# Patient Record
Sex: MALE | Race: WHITE | NOT HISPANIC OR LATINO | Employment: OTHER | URBAN - METROPOLITAN AREA
[De-identification: names, ages, dates, MRNs, and addresses within clinical notes are randomized per-mention and may not be internally consistent; named-entity substitution may affect disease eponyms.]

---

## 2019-05-13 ENCOUNTER — APPOINTMENT (OUTPATIENT)
Dept: RADIOLOGY | Facility: MEDICAL CENTER | Age: 79
End: 2019-05-13
Attending: EMERGENCY MEDICINE
Payer: MEDICARE

## 2019-05-13 ENCOUNTER — HOSPITAL ENCOUNTER (EMERGENCY)
Facility: MEDICAL CENTER | Age: 79
End: 2019-05-13
Attending: EMERGENCY MEDICINE
Payer: MEDICARE

## 2019-05-13 VITALS
SYSTOLIC BLOOD PRESSURE: 113 MMHG | HEART RATE: 60 BPM | OXYGEN SATURATION: 91 % | BODY MASS INDEX: 26.31 KG/M2 | RESPIRATION RATE: 16 BRPM | HEIGHT: 74 IN | WEIGHT: 205.03 LBS | TEMPERATURE: 97.4 F | DIASTOLIC BLOOD PRESSURE: 83 MMHG

## 2019-05-13 DIAGNOSIS — R06.00 ACUTE DYSPNEA: ICD-10-CM

## 2019-05-13 LAB
ALBUMIN SERPL BCP-MCNC: 3.8 G/DL (ref 3.2–4.9)
ALBUMIN/GLOB SERPL: 1.2 G/DL
ALP SERPL-CCNC: 75 U/L (ref 30–99)
ALT SERPL-CCNC: 34 U/L (ref 2–50)
ANION GAP SERPL CALC-SCNC: 7 MMOL/L (ref 0–11.9)
AST SERPL-CCNC: 29 U/L (ref 12–45)
BASOPHILS # BLD AUTO: 0.5 % (ref 0–1.8)
BASOPHILS # BLD: 0.02 K/UL (ref 0–0.12)
BILIRUB SERPL-MCNC: 1.7 MG/DL (ref 0.1–1.5)
BNP SERPL-MCNC: 695 PG/ML (ref 0–100)
BUN SERPL-MCNC: 21 MG/DL (ref 8–22)
CALCIUM SERPL-MCNC: 8.5 MG/DL (ref 8.4–10.2)
CHLORIDE SERPL-SCNC: 107 MMOL/L (ref 96–112)
CO2 SERPL-SCNC: 22 MMOL/L (ref 20–33)
CREAT SERPL-MCNC: 1.42 MG/DL (ref 0.5–1.4)
EKG IMPRESSION: NORMAL
EOSINOPHIL # BLD AUTO: 0.15 K/UL (ref 0–0.51)
EOSINOPHIL NFR BLD: 3.4 % (ref 0–6.9)
ERYTHROCYTE [DISTWIDTH] IN BLOOD BY AUTOMATED COUNT: 48.9 FL (ref 35.9–50)
GLOBULIN SER CALC-MCNC: 3.1 G/DL (ref 1.9–3.5)
GLUCOSE SERPL-MCNC: 141 MG/DL (ref 65–99)
HCT VFR BLD AUTO: 48.2 % (ref 42–52)
HGB BLD-MCNC: 16.2 G/DL (ref 14–18)
IMM GRANULOCYTES # BLD AUTO: 0.01 K/UL (ref 0–0.11)
IMM GRANULOCYTES NFR BLD AUTO: 0.2 % (ref 0–0.9)
LYMPHOCYTES # BLD AUTO: 1.36 K/UL (ref 1–4.8)
LYMPHOCYTES NFR BLD: 31 % (ref 22–41)
MCH RBC QN AUTO: 33 PG (ref 27–33)
MCHC RBC AUTO-ENTMCNC: 33.6 G/DL (ref 33.7–35.3)
MCV RBC AUTO: 98.2 FL (ref 81.4–97.8)
MONOCYTES # BLD AUTO: 0.49 K/UL (ref 0–0.85)
MONOCYTES NFR BLD AUTO: 11.2 % (ref 0–13.4)
NEUTROPHILS # BLD AUTO: 2.36 K/UL (ref 1.82–7.42)
NEUTROPHILS NFR BLD: 53.7 % (ref 44–72)
NRBC # BLD AUTO: 0 K/UL
NRBC BLD-RTO: 0 /100 WBC
PLATELET # BLD AUTO: 167 K/UL (ref 164–446)
PMV BLD AUTO: 11.3 FL (ref 9–12.9)
POTASSIUM SERPL-SCNC: 4.3 MMOL/L (ref 3.6–5.5)
PROT SERPL-MCNC: 6.9 G/DL (ref 6–8.2)
RBC # BLD AUTO: 4.91 M/UL (ref 4.7–6.1)
SODIUM SERPL-SCNC: 136 MMOL/L (ref 135–145)
TROPONIN I SERPL-MCNC: 0.04 NG/ML (ref 0–0.04)
WBC # BLD AUTO: 4.4 K/UL (ref 4.8–10.8)

## 2019-05-13 PROCEDURE — 700111 HCHG RX REV CODE 636 W/ 250 OVERRIDE (IP): Performed by: EMERGENCY MEDICINE

## 2019-05-13 PROCEDURE — 700101 HCHG RX REV CODE 250: Performed by: EMERGENCY MEDICINE

## 2019-05-13 PROCEDURE — 96374 THER/PROPH/DIAG INJ IV PUSH: CPT

## 2019-05-13 PROCEDURE — 85025 COMPLETE CBC W/AUTO DIFF WBC: CPT

## 2019-05-13 PROCEDURE — 99285 EMERGENCY DEPT VISIT HI MDM: CPT

## 2019-05-13 PROCEDURE — 71045 X-RAY EXAM CHEST 1 VIEW: CPT

## 2019-05-13 PROCEDURE — 93005 ELECTROCARDIOGRAM TRACING: CPT | Performed by: EMERGENCY MEDICINE

## 2019-05-13 PROCEDURE — 83880 ASSAY OF NATRIURETIC PEPTIDE: CPT

## 2019-05-13 PROCEDURE — 94640 AIRWAY INHALATION TREATMENT: CPT

## 2019-05-13 PROCEDURE — 80053 COMPREHEN METABOLIC PANEL: CPT

## 2019-05-13 PROCEDURE — 94760 N-INVAS EAR/PLS OXIMETRY 1: CPT

## 2019-05-13 PROCEDURE — 84484 ASSAY OF TROPONIN QUANT: CPT

## 2019-05-13 PROCEDURE — 36415 COLL VENOUS BLD VENIPUNCTURE: CPT

## 2019-05-13 RX ORDER — FUROSEMIDE 20 MG/1
20 TABLET ORAL SEE ADMIN INSTRUCTIONS
Status: SHIPPED | COMMUNITY
End: 2019-12-11

## 2019-05-13 RX ORDER — ALBUTEROL SULFATE 90 UG/1
2 AEROSOL, METERED RESPIRATORY (INHALATION) EVERY 6 HOURS PRN
Qty: 8.5 G | Refills: 0 | Status: SHIPPED | OUTPATIENT
Start: 2019-05-13 | End: 2024-01-11

## 2019-05-13 RX ORDER — FUROSEMIDE 10 MG/ML
40 INJECTION INTRAMUSCULAR; INTRAVENOUS ONCE
Status: COMPLETED | OUTPATIENT
Start: 2019-05-13 | End: 2019-05-13

## 2019-05-13 RX ORDER — CHLORAL HYDRATE 500 MG
1000 CAPSULE ORAL DAILY
COMMUNITY

## 2019-05-13 RX ORDER — CARVEDILOL 25 MG/1
12.5-25 TABLET ORAL 2 TIMES DAILY
Status: SHIPPED | COMMUNITY
End: 2022-12-09 | Stop reason: SDUPTHER

## 2019-05-13 RX ORDER — AMIODARONE HYDROCHLORIDE 200 MG/1
200 TABLET ORAL DAILY
Status: SHIPPED | COMMUNITY
End: 2021-03-15 | Stop reason: SDUPTHER

## 2019-05-13 RX ORDER — CINACALCET 30 MG/1
30 TABLET, FILM COATED ORAL DAILY
COMMUNITY

## 2019-05-13 RX ORDER — METHYLPREDNISOLONE 4 MG/1
TABLET ORAL
Qty: 1 KIT | Refills: 0 | Status: SHIPPED | OUTPATIENT
Start: 2019-05-13 | End: 2019-07-11

## 2019-05-13 RX ORDER — ATORVASTATIN CALCIUM 20 MG/1
20 TABLET, FILM COATED ORAL EVERY EVENING
Status: SHIPPED | COMMUNITY
End: 2022-12-09

## 2019-05-13 RX ORDER — AMLODIPINE BESYLATE 5 MG/1
5 TABLET ORAL DAILY
Status: SHIPPED | COMMUNITY
End: 2019-07-11 | Stop reason: SDUPTHER

## 2019-05-13 RX ORDER — IPRATROPIUM BROMIDE AND ALBUTEROL SULFATE 2.5; .5 MG/3ML; MG/3ML
3 SOLUTION RESPIRATORY (INHALATION) ONCE
Status: COMPLETED | OUTPATIENT
Start: 2019-05-13 | End: 2019-05-13

## 2019-05-13 RX ORDER — TAMSULOSIN HYDROCHLORIDE 0.4 MG/1
0.8 CAPSULE ORAL
Status: SHIPPED | COMMUNITY
End: 2020-06-01

## 2019-05-13 RX ADMIN — IPRATROPIUM BROMIDE AND ALBUTEROL SULFATE 3 ML: .5; 3 SOLUTION RESPIRATORY (INHALATION) at 12:34

## 2019-05-13 RX ADMIN — FUROSEMIDE 40 MG: 10 INJECTION, SOLUTION INTRAMUSCULAR; INTRAVENOUS at 13:33

## 2019-05-13 NOTE — ED NOTES
Med rec updated and complete  Allergies reviewed  Interviewed pt with wife at bedside with permission from pt.  Pt had a list of medications on his phone, went over all medications.  Pt reports no antibiotics in the last 30 days.

## 2019-05-13 NOTE — ED PROVIDER NOTES
"CHIEF COMPLAINT  Chief Complaint   Patient presents with   • Shortness of Breath     x \" a few days off and on\" , today was the worst. PMH: cardiomyopathy, defibrillator . Keagan ERWIN. Pt recent located from [place].    • Dizziness   • Blurred Vision       HPI  Alejandra Parker is a 78 y.o. male who presents complaints of a shortness of breath.  The shortness of breath is with any exertion.  Has no chest pain sweating or nausea when this occurs.  He does have a history of COPD and has moved here recently from [facility] which is a much lower elevation with higher oxygen pressures.  He also has history of a cardiomyopathy has a pacemaker.  He has not been sick with cough congestion or fevers.  Currently has no shortness of breath.    He is also complaining of intermittent episodes of lightheadedness, the seem to be worse when he is standing and happens almost every time he does stand up.  Again with this he has no chest pain or shortness of breath with it.  He has not had a syncopal episode  REVIEW OF SYSTEMS  See HPI for further details. All other systems are negative.     PAST MEDICAL HISTORY   has a past medical history of Cardiomyopathy (HCC); Chronic obstructive pulmonary disease (HCC); Renal disorder; and Stroke (HCC) (2012).    SOCIAL HISTORY  Social History     Social History Main Topics   • Smoking status: Never Smoker   • Smokeless tobacco: Never Used   • Alcohol use Yes      Comment: wine occasionally   • Drug use: No   • Sexual activity: Not on file       SURGICAL HISTORY  patient denies any surgical history    CURRENT MEDICATIONS  Home Medications     Reviewed by Alfonso Singh (Pharmacy Tech) on 05/13/19 at 1125  Med List Status: Complete   Medication Last Dose Status   amiodarone (CORDARONE) 200 MG Tab 5/13/2019 Active   amLODIPine (NORVASC) 5 MG Tab 5/13/2019 Active   aspirin EC (ECOTRIN) 81 MG Tablet Delayed Response 5/13/2019 Active   atorvastatin (LIPITOR) 20 MG Tab 5/13/2019 Active   carvedilol " "(COREG) 25 MG Tab 5/13/2019 Active   cinacalcet (SENSIPAR) 30 MG Tab 5/13/2019 Active   furosemide (LASIX) 20 MG Tab 5/13/2019 Active   multivitamin (THERAGRAN) Tab 5/13/2019 Active   Omega-3 Fatty Acids (FISH OIL) 1000 MG Cap capsule 5/13/2019 Active   tamsulosin (FLOMAX) 0.4 MG capsule 5/13/2019 Active                ALLERGIES  No Known Allergies    PHYSICAL EXAM  VITAL SIGNS: /83   Pulse 65   Temp 36.3 °C (97.4 °F) (Temporal)   Resp 18   Ht 1.88 m (6' 2\")   Wt 93 kg (205 lb 0.4 oz)   SpO2 92%   BMI 26.32 kg/m²   Pulse ox interpretation: pulse ox is normal.  Constitutional: Alert in no apparent distress.  HENT: No signs of trauma, Bilateral external ears normal, Nose normal.   Eyes: Pupils are equal and reactive, Conjunctiva normal, Non-icteric.   Neck: Normal range of motion, No tenderness, Supple, No stridor.   Lymphatic: No lymphadenopathy noted.   Cardiovascular: Regular rate and rhythm, no murmurs.   Thorax & Lungs: Normal breath sounds, No respiratory distress, No wheezing, No chest tenderness.  Left chest mass consistent with defibrillator  Abdomen: Bowel sounds normal, Soft, No tenderness, No masses, No pulsatile masses. No peritoneal signs.  Skin: Warm, Dry, No erythema, No rash.   Back: No bony tenderness, No CVA tenderness.   Extremities: Intact distal pulses, No edema, No tenderness, No cyanosis,  Negative Vamshi's sign.   Musculoskeletal: Good range of motion in all major joints. No tenderness to palpation or major deformities noted.   Neurologic: Alert , Normal motor function, Normal sensory function, No focal deficits noted.   Psychiatric: Affect normal, Judgment normal, Mood normal.       MEDICAL DECISION MAKING  Has a history of COPD, is having shortness of breath, this may be COPD exacerbation, pneumonia, bronchitis, also concerns of changes in oxygen pressures.  He has a history of cardiomyopathy congestive heart failure and cardiac injury are also considered.  Lab tests are being " initiated to evaluate this.  Currently the patient has no respiratory distress lungs are clear to auscultation he is not hypoxic.  Is also complaining of orthostatic lightheadedness.  IV fluids will be held at this time pending evaluation for possible congestive heart failure as cause of his shortness of breath.      DIAGNOSTIC STUDIES / PROCEDURES    EKG  Results for orders placed or performed during the hospital encounter of 05/13/19   CBC WITH DIFFERENTIAL   Result Value Ref Range    WBC 4.4 (L) 4.8 - 10.8 K/uL    RBC 4.91 4.70 - 6.10 M/uL    Hemoglobin 16.2 14.0 - 18.0 g/dL    Hematocrit 48.2 42.0 - 52.0 %    MCV 98.2 (H) 81.4 - 97.8 fL    MCH 33.0 27.0 - 33.0 pg    MCHC 33.6 (L) 33.7 - 35.3 g/dL    RDW 48.9 35.9 - 50.0 fL    Platelet Count 167 164 - 446 K/uL    MPV 11.3 9.0 - 12.9 fL    Neutrophils-Polys 53.70 44.00 - 72.00 %    Lymphocytes 31.00 22.00 - 41.00 %    Monocytes 11.20 0.00 - 13.40 %    Eosinophils 3.40 0.00 - 6.90 %    Basophils 0.50 0.00 - 1.80 %    Immature Granulocytes 0.20 0.00 - 0.90 %    Nucleated RBC 0.00 /100 WBC    Neutrophils (Absolute) 2.36 1.82 - 7.42 K/uL    Lymphs (Absolute) 1.36 1.00 - 4.80 K/uL    Monos (Absolute) 0.49 0.00 - 0.85 K/uL    Eos (Absolute) 0.15 0.00 - 0.51 K/uL    Baso (Absolute) 0.02 0.00 - 0.12 K/uL    Immature Granulocytes (abs) 0.01 0.00 - 0.11 K/uL    NRBC (Absolute) 0.00 K/uL   COMP METABOLIC PANEL   Result Value Ref Range    Sodium 136 135 - 145 mmol/L    Potassium 4.3 3.6 - 5.5 mmol/L    Chloride 107 96 - 112 mmol/L    Co2 22 20 - 33 mmol/L    Anion Gap 7.0 0.0 - 11.9    Glucose 141 (H) 65 - 99 mg/dL    Bun 21 8 - 22 mg/dL    Creatinine 1.42 (H) 0.50 - 1.40 mg/dL    Calcium 8.5 8.4 - 10.2 mg/dL    AST(SGOT) 29 12 - 45 U/L    ALT(SGPT) 34 2 - 50 U/L    Alkaline Phosphatase 75 30 - 99 U/L    Total Bilirubin 1.7 (H) 0.1 - 1.5 mg/dL    Albumin 3.8 3.2 - 4.9 g/dL    Total Protein 6.9 6.0 - 8.2 g/dL    Globulin 3.1 1.9 - 3.5 g/dL    A-G Ratio 1.2 g/dL   TROPONIN    Result Value Ref Range    Troponin I 0.04 0.00 - 0.04 ng/mL   BTYPE NATRIURETIC PEPTIDE   Result Value Ref Range    B Natriuretic Peptide 695 (H) 0 - 100 pg/mL   ESTIMATED GFR   Result Value Ref Range    GFR If  59 (A) >60 mL/min/1.73 m 2    GFR If Non  48 (A) >60 mL/min/1.73 m 2   EKG (NOW)   Result Value Ref Range    Report       [facility] Emergency Dept.    Test Date:  2019  Pt Name:    TIMI LEONARD               Department: [facility]  MRN:        [id number]                      [facility]  Gender:     Male                         Technician: MANNY  :        1940                   Requested By:JOANN WALLACE  Order #:    [id number]                    Reading MD: JOANN WALLACE D.O.    Measurements  Intervals                                Axis  Rate:       61                           P:  PA:                                      QRS:        23  QRSD:       132                          T:          142  QT:         492  QTc:        496    Interpretive Statements  FAILURE TO SENSE AND/OR CAPTURE (?MAGNET)  ACCELERATED JUNCTIONAL ESCAPE RHYTHM  NONSPECIFIC INTRAVENTRICULAR CONDUCTION DELAY  No previous ECG available for comparison    Electronically Signed On 2019 12:19:57 PDT by JOANN WALLACE D.O.       DX-CHEST-PORTABLE (1 VIEW)   Final Result         1. No pulmonary infiltrates or consolidations are noted.      2. Cardiomegaly.                COURSE  Pertinent Labs & Imaging studies reviewed. (See chart for details)  Patient has a history of cardiomyopathy, COPD, and is just moved here from [place] which is a significant change in elevation he is developed some shortness of breath since he has been here.  This is with exertion there is no chest pain with this.  His troponin is negative, his EKG has pacemaker changes, I do not suspect cardiac injury as etiology of her shortness of breath.  His BNP is mildly elevated but his  chest x-ray shows no pulmonary edema, mild congestive heart failure decompensated is possible so he will receive an extra dose of Lasix.  Also COPD is a concern.  Be placed on steroids and albuterol.  I believe some of this may have to do with acclimatization, and this will take some time several weeks.  He is not hypoxic and no respiratory distress his lungs are clear to auscultation.  There is no indication at this time for admission and I densely made for outpatient management with maximizing his breathing potential.  He is to return if his symptoms change or worsen.  The patient will not drink alcohol nor drive with prescribed medications. The patient will return for worsening symptoms and is stable at the time of discharge. The patient verbalizes understanding and will comply.    FINAL IMPRESSION  1. Acute dyspnea               Electronically signed by: Angel Saunders, 5/13/2019 11:53 AM    ED Provider Note

## 2019-05-13 NOTE — ED TRIAGE NOTES
"Chief Complaint   Patient presents with   • Shortness of Breath     x \" a few days off and on\" , today was the worst. PMH: cardiomyopathy, defibrillator . Keagan ERWIN. Pt recent located from [place].    • Dizziness   • Blurred Vision       "

## 2019-05-13 NOTE — DISCHARGE INSTRUCTIONS
The things contributing to your shortness of breath is a change in out COPD, and perhaps some mild congestive heart failure.  You are being given an additional dose of Lasix now.  Please continue your previous doses.  You are also being treated with a low-dose steroid along with albuterol.  This can help with shortness of breath.  Typically getting acclimatized to this elevation may take a couple weeks.  Return if you develop chest pain or worsening symptoms.

## 2019-05-13 NOTE — FLOWSHEET NOTE
05/13/19 1234   Events/Summary/Plan   Events/Summary/Plan ER TX   Interdisciplinary Plan of Care-Goals (Indications)   Bronchodilator Indications History / Diagnosis   Interdisciplinary Plan of Care-Outcomes    Bronchodilator Outcome Patient at Stable Baseline   Education   Education Yes - Pt. / Family has been Instructed in use of Respiratory Equipment;Yes - Pt. / Family has been Instructed in use of Respiratory Medications and Adverse Reactions   RT Assessment of Delivered Medications   Evaluation of Medication Delivery Daily Yes-- Pt /Family has been Instructed in use of Respiratory Medications and Adverse Reactions   SVN Group   #SVN Performed Yes   Given By: Mouthpiece   Date SVN Last Changed 05/13/19   Respiratory WDL   Respiratory (WDL) X   Chest Exam   Respiration 18   Pulse 65   Heart Rate (Monitored) 64   Breath Sounds   Pre/Post Intervention Pre Intervention Assessment   RUL Breath Sounds Clear;Diminished   RML Breath Sounds Clear;Diminished   RLL Breath Sounds Diminished   CECELIA Breath Sounds Clear   LLL Breath Sounds Clear   Oximetry   #Pulse Oximetry (Single Determination) Yes   Oxygen   Home O2 Use Prior To Admission? No   Pulse Oximetry 92 %   O2 Daily Delivery Respiratory  Room Air with O2 Available

## 2019-07-11 ENCOUNTER — TELEPHONE (OUTPATIENT)
Dept: CARDIOLOGY | Facility: MEDICAL CENTER | Age: 79
End: 2019-07-11

## 2019-07-11 ENCOUNTER — NON-PROVIDER VISIT (OUTPATIENT)
Dept: CARDIOLOGY | Facility: MEDICAL CENTER | Age: 79
End: 2019-07-11
Payer: MEDICARE

## 2019-07-11 ENCOUNTER — OFFICE VISIT (OUTPATIENT)
Dept: CARDIOLOGY | Facility: MEDICAL CENTER | Age: 79
End: 2019-07-11
Payer: MEDICARE

## 2019-07-11 VITALS
BODY MASS INDEX: 25.93 KG/M2 | OXYGEN SATURATION: 93 % | HEART RATE: 88 BPM | SYSTOLIC BLOOD PRESSURE: 100 MMHG | WEIGHT: 202 LBS | DIASTOLIC BLOOD PRESSURE: 70 MMHG | HEIGHT: 74 IN

## 2019-07-11 DIAGNOSIS — E78.49 OTHER HYPERLIPIDEMIA: ICD-10-CM

## 2019-07-11 DIAGNOSIS — Z79.899 ENCOUNTER FOR LONG-TERM (CURRENT) USE OF HIGH-RISK MEDICATION: ICD-10-CM

## 2019-07-11 DIAGNOSIS — Z95.810 AUTOMATIC IMPLANTABLE CARDIOVERTER-DEFIBRILLATOR IN SITU: ICD-10-CM

## 2019-07-11 DIAGNOSIS — R06.02 SHORTNESS OF BREATH: ICD-10-CM

## 2019-07-11 DIAGNOSIS — I42.8 OTHER CARDIOMYOPATHY (HCC): ICD-10-CM

## 2019-07-11 DIAGNOSIS — I47.20 VT (VENTRICULAR TACHYCARDIA) (HCC): ICD-10-CM

## 2019-07-11 DIAGNOSIS — I49.9 CARDIAC ARRHYTHMIA, UNSPECIFIED CARDIAC ARRHYTHMIA TYPE: ICD-10-CM

## 2019-07-11 LAB — EKG IMPRESSION: NORMAL

## 2019-07-11 PROCEDURE — 93283 PRGRMG EVAL IMPLANTABLE DFB: CPT | Performed by: INTERNAL MEDICINE

## 2019-07-11 PROCEDURE — 93000 ELECTROCARDIOGRAM COMPLETE: CPT | Mod: 59 | Performed by: INTERNAL MEDICINE

## 2019-07-11 PROCEDURE — 99205 OFFICE O/P NEW HI 60 MIN: CPT | Mod: 25 | Performed by: INTERNAL MEDICINE

## 2019-07-11 RX ORDER — AMLODIPINE BESYLATE 2.5 MG/1
2.5 TABLET ORAL DAILY
Qty: 30 TAB | Refills: 11 | Status: SHIPPED | OUTPATIENT
Start: 2019-07-11 | End: 2019-10-28

## 2019-07-11 ASSESSMENT — ENCOUNTER SYMPTOMS
PND: 0
ABDOMINAL PAIN: 0
PALPITATIONS: 0
DEPRESSION: 0
ORTHOPNEA: 0
SHORTNESS OF BREATH: 1
DIZZINESS: 1
FALLS: 0
LOSS OF CONSCIOUSNESS: 0

## 2019-07-11 NOTE — TELEPHONE ENCOUNTER
----- Message from Becka Martines M.D. sent at 7/11/2019 11:12 AM PDT -----  Graciela Pond,     This patient needs an urgent ICD interrogation. Per recent report from his prior cardiologist, his ICD went off recently. We don't have any records.   Can you get him in for an interrogation in the week or less either at  or [facility]?    Thanks  AA

## 2019-07-11 NOTE — PROGRESS NOTES
Chief Complaint   Patient presents with   • Cardiomyopathy (Non-ischemic)   • Shortness of Breath       Subjective:   Lencho Parker is a 78 y.o. male referred to cardiology clinic for evaluation of cardiomyopathy.      Patient reports being diagnosed with cardiomyopathy in the late 1990s at which time he was also told that his heart rhythm is abnormal.  He initially received a pacemaker in about a year later he was upgraded to an ICD (approximately 1999).  He reports having a generator change about 5 years ago.  He has been on amiodarone for almost 20 years.    He moved from [place] 3 months ago.  He recently received a call from his previous cardiologist office stating that he had an event on his ICD recently with possible shock from the defibrillator.  Patient does not have any further details about this.  His previous cardiologist was Dr. Limon in [place].    He exercises regularly.  He does labs in a 50 m pool at least 3 times a week without any dyspnea or chest discomfort.  He denies any heart failure symptoms.  He denies any palpitations.  His blood pressure ranges between 80s to low 100 systolic.  When his blood pressures in the 80s he often gets dizziness.  He denies any associated palpitations.  No history of syncope.    He is currently on Lasix 20 mg Monday Wednesday and Friday.    Other pertinent history:  History of AAA status post stent approximately 10 years ago  Solitary kidney.  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  Patient denies having any neurologic deficits.    Past Medical History:   Diagnosis Date   • Cardiomyopathy (HCC)    • Chronic obstructive pulmonary disease (HCC)    • Renal disorder     Pt  donated 1 kidney to son.    • Stroke (HCC) 2012    tia     History reviewed. No pertinent surgical history.  History reviewed. No pertinent family history.  Social History     Social History   • Marital status: Single     Spouse  name: N/A   • Number of children: N/A   • Years of education: N/A     Occupational History   • Not on file.     Social History Main Topics   • Smoking status: Former Smoker     Packs/day: 0.50     Types: Cigarettes     Quit date: 7/11/2004   • Smokeless tobacco: Never Used   • Alcohol use Yes      Comment: wine occasionally   • Drug use: No   • Sexual activity: Not on file     Other Topics Concern   • Not on file     Social History Narrative   • No narrative on file     No Known Allergies  Outpatient Encounter Prescriptions as of 7/11/2019   Medication Sig Dispense Refill   • amLODIPine (NORVASC) 2.5 MG Tab Take 1 Tab by mouth every day. 30 Tab 11   • aspirin EC (ECOTRIN) 81 MG Tablet Delayed Response Take 81 mg by mouth every day.     • amiodarone (CORDARONE) 200 MG Tab Take 200 mg by mouth every day.     • tamsulosin (FLOMAX) 0.4 MG capsule Take 0.8 mg by mouth ONE-HALF HOUR AFTER BREAKFAST.     • cinacalcet (SENSIPAR) 30 MG Tab Take 30 mg by mouth every day.     • furosemide (LASIX) 20 MG Tab Take 20 mg by mouth See Admin Instructions. Every Mon-Wed-Fri     • atorvastatin (LIPITOR) 20 MG Tab Take 20 mg by mouth every day.     • multivitamin (THERAGRAN) Tab Take 1 Tab by mouth every day.     • albuterol 108 (90 Base) MCG/ACT Aero Soln inhalation aerosol Inhale 2 Puffs by mouth every 6 hours as needed for Shortness of Breath. 8.5 g 0   • carvedilol (COREG) 25 MG Tab Take 25 mg by mouth every day. Pt takes 25MG in AM and 12.5MG HS      • Omega-3 Fatty Acids (FISH OIL) 1000 MG Cap capsule Take 1,000 mg by mouth every day.     • [DISCONTINUED] amLODIPine (NORVASC) 5 MG Tab Take 5 mg by mouth every day.     • [DISCONTINUED] MethylPREDNISolone (MEDROL DOSEPAK) 4 MG Tablet Therapy Pack Use as directed 1 Kit 0     No facility-administered encounter medications on file as of 7/11/2019.      Review of Systems   Constitutional: Negative for malaise/fatigue.   Respiratory: Positive for shortness of breath.    Cardiovascular:  "Negative for chest pain, palpitations, orthopnea, leg swelling and PND.   Gastrointestinal: Negative for abdominal pain.   Musculoskeletal: Negative for falls.   Neurological: Positive for dizziness. Negative for loss of consciousness.   Psychiatric/Behavioral: Negative for depression.   All other systems reviewed and are negative.       Objective:   /70 (BP Location: Right arm, Patient Position: Sitting)   Pulse 88   Ht 1.88 m (6' 2\")   Wt 91.6 kg (202 lb)   SpO2 93%   BMI 25.94 kg/m²     Physical Exam   Constitutional: He is oriented to person, place, and time. He appears well-developed and well-nourished. No distress.   HENT:   Head: Normocephalic and atraumatic.   Eyes: Conjunctivae are normal. No scleral icterus.   Neck: Normal range of motion. Neck supple.   Cardiovascular: Normal rate, regular rhythm and normal heart sounds.  Exam reveals no gallop and no friction rub.    No murmur heard.  Pulmonary/Chest: Effort normal and breath sounds normal. No respiratory distress. He has no wheezes. He has no rales.   Abdominal: Soft. He exhibits no distension. There is no tenderness.   Musculoskeletal: He exhibits no edema.   Neurological: He is alert and oriented to person, place, and time.   Skin: Skin is warm and dry. He is not diaphoretic.   Psychiatric: He has a normal mood and affect. His behavior is normal.   Nursing note and vitals reviewed.    EKG performed today was personally reviewed and per my interpretation shows atrial paced rhythm with a long MS interval.  Nonspecific internal jugular conduction delay.  Nonspecific T wave changes in the anterolateral leads.    Assessment:     1. Other cardiomyopathy (HCC)  EKG    Lipid Profile    EC-ECHOCARDIOGRAM COMPLETE W/O CONT   2. Cardiac arrhythmia, unspecified cardiac arrhythmia type  TSH    FREE THYROXINE    HEPATIC FUNCTION PANEL   3. Shortness of breath  PULMONARY FUNCTION TESTS -Test requested: Complete Pulmonary Function Test    EC-ECHOCARDIOGRAM " COMPLETE W/O CONT   4. Other hyperlipidemia  Lipid Profile   5. Encounter for long-term (current) use of high-risk medication         Medical Decision Making:  Today's Assessment / Status / Plan:     History of arrhythmia:  Status post ICD:  Patient reports that he was contacted by his previous cardiologist about an event on his ICD recently.  We will request an urgent interrogation in our clinic in the next week for further evaluation.  He is currently on amiodarone for this reason.  Thyroid and liver function testing was ordered today.    Dyspnea: Since moving to [place], possibly related to the elevation.  Patient does not have symptoms with exercise.  He has been on amiodarone for almost 20 years.  I will refer him for pulmonary function testing for further evaluation.  If the patient starts having exertional symptoms, he should let us know.    Cardiomyopathy:  Hypertension:  Dizziness:  Patient is euvolemic on exam today.  Echocardiogram has been ordered to evaluate LV function.  His blood pressure is at goal however at home he has been significantly lower with associated symptoms.  Will reduce amlodipine to 2.5 mg once a day.  If patient continues to have persistently lower blood pressures, he will let us know.    Return to clinic in 4 months or earlier if needed.    Thank you for allowing me to participate in the care of this patient. Please do not hesitate to contact me with any questions.    Becka Martines MD  Cardiologist  [facility]      PLEASE NOTE: This dictation was created using voice recognition software.

## 2019-07-17 ENCOUNTER — HOSPITAL ENCOUNTER (OUTPATIENT)
Dept: LAB | Facility: MEDICAL CENTER | Age: 79
End: 2019-07-17
Attending: INTERNAL MEDICINE
Payer: MEDICARE

## 2019-07-17 DIAGNOSIS — I49.9 CARDIAC ARRHYTHMIA, UNSPECIFIED CARDIAC ARRHYTHMIA TYPE: ICD-10-CM

## 2019-07-17 DIAGNOSIS — E78.49 OTHER HYPERLIPIDEMIA: ICD-10-CM

## 2019-07-17 DIAGNOSIS — I42.8 OTHER CARDIOMYOPATHY (HCC): ICD-10-CM

## 2019-07-17 LAB
ALBUMIN SERPL BCP-MCNC: 4 G/DL (ref 3.2–4.9)
ALBUMIN SERPL BCP-MCNC: 4.1 G/DL (ref 3.2–4.9)
ALBUMIN/GLOB SERPL: 1.4 G/DL
ALP SERPL-CCNC: 71 U/L (ref 30–99)
ALP SERPL-CCNC: 78 U/L (ref 30–99)
ALT SERPL-CCNC: 33 U/L (ref 2–50)
ALT SERPL-CCNC: 35 U/L (ref 2–50)
ANION GAP SERPL CALC-SCNC: 9 MMOL/L (ref 0–11.9)
APPEARANCE UR: CLEAR
AST SERPL-CCNC: 22 U/L (ref 12–45)
AST SERPL-CCNC: 22 U/L (ref 12–45)
BASOPHILS # BLD AUTO: 0.2 % (ref 0–1.8)
BASOPHILS # BLD: 0.01 K/UL (ref 0–0.12)
BILIRUB CONJ SERPL-MCNC: 0.2 MG/DL (ref 0.1–0.5)
BILIRUB INDIRECT SERPL-MCNC: 1.1 MG/DL (ref 0–1)
BILIRUB SERPL-MCNC: 1.3 MG/DL (ref 0.1–1.5)
BILIRUB SERPL-MCNC: 1.3 MG/DL (ref 0.1–1.5)
BILIRUB UR QL STRIP.AUTO: NEGATIVE
BUN SERPL-MCNC: 24 MG/DL (ref 8–22)
CALCIUM SERPL-MCNC: 9 MG/DL (ref 8.5–10.5)
CHLORIDE SERPL-SCNC: 107 MMOL/L (ref 96–112)
CHOLEST SERPL-MCNC: 192 MG/DL (ref 100–199)
CO2 SERPL-SCNC: 22 MMOL/L (ref 20–33)
COLOR UR: NORMAL
CREAT SERPL-MCNC: 1.67 MG/DL (ref 0.5–1.4)
CREAT UR-MCNC: 180.6 MG/DL
EOSINOPHIL # BLD AUTO: 0.24 K/UL (ref 0–0.51)
EOSINOPHIL NFR BLD: 5.3 % (ref 0–6.9)
ERYTHROCYTE [DISTWIDTH] IN BLOOD BY AUTOMATED COUNT: 51.1 FL (ref 35.9–50)
FASTING STATUS PATIENT QL REPORTED: NORMAL
FASTING STATUS PATIENT QL REPORTED: NORMAL
GLOBULIN SER CALC-MCNC: 2.8 G/DL (ref 1.9–3.5)
GLUCOSE SERPL-MCNC: 130 MG/DL (ref 65–99)
GLUCOSE UR STRIP.AUTO-MCNC: NEGATIVE MG/DL
HCT VFR BLD AUTO: 49.9 % (ref 42–52)
HDLC SERPL-MCNC: 61 MG/DL
HGB BLD-MCNC: 17 G/DL (ref 14–18)
IMM GRANULOCYTES # BLD AUTO: 0.01 K/UL (ref 0–0.11)
IMM GRANULOCYTES NFR BLD AUTO: 0.2 % (ref 0–0.9)
KETONES UR STRIP.AUTO-MCNC: NEGATIVE MG/DL
LDLC SERPL CALC-MCNC: 113 MG/DL
LEUKOCYTE ESTERASE UR QL STRIP.AUTO: NEGATIVE
LYMPHOCYTES # BLD AUTO: 1.26 K/UL (ref 1–4.8)
LYMPHOCYTES NFR BLD: 27.8 % (ref 22–41)
MCH RBC QN AUTO: 33.9 PG (ref 27–33)
MCHC RBC AUTO-ENTMCNC: 34.1 G/DL (ref 33.7–35.3)
MCV RBC AUTO: 99.6 FL (ref 81.4–97.8)
MICRO URNS: NORMAL
MONOCYTES # BLD AUTO: 0.47 K/UL (ref 0–0.85)
MONOCYTES NFR BLD AUTO: 10.4 % (ref 0–13.4)
NEUTROPHILS # BLD AUTO: 2.54 K/UL (ref 1.82–7.42)
NEUTROPHILS NFR BLD: 56.1 % (ref 44–72)
NITRITE UR QL STRIP.AUTO: NEGATIVE
NRBC # BLD AUTO: 0 K/UL
NRBC BLD-RTO: 0 /100 WBC
PH UR STRIP.AUTO: 5.5 [PH]
PHOSPHATE SERPL-MCNC: 3.7 MG/DL (ref 2.5–4.5)
PLATELET # BLD AUTO: 157 K/UL (ref 164–446)
PMV BLD AUTO: 12.4 FL (ref 9–12.9)
POTASSIUM SERPL-SCNC: 4.4 MMOL/L (ref 3.6–5.5)
PROT SERPL-MCNC: 6.8 G/DL (ref 6–8.2)
PROT SERPL-MCNC: 6.9 G/DL (ref 6–8.2)
PROT UR QL STRIP: NEGATIVE MG/DL
PROT UR-MCNC: 25.8 MG/DL (ref 0–15)
PROT/CREAT UR: 143 MG/G (ref 15–68)
RBC # BLD AUTO: 5.01 M/UL (ref 4.7–6.1)
RBC UR QL AUTO: NEGATIVE
SODIUM SERPL-SCNC: 138 MMOL/L (ref 135–145)
SP GR UR STRIP.AUTO: 1.02
T4 FREE SERPL-MCNC: 1.13 NG/DL (ref 0.53–1.43)
TRIGL SERPL-MCNC: 89 MG/DL (ref 0–149)
TSH SERPL DL<=0.005 MIU/L-ACNC: 4.63 UIU/ML (ref 0.38–5.33)
UROBILINOGEN UR STRIP.AUTO-MCNC: 1 MG/DL
WBC # BLD AUTO: 4.5 K/UL (ref 4.8–10.8)

## 2019-07-17 PROCEDURE — 84443 ASSAY THYROID STIM HORMONE: CPT

## 2019-07-17 PROCEDURE — 80061 LIPID PANEL: CPT

## 2019-07-17 PROCEDURE — 84100 ASSAY OF PHOSPHORUS: CPT

## 2019-07-17 PROCEDURE — 84439 ASSAY OF FREE THYROXINE: CPT

## 2019-07-17 PROCEDURE — 85025 COMPLETE CBC W/AUTO DIFF WBC: CPT

## 2019-07-17 PROCEDURE — 80076 HEPATIC FUNCTION PANEL: CPT

## 2019-07-17 PROCEDURE — 80053 COMPREHEN METABOLIC PANEL: CPT

## 2019-07-17 PROCEDURE — 36415 COLL VENOUS BLD VENIPUNCTURE: CPT

## 2019-07-17 PROCEDURE — 81003 URINALYSIS AUTO W/O SCOPE: CPT

## 2019-08-29 ENCOUNTER — HOSPITAL ENCOUNTER (OUTPATIENT)
Dept: CARDIOLOGY | Facility: MEDICAL CENTER | Age: 79
End: 2019-08-29
Attending: INTERNAL MEDICINE
Payer: MEDICARE

## 2019-08-29 DIAGNOSIS — I42.8 OTHER CARDIOMYOPATHY (HCC): ICD-10-CM

## 2019-08-29 DIAGNOSIS — R06.02 SHORTNESS OF BREATH: ICD-10-CM

## 2019-08-29 LAB
LV EJECT FRACT  99904: 15
LV EJECT FRACT MOD 4C 99902: 22

## 2019-08-29 PROCEDURE — 700117 HCHG RX CONTRAST REV CODE 255: Performed by: INTERNAL MEDICINE

## 2019-08-29 PROCEDURE — 93306 TTE W/DOPPLER COMPLETE: CPT

## 2019-08-29 PROCEDURE — 93306 TTE W/DOPPLER COMPLETE: CPT | Mod: 26 | Performed by: INTERNAL MEDICINE

## 2019-08-29 RX ADMIN — HUMAN ALBUMIN MICROSPHERES AND PERFLUTREN 3 ML: 10; .22 INJECTION, SOLUTION INTRAVENOUS at 14:45

## 2019-08-30 ENCOUNTER — TELEPHONE (OUTPATIENT)
Dept: CARDIOLOGY | Facility: MEDICAL CENTER | Age: 79
End: 2019-08-30

## 2019-08-30 NOTE — TELEPHONE ENCOUNTER
----- Message from Becka Martines M.D. sent at 8/29/2019  4:59 PM PDT -----  Reviewed echocardiogram.  EF is severely reduced.  If the patient is still having shortness of breath please increase Lasix to 40 mg once daily for 1 week following which he should reduce back to 20 mg once daily.  Thank you   AA

## 2019-10-02 ENCOUNTER — HOSPITAL ENCOUNTER (OUTPATIENT)
Dept: LAB | Facility: MEDICAL CENTER | Age: 79
End: 2019-10-02
Attending: INTERNAL MEDICINE
Payer: MEDICARE

## 2019-10-02 LAB
25(OH)D3 SERPL-MCNC: 26 NG/ML (ref 30–100)
ALBUMIN SERPL BCP-MCNC: 4.1 G/DL (ref 3.2–4.9)
APPEARANCE UR: CLEAR
BASOPHILS # BLD AUTO: 0.2 % (ref 0–1.8)
BASOPHILS # BLD: 0.01 K/UL (ref 0–0.12)
BILIRUB UR QL STRIP.AUTO: NEGATIVE
BUN SERPL-MCNC: 22 MG/DL (ref 8–22)
CALCIUM SERPL-MCNC: 8.6 MG/DL (ref 8.5–10.5)
CHLORIDE SERPL-SCNC: 108 MMOL/L (ref 96–112)
CO2 SERPL-SCNC: 21 MMOL/L (ref 20–33)
COLOR UR: YELLOW
CREAT SERPL-MCNC: 1.36 MG/DL (ref 0.5–1.4)
CREAT UR-MCNC: 96 MG/DL
CREAT UR-MCNC: 96.7 MG/DL
EOSINOPHIL # BLD AUTO: 0.21 K/UL (ref 0–0.51)
EOSINOPHIL NFR BLD: 3.9 % (ref 0–6.9)
ERYTHROCYTE [DISTWIDTH] IN BLOOD BY AUTOMATED COUNT: 51.3 FL (ref 35.9–50)
EST. AVERAGE GLUCOSE BLD GHB EST-MCNC: 120 MG/DL
FASTING STATUS PATIENT QL REPORTED: NORMAL
GLUCOSE SERPL-MCNC: 102 MG/DL (ref 65–99)
GLUCOSE UR STRIP.AUTO-MCNC: NEGATIVE MG/DL
HBA1C MFR BLD: 5.8 % (ref 0–5.6)
HCT VFR BLD AUTO: 49.3 % (ref 42–52)
HGB BLD-MCNC: 16.2 G/DL (ref 14–18)
IMM GRANULOCYTES # BLD AUTO: 0.01 K/UL (ref 0–0.11)
IMM GRANULOCYTES NFR BLD AUTO: 0.2 % (ref 0–0.9)
KETONES UR STRIP.AUTO-MCNC: NEGATIVE MG/DL
LEUKOCYTE ESTERASE UR QL STRIP.AUTO: NEGATIVE
LYMPHOCYTES # BLD AUTO: 1.59 K/UL (ref 1–4.8)
LYMPHOCYTES NFR BLD: 29.7 % (ref 22–41)
MAGNESIUM SERPL-MCNC: 1.9 MG/DL (ref 1.5–2.5)
MCH RBC QN AUTO: 33.6 PG (ref 27–33)
MCHC RBC AUTO-ENTMCNC: 32.9 G/DL (ref 33.7–35.3)
MCV RBC AUTO: 102.3 FL (ref 81.4–97.8)
MICRO URNS: NORMAL
MICROALBUMIN UR-MCNC: 5.2 MG/DL
MICROALBUMIN/CREAT UR: 54 MG/G (ref 0–30)
MONOCYTES # BLD AUTO: 0.57 K/UL (ref 0–0.85)
MONOCYTES NFR BLD AUTO: 10.7 % (ref 0–13.4)
NEUTROPHILS # BLD AUTO: 2.96 K/UL (ref 1.82–7.42)
NEUTROPHILS NFR BLD: 55.3 % (ref 44–72)
NITRITE UR QL STRIP.AUTO: NEGATIVE
NRBC # BLD AUTO: 0 K/UL
NRBC BLD-RTO: 0 /100 WBC
PH UR STRIP.AUTO: 6.5 [PH] (ref 5–8)
PHOSPHATE SERPL-MCNC: 3.1 MG/DL (ref 2.5–4.5)
PLATELET # BLD AUTO: 167 K/UL (ref 164–446)
PMV BLD AUTO: 11.9 FL (ref 9–12.9)
POTASSIUM SERPL-SCNC: 4.3 MMOL/L (ref 3.6–5.5)
PROT UR QL STRIP: NEGATIVE MG/DL
PROT UR-MCNC: 20.3 MG/DL (ref 0–15)
PROT/CREAT UR: 211 MG/G (ref 15–68)
PTH-INTACT SERPL-MCNC: 163.5 PG/ML (ref 14–72)
RBC # BLD AUTO: 4.82 M/UL (ref 4.7–6.1)
RBC UR QL AUTO: NEGATIVE
SODIUM SERPL-SCNC: 138 MMOL/L (ref 135–145)
SP GR UR STRIP.AUTO: 1.02
URATE SERPL-MCNC: 6.8 MG/DL (ref 2.5–8.3)
UROBILINOGEN UR STRIP.AUTO-MCNC: 1 MG/DL
WBC # BLD AUTO: 5.4 K/UL (ref 4.8–10.8)

## 2019-10-02 PROCEDURE — 85025 COMPLETE CBC W/AUTO DIFF WBC: CPT

## 2019-10-02 PROCEDURE — 82570 ASSAY OF URINE CREATININE: CPT | Mod: 91

## 2019-10-02 PROCEDURE — 84550 ASSAY OF BLOOD/URIC ACID: CPT

## 2019-10-02 PROCEDURE — 84156 ASSAY OF PROTEIN URINE: CPT

## 2019-10-02 PROCEDURE — 80069 RENAL FUNCTION PANEL: CPT

## 2019-10-02 PROCEDURE — 83036 HEMOGLOBIN GLYCOSYLATED A1C: CPT | Mod: GA

## 2019-10-02 PROCEDURE — 36415 COLL VENOUS BLD VENIPUNCTURE: CPT

## 2019-10-02 PROCEDURE — 82306 VITAMIN D 25 HYDROXY: CPT

## 2019-10-02 PROCEDURE — 83970 ASSAY OF PARATHORMONE: CPT

## 2019-10-02 PROCEDURE — 82043 UR ALBUMIN QUANTITATIVE: CPT

## 2019-10-02 PROCEDURE — 83735 ASSAY OF MAGNESIUM: CPT

## 2019-10-02 PROCEDURE — 81003 URINALYSIS AUTO W/O SCOPE: CPT

## 2019-10-21 ENCOUNTER — HOSPITAL ENCOUNTER (OUTPATIENT)
Dept: LAB | Facility: MEDICAL CENTER | Age: 79
End: 2019-10-21
Attending: INTERNAL MEDICINE
Payer: MEDICARE

## 2019-10-21 LAB
ALBUMIN SERPL BCP-MCNC: 4.1 G/DL (ref 3.2–4.9)
APPEARANCE UR: CLEAR
BASOPHILS # BLD AUTO: 0.4 % (ref 0–1.8)
BASOPHILS # BLD: 0.02 K/UL (ref 0–0.12)
BILIRUB UR QL STRIP.AUTO: NEGATIVE
BUN SERPL-MCNC: 18 MG/DL (ref 8–22)
CALCIUM SERPL-MCNC: 9.4 MG/DL (ref 8.5–10.5)
CHLORIDE SERPL-SCNC: 111 MMOL/L (ref 96–112)
CO2 SERPL-SCNC: 20 MMOL/L (ref 20–33)
COLOR UR: YELLOW
CREAT SERPL-MCNC: 1.42 MG/DL (ref 0.5–1.4)
CREAT UR-MCNC: 81.8 MG/DL
EOSINOPHIL # BLD AUTO: 0.23 K/UL (ref 0–0.51)
EOSINOPHIL NFR BLD: 5 % (ref 0–6.9)
ERYTHROCYTE [DISTWIDTH] IN BLOOD BY AUTOMATED COUNT: 51 FL (ref 35.9–50)
GLUCOSE SERPL-MCNC: 94 MG/DL (ref 65–99)
GLUCOSE UR STRIP.AUTO-MCNC: NEGATIVE MG/DL
HCT VFR BLD AUTO: 49.8 % (ref 42–52)
HGB BLD-MCNC: 16.6 G/DL (ref 14–18)
IMM GRANULOCYTES # BLD AUTO: 0.01 K/UL (ref 0–0.11)
IMM GRANULOCYTES NFR BLD AUTO: 0.2 % (ref 0–0.9)
KETONES UR STRIP.AUTO-MCNC: NEGATIVE MG/DL
LEUKOCYTE ESTERASE UR QL STRIP.AUTO: NEGATIVE
LYMPHOCYTES # BLD AUTO: 1.46 K/UL (ref 1–4.8)
LYMPHOCYTES NFR BLD: 32 % (ref 22–41)
MAGNESIUM SERPL-MCNC: 2.1 MG/DL (ref 1.5–2.5)
MCH RBC QN AUTO: 33.9 PG (ref 27–33)
MCHC RBC AUTO-ENTMCNC: 33.3 G/DL (ref 33.7–35.3)
MCV RBC AUTO: 101.8 FL (ref 81.4–97.8)
MICRO URNS: NORMAL
MONOCYTES # BLD AUTO: 0.48 K/UL (ref 0–0.85)
MONOCYTES NFR BLD AUTO: 10.5 % (ref 0–13.4)
NEUTROPHILS # BLD AUTO: 2.36 K/UL (ref 1.82–7.42)
NEUTROPHILS NFR BLD: 51.9 % (ref 44–72)
NITRITE UR QL STRIP.AUTO: NEGATIVE
NRBC # BLD AUTO: 0 K/UL
NRBC BLD-RTO: 0 /100 WBC
PH UR STRIP.AUTO: 6.5 [PH] (ref 5–8)
PHOSPHATE SERPL-MCNC: 3.1 MG/DL (ref 2.5–4.5)
PLATELET # BLD AUTO: 172 K/UL (ref 164–446)
PMV BLD AUTO: 11.6 FL (ref 9–12.9)
POTASSIUM SERPL-SCNC: 4.4 MMOL/L (ref 3.6–5.5)
PROT UR QL STRIP: NEGATIVE MG/DL
PROT UR-MCNC: 19.9 MG/DL (ref 0–15)
PROT/CREAT UR: 243 MG/G (ref 15–68)
RBC # BLD AUTO: 4.89 M/UL (ref 4.7–6.1)
RBC UR QL AUTO: NEGATIVE
SODIUM SERPL-SCNC: 141 MMOL/L (ref 135–145)
SP GR UR STRIP.AUTO: 1.01
URATE SERPL-MCNC: 6.5 MG/DL (ref 2.5–8.3)
UROBILINOGEN UR STRIP.AUTO-MCNC: 1 MG/DL
WBC # BLD AUTO: 4.6 K/UL (ref 4.8–10.8)

## 2019-10-21 PROCEDURE — 84156 ASSAY OF PROTEIN URINE: CPT

## 2019-10-21 PROCEDURE — 83735 ASSAY OF MAGNESIUM: CPT

## 2019-10-21 PROCEDURE — 82570 ASSAY OF URINE CREATININE: CPT

## 2019-10-21 PROCEDURE — 36415 COLL VENOUS BLD VENIPUNCTURE: CPT

## 2019-10-21 PROCEDURE — 81003 URINALYSIS AUTO W/O SCOPE: CPT

## 2019-10-21 PROCEDURE — 85025 COMPLETE CBC W/AUTO DIFF WBC: CPT

## 2019-10-21 PROCEDURE — 84550 ASSAY OF BLOOD/URIC ACID: CPT

## 2019-10-21 PROCEDURE — 80069 RENAL FUNCTION PANEL: CPT

## 2019-10-28 ENCOUNTER — NON-PROVIDER VISIT (OUTPATIENT)
Dept: CARDIOLOGY | Facility: MEDICAL CENTER | Age: 79
End: 2019-10-28
Payer: MEDICARE

## 2019-10-28 ENCOUNTER — OFFICE VISIT (OUTPATIENT)
Dept: CARDIOLOGY | Facility: MEDICAL CENTER | Age: 79
End: 2019-10-28

## 2019-10-28 VITALS
BODY MASS INDEX: 26.31 KG/M2 | DIASTOLIC BLOOD PRESSURE: 76 MMHG | HEIGHT: 74 IN | WEIGHT: 205 LBS | OXYGEN SATURATION: 93 % | SYSTOLIC BLOOD PRESSURE: 120 MMHG | HEART RATE: 70 BPM

## 2019-10-28 DIAGNOSIS — E78.2 MIXED HYPERLIPIDEMIA: ICD-10-CM

## 2019-10-28 DIAGNOSIS — Z79.899 HIGH RISK MEDICATION USE: ICD-10-CM

## 2019-10-28 DIAGNOSIS — I10 ESSENTIAL HYPERTENSION, BENIGN: ICD-10-CM

## 2019-10-28 DIAGNOSIS — I42.0 DILATED CARDIOMYOPATHY (HCC): ICD-10-CM

## 2019-10-28 DIAGNOSIS — I47.20 VENTRICULAR TACHYCARDIA (HCC): ICD-10-CM

## 2019-10-28 DIAGNOSIS — Z95.810 AICD (AUTOMATIC CARDIOVERTER/DEFIBRILLATOR) PRESENT: ICD-10-CM

## 2019-10-28 DIAGNOSIS — I48.0 PAROXYSMAL ATRIAL FIBRILLATION (HCC): ICD-10-CM

## 2019-10-28 DIAGNOSIS — J44.9 CHRONIC OBSTRUCTIVE PULMONARY DISEASE, UNSPECIFIED COPD TYPE (HCC): ICD-10-CM

## 2019-10-28 PROBLEM — Z86.79 HISTORY OF ABDOMINAL AORTIC ANEURYSM (AAA): Status: ACTIVE | Noted: 2019-10-28

## 2019-10-28 PROBLEM — I48.91 ATRIAL FIBRILLATION (HCC): Status: ACTIVE | Noted: 2019-10-28

## 2019-10-28 PROBLEM — N28.9 RENAL DISORDER: Status: ACTIVE | Noted: 2019-10-28

## 2019-10-28 PROBLEM — I42.9 CARDIOMYOPATHY (HCC): Status: ACTIVE | Noted: 2019-10-28

## 2019-10-28 PROBLEM — Z86.73 HISTORY OF TIA (TRANSIENT ISCHEMIC ATTACK): Status: ACTIVE | Noted: 2019-10-28

## 2019-10-28 PROCEDURE — 99214 OFFICE O/P EST MOD 30 MIN: CPT | Mod: 25 | Performed by: NURSE PRACTITIONER

## 2019-10-28 PROCEDURE — 93283 PRGRMG EVAL IMPLANTABLE DFB: CPT | Performed by: NURSE PRACTITIONER

## 2019-10-28 RX ORDER — LOSARTAN POTASSIUM 25 MG/1
25 TABLET ORAL DAILY
Qty: 90 TAB | Refills: 3 | Status: SHIPPED
Start: 2019-10-28 | End: 2019-12-11

## 2019-10-28 RX ORDER — ERGOCALCIFEROL 1.25 MG/1
50000 CAPSULE ORAL
COMMUNITY
End: 2022-08-30

## 2019-10-28 ASSESSMENT — ENCOUNTER SYMPTOMS
SHORTNESS OF BREATH: 1
HEADACHES: 0
MYALGIAS: 0
CHILLS: 0
ABDOMINAL PAIN: 0
COUGH: 0
ORTHOPNEA: 0
NAUSEA: 0
FEVER: 0
INSOMNIA: 0
PALPITATIONS: 0
DIZZINESS: 0
BRUISES/BLEEDS EASILY: 0
PND: 0
LOSS OF CONSCIOUSNESS: 0

## 2019-10-28 NOTE — PATIENT INSTRUCTIONS
STOP Amlodipine 2.5mg once daily    START Losartan 25mg once daily (printed RX given)    Need pulmonary function tests (PFTs)    Referral to [facility] HF clinic

## 2019-10-28 NOTE — PROGRESS NOTES
Chief Complaint   Patient presents with   • Follow-Up   • AICD Check/Dysfunction   • Cardiomyopathy (Non-ischemic)   • Ventricular Tachycardia   • Atrial Fibrillation   • HTN (Controlled)   • Hyperlipidemia       Subjective:   Lencho Parker is a 79 y.o. male who presents today for three month follow-up for AICD check, cardiomyopathy, VT, AFib, HTN and hyperlipidemia.    Lencho is a 79 year old male with history of (presumed dilated) cardiomyopathy with AICD (with generator change in January 2017), VT, AFib status post Watchman procedure in 2017, hypertension, hyperlipidemia, history of solitary kidney and COPD, first seen by Dr. Martines in July 2019, after relocating here from [place]. Echocardiogram was done in August 2019, which showed LVEF 15%.    He is here today for follow-up. Generally, he is doing stable: he does tire easily and has some shortness of breath, but overt LE edema or orthopnea. No chest pain, pressure or discomfort; no palpitations; some lightheadedness, but no dizziness or syncope; BPs run  systolic at home. He does try to exercise somewhat regularly. He does have a solitary kidney, as he donated one to his son.    Past Medical History:   Diagnosis Date   • Cardiomyopathy (HCC) 08/2019    Echocardiogram with mildly dilated LV, mild concentric, LVEF 15%. Severely dilated LA. Trace MR, trace AI, mild TR. RVSP 26mmHg.   • Chronic obstructive pulmonary disease (HCC)    • History of abdominal aortic aneurysm (AAA) 2009    Status post stent   • Hyperlipidemia    • Hypertension    • Paroxysmal atrial fibrillation (HCC)     Status post Watchman procedure in [place].   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    • Stroke (HCC) 2012    TIA   • Ventricular tachycardia (HCC)      Past Surgical History:   Procedure Laterality Date   • AICD BATTERY CHANGE Left 01/03/2017    Generator replacement with Premise Evera MRI XT  [id number] implanted in [place].   • AAA WITH STENT GRAFT  2009     History  reviewed. No pertinent family history.  Social History     Socioeconomic History   • Marital status: Single     Spouse name: Not on file   • Number of children: Not on file   • Years of education: Not on file   • Highest education level: Not on file   Occupational History   • Not on file   Social Needs   • Financial resource strain: Not on file   • Food insecurity:     Worry: Not on file     Inability: Not on file   • Transportation needs:     Medical: Not on file     Non-medical: Not on file   Tobacco Use   • Smoking status: Former Smoker     Packs/day: 0.50     Types: Cigarettes     Last attempt to quit: 7/11/2004     Years since quitting: 15.3   • Smokeless tobacco: Never Used   Substance and Sexual Activity   • Alcohol use: Yes     Comment: wine occasionally   • Drug use: No   • Sexual activity: Not on file   Lifestyle   • Physical activity:     Days per week: Not on file     Minutes per session: Not on file   • Stress: Not on file   Relationships   • Social connections:     Talks on phone: Not on file     Gets together: Not on file     Attends Hindu service: Not on file     Active member of club or organization: Not on file     Attends meetings of clubs or organizations: Not on file     Relationship status: Not on file   • Intimate partner violence:     Fear of current or ex partner: Not on file     Emotionally abused: Not on file     Physically abused: Not on file     Forced sexual activity: Not on file   Other Topics Concern   • Not on file   Social History Narrative   • Not on file     No Known Allergies  Outpatient Encounter Medications as of 10/28/2019   Medication Sig Dispense Refill   • vitamin D, Ergocalciferol, (DRISDOL) 36151 units Cap capsule Take  by mouth every 7 days.     • losartan (COZAAR) 25 MG Tab Take 1 Tab by mouth every day. 90 Tab 3   • aspirin EC (ECOTRIN) 81 MG Tablet Delayed Response Take 81 mg by mouth every day.     • amiodarone (CORDARONE) 200 MG Tab Take 200 mg by mouth every  "day.     • tamsulosin (FLOMAX) 0.4 MG capsule Take 0.8 mg by mouth ONE-HALF HOUR AFTER BREAKFAST.     • cinacalcet (SENSIPAR) 30 MG Tab Take 30 mg by mouth every day.     • carvedilol (COREG) 25 MG Tab Take 25 mg by mouth every day. Pt takes 25MG in AM and 12.5MG HS      • furosemide (LASIX) 20 MG Tab Take 20 mg by mouth See Admin Instructions. Every Mon-Wed-Fri     • atorvastatin (LIPITOR) 20 MG Tab Take 20 mg by mouth every day.     • Omega-3 Fatty Acids (FISH OIL) 1000 MG Cap capsule Take 1,000 mg by mouth every day.     • multivitamin (THERAGRAN) Tab Take 1 Tab by mouth every day.     • albuterol 108 (90 Base) MCG/ACT Aero Soln inhalation aerosol Inhale 2 Puffs by mouth every 6 hours as needed for Shortness of Breath. 8.5 g 0   • [DISCONTINUED] amLODIPine (NORVASC) 2.5 MG Tab Take 1 Tab by mouth every day. 30 Tab 11     No facility-administered encounter medications on file as of 10/28/2019.      Review of Systems   Constitutional: Positive for malaise/fatigue. Negative for chills and fever.   HENT: Negative for congestion.    Respiratory: Positive for shortness of breath. Negative for cough.    Cardiovascular: Negative for chest pain, palpitations, orthopnea, leg swelling and PND.   Gastrointestinal: Negative for abdominal pain and nausea.   Musculoskeletal: Negative for myalgias.   Skin: Negative for rash.   Neurological: Negative for dizziness, loss of consciousness and headaches.   Endo/Heme/Allergies: Does not bruise/bleed easily.   Psychiatric/Behavioral: The patient does not have insomnia.         Objective:   /76 (BP Location: Left arm, Patient Position: Sitting, BP Cuff Size: Adult)   Pulse 70   Ht 1.88 m (6' 2\")   Wt 93 kg (205 lb)   SpO2 93%   BMI 26.32 kg/m²     Physical Exam   Constitutional: He is oriented to person, place, and time. He appears well-developed and well-nourished.   HENT:   Head: Normocephalic.   Eyes: EOM are normal.   Neck: Normal range of motion. Neck supple. JVD " present.   Cardiovascular: Normal rate, regular rhythm and normal heart sounds.   Pulmonary/Chest: Effort normal and breath sounds normal. No respiratory distress. He has no wheezes. He has no rales.   AICD in left chest wall.   Abdominal: Soft. Bowel sounds are normal. He exhibits no distension. There is no tenderness.   Musculoskeletal: Normal range of motion. He exhibits no edema.   Neurological: He is alert and oriented to person, place, and time.   Skin: Skin is warm and dry. No rash noted.   Psychiatric: He has a normal mood and affect.     Device is working normally today, no device therapy and no mode switching episodes. Battery is good.    CONCLUSIONS OF ECHOCARDIOGRAM OF 8/29/2019:  No prior study is available for comparison.   Severely reduced left ventricular systolic function. Left ventricular   ejection fraction is visually estimated to be 15%.  Indeterminate diastolic function.  Estimated right ventricular systolic pressure  is 26 mmHg + JVP.  Ascending aorta diameter is 4 cm.    RESULTS OF CHEST XRAY OF 5/13/2019:  1. No pulmonary infiltrates or consolidations are noted.  2. Cardiomegaly.    Component      Latest Ref Rng & Units 7/17/2019           8:37 AM   TSH      0.380 - 5.330 uIU/mL 4.630       Lab Results   Component Value Date/Time    CHOLSTRLTOT 192 07/17/2019 08:37 AM     (H) 07/17/2019 08:37 AM    HDL 61 07/17/2019 08:37 AM    TRIGLYCERIDE 89 07/17/2019 08:37 AM       Lab Results   Component Value Date/Time    SODIUM 141 10/21/2019 10:07 AM    POTASSIUM 4.4 10/21/2019 10:07 AM    CHLORIDE 111 10/21/2019 10:07 AM    CO2 20 10/21/2019 10:07 AM    GLUCOSE 94 10/21/2019 10:07 AM    BUN 18 10/21/2019 10:07 AM    CREATININE 1.42 (H) 10/21/2019 10:07 AM     Lab Results   Component Value Date/Time    ALKPHOSPHAT 78 07/17/2019 08:38 AM    ASTSGOT 22 07/17/2019 08:38 AM    ALTSGPT 35 07/17/2019 08:38 AM    TBILIRUBIN 1.3 07/17/2019 08:38 AM        Assessment:     1. AICD (automatic  cardioverter/defibrillator) present     2. Dilated cardiomyopathy (HCC)  losartan (COZAAR) 25 MG Tab    REFERRAL TO CARDIOLOGY   3. Ventricular tachycardia (HCC)     4. High risk medication use     5. Paroxysmal atrial fibrillation (HCC)     6. Essential hypertension, benign     7. Mixed hyperlipidemia     8. Chronic obstructive pulmonary disease, unspecified COPD type (HCC)         Medical Decision Making:  Today's Assessment / Status / Plan:     1. (Presumed dilated) cardiomyopathy, with LVEF 15% and AICD. The device is working normally. He is on BB, but will stop Amlodipine and start Losartan 25mg once daily. To watch BP at home. Is also on Lasix and ASA. Will refer to HF clinic, for medication titration (possibly Entresto?).    2. History of VT, on Amiodarone. No recent device therapy. Recent TSH, LFTs and CXR were all normal.    3. History of paroxysmal atrial fibrillation, with Watchman device (2017?), now of off anticoagulation.    4. Hypertension, treated and stable. As above, to stop Amlodipine and start Losartan.    5. Hyperlipidemia, on Lipitor.    6. COPD, on inhalers. Needs to complete PFTs, which were ordered in July 2019; may need referral to pulmonology for possible sleep apnea evluation.    Plan as above. Refer to HF clinic. Next AICD interrogation in 4 months, along with FU with Dr. Martines.    Collaborating MD: Monica

## 2019-10-29 ENCOUNTER — TELEPHONE (OUTPATIENT)
Dept: CARDIOLOGY | Facility: MEDICAL CENTER | Age: 79
End: 2019-10-29

## 2019-10-29 NOTE — TELEPHONE ENCOUNTER
Spoke with pt. To explain that Radha switched him from Amlodipine to Losartan because Losartan may help treat dilated cardiomyopathy. He has FV 11-4-19 with Sole and will discuss further then.

## 2019-10-29 NOTE — TELEPHONE ENCOUNTER
AB/mary    Pt calling about AB's med change from amlodipine to losartan. Pharmacy told pt losartan reduces b/p.  Please call Lencho  to discuss and advise.

## 2019-10-31 ENCOUNTER — HOSPITAL ENCOUNTER (OUTPATIENT)
Dept: PULMONOLOGY | Facility: MEDICAL CENTER | Age: 79
End: 2019-10-31
Attending: INTERNAL MEDICINE
Payer: MEDICARE

## 2019-10-31 DIAGNOSIS — R06.02 SHORTNESS OF BREATH: ICD-10-CM

## 2019-10-31 PROCEDURE — 94060 EVALUATION OF WHEEZING: CPT

## 2019-10-31 PROCEDURE — 94729 DIFFUSING CAPACITY: CPT

## 2019-10-31 PROCEDURE — 94726 PLETHYSMOGRAPHY LUNG VOLUMES: CPT

## 2019-10-31 PROCEDURE — 94060 EVALUATION OF WHEEZING: CPT | Mod: 26 | Performed by: INTERNAL MEDICINE

## 2019-10-31 PROCEDURE — 94729 DIFFUSING CAPACITY: CPT | Mod: 26 | Performed by: INTERNAL MEDICINE

## 2019-10-31 PROCEDURE — 94726 PLETHYSMOGRAPHY LUNG VOLUMES: CPT | Mod: 26 | Performed by: INTERNAL MEDICINE

## 2019-10-31 ASSESSMENT — PULMONARY FUNCTION TESTS
FEV1/FVC_PERCENT_PREDICTED: 101
FEV1: 2.97
FEV1/FVC_PERCENT_PREDICTED: 74
FEV1/FVC: 77
FEV1_LLN: 2.81
FVC_PREDICTED: 4.56
FEV1/FVC_PERCENT_LLN: 62
FEV1_PERCENT_CHANGE: 3
FVC: 4.01
FEV1/FVC_PERCENT_PREDICTED: 104
FVC_LLN: 3.81
FEV1/FVC: 75.31
FVC_PERCENT_PREDICTED: 84
FVC_LLN: 3.81
FEV1_PERCENT_CHANGE: 1
FEV1/FVC: 75
FEV1/FVC: 77
FEV1: 3.02
FEV1_PREDICTED: 3.36
FVC: 3.86
FEV1/FVC_PREDICTED: 74
FEV1/FVC_PERCENT_PREDICTED: 102
FEV1_PERCENT_PREDICTED: 88
FEV1/FVC_PERCENT_PREDICTED: 105
FEV1_PERCENT_PREDICTED: 89
FEV1_LLN: 2.81
FVC_PERCENT_PREDICTED: 87
FEV1/FVC_PERCENT_LLN: 62
FEV1/FVC_PERCENT_CHANGE: -2
FEV1/FVC_PERCENT_CHANGE: 33

## 2019-11-03 NOTE — PROCEDURES
PULMONARY FUNCTION TEST INTERPRETATION    REQUESTING PROVIDER:  Becka Martines MD    REASON FOR REQUEST:  Shortness of breath.    FINDINGS:  1.  Acceptable and reproducible.  2.  FEV1 2.97 liters (88%), FVC 3.86 liters (84%), ratio 77%.  3.  Flow volume loops suggestive of restriction.  4.  Total lung capacity 6.62 liters (84%).  5.  DLCO corrected for hemoglobin 19.62 mL/min/mmHg (73%).    IMPRESSION:  Normal spirometry with no response to bronchodilator.  Reduced   inspiratory capacity suggestive of underlying restrictive lung disease with a   mild reduction in gas transfer.  Absolute total lung capacity is normal.    Clinically correlate if there is any evidence of amiodarone toxicity as there   is reduced inspiratory capacity as well as mild reduction in DLCO.       ____________________________________     MD KIM Garcia / MILAD    DD:  11/03/2019 15:23:44  DT:  11/03/2019 15:37:33    D#:  [id number]  Job#:  [id number]    cc: Becka Martines MD

## 2019-11-04 ENCOUNTER — OFFICE VISIT (OUTPATIENT)
Dept: CARDIOLOGY | Facility: MEDICAL CENTER | Age: 79
End: 2019-11-04
Payer: MEDICARE

## 2019-11-04 VITALS
BODY MASS INDEX: 26.51 KG/M2 | HEART RATE: 86 BPM | WEIGHT: 206.6 LBS | OXYGEN SATURATION: 95 % | HEIGHT: 74 IN | SYSTOLIC BLOOD PRESSURE: 112 MMHG | DIASTOLIC BLOOD PRESSURE: 60 MMHG

## 2019-11-04 DIAGNOSIS — Z79.899 HIGH RISK MEDICATION USE: ICD-10-CM

## 2019-11-04 DIAGNOSIS — E78.2 MIXED HYPERLIPIDEMIA: ICD-10-CM

## 2019-11-04 DIAGNOSIS — R06.02 SOB (SHORTNESS OF BREATH): ICD-10-CM

## 2019-11-04 DIAGNOSIS — Z95.810 AICD (AUTOMATIC CARDIOVERTER/DEFIBRILLATOR) PRESENT: ICD-10-CM

## 2019-11-04 DIAGNOSIS — I47.20 VENTRICULAR TACHYCARDIA (HCC): ICD-10-CM

## 2019-11-04 DIAGNOSIS — I48.0 PAROXYSMAL ATRIAL FIBRILLATION (HCC): ICD-10-CM

## 2019-11-04 DIAGNOSIS — R06.81 APNEA: ICD-10-CM

## 2019-11-04 DIAGNOSIS — J44.9 CHRONIC OBSTRUCTIVE PULMONARY DISEASE, UNSPECIFIED COPD TYPE (HCC): ICD-10-CM

## 2019-11-04 DIAGNOSIS — I42.0 DILATED CARDIOMYOPATHY (HCC): ICD-10-CM

## 2019-11-04 PROCEDURE — 94618 PULMONARY STRESS TESTING: CPT | Performed by: NURSE PRACTITIONER

## 2019-11-04 PROCEDURE — 99214 OFFICE O/P EST MOD 30 MIN: CPT | Mod: 25 | Performed by: NURSE PRACTITIONER

## 2019-11-04 ASSESSMENT — MINNESOTA LIVING WITH HEART FAILURE QUESTIONNAIRE (MLHF)
DIFFICULTY GOING AWAY FROM HOME: 1
TIRED, FATIGUED OR LOW ON ENERGY: 1
DIFFICULTY SOCIALIZING WITH FAMILY OR FRIENDS: 1
MAKING YOU FEEL DEPRESSED: 0
DIFFICULTY WITH SEXUAL ACTIVITIES: 0
FEELING LIKE A BURDEN TO FAMILY AND FRIENDS: 0
DIFFICULTY WITH RECREATIONAL PASTIMES, SPORTS, HOBBIES: 1
DIFFICULTY TO CONCENTRATE OR REMEMBERING THINGS: 1
WALKING ABOUT OR CLIMBING STAIRS DIFFICULT: 2
LOSS OF SELF CONTROL IN YOUR LIFE: 0
TOTAL_SCORE: 14
MAKING YOU WORRY: 0
MAKING YOU STAY IN A HOSPITAL: 0
SWELLING IN ANKLES OR LEGS: 0
MAKING YOU SHORT OF BREATH: 2
DIFFICULTY WORKING TO EARN A LIVING: 0
WORKING AROUND THE HOUSE OR YARD DIFFICULT: 2
GIVING YOU SIDE EFFECTS FROM TREATMENTS: 0
COSTING YOU MONEY FOR MEDICAL CARE: 0
DIFFICULTY SLEEPING WELL AT NIGHT: 2
EATING LESS FOODS YOU LIKE: 0
HAVING TO SIT OR LIE DOWN DURING THE DAY: 1

## 2019-11-04 ASSESSMENT — ENCOUNTER SYMPTOMS
DIZZINESS: 0
ABDOMINAL PAIN: 0
ORTHOPNEA: 0
FEVER: 0
COUGH: 0
SHORTNESS OF BREATH: 1
MYALGIAS: 0
PALPITATIONS: 0
PND: 0
CLAUDICATION: 0

## 2019-11-04 ASSESSMENT — 6 MINUTE WALK TEST (6MWT): TOTAL DISTANCE WALKED (METERS): 410

## 2019-11-04 NOTE — PROGRESS NOTES
Chief Complaint   Patient presents with   • Congestive Heart Failure       Subjective:   Lencho Parker is a 79 y.o. male who presents today for follow-up on his cardiomyopathy.    Patient of Dr. Martines.  He was last seen in clinic on 10/28/2019 with NORMA Ordonez.  During that visit, his amlodipine was discontinued and he was started on losartan 25 mg daily.  Patient was also sent for PFT.  He was referred over to the heart failure clinic to optimize his medical therapy.    For his symptoms, he reports dyspnea with exertion.  He recently moved up to the [facility] from [place], [place] and attributes the elevation change to the cause of his shortness of breath.    He denies chest pain, palpitations, orthopnea, PND, dizziness/lightheadedness or fatigue.    His home weights are stable at 200 pounds    He does exercise regularly by swimming 3 times a week for about 45 minutes.    He was diagnosed with cardiomyopathy in the 90s.    Patient was a mild social smoker for about 5 years    He drinks 2 to 3 glasses of wine per night.    His girlfriend reports he stops breathing in his sleep but reports rare snoring.    Initial 6 minute walk test, patient was able to complete 410 m during his 6 minute walk test.  His O2 saturation at baseline was 95 % and at the end of the test, the O2 saturation was 96 %.  He reported level 2 of dyspnea on Christian scale.    MLWHF score 14    Additonally, patient has the following medical problems:    -Non-ischemic cardiomyopathy last EF in [place] was 20%    -Paroxysmal A. fib, has a watchman device placed approximately 10/2015    -Hx sustained slow VT, had RFA (9/2012) has an ICD    -Nonobstructive CAD per angiogram 4/09 and 05/2016 (per medical records)    -History of a AAA with repair about 10 years ago    -Has 1 solitary left kidney.  Gave 1 to son in the 90s, followed by nephrology    -History of TIA/CVA    -History Colon Cancer    Past Medical History:   Diagnosis Date   •  Cardiomyopathy (HCC) 08/2019    Echocardiogram with mildly dilated LV, mild concentric, LVEF 15%. Severely dilated LA. Trace MR, trace AI, mild TR. RVSP 26mmHg.   • Chronic obstructive pulmonary disease (HCC)    • History of abdominal aortic aneurysm (AAA) 2009    Status post stent   • Hyperlipidemia    • Hypertension    • Paroxysmal atrial fibrillation (HCC)     Status post Watchman procedure in [place].   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    • Stroke (HCC) 2012    TIA   • Ventricular tachycardia (HCC)      Past Surgical History:   Procedure Laterality Date   • AICD BATTERY CHANGE Left 01/03/2017    Generator replacement with wishkicker Evera MRI XT  [id number] implanted in [place].   • AAA WITH STENT GRAFT  2009     History reviewed. No pertinent family history.  Social History     Socioeconomic History   • Marital status: Single     Spouse name: Not on file   • Number of children: Not on file   • Years of education: Not on file   • Highest education level: Not on file   Occupational History   • Not on file   Social Needs   • Financial resource strain: Not on file   • Food insecurity:     Worry: Not on file     Inability: Not on file   • Transportation needs:     Medical: Not on file     Non-medical: Not on file   Tobacco Use   • Smoking status: Former Smoker     Packs/day: 0.50     Types: Cigarettes     Last attempt to quit: 7/11/2004     Years since quitting: 15.3   • Smokeless tobacco: Never Used   Substance and Sexual Activity   • Alcohol use: Yes     Comment: wine occasionally   • Drug use: No   • Sexual activity: Not on file   Lifestyle   • Physical activity:     Days per week: Not on file     Minutes per session: Not on file   • Stress: Not on file   Relationships   • Social connections:     Talks on phone: Not on file     Gets together: Not on file     Attends Nondenominational service: Not on file     Active member of club or organization: Not on file     Attends meetings of clubs or organizations: Not on  file     Relationship status: Not on file   • Intimate partner violence:     Fear of current or ex partner: Not on file     Emotionally abused: Not on file     Physically abused: Not on file     Forced sexual activity: Not on file   Other Topics Concern   • Not on file   Social History Narrative   • Not on file     No Known Allergies  Outpatient Encounter Medications as of 11/4/2019   Medication Sig Dispense Refill   • vitamin D, Ergocalciferol, (DRISDOL) 48054 units Cap capsule Take  by mouth every 7 days.     • losartan (COZAAR) 25 MG Tab Take 1 Tab by mouth every day. 90 Tab 3   • aspirin EC (ECOTRIN) 81 MG Tablet Delayed Response Take 81 mg by mouth every day.     • amiodarone (CORDARONE) 200 MG Tab Take 200 mg by mouth every day.     • tamsulosin (FLOMAX) 0.4 MG capsule Take 0.8 mg by mouth ONE-HALF HOUR AFTER BREAKFAST.     • cinacalcet (SENSIPAR) 30 MG Tab Take 30 mg by mouth every day.     • carvedilol (COREG) 25 MG Tab Take 25 mg by mouth every day. Pt takes 25MG in AM and 12.5MG HS      • furosemide (LASIX) 20 MG Tab Take 20 mg by mouth See Admin Instructions. Every Mon-Wed-Fri     • atorvastatin (LIPITOR) 20 MG Tab Take 20 mg by mouth every day.     • Omega-3 Fatty Acids (FISH OIL) 1000 MG Cap capsule Take 1,000 mg by mouth every day.     • multivitamin (THERAGRAN) Tab Take 1 Tab by mouth every day.     • albuterol 108 (90 Base) MCG/ACT Aero Soln inhalation aerosol Inhale 2 Puffs by mouth every 6 hours as needed for Shortness of Breath. 8.5 g 0     No facility-administered encounter medications on file as of 11/4/2019.      Review of Systems   Constitutional: Negative for fever and malaise/fatigue.   Respiratory: Positive for shortness of breath. Negative for cough.    Cardiovascular: Negative for chest pain, palpitations, orthopnea, claudication, leg swelling and PND.   Gastrointestinal: Negative for abdominal pain.   Musculoskeletal: Negative for myalgias.   Neurological: Negative for dizziness.   All  "other systems reviewed and are negative.       Objective:   /60 (BP Location: Left arm, Patient Position: Sitting, BP Cuff Size: Adult)   Pulse 86   Ht 1.88 m (6' 2\")   Wt 93.7 kg (206 lb 9.6 oz)   SpO2 95%   BMI 26.53 kg/m²     Physical Exam   Constitutional: He is oriented to person, place, and time. He appears well-developed and well-nourished.   HENT:   Head: Normocephalic and atraumatic.   Eyes: Pupils are equal, round, and reactive to light. EOM are normal.   Neck: Normal range of motion. Neck supple. No JVD present.   Cardiovascular: Normal rate, regular rhythm and normal heart sounds.   Pulmonary/Chest: Effort normal and breath sounds normal. No respiratory distress. He has no wheezes. He has no rales.   Abdominal: Soft. Bowel sounds are normal.   Musculoskeletal:         General: No edema.   Neurological: He is alert and oriented to person, place, and time.   Skin: Skin is warm and dry.   Psychiatric: He has a normal mood and affect. His behavior is normal.   Vitals reviewed.    Lab Results   Component Value Date/Time    CHOLSTRLTOT 192 07/17/2019 08:37 AM     (H) 07/17/2019 08:37 AM    HDL 61 07/17/2019 08:37 AM    TRIGLYCERIDE 89 07/17/2019 08:37 AM       Lab Results   Component Value Date/Time    SODIUM 141 10/21/2019 10:07 AM    POTASSIUM 4.4 10/21/2019 10:07 AM    CHLORIDE 111 10/21/2019 10:07 AM    CO2 20 10/21/2019 10:07 AM    GLUCOSE 94 10/21/2019 10:07 AM    BUN 18 10/21/2019 10:07 AM    CREATININE 1.42 (H) 10/21/2019 10:07 AM     Lab Results   Component Value Date/Time    ALKPHOSPHAT 78 07/17/2019 08:38 AM    ASTSGOT 22 07/17/2019 08:38 AM    ALTSGPT 35 07/17/2019 08:38 AM    TBILIRUBIN 1.3 07/17/2019 08:38 AM      Transthoracic Echo Report 8/29/2019  No prior study is available for comparison.   Severely reduced left ventricular systolic function. Left ventricular   ejection fraction is visually estimated to be 15%.  Indeterminate diastolic function.  Estimated right ventricular " systolic pressure  is 26 mmHg + JVP.  Ascending aorta diameter is 4 cm.     Assessment:     1. Dilated cardiomyopathy (HCC)  REFERRAL TO SLEEP STUDIES   2. Apnea  REFERRAL TO SLEEP STUDIES   3. AICD (automatic cardioverter/defibrillator) present     4. Ventricular tachycardia (HCC)     5. Paroxysmal atrial fibrillation (HCC)     6. High risk medication use     7. Mixed hyperlipidemia     8. Chronic obstructive pulmonary disease, unspecified COPD type (HCC)     9. SOB (shortness of breath)         Medical Decision Making:  Today's Assessment / Status / Plan:   1.  Cardiomyopathy, LVEF 15%: Patient does have mild dyspnea, could be related to elevation change.  PFT did show a restrictive disease.  -Patient to take carvedilol 25 mg in the a.m. and start 12.5 mg in the p.m.  (Patient states he has been taking only 25 mg daily)  -Continue losartan 25 mg daily as (can consider Entresto in the future, however, he has solitary kidney)  -Consider spironolactone  -Has ICD for history of VT, but denies any current shocks  -Reinforced s/sx of worsening heart failure with patient and weight monitoring. Pt verbalizes understanding. Pt to call office or RTC if present.     2.  Paroxysmal A. Fib, has watchman:   -No episodes on device check  -Continue aspirin 81 mg daily  -Continue amiodarone 200 mg daily    3.  Hyperlipidemia/nonobstructive CAD: Last  on 7/17/2019  -Continue aspirin 81 mg daily  -Continue atorvastatin 20 mg daily    4.  Hypertension: Stable   -Continue recommendations per above    5.  Apnea in sleep:  -Referred patient for sleep study, discussed with patient he is agreeable.    FU in clinic in 1 month with HF MD to see if meds can further be opitmized. Sooner if needed.    Patient verbalizes understanding and agrees with the plan of care.     Collaborating MD: Francesco Velázquez MD

## 2019-11-12 ENCOUNTER — TELEPHONE (OUTPATIENT)
Dept: CARDIOLOGY | Facility: MEDICAL CENTER | Age: 79
End: 2019-11-12

## 2019-11-12 DIAGNOSIS — R06.02 SOB (SHORTNESS OF BREATH): ICD-10-CM

## 2019-11-12 DIAGNOSIS — R06.02 SHORTNESS OF BREATH: ICD-10-CM

## 2019-11-12 DIAGNOSIS — J44.9 CHRONIC OBSTRUCTIVE PULMONARY DISEASE, UNSPECIFIED COPD TYPE (HCC): ICD-10-CM

## 2019-11-12 DIAGNOSIS — R06.81 APNEA: ICD-10-CM

## 2019-11-12 NOTE — TELEPHONE ENCOUNTER
Result Notes for PFT DICTATED RESULTS     Notes recorded by Becka Martines M.D. on 11/10/2019 at 8:44 PM PST  Abnormal pulmonary function testing.  Please refer the patient to pulmonary  Thank you   AA       Referral to pulmonology order placed.  Attempted to call pt, no answer, LM for him to call back.

## 2019-11-13 NOTE — TELEPHONE ENCOUNTER
Called pt back, discussed referral to pulmonology, verbalized understanding. Advised if he does not get a phone call by the end of the week, he should give us a call. Pt appreciative of call.

## 2019-11-19 ENCOUNTER — HOSPITAL ENCOUNTER (EMERGENCY)
Facility: MEDICAL CENTER | Age: 79
End: 2019-11-19
Attending: EMERGENCY MEDICINE
Payer: MEDICARE

## 2019-11-19 VITALS
TEMPERATURE: 97.6 F | WEIGHT: 204.81 LBS | BODY MASS INDEX: 40.21 KG/M2 | SYSTOLIC BLOOD PRESSURE: 119 MMHG | HEART RATE: 70 BPM | OXYGEN SATURATION: 96 % | RESPIRATION RATE: 19 BRPM | DIASTOLIC BLOOD PRESSURE: 88 MMHG | HEIGHT: 60 IN

## 2019-11-19 DIAGNOSIS — S61.209A AVULSION OF FINGERTIP, INITIAL ENCOUNTER: ICD-10-CM

## 2019-11-19 PROCEDURE — 303353 HCHG DERMABOND SKIN ADHESIVE

## 2019-11-19 PROCEDURE — 304999 HCHG REPAIR-SIMPLE/INTERMED LEVEL 1

## 2019-11-19 PROCEDURE — 700101 HCHG RX REV CODE 250: Performed by: EMERGENCY MEDICINE

## 2019-11-19 PROCEDURE — 90471 IMMUNIZATION ADMIN: CPT

## 2019-11-19 PROCEDURE — 700111 HCHG RX REV CODE 636 W/ 250 OVERRIDE (IP)

## 2019-11-19 PROCEDURE — 90715 TDAP VACCINE 7 YRS/> IM: CPT

## 2019-11-19 PROCEDURE — 99283 EMERGENCY DEPT VISIT LOW MDM: CPT

## 2019-11-19 PROCEDURE — 303485 HCHG DRESSING MEDIUM

## 2019-11-19 RX ORDER — LIDOCAINE HYDROCHLORIDE 20 MG/ML
20 INJECTION, SOLUTION INFILTRATION; PERINEURAL ONCE
Status: COMPLETED | OUTPATIENT
Start: 2019-11-19 | End: 2019-11-19

## 2019-11-19 RX ORDER — CEPHALEXIN 500 MG/1
500 CAPSULE ORAL 4 TIMES DAILY
Qty: 28 CAP | Refills: 0 | Status: SHIPPED | OUTPATIENT
Start: 2019-11-19 | End: 2019-11-26

## 2019-11-19 RX ADMIN — LIDOCAINE HYDROCHLORIDE 20 ML: 20 INJECTION, SOLUTION INFILTRATION; PERINEURAL at 19:30

## 2019-11-19 RX ADMIN — CLOSTRIDIUM TETANI TOXOID ANTIGEN (FORMALDEHYDE INACTIVATED), CORYNEBACTERIUM DIPHTHERIAE TOXOID ANTIGEN (FORMALDEHYDE INACTIVATED), BORDETELLA PERTUSSIS TOXOID ANTIGEN (GLUTARALDEHYDE INACTIVATED), BORDETELLA PERTUSSIS FILAMENTOUS HEMAGGLUTININ ANTIGEN (FORMALDEHYDE INACTIVATED), BORDETELLA PERTUSSIS PERTACTIN ANTIGEN, AND BORDETELLA PERTUSSIS FIMBRIAE 2/3 ANTIGEN 0.5 ML: 5; 2; 2.5; 5; 3; 5 INJECTION, SUSPENSION INTRAMUSCULAR at 19:38

## 2019-11-20 NOTE — ED NOTES
Pt given d/c paperwork and prescription, pt verbalized understanding all information given. Pt ambulated out of the ER w/ spouse w/o difficulty.

## 2019-11-20 NOTE — ED PROVIDER NOTES
ED Provider Note    CHIEF COMPLAINT  Chief Complaint   Patient presents with   • Laceration     right index distal avulsion laceration. Bleeding controlled with pressure, mailbox snaped on him       HPI  Lencho Parker is a 79 y.o. male who presents for evaluation of acute injury to the right distal index finger.  The patient accidentally lacerated the tip of his right index finger when mailbox naptime.  He has an avulsion tissue loss to the tip right index finger.  No exposed tendon or bone.  He has no other symptoms.  Injury occurred an hour ago.  Tetanus is not up-to-date.    REVIEW OF SYSTEMS  See HPI for further details.  No numbness tingling weakness fevers chills all other systems are negative.     PAST MEDICAL HISTORY  Past Medical History:   Diagnosis Date   • Cardiomyopathy (HCC) 08/2019    Echocardiogram with mildly dilated LV, mild concentric, LVEF 15%. Severely dilated LA. Trace MR, trace AI, mild TR. RVSP 26mmHg.   • Stroke (HCC) 2012    TIA   • History of abdominal aortic aneurysm (AAA) 2009    Status post stent   • Chronic obstructive pulmonary disease (HCC)    • Hyperlipidemia    • Hypertension    • Paroxysmal atrial fibrillation (HCC)     Status post Watchman procedure in [place].   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    • Ventricular tachycardia (HCC)        FAMILY HISTORY  Noncontributory    SOCIAL HISTORY  Social History     Socioeconomic History   • Marital status: Single     Spouse name: Not on file   • Number of children: Not on file   • Years of education: Not on file   • Highest education level: Not on file   Occupational History   • Not on file   Social Needs   • Financial resource strain: Not on file   • Food insecurity:     Worry: Not on file     Inability: Not on file   • Transportation needs:     Medical: Not on file     Non-medical: Not on file   Tobacco Use   • Smoking status: Former Smoker     Packs/day: 0.50     Types: Cigarettes     Last attempt to quit: 7/11/2004      Years since quitting: 15.3   • Smokeless tobacco: Never Used   Substance and Sexual Activity   • Alcohol use: Yes     Comment: wine occasionally   • Drug use: No   • Sexual activity: Not on file   Lifestyle   • Physical activity:     Days per week: Not on file     Minutes per session: Not on file   • Stress: Not on file   Relationships   • Social connections:     Talks on phone: Not on file     Gets together: Not on file     Attends Adventism service: Not on file     Active member of club or organization: Not on file     Attends meetings of clubs or organizations: Not on file     Relationship status: Not on file   • Intimate partner violence:     Fear of current or ex partner: Not on file     Emotionally abused: Not on file     Physically abused: Not on file     Forced sexual activity: Not on file   Other Topics Concern   • Not on file   Social History Narrative   • Not on file       SURGICAL HISTORY  Past Surgical History:   Procedure Laterality Date   • AICD BATTERY CHANGE Left 01/03/2017    Generator replacement with [employer] Evera MRI XT  [id number] implanted in [place].   • AAA WITH STENT GRAFT  2009       CURRENT MEDICATIONS  No current facility-administered medications for this encounter.     Current Outpatient Medications:   •  vitamin D, Ergocalciferol, (DRISDOL) 69281 units Cap capsule, Take  by mouth every 7 days., Disp: , Rfl:   •  losartan (COZAAR) 25 MG Tab, Take 1 Tab by mouth every day., Disp: 90 Tab, Rfl: 3  •  aspirin EC (ECOTRIN) 81 MG Tablet Delayed Response, Take 81 mg by mouth every day., Disp: , Rfl:   •  amiodarone (CORDARONE) 200 MG Tab, Take 200 mg by mouth every day., Disp: , Rfl:   •  tamsulosin (FLOMAX) 0.4 MG capsule, Take 0.8 mg by mouth ONE-HALF HOUR AFTER BREAKFAST., Disp: , Rfl:   •  cinacalcet (SENSIPAR) 30 MG Tab, Take 30 mg by mouth every day., Disp: , Rfl:   •  carvedilol (COREG) 25 MG Tab, Take 25 mg by mouth every day. Pt takes 25MG in AM and 12.5MG HS , Disp: , Rfl:   •   furosemide (LASIX) 20 MG Tab, Take 20 mg by mouth See Admin Instructions. Every Mon-Wed-Fri, Disp: , Rfl:   •  atorvastatin (LIPITOR) 20 MG Tab, Take 20 mg by mouth every day., Disp: , Rfl:   •  Omega-3 Fatty Acids (FISH OIL) 1000 MG Cap capsule, Take 1,000 mg by mouth every day., Disp: , Rfl:   •  multivitamin (THERAGRAN) Tab, Take 1 Tab by mouth every day., Disp: , Rfl:   •  albuterol 108 (90 Base) MCG/ACT Aero Soln inhalation aerosol, Inhale 2 Puffs by mouth every 6 hours as needed for Shortness of Breath., Disp: 8.5 g, Rfl: 0      ALLERGIES  No Known Allergies    PHYSICAL EXAM  VITAL SIGNS: /80   Pulse 79   Temp 36.1 °C (97 °F) (Temporal)   Resp 20   Ht 0.61 m (2')   Wt 92.9 kg (204 lb 12.9 oz)   SpO2 95%   .99 kg/m²       Constitutional: Well developed, Well nourished, No acute distress, Non-toxic appearance.   HENT: Normocephalic, Atraumatic, Bilateral external ears normal, Oropharynx moist, No oral exudates, Nose normal.   Eyes: PERRLA, EOMI, Conjunctiva normal, No discharge.   Cardiovascular: Normal heart rate, Normal rhythm, No murmurs, No rubs, No gallops.   Thorax & Lungs: Normal breath sounds, No respiratory distress, No wheezing, No chest tenderness.   Abdomen: Bowel sounds normal, Soft, No tenderness, No masses, No pulsatile masses.   Skin: Warm, Dry, No erythema, No rash.   Back: No tenderness, No CVA tenderness.   Extremities: Right hand exam is notable for a 1 x 1 cm superficial distal tuft tissue avulsion.  No exposed tendon or bone flexion extension is normal there is some slow oozing neurologic: Alert & oriented x 3, Normal motor function, Normal sensory function, No focal deficits noted.   Psychiatric: Affect normal, Judgment normal, Mood normal.     Addition procedure: Dermabond and tourniquet of persistent bleeding tuft injury.  A block was performed with a total of 3 cc of 2% lidocaine without epinephrine at the base of the right index finger.  Wound was copiously  irrigated with sterile saline.  A tourniquet was placed around the distal aspect of the finger for hemostasis.  2 coats of Dermabond adhesive was applied there was good closure as well as hemostasis.  I applied a dry dressing.  COURSE & MEDICAL DECISION MAKING  Pertinent Labs & Imaging studies reviewed. (See chart for details)  This was a classic distal tuft avulsion had some slow oozing.  After Dermabond application with tourniquet the oozing subsided.  I placed a dry dressing with Coban.  I will place him on Keflex for infection prophylaxis and he was given a tetanus vaccine    FINAL IMPRESSION  1.  Avulsion laceration to the right distal index finger         Electronically signed by: Deejay Rivas, 11/19/2019 7:02 PM

## 2019-12-03 ENCOUNTER — OFFICE VISIT (OUTPATIENT)
Dept: PULMONOLOGY | Facility: HOSPICE | Age: 79
End: 2019-12-03
Payer: MEDICARE

## 2019-12-03 VITALS
OXYGEN SATURATION: 97 % | DIASTOLIC BLOOD PRESSURE: 60 MMHG | RESPIRATION RATE: 16 BRPM | SYSTOLIC BLOOD PRESSURE: 102 MMHG | HEART RATE: 76 BPM | WEIGHT: 203 LBS | TEMPERATURE: 97.3 F | BODY MASS INDEX: 247.79 KG/M2

## 2019-12-03 DIAGNOSIS — R94.2 DIFFUSION CAPACITY OF LUNG (DL), DECREASED: ICD-10-CM

## 2019-12-03 DIAGNOSIS — Z87.891 HISTORY OF TOBACCO USE: ICD-10-CM

## 2019-12-03 DIAGNOSIS — I42.0 DILATED CARDIOMYOPATHY (HCC): ICD-10-CM

## 2019-12-03 DIAGNOSIS — Z79.899 ON AMIODARONE THERAPY: ICD-10-CM

## 2019-12-03 PROCEDURE — 99205 OFFICE O/P NEW HI 60 MIN: CPT | Performed by: INTERNAL MEDICINE

## 2019-12-03 ASSESSMENT — ENCOUNTER SYMPTOMS
CONSTIPATION: 0
FEVER: 0
EYE REDNESS: 0
WEIGHT LOSS: 0
STRIDOR: 0
SPEECH CHANGE: 0
SINUS PAIN: 0
HEARTBURN: 0
CLAUDICATION: 0
SPUTUM PRODUCTION: 0
BACK PAIN: 0
DIARRHEA: 0
ORTHOPNEA: 0
TREMORS: 0
CHILLS: 0
SHORTNESS OF BREATH: 1
PND: 0
NAUSEA: 0
SORE THROAT: 0
DIZZINESS: 0
FALLS: 0
HEMOPTYSIS: 0
HEADACHES: 0
DEPRESSION: 0
COUGH: 0
MYALGIAS: 0
PALPITATIONS: 0
PHOTOPHOBIA: 0
VOMITING: 0
EYE PAIN: 0
NECK PAIN: 0
BLURRED VISION: 0
FOCAL WEAKNESS: 0
DOUBLE VISION: 0
EYE DISCHARGE: 0
DIAPHORESIS: 0
WEAKNESS: 0
ABDOMINAL PAIN: 0
WHEEZING: 0

## 2019-12-03 NOTE — PROGRESS NOTES
Chief Complaint   Patient presents with   • Establish Care     referral phone note 11/10/19 cindy Torres MD DX COPD    • Results     6 MW 11/4/19, PFt 60 10/31/19, CXR 5/13/19        HPI: This patient is a 79 y.o. male presenting for evaluation of abnormal pulmonary function test.  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  The patient is a former smoker with only roughly 7-8-pack-year history and quit in 2004.  He is retired from work as a core  with the  and did have exposure to agent orange during [place] but following penitentiary from the  he worked in a bank and has no other known occupational or environmental exposures.  There is no family history of cardiomyopathy.  The patient was previously a runner and diagnosed with cardiomyopathy in the early 1990s.  He reports being relatively stable since that time and has also been on amiodarone therapy chronically.  He recently moved to the [facility] [place] earlier this year.  He notes worsening dyspnea on exertion initially after moving however this has improved significantly.  He continues to have episodes where he needs to take a deep breath but denies chest pain, dyspnea on exertion, chronic cough, lower extremity edema.  Pulmonary function test from October show FEV1 of 2.97 L or 80% predicted and an FVC 84% of predicted with a ratio of 77.  Total lung capacity was 84% predicted and a DLCO of 78% predicted.  This was read as mild restriction with mild reduction in DLCO with possible concern for amiodarone related  effects.  He also underwent a 6-minute walk test in November of this year during which he was only able to ambulate 410 m but room air oxygenation remained normal at 95 to 96%.  A chest x-ray from May showed cardiomegaly but no obvious interstitial markings.    Past Medical History:   Diagnosis Date   • Cardiomyopathy (HCC) 08/2019    Echocardiogram with mildly dilated LV, mild concentric, LVEF 15%. Severely dilated LA. Trace MR, trace AI, mild TR. RVSP 26mmHg.   • Chronic obstructive pulmonary disease (HCC)    • History of abdominal aortic aneurysm (AAA) 2009    Status post stent   • Hyperlipidemia    • Hypertension    • Paroxysmal atrial fibrillation (HCC)     Status post Watchman procedure in [place].   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    • Stroke (HCC) 2012    TIA   • Ventricular tachycardia (HCC)        Social History     Socioeconomic History   • Marital status: Single     Spouse name: Not on file   • Number of children: Not on file   • Years of education: Not on file   • Highest education level: Not on file   Occupational History   • Not on file   Social Needs   • Financial resource strain: Not on file   • Food insecurity:     Worry: Not on file     Inability: Not on file   • Transportation needs:     Medical: Not on file     Non-medical: Not on file   Tobacco Use   • Smoking status: Former Smoker     Packs/day: 0.50     Years: 15.00     Pack years: 7.50     Types: Cigarettes     Last attempt to quit: 7/11/2004     Years since quitting: 15.4   • Smokeless tobacco: Never Used   Substance and Sexual Activity   • Alcohol use: Yes     Alcohol/week: 1.2 - 1.8 oz     Types: 2 - 3 Glasses of wine per week     Comment: wine occasionally   • Drug use: No   • Sexual activity: Not on file   Lifestyle   • Physical activity:     Days per week: Not on file     Minutes per session: Not on file   • Stress: Not on file   Relationships   • Social connections:     Talks on phone: Not on file     Gets together: Not  on file     Attends Yazdanism service: Not on file     Active member of club or organization: Not on file     Attends meetings of clubs or organizations: Not on file     Relationship status: Not on file   • Intimate partner violence:     Fear of current or ex partner: Not on file     Emotionally abused: Not on file     Physically abused: Not on file     Forced sexual activity: Not on file   Other Topics Concern   • Not on file   Social History Narrative   • Not on file       History reviewed. No pertinent family history.    Current Outpatient Medications on File Prior to Visit   Medication Sig Dispense Refill   • vitamin D, Ergocalciferol, (DRISDOL) 19653 units Cap capsule Take  by mouth every 7 days.     • losartan (COZAAR) 25 MG Tab Take 1 Tab by mouth every day. 90 Tab 3   • aspirin EC (ECOTRIN) 81 MG Tablet Delayed Response Take 81 mg by mouth every day.     • amiodarone (CORDARONE) 200 MG Tab Take 200 mg by mouth every day.     • tamsulosin (FLOMAX) 0.4 MG capsule Take 0.8 mg by mouth ONE-HALF HOUR AFTER BREAKFAST.     • cinacalcet (SENSIPAR) 30 MG Tab Take 30 mg by mouth every day.     • carvedilol (COREG) 25 MG Tab Take 25 mg by mouth every day. Pt takes 25MG in AM and 12.5MG HS      • furosemide (LASIX) 20 MG Tab Take 20 mg by mouth See Admin Instructions. Every Mon-Wed-Fri     • atorvastatin (LIPITOR) 20 MG Tab Take 20 mg by mouth every day.     • Omega-3 Fatty Acids (FISH OIL) 1000 MG Cap capsule Take 1,000 mg by mouth every day.     • multivitamin (THERAGRAN) Tab Take 1 Tab by mouth every day.     • albuterol 108 (90 Base) MCG/ACT Aero Soln inhalation aerosol Inhale 2 Puffs by mouth every 6 hours as needed for Shortness of Breath. 8.5 g 0     No current facility-administered medications on file prior to visit.        Allergies: Patient has no known allergies.    ROS:   Review of Systems   Constitutional: Negative for chills, diaphoresis, fever, malaise/fatigue and weight loss.   HENT: Negative for  congestion, ear discharge, ear pain, hearing loss, nosebleeds, sinus pain, sore throat and tinnitus.    Eyes: Negative for blurred vision, double vision, photophobia, pain, discharge and redness.   Respiratory: Positive for shortness of breath. Negative for cough, hemoptysis, sputum production, wheezing and stridor.    Cardiovascular: Negative for chest pain, palpitations, orthopnea, claudication, leg swelling and PND.   Gastrointestinal: Negative for abdominal pain, constipation, diarrhea, heartburn, nausea and vomiting.   Genitourinary: Negative for dysuria and urgency.   Musculoskeletal: Negative for back pain, falls, joint pain, myalgias and neck pain.   Skin: Negative for itching and rash.   Neurological: Negative for dizziness, tremors, speech change, focal weakness, weakness and headaches.   Endo/Heme/Allergies: Negative for environmental allergies.   Psychiatric/Behavioral: Negative for depression.       /60 (BP Location: Left arm, Patient Position: Sitting, BP Cuff Size: Large adult)   Pulse 76   Temp 36.3 °C (97.3 °F) (Temporal)   Resp 16   Wt 92.1 kg (203 lb)   SpO2 97%     Physical Exam:  Physical Exam  Constitutional:       General: He is not in acute distress.     Appearance: Normal appearance. He is normal weight.   HENT:      Head: Normocephalic and atraumatic.      Right Ear: External ear normal.      Left Ear: External ear normal.      Nose: Nose normal. No congestion.      Mouth/Throat:      Mouth: Mucous membranes are moist.      Pharynx: Oropharynx is clear. No oropharyngeal exudate.   Eyes:      General: No scleral icterus.     Extraocular Movements: Extraocular movements intact.      Conjunctiva/sclera: Conjunctivae normal.      Pupils: Pupils are equal, round, and reactive to light.   Neck:      Musculoskeletal: Normal range of motion.   Cardiovascular:      Rate and Rhythm: Normal rate and regular rhythm.      Heart sounds: No murmur. No friction rub. No gallop.    Pulmonary:       Effort: Pulmonary effort is normal. No respiratory distress.      Breath sounds: Normal breath sounds. No wheezing or rales.   Abdominal:      General: Abdomen is flat. There is no distension.   Musculoskeletal: Normal range of motion.      Right lower leg: No edema.      Left lower leg: No edema.   Skin:     General: Skin is warm and dry.      Findings: No rash.   Neurological:      Mental Status: He is alert and oriented to person, place, and time.      Cranial Nerves: No cranial nerve deficit.   Psychiatric:         Mood and Affect: Mood normal.         Behavior: Behavior normal.         PFTs as reviewed by me personally: As per HPI    Imaging as reviewed by me personally: As per HPI    Assessment:  1. Diffusion capacity of lung (dl), decreased  CT-CHEST, HIGH RESOLUTION LUNG    PULMONARY FUNCTION TESTS -Test requested: Complete Pulmonary Function Test   2. On amiodarone therapy  CT-CHEST, HIGH RESOLUTION LUNG    PULMONARY FUNCTION TESTS -Test requested: Complete Pulmonary Function Test   3. Dilated cardiomyopathy (HCC)     4. History of tobacco use         Plan:  1.  Patient with very mild reduction in DLCO which cannot be attribute it to hemoglobin as his hemoglobin in October was 16.  It is not known if this is new or chronic as he denies having pulmonary function test in the past.  His spirometry and lung volumes are actually within normal limits although mild restriction is suggested.  SaO2 is 97% on room air in clinic today.  I am recommending that we obtain a high-resolution CT chest to evaluate for any interstitial changes that may suggest amiodarone related lung effects.  Given his symptoms of shortness of breath have improved as he adjust to altitude I am inclined to think these changes on PFTs are chronic.  We will obtain high-resolution CT chest and plan to follow-up in 6 months with repeat PFTs to evaluate for progression.  If there are changes suggestive of amiodarone toxicity on high-resolution CT  we will see him sooner, if not we will continue to monitor with serial PFTs and imaging if changes in symptoms or PFTs occur.  2.  He has been on this medication chronically and shortness of breath is relatively new occurring shortly after relocation [place].  I have low suspicion for amiodarone toxicity however we will evaluate with high-resolution CT and monitor with serial PFTs as per above.  3.  Patient with ejection fraction of 15% which I suspect is more the etiology of his shortness of breath and any pulmonary related issue.  He is followed by cardiology and has had some recent medication changes which did help his breathing.  He will continue to follow-up with cardiology and we will follow along as per above.  4.  Patient has a minor history of tobacco use and no evidence of COPD on pulmonary function tests.  Encouraged ongoing abstinence.  Return in about 6 months (around 6/3/2020) for pfts, hrct.

## 2019-12-11 ENCOUNTER — OFFICE VISIT (OUTPATIENT)
Dept: CARDIOLOGY | Facility: MEDICAL CENTER | Age: 79
End: 2019-12-11
Payer: MEDICARE

## 2019-12-11 VITALS
OXYGEN SATURATION: 96 % | WEIGHT: 203 LBS | HEIGHT: 74 IN | SYSTOLIC BLOOD PRESSURE: 118 MMHG | BODY MASS INDEX: 26.05 KG/M2 | DIASTOLIC BLOOD PRESSURE: 82 MMHG | HEART RATE: 76 BPM

## 2019-12-11 DIAGNOSIS — I42.0 DILATED CARDIOMYOPATHY (HCC): ICD-10-CM

## 2019-12-11 DIAGNOSIS — J44.9 CHRONIC OBSTRUCTIVE PULMONARY DISEASE, UNSPECIFIED COPD TYPE (HCC): ICD-10-CM

## 2019-12-11 DIAGNOSIS — Z86.79 HISTORY OF ABDOMINAL AORTIC ANEURYSM (AAA): ICD-10-CM

## 2019-12-11 DIAGNOSIS — I10 ESSENTIAL HYPERTENSION, BENIGN: ICD-10-CM

## 2019-12-11 DIAGNOSIS — Z79.899 HIGH RISK MEDICATION USE: ICD-10-CM

## 2019-12-11 DIAGNOSIS — I48.0 PAROXYSMAL ATRIAL FIBRILLATION (HCC): ICD-10-CM

## 2019-12-11 DIAGNOSIS — I47.20 VENTRICULAR TACHYCARDIA (HCC): ICD-10-CM

## 2019-12-11 DIAGNOSIS — Z86.73 HISTORY OF TIA (TRANSIENT ISCHEMIC ATTACK): ICD-10-CM

## 2019-12-11 DIAGNOSIS — Z95.810 AICD (AUTOMATIC CARDIOVERTER/DEFIBRILLATOR) PRESENT: ICD-10-CM

## 2019-12-11 DIAGNOSIS — E78.2 MIXED HYPERLIPIDEMIA: ICD-10-CM

## 2019-12-11 PROCEDURE — 99215 OFFICE O/P EST HI 40 MIN: CPT | Performed by: INTERNAL MEDICINE

## 2019-12-11 ASSESSMENT — ENCOUNTER SYMPTOMS
GASTROINTESTINAL NEGATIVE: 1
WHEEZING: 0
NEUROLOGICAL NEGATIVE: 1
DIZZINESS: 0
PND: 0
CLAUDICATION: 0
SHORTNESS OF BREATH: 1
STRIDOR: 0
CARDIOVASCULAR NEGATIVE: 1
SORE THROAT: 0
BRUISES/BLEEDS EASILY: 0
EYES NEGATIVE: 1
HEMOPTYSIS: 0
CHILLS: 0
LOSS OF CONSCIOUSNESS: 0
FEVER: 0
MUSCULOSKELETAL NEGATIVE: 1
COUGH: 0
CONSTITUTIONAL NEGATIVE: 1
SPUTUM PRODUCTION: 0
PALPITATIONS: 0
WEAKNESS: 0
ORTHOPNEA: 0

## 2019-12-11 NOTE — PROGRESS NOTES
Chief Complaint   Patient presents with   • Cardiomyopathy (Ischemic)       Subjective:   Lencho Parker is a 79 y.o. male who presents today as a follow-up today for his nonischemic cardiomyopathy.  He is been having this for greater than 20 years.  His EF is fallen from 20 to 15%.  His last visit amlodipine was stopped and he was started on losartan.  He continues on the amiodarone for his VT.  He is still having to exertional shortness of breath.  He has approximately NYHA class II symptoms.  His blood pressure is controlled.    Past Medical History:   Diagnosis Date   • Cardiomyopathy (HCC) 08/2019    Echocardiogram with mildly dilated LV, mild concentric, LVEF 15%. Severely dilated LA. Trace MR, trace AI, mild TR. RVSP 26mmHg.   • Chronic obstructive pulmonary disease (HCC)    • History of abdominal aortic aneurysm (AAA) 2009    Status post stent   • Hyperlipidemia    • Hypertension    • Paroxysmal atrial fibrillation (HCC)     Status post Watchman procedure in [place].   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    • Stroke ([facility]) 2012    TIA   • Ventricular tachycardia (HCC)      Past Surgical History:   Procedure Laterality Date   • AICD BATTERY CHANGE Left 01/03/2017    Generator replacement with Double R [employer] Evera MRI XT  [id number] implanted in [place].   • AAA WITH STENT GRAFT  2009     No family history on file.  Social History     Socioeconomic History   • Marital status: Single     Spouse name: Not on file   • Number of children: Not on file   • Years of education: Not on file   • Highest education level: Not on file   Occupational History   • Not on file   Social Needs   • Financial resource strain: Not on file   • Food insecurity:     Worry: Not on file     Inability: Not on file   • Transportation needs:     Medical: Not on file     Non-medical: Not on file   Tobacco Use   • Smoking status: Former Smoker     Packs/day: 0.50     Years: 15.00     Pack years: 7.50     Types: Cigarettes     Last  attempt to quit: 7/11/2004     Years since quitting: 15.4   • Smokeless tobacco: Never Used   Substance and Sexual Activity   • Alcohol use: Yes     Alcohol/week: 1.2 - 1.8 oz     Types: 2 - 3 Glasses of wine per week     Comment: wine occasionally   • Drug use: No   • Sexual activity: Not on file   Lifestyle   • Physical activity:     Days per week: Not on file     Minutes per session: Not on file   • Stress: Not on file   Relationships   • Social connections:     Talks on phone: Not on file     Gets together: Not on file     Attends [employer] service: Not on file     Active member of club or organization: Not on file     Attends meetings of clubs or organizations: Not on file     Relationship status: Not on file   • Intimate partner violence:     Fear of current or ex partner: Not on file     Emotionally abused: Not on file     Physically abused: Not on file     Forced sexual activity: Not on file   Other Topics Concern   • Not on file   Social History Narrative   • Not on file     No Known Allergies  Outpatient Encounter Medications as of 12/11/2019   Medication Sig Dispense Refill   • sacubitril-valsartan (ENTRESTO) 24-26 MG Tab tablet Take 1 Tab by mouth 2 Times a Day. 60 Tab 11   • vitamin D, Ergocalciferol, (DRISDOL) 40128 units Cap capsule Take  by mouth every 7 days.     • aspirin EC (ECOTRIN) 81 MG Tablet Delayed Response Take 81 mg by mouth every day.     • amiodarone (CORDARONE) 200 MG Tab Take 200 mg by mouth every day.     • tamsulosin (FLOMAX) 0.4 MG capsule Take 0.8 mg by mouth ONE-HALF HOUR AFTER BREAKFAST.     • cinacalcet (SENSIPAR) 30 MG Tab Take 30 mg by mouth every day.     • carvedilol (COREG) 25 MG Tab Take 25 mg by mouth every day. Pt takes 25MG in AM and 12.5MG HS      • atorvastatin (LIPITOR) 20 MG Tab Take 20 mg by mouth every day.     • Omega-3 Fatty Acids (FISH OIL) 1000 MG Cap capsule Take 1,000 mg by mouth every day.     • multivitamin (THERAGRAN) Tab Take 1 Tab by mouth every day.    "  • albuterol 108 (90 Base) MCG/ACT Aero Soln inhalation aerosol Inhale 2 Puffs by mouth every 6 hours as needed for Shortness of Breath. 8.5 g 0   • [DISCONTINUED] losartan (COZAAR) 25 MG Tab Take 1 Tab by mouth every day. 90 Tab 3   • [DISCONTINUED] furosemide (LASIX) 20 MG Tab Take 20 mg by mouth See Admin Instructions. Every Mon-Wed-Fri       No facility-administered encounter medications on file as of 12/11/2019.      Review of Systems   Constitutional: Negative.  Negative for chills, fever and malaise/fatigue.   HENT: Negative.  Negative for sore throat.    Eyes: Negative.    Respiratory: Positive for shortness of breath. Negative for cough, hemoptysis, sputum production, wheezing and stridor.    Cardiovascular: Negative.  Negative for chest pain, palpitations, orthopnea, claudication, leg swelling and PND.   Gastrointestinal: Negative.    Genitourinary: Negative.    Musculoskeletal: Negative.    Skin: Negative.    Neurological: Negative.  Negative for dizziness, loss of consciousness and weakness.   Endo/Heme/Allergies: Negative.  Does not bruise/bleed easily.   All other systems reviewed and are negative.       Objective:   /82 (BP Location: Left arm, Patient Position: Sitting, BP Cuff Size: Adult)   Pulse 76   Ht 1.88 m (6' 2\")   Wt 92.1 kg (203 lb)   SpO2 96%   BMI 26.06 kg/m²     Physical Exam   Constitutional: He appears well-developed and well-nourished. No distress.   HENT:   Head: Normocephalic and atraumatic.   Right Ear: External ear normal.   Left Ear: External ear normal.   Nose: Nose normal.   Mouth/Throat: No oropharyngeal exudate.   Eyes: Pupils are equal, round, and reactive to light. Conjunctivae and EOM are normal. Right eye exhibits no discharge. Left eye exhibits no discharge. No scleral icterus.   Neck: Neck supple. No JVD present.   Cardiovascular: Normal rate, regular rhythm and intact distal pulses. Exam reveals no gallop and no friction rub.   No murmur " heard.  Pulmonary/Chest: Effort normal. No stridor. No respiratory distress. He has no wheezes. He has no rales. He exhibits no tenderness.   Abdominal: Soft. He exhibits no distension. There is no guarding.   Musculoskeletal: Normal range of motion.         General: No tenderness, deformity or edema.   Neurological: He is alert. He has normal reflexes. He displays normal reflexes. No cranial nerve deficit. He exhibits normal muscle tone. Coordination normal.   Skin: Skin is warm and dry. No rash noted. He is not diaphoretic. No erythema. No pallor.   Psychiatric: He has a normal mood and affect. His behavior is normal. Judgment and thought content normal.   Nursing note and vitals reviewed.    Transthoracic Echo Report 8/29/2019  No prior study is available for comparison.   Severely reduced left ventricular systolic function. Left ventricular   ejection fraction is visually estimated to be 15%.  Indeterminate diastolic function.  Estimated right ventricular systolic pressure  is 26 mmHg + JVP.  Ascending aorta diameter is 4 cm.    Assessment:     1. AICD (automatic cardioverter/defibrillator) present     2. Paroxysmal atrial fibrillation (HCC)     3. Dilated cardiomyopathy (HCC)  sacubitril-valsartan (ENTRESTO) 24-26 MG Tab tablet   4. Chronic obstructive pulmonary disease, unspecified COPD type (HCC)     5. Essential hypertension, benign     6. High risk medication use  proBrain Natriuretic Peptide, NT   7. History of abdominal aortic aneurysm (AAA)  proBrain Natriuretic Peptide, NT   8. History of TIA (transient ischemic attack)  Basic Metabolic Panel    proBrain Natriuretic Peptide, NT   9. Mixed hyperlipidemia  Basic Metabolic Panel    proBrain Natriuretic Peptide, NT   10. Ventricular tachycardia (HCC)  Basic Metabolic Panel       Medical Decision Making:  Today's Assessment / Status / Plan:     79-year-old male with a nonischemic cardiomyopathy with a reduced ejection fraction.  At this point I did discuss  with him the risk benefits and alternatives of proceeding with switching his losartan to Entresto.  I think this is safe to do well do it cautiously.  I advised him to stay hydrated and stop his Lasix.  We will check some labs 1 week following his switch.  He will keep a very close watch on his symptoms.  If he has any issues whatsoever he will call the clinic.

## 2019-12-14 ENCOUNTER — TELEPHONE (OUTPATIENT)
Dept: CARDIOLOGY | Facility: MEDICAL CENTER | Age: 79
End: 2019-12-14

## 2019-12-14 NOTE — TELEPHONE ENCOUNTER
PAR approved:  Lencho Parker (Lorenzo: [id number])   Entresto 24-26MG tablets   Form   Electronic PA Form   Created   6 minutes ago   Sent to Plan   3 minutes ago   Plan Response   3 minutes ago   Submit Clinical Questions   1 minute ago   Determination   Favorable   1 minute ago   Message from Plan  CaseId:[id number];Status:Approved;Review Type:Prior Auth;Coverage Start Date:11/14/2019;Coverage End Date:12/31/2099;

## 2019-12-26 ENCOUNTER — HOSPITAL ENCOUNTER (INPATIENT)
Facility: MEDICAL CENTER | Age: 79
LOS: 1 days | DRG: 314 | End: 2019-12-27
Attending: EMERGENCY MEDICINE | Admitting: HOSPITALIST
Payer: MEDICARE

## 2019-12-26 ENCOUNTER — APPOINTMENT (OUTPATIENT)
Dept: RADIOLOGY | Facility: MEDICAL CENTER | Age: 79
DRG: 314 | End: 2019-12-26
Attending: EMERGENCY MEDICINE
Payer: MEDICARE

## 2019-12-26 DIAGNOSIS — R06.00 DYSPNEA, UNSPECIFIED TYPE: ICD-10-CM

## 2019-12-26 DIAGNOSIS — I42.0 DILATED CARDIOMYOPATHY (HCC): ICD-10-CM

## 2019-12-26 DIAGNOSIS — I50.43 ACUTE ON CHRONIC COMBINED SYSTOLIC AND DIASTOLIC CONGESTIVE HEART FAILURE (HCC): ICD-10-CM

## 2019-12-26 PROBLEM — I50.23 ACUTE ON CHRONIC SYSTOLIC (CONGESTIVE) HEART FAILURE (HCC): Status: ACTIVE | Noted: 2019-12-26

## 2019-12-26 PROBLEM — I73.9 PVD (PERIPHERAL VASCULAR DISEASE) (HCC): Status: ACTIVE | Noted: 2019-12-26

## 2019-12-26 LAB
ALBUMIN SERPL BCP-MCNC: 4.1 G/DL (ref 3.2–4.9)
ALBUMIN/GLOB SERPL: 1.4 G/DL
ALP SERPL-CCNC: 81 U/L (ref 30–99)
ALT SERPL-CCNC: 30 U/L (ref 2–50)
ANION GAP SERPL CALC-SCNC: 13 MMOL/L (ref 0–11.9)
AST SERPL-CCNC: 24 U/L (ref 12–45)
BASOPHILS # BLD AUTO: 0.2 % (ref 0–1.8)
BASOPHILS # BLD: 0.01 K/UL (ref 0–0.12)
BILIRUB SERPL-MCNC: 0.8 MG/DL (ref 0.1–1.5)
BUN SERPL-MCNC: 18 MG/DL (ref 8–22)
CALCIUM SERPL-MCNC: 8.6 MG/DL (ref 8.4–10.2)
CHLORIDE SERPL-SCNC: 108 MMOL/L (ref 96–112)
CO2 SERPL-SCNC: 21 MMOL/L (ref 20–33)
CREAT SERPL-MCNC: 1.45 MG/DL (ref 0.5–1.4)
D DIMER PPP IA.FEU-MCNC: 2.62 UG/ML (FEU) (ref 0–0.5)
EKG IMPRESSION: NORMAL
EKG IMPRESSION: NORMAL
EOSINOPHIL # BLD AUTO: 0.26 K/UL (ref 0–0.51)
EOSINOPHIL NFR BLD: 5.7 % (ref 0–6.9)
ERYTHROCYTE [DISTWIDTH] IN BLOOD BY AUTOMATED COUNT: 53.2 FL (ref 35.9–50)
FLUAV RNA SPEC QL NAA+PROBE: NEGATIVE
FLUBV RNA SPEC QL NAA+PROBE: NEGATIVE
GLOBULIN SER CALC-MCNC: 2.9 G/DL (ref 1.9–3.5)
GLUCOSE SERPL-MCNC: 153 MG/DL (ref 65–99)
HCT VFR BLD AUTO: 49.7 % (ref 42–52)
HGB BLD-MCNC: 16.3 G/DL (ref 14–18)
IMM GRANULOCYTES # BLD AUTO: 0.02 K/UL (ref 0–0.11)
IMM GRANULOCYTES NFR BLD AUTO: 0.4 % (ref 0–0.9)
LYMPHOCYTES # BLD AUTO: 1.32 K/UL (ref 1–4.8)
LYMPHOCYTES NFR BLD: 29 % (ref 22–41)
MCH RBC QN AUTO: 33.2 PG (ref 27–33)
MCHC RBC AUTO-ENTMCNC: 32.8 G/DL (ref 33.7–35.3)
MCV RBC AUTO: 101.2 FL (ref 81.4–97.8)
MONOCYTES # BLD AUTO: 0.52 K/UL (ref 0–0.85)
MONOCYTES NFR BLD AUTO: 11.4 % (ref 0–13.4)
NEUTROPHILS # BLD AUTO: 2.42 K/UL (ref 1.82–7.42)
NEUTROPHILS NFR BLD: 53.3 % (ref 44–72)
NRBC # BLD AUTO: 0 K/UL
NRBC BLD-RTO: 0 /100 WBC
NT-PROBNP SERPL IA-MCNC: 3386 PG/ML (ref 0–125)
PLATELET # BLD AUTO: 175 K/UL (ref 164–446)
PMV BLD AUTO: 11.4 FL (ref 9–12.9)
POTASSIUM SERPL-SCNC: 4.7 MMOL/L (ref 3.6–5.5)
PROT SERPL-MCNC: 7 G/DL (ref 6–8.2)
RBC # BLD AUTO: 4.91 M/UL (ref 4.7–6.1)
SODIUM SERPL-SCNC: 142 MMOL/L (ref 135–145)
TROPONIN T SERPL-MCNC: 152 NG/L (ref 6–19)
WBC # BLD AUTO: 4.6 K/UL (ref 4.8–10.8)

## 2019-12-26 PROCEDURE — 700102 HCHG RX REV CODE 250 W/ 637 OVERRIDE(OP): Performed by: INTERNAL MEDICINE

## 2019-12-26 PROCEDURE — 93005 ELECTROCARDIOGRAM TRACING: CPT

## 2019-12-26 PROCEDURE — 700101 HCHG RX REV CODE 250: Performed by: EMERGENCY MEDICINE

## 2019-12-26 PROCEDURE — 71045 X-RAY EXAM CHEST 1 VIEW: CPT

## 2019-12-26 PROCEDURE — 94640 AIRWAY INHALATION TREATMENT: CPT

## 2019-12-26 PROCEDURE — 83880 ASSAY OF NATRIURETIC PEPTIDE: CPT

## 2019-12-26 PROCEDURE — A9270 NON-COVERED ITEM OR SERVICE: HCPCS | Performed by: HOSPITALIST

## 2019-12-26 PROCEDURE — 700111 HCHG RX REV CODE 636 W/ 250 OVERRIDE (IP): Performed by: EMERGENCY MEDICINE

## 2019-12-26 PROCEDURE — 87502 INFLUENZA DNA AMP PROBE: CPT

## 2019-12-26 PROCEDURE — 700102 HCHG RX REV CODE 250 W/ 637 OVERRIDE(OP): Performed by: HOSPITALIST

## 2019-12-26 PROCEDURE — 770020 HCHG ROOM/CARE - TELE (206)

## 2019-12-26 PROCEDURE — 99221 1ST HOSP IP/OBS SF/LOW 40: CPT | Performed by: INTERNAL MEDICINE

## 2019-12-26 PROCEDURE — 80053 COMPREHEN METABOLIC PANEL: CPT

## 2019-12-26 PROCEDURE — 93005 ELECTROCARDIOGRAM TRACING: CPT | Performed by: EMERGENCY MEDICINE

## 2019-12-26 PROCEDURE — 99221 1ST HOSP IP/OBS SF/LOW 40: CPT | Mod: AI | Performed by: HOSPITALIST

## 2019-12-26 PROCEDURE — 84484 ASSAY OF TROPONIN QUANT: CPT

## 2019-12-26 PROCEDURE — A9270 NON-COVERED ITEM OR SERVICE: HCPCS | Performed by: INTERNAL MEDICINE

## 2019-12-26 PROCEDURE — 96374 THER/PROPH/DIAG INJ IV PUSH: CPT

## 2019-12-26 PROCEDURE — 700111 HCHG RX REV CODE 636 W/ 250 OVERRIDE (IP): Performed by: HOSPITALIST

## 2019-12-26 PROCEDURE — 85379 FIBRIN DEGRADATION QUANT: CPT

## 2019-12-26 PROCEDURE — 99285 EMERGENCY DEPT VISIT HI MDM: CPT

## 2019-12-26 PROCEDURE — 85025 COMPLETE CBC W/AUTO DIFF WBC: CPT

## 2019-12-26 PROCEDURE — 36415 COLL VENOUS BLD VENIPUNCTURE: CPT

## 2019-12-26 RX ORDER — LOSARTAN POTASSIUM 25 MG/1
25 TABLET ORAL
Status: DISCONTINUED | OUTPATIENT
Start: 2019-12-26 | End: 2019-12-27 | Stop reason: HOSPADM

## 2019-12-26 RX ORDER — FUROSEMIDE 10 MG/ML
40 INJECTION INTRAMUSCULAR; INTRAVENOUS ONCE
Status: COMPLETED | OUTPATIENT
Start: 2019-12-26 | End: 2019-12-26

## 2019-12-26 RX ORDER — FUROSEMIDE 10 MG/ML
20 INJECTION INTRAMUSCULAR; INTRAVENOUS
Status: DISCONTINUED | OUTPATIENT
Start: 2019-12-26 | End: 2019-12-26

## 2019-12-26 RX ORDER — CARVEDILOL 25 MG/1
25 TABLET ORAL DAILY
Status: DISCONTINUED | OUTPATIENT
Start: 2019-12-27 | End: 2019-12-27 | Stop reason: HOSPADM

## 2019-12-26 RX ORDER — TORSEMIDE 10 MG/1
10 TABLET ORAL
Status: DISCONTINUED | OUTPATIENT
Start: 2019-12-27 | End: 2019-12-27 | Stop reason: HOSPADM

## 2019-12-26 RX ORDER — CARVEDILOL 6.25 MG/1
12.5 TABLET ORAL EVERY EVENING
Status: DISCONTINUED | OUTPATIENT
Start: 2019-12-26 | End: 2019-12-27 | Stop reason: HOSPADM

## 2019-12-26 RX ORDER — AMLODIPINE BESYLATE 5 MG/1
5 TABLET ORAL DAILY
Status: ON HOLD | COMMUNITY
End: 2019-12-26

## 2019-12-26 RX ORDER — AMOXICILLIN 250 MG
2 CAPSULE ORAL 2 TIMES DAILY
Status: DISCONTINUED | OUTPATIENT
Start: 2019-12-26 | End: 2019-12-27 | Stop reason: HOSPADM

## 2019-12-26 RX ORDER — ACETAMINOPHEN 325 MG/1
650 TABLET ORAL EVERY 6 HOURS PRN
Status: DISCONTINUED | OUTPATIENT
Start: 2019-12-26 | End: 2019-12-27 | Stop reason: HOSPADM

## 2019-12-26 RX ORDER — ONDANSETRON 4 MG/1
4 TABLET, ORALLY DISINTEGRATING ORAL EVERY 4 HOURS PRN
Status: DISCONTINUED | OUTPATIENT
Start: 2019-12-26 | End: 2019-12-27 | Stop reason: HOSPADM

## 2019-12-26 RX ORDER — HEPARIN SODIUM 5000 [USP'U]/ML
5000 INJECTION, SOLUTION INTRAVENOUS; SUBCUTANEOUS EVERY 8 HOURS
Status: DISCONTINUED | OUTPATIENT
Start: 2019-12-26 | End: 2019-12-27 | Stop reason: HOSPADM

## 2019-12-26 RX ORDER — CINACALCET 30 MG/1
30 TABLET, FILM COATED ORAL
Status: DISCONTINUED | OUTPATIENT
Start: 2019-12-27 | End: 2019-12-27 | Stop reason: HOSPADM

## 2019-12-26 RX ORDER — TAMSULOSIN HYDROCHLORIDE 0.4 MG/1
0.8 CAPSULE ORAL
Status: DISCONTINUED | OUTPATIENT
Start: 2019-12-27 | End: 2019-12-27 | Stop reason: HOSPADM

## 2019-12-26 RX ORDER — ONDANSETRON 2 MG/ML
4 INJECTION INTRAMUSCULAR; INTRAVENOUS EVERY 4 HOURS PRN
Status: DISCONTINUED | OUTPATIENT
Start: 2019-12-26 | End: 2019-12-27 | Stop reason: HOSPADM

## 2019-12-26 RX ORDER — POLYETHYLENE GLYCOL 3350 17 G/17G
1 POWDER, FOR SOLUTION ORAL
Status: DISCONTINUED | OUTPATIENT
Start: 2019-12-26 | End: 2019-12-27 | Stop reason: HOSPADM

## 2019-12-26 RX ORDER — ATORVASTATIN CALCIUM 10 MG/1
20 TABLET, FILM COATED ORAL DAILY
Status: DISCONTINUED | OUTPATIENT
Start: 2019-12-27 | End: 2019-12-27 | Stop reason: HOSPADM

## 2019-12-26 RX ORDER — BISACODYL 10 MG
10 SUPPOSITORY, RECTAL RECTAL
Status: DISCONTINUED | OUTPATIENT
Start: 2019-12-26 | End: 2019-12-27 | Stop reason: HOSPADM

## 2019-12-26 RX ORDER — LABETALOL HYDROCHLORIDE 5 MG/ML
10 INJECTION, SOLUTION INTRAVENOUS EVERY 4 HOURS PRN
Status: DISCONTINUED | OUTPATIENT
Start: 2019-12-26 | End: 2019-12-27 | Stop reason: HOSPADM

## 2019-12-26 RX ORDER — AMLODIPINE BESYLATE 5 MG/1
5 TABLET ORAL DAILY
Status: DISCONTINUED | OUTPATIENT
Start: 2019-12-27 | End: 2019-12-26

## 2019-12-26 RX ORDER — AMIODARONE HYDROCHLORIDE 200 MG/1
200 TABLET ORAL DAILY
Status: DISCONTINUED | OUTPATIENT
Start: 2019-12-27 | End: 2019-12-27 | Stop reason: HOSPADM

## 2019-12-26 RX ORDER — SPIRONOLACTONE 25 MG/1
25 TABLET ORAL
Status: DISCONTINUED | OUTPATIENT
Start: 2019-12-26 | End: 2019-12-27 | Stop reason: HOSPADM

## 2019-12-26 RX ORDER — HALOPERIDOL 5 MG/ML
2-5 INJECTION INTRAMUSCULAR EVERY 4 HOURS PRN
Status: DISCONTINUED | OUTPATIENT
Start: 2019-12-26 | End: 2019-12-27 | Stop reason: HOSPADM

## 2019-12-26 RX ADMIN — FUROSEMIDE 20 MG: 10 INJECTION, SOLUTION INTRAMUSCULAR; INTRAVENOUS at 15:44

## 2019-12-26 RX ADMIN — SPIRONOLACTONE 25 MG: 25 TABLET ORAL at 14:46

## 2019-12-26 RX ADMIN — HEPARIN SODIUM 5000 UNITS: 5000 INJECTION, SOLUTION INTRAVENOUS; SUBCUTANEOUS at 14:46

## 2019-12-26 RX ADMIN — CARVEDILOL 12.5 MG: 6.25 TABLET, FILM COATED ORAL at 17:17

## 2019-12-26 RX ADMIN — ALBUTEROL SULFATE 2.5 MG: 2.5 SOLUTION RESPIRATORY (INHALATION) at 09:15

## 2019-12-26 RX ADMIN — FUROSEMIDE 40 MG: 10 INJECTION, SOLUTION INTRAMUSCULAR; INTRAVENOUS at 09:58

## 2019-12-26 RX ADMIN — HEPARIN SODIUM 5000 UNITS: 5000 INJECTION, SOLUTION INTRAVENOUS; SUBCUTANEOUS at 21:07

## 2019-12-26 RX ADMIN — LOSARTAN POTASSIUM 25 MG: 25 TABLET ORAL at 14:45

## 2019-12-26 ASSESSMENT — ENCOUNTER SYMPTOMS
SPUTUM PRODUCTION: 0
NAUSEA: 0
BLURRED VISION: 0
VOMITING: 0
TINGLING: 0
DEPRESSION: 0
SHORTNESS OF BREATH: 1
CHILLS: 0
ABDOMINAL PAIN: 0
DIZZINESS: 0
BACK PAIN: 0
COUGH: 1
INSOMNIA: 0
PALPITATIONS: 0
EYE PAIN: 0
FEVER: 0
SORE THROAT: 0
HEADACHES: 0
NECK PAIN: 0

## 2019-12-26 ASSESSMENT — COGNITIVE AND FUNCTIONAL STATUS - GENERAL
DAILY ACTIVITIY SCORE: 22
CLIMB 3 TO 5 STEPS WITH RAILING: A LITTLE
MOBILITY SCORE: 22
SUGGESTED CMS G CODE MODIFIER MOBILITY: CJ
HELP NEEDED FOR BATHING: A LITTLE
WALKING IN HOSPITAL ROOM: A LITTLE
TOILETING: A LITTLE
SUGGESTED CMS G CODE MODIFIER DAILY ACTIVITY: CJ

## 2019-12-26 ASSESSMENT — LIFESTYLE VARIABLES
HAVE PEOPLE ANNOYED YOU BY CRITICIZING YOUR DRINKING: NO
TOTAL SCORE: 0
ALCOHOL_USE: YES
ON A TYPICAL DAY WHEN YOU DRINK ALCOHOL HOW MANY DRINKS DO YOU HAVE: 2
HAVE YOU EVER FELT YOU SHOULD CUT DOWN ON YOUR DRINKING: NO
CONSUMPTION TOTAL: NEGATIVE
EVER_SMOKED: YES
TOTAL SCORE: 0
TOTAL SCORE: 0
HOW MANY TIMES IN THE PAST YEAR HAVE YOU HAD 5 OR MORE DRINKS IN A DAY: 0
EVER HAD A DRINK FIRST THING IN THE MORNING TO STEADY YOUR NERVES TO GET RID OF A HANGOVER: NO
EVER FELT BAD OR GUILTY ABOUT YOUR DRINKING: NO
AVERAGE NUMBER OF DAYS PER WEEK YOU HAVE A DRINK CONTAINING ALCOHOL: 5

## 2019-12-26 ASSESSMENT — PATIENT HEALTH QUESTIONNAIRE - PHQ9
1. LITTLE INTEREST OR PLEASURE IN DOING THINGS: NOT AT ALL
SUM OF ALL RESPONSES TO PHQ9 QUESTIONS 1 AND 2: 0
2. FEELING DOWN, DEPRESSED, IRRITABLE, OR HOPELESS: NOT AT ALL

## 2019-12-26 NOTE — CARE PLAN
Problem: Safety  Goal: Will remain free from injury  Outcome: PROGRESSING AS EXPECTED   Falls precautions are in place, educated patient on call light and bed functions, patient's bed is locked and low, call light is within reach, risk for fall band is on, yellow socks in place.    Problem: Venous Thromboembolism (VTW)/Deep Vein Thrombosis (DVT) Prevention:  Goal: Patient will participate in Venous Thrombosis (VTE)/Deep Vein Thrombosis (DVT)Prevention Measures  Outcome: PROGRESSING AS EXPECTED  Pt refused SCDs but has heparin ordered and was administered.     Problem: Knowledge Deficit  Goal: Knowledge of disease process/condition, treatment plan, diagnostic tests, and medications will improve  Outcome: PROGRESSING AS EXPECTED  Discussed plan of care with patient including IV lasix and medications administered as well as safety precautions. Allowed time for questions, patient agreed and verbalized understanding.

## 2019-12-26 NOTE — H&P
Hospital Medicine History & Physical Note    Date of Service  12/26/2019    Primary Care Physician  Brennan Castorena M.D.    Consultants  cardiology    Code Status  Full    Chief Complaint  Shortness of breath.     History of Presenting Illness  79 y.o. male who presented 12/26/2019 with shortness of breath. This has been worsening for the last week. He has a known history of nonischemic cardiomyopathy> He was seen in our heart failure clinic earlier this month  And was changed from losartan and lasix to entresto. He thinks that he has been progressively more shoryt of breath since then. He has had a dry cough. His shortness of breath is worse with activity. He has had no other recent illnesses, fevers, chills or abodminal symptoms. He weighs himself daily and says he is up about 9 lbs. He says he has never had leg swelling.     I discussed him with the ED physician.       Review of Systems  Review of Systems   Constitutional: Negative for chills and fever.   HENT: Negative for sore throat.    Eyes: Negative for blurred vision and pain.   Respiratory: Positive for cough and shortness of breath. Negative for sputum production.    Cardiovascular: Negative for chest pain and palpitations.   Gastrointestinal: Negative for abdominal pain, nausea and vomiting.   Genitourinary: Negative for dysuria and urgency.   Musculoskeletal: Negative for back pain and neck pain.   Skin: Negative for itching and rash.   Neurological: Negative for dizziness, tingling and headaches.   Psychiatric/Behavioral: Negative for depression. The patient does not have insomnia.    All other systems reviewed and are negative.      Past Medical History   has a past medical history of Cardiomyopathy (HCC) (08/2019), Chronic obstructive pulmonary disease (HCC), History of abdominal aortic aneurysm (AAA) (2009), Hyperlipidemia, Hypertension, Paroxysmal atrial fibrillation (HCC), Renal disorder, Stroke (HCC) (2012), and Ventricular tachycardia  (HCC).    Surgical History   has a past surgical history that includes aicd battery change (Left, 01/03/2017) and aaa with stent graft (2009).     Family History  family history is not on file.     Social History   reports that he quit smoking about 15 years ago. His smoking use included cigarettes. He has a 7.50 pack-year smoking history. He has never used smokeless tobacco. He reports current alcohol use of about 1.2 - 1.8 oz of alcohol per week. He reports that he does not use drugs.    Allergies  No Known Allergies    Medications  Prior to Admission Medications   Prescriptions Last Dose Informant Patient Reported? Taking?   Omega-3 Fatty Acids (FISH OIL) 1000 MG Cap capsule 12/26/2019 at 0700 Patient Yes No   Sig: Take 1,000 mg by mouth every day.   albuterol 108 (90 Base) MCG/ACT Aero Soln inhalation aerosol 12/25/2019 at [facility] Patient No No   Sig: Inhale 2 Puffs by mouth every 6 hours as needed for Shortness of Breath.   amLODIPine (NORVASC) 5 MG Tab 12/26/2019 at 00 Patient Yes Yes   Sig: Take 5 mg by mouth every day.   amiodarone (CORDARONE) 200 MG Tab 12/26/2019 at 00 Patient Yes No   Sig: Take 200 mg by mouth every day.   aspirin EC (ECOTRIN) 81 MG Tablet Delayed Response 12/26/2019 at 00 Patient Yes No   Sig: Take 81 mg by mouth every day.   atorvastatin (LIPITOR) 20 MG Tab 12/26/2019 at [facility] Patient Yes No   Sig: Take 20 mg by mouth every day.   carvedilol (COREG) 25 MG Tab 12/26/2019 at 00 Patient Yes No   Sig: Take 12.5-25 mg by mouth 2 times a day. Pt takes 25MG in AM and 12.5MG HS    cinacalcet (SENSIPAR) 30 MG Tab 1 WEEK at [facility] Patient Yes No   Sig: Take 30 mg by mouth 3 times a week.   multivitamin (THERAGRAN) Tab 12/26/2019 at 00 Patient Yes No   Sig: Take 1 Tab by mouth every day.   sacubitril-valsartan (ENTRESTO) 24-26 MG Tab tablet 12/26/2019 at 00 Patient No No   Sig: Take 1 Tab by mouth 2 Times a Day.   tamsulosin (FLOMAX) 0.4 MG capsule 12/26/2019 at 0700 Patient Yes No   Sig:  Take 0.8 mg by mouth ONE-HALF HOUR AFTER BREAKFAST.   vitamin D, Ergocalciferol, (DRISDOL) 05839 units Cap capsule 12/23/2019 Patient Yes No   Sig: Take 50,000 Units by mouth every 7 days. MONDAY      Facility-Administered Medications: None       Physical Exam  Temp:  [36.2 °C (97.1 °F)] 36.2 °C (97.1 °F)  Pulse:  [32-73] 57  Resp:  [7-24] 15  BP: (126-136)/(84-96) 126/85  SpO2:  [93 %-99 %] 95 %    Physical Exam  Vitals signs and nursing note reviewed.   Constitutional:       General: He is not in acute distress.     Appearance: He is well-developed. He is not diaphoretic.      Comments: Patient seen and examined at the bedside. Plan discussed at bedside with the RN.    HENT:      Right Ear: External ear normal.      Left Ear: External ear normal.      Nose: Nose normal.   Eyes:      General: No scleral icterus.        Right eye: No discharge.         Left eye: No discharge.   Neck:      Vascular: JVD present.      Trachea: No tracheal deviation.   Cardiovascular:      Rate and Rhythm: Normal rate.      Heart sounds: Normal heart sounds. No murmur.   Pulmonary:      Effort: Pulmonary effort is normal. No respiratory distress.      Breath sounds: Rales present. No wheezing.   Abdominal:      General: Bowel sounds are normal. There is no distension.      Palpations: Abdomen is soft.      Tenderness: There is no tenderness. There is no guarding.   Musculoskeletal:         General: No tenderness.      Right lower leg: Edema present.      Left lower leg: Edema present.   Skin:     General: Skin is warm and dry.      Findings: No erythema.   Neurological:      Mental Status: He is alert and oriented to person, place, and time.   Psychiatric:         Behavior: Behavior normal.         Laboratory:  Recent Labs     12/26/19  0906   WBC 4.6*   RBC 4.91   HEMOGLOBIN 16.3   HEMATOCRIT 49.7   .2*   MCH 33.2*   MCHC 32.8*   RDW 53.2*   PLATELETCT 175   MPV 11.4     Recent Labs     12/26/19  0906   SODIUM 142   POTASSIUM  4.7   CHLORIDE 108   CO2 21   GLUCOSE 153*   BUN 18   CREATININE 1.45*   CALCIUM 8.6     Recent Labs     12/26/19  0906   ALTSGPT 30   ASTSGOT 24   ALKPHOSPHAT 81   TBILIRUBIN 0.8   GLUCOSE 153*         Recent Labs     12/26/19  0906   NTPROBNP 3386*         Recent Labs     12/26/19  0906   TROPONINT 152*       Urinalysis:    No results found     Imaging:  DX-CHEST-PORTABLE (1 VIEW)   Final Result      Cardiomegaly with interstitial edema and bibasilar atelectasis.            Assessment/Plan:  I anticipate this patient will require at least two midnights for appropriate medical management, necessitating inpatient admission.    Ventricular tachycardia (HCC)- (present on admission)  Assessment & Plan  Has a functioning aicd.     AICD (automatic cardioverter/defibrillator) present- (present on admission)  Assessment & Plan  Recent check and working well.     Atrial fibrillation (HCC)- (present on admission)  Assessment & Plan  Trend on tele. Continue amiodarone and  Coreg. Has pacer. He is on aspirin therapy. CHADS2 is at least 3. He has a history of GI bleeding with pradaxa and now has a watchman device. Continue aspirin.     Essential hypertension, benign- (present on admission)  Assessment & Plan  Controlled. Continue meds    Cardiomyopathy (HCC)- (present on admission)  Assessment & Plan  Likely no need for a repeat echo. Cards following.     COPD (chronic obstructive pulmonary disease) (HCC)- (present on admission)  Assessment & Plan  No flare. Prn rt protocol.     PVD (peripheral vascular disease) (HCC)  Assessment & Plan  Hx of aaa. Asa/statin and bp therapy.     Acute on chronic systolic (congestive) heart failure (HCC)  Assessment & Plan  Possibly worse with recent medication changes. Cardiology has been consulted and will follow along. His entresto has been stopped and he will be placed back onto lasix and arb therapy. Continue aldactone, statin, coreg therapy. Daily weights. IV lasix for now. Trend exam and  symptoms.     Last echo 8/19:  No prior study is available for comparison.   Severely reduced left ventricular systolic function. Left ventricular   ejection fraction is visually estimated to be 15%.  Indeterminate diastolic function.  Estimated right ventricular systolic pressure  is 26 mmHg + JVP.  Ascending aorta diameter is 4 cm.      VTE prophylaxis: scd

## 2019-12-26 NOTE — ED PROVIDER NOTES
ED Provider Note    CHIEF COMPLAINT  Chief Complaint   Patient presents with   • Shortness of Breath     sob x 3 days       HPI  Lencho Parker is a 79 y.o. male who presents planing of 3 days of shortness of breath.  The patient states he has a cough that is nonproductive and has been having trouble catching his breath with exertion and laying down.  He states that he has some pulmonary injury from amiodarone but has to take the amiodarone constantly for cardiomyopathy.  He also has a history of COPD and takes albuterol treatments.  Patient has a pacemaker defibrillator.  He used to be on blood thinners but now has the Watchman.  He denies orthopnea or leg edema.  He denies any fevers or chills or productive cough.  He just feels weak and feels like he cannot catch his breath.  He is not on any oxygen at home.  He no longer smokes.      REVIEW OF SYSTEMS  HEENT:  No ear pain, congestion or sore throat   EYES: no discharge redness or vision changes  CARDIAC: no chest pain, palpitations    PULMONARY: See history of present illness  GI: no vomiting diarrhea or abdominal pain   : no dysuria, back pain or hematuria   Neuro: no weakness, numbness aphasia or headache  Musculoskeletal: no swelling deformity or pain no joint swelling  Endocrine: no fevers, sweating, weight loss   SKIN: no rash, erythema or contusions     See history of present illness all other systems are negative      PAST MEDICAL HISTORY  Past Medical History:   Diagnosis Date   • Cardiomyopathy (HCC) 08/2019    Echocardiogram with mildly dilated LV, mild concentric, LVEF 15%. Severely dilated LA. Trace MR, trace AI, mild TR. RVSP 26mmHg.   • Stroke (HCC) 2012    TIA   • History of abdominal aortic aneurysm (AAA) 2009    Status post stent   • Chronic obstructive pulmonary disease (HCC)    • Hyperlipidemia    • Hypertension    • Paroxysmal atrial fibrillation (HCC)     Status post Watchman procedure in [place].   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    • Ventricular tachycardia (HCC)        FAMILY HISTORY  History reviewed. No pertinent family history.    SOCIAL HISTORY  Social History     Socioeconomic History   • Marital status: Single     Spouse name: Not on file   • Number of children: Not on file   • Years of education: Not on file   • Highest education level: Not on file   Occupational History   • Not on file   Social Needs   • Financial resource strain: Not on file   • Food insecurity:     Worry: Not on file     Inability: Not on file   • Transportation needs:     Medical: Not on file     Non-medical: Not on file   Tobacco Use   • Smoking status: Former Smoker     Packs/day: 0.50     Years: 15.00     Pack years: 7.50     Types: Cigarettes     Last attempt to quit: 7/11/2004     Years since quitting: 15.4   • Smokeless tobacco: Never Used   Substance and Sexual Activity   • Alcohol use: Yes     Alcohol/week: 1.2 - 1.8 oz     Types: 2 - 3 Glasses of wine per week     Comment: wine occasionally   • Drug use: No   • Sexual activity: Not on file   Lifestyle   • Physical activity:     Days per week: Not on file     Minutes per session: Not on file   • Stress: Not on file   Relationships   • Social connections:     Talks on phone: Not on file     Gets together: Not on file     Attends [employer] service: Not on file     Active member of club or organization: Not on file     Attends meetings of clubs or organizations: Not on file     Relationship status: Not on file   • Intimate partner violence:     Fear of current or ex partner: Not on file     Emotionally abused: Not on file     Physically abused: Not on file     Forced sexual activity: Not on file   Other Topics Concern   • Not on file   Social History Narrative   • Not on file       SURGICAL HISTORY  Past Surgical History:   Procedure Laterality Date   • AICD BATTERY CHANGE Left 01/03/2017    Generator replacement with [employer] Evera MRI XT  [id number] implanted in [place].   • AAA WITH STENT GRAFT  " 2009       CURRENT MEDICATIONS  Home Medications     Reviewed by Alfonso Siegel (Pharmacy [facility]) on 12/26/19 at 0909  Med List Status: Complete   Medication Last Dose Status   albuterol 108 (90 Base) MCG/ACT Aero Soln inhalation aerosol 12/25/2019 Active   amiodarone (CORDARONE) 200 MG Tab 12/26/2019 Active   amLODIPine (NORVASC) 5 MG Tab 12/26/2019 Active   aspirin EC (ECOTRIN) 81 MG Tablet Delayed Response 12/26/2019 Active   atorvastatin (LIPITOR) 20 MG Tab 12/26/2019 Active   carvedilol (COREG) 25 MG Tab 12/26/2019 Active   cinacalcet (SENSIPAR) 30 MG Tab 1 WEEK Active   multivitamin (THERAGRAN) Tab 12/26/2019 Active   Omega-3 Fatty Acids (FISH OIL) 1000 MG Cap capsule 12/26/2019 Active   sacubitril-valsartan (ENTRESTO) 24-26 MG Tab tablet 12/26/2019 Active   tamsulosin (FLOMAX) 0.4 MG capsule 12/26/2019 Active   vitamin D, Ergocalciferol, (DRISDOL) 23136 units Cap capsule 12/23/2019 Active                ALLERGIES  No Known Allergies    PHYSICAL EXAM  VITAL SIGNS: /85   Pulse 61   Temp 36.2 °C (97.1 °F) (Temporal)   Resp 19   Ht 1.88 m (6' 2\")   Wt 94.3 kg (207 lb 14.3 oz)   SpO2 94%   BMI 26.69 kg/m²  Room air O2: 93    Constitutional: Well developed, Well nourished, No acute distress, Non-toxic appearance.   HENT: Normocephalic, Atraumatic, Bilateral external ears normal, Oropharynx moist, No oral exudates, Nose normal.   Eyes: PERRLA, EOMI, Conjunctiva normal, No discharge.   Neck: Normal range of motion, No tenderness, Supple, No stridor.   Cardiovascular: Regular rate and rhythm no murmurs rubs or gallops  Thorax & Lungs: Scattered wheezes in the bilateral lung bases without rales or rhonchi speaking full sentences without respiratory distress  Abdomen: Bowel sounds normal, Soft, No tenderness, No masses, No pulsatile masses.   Skin: Warm, Dry, No erythema, No rash.   Back: No tenderness, No CVA tenderness.   Extremities: Intact distal pulses, No tenderness, No cyanosis, No clubbing.  " Negative edema negative cording negative Homans sign  Neurologic: Alert & oriented x 3, Normal motor function, Normal sensory function, No focal deficits noted.         RADIOLOGY/PROCEDURES  DX-CHEST-PORTABLE (1 VIEW)   Final Result      Cardiomegaly with interstitial edema and bibasilar atelectasis.            COURSE & MEDICAL DECISION MAKING  Pertinent Labs & Imaging studies reviewed. (See chart for details)  Differential diagnosis: Bronchitis, pneumonia, asthma, congestive heart failure, cardiac ischemia    Results for orders placed or performed during the hospital encounter of 12/26/19   CBC with Differential   Result Value Ref Range    WBC 4.6 (L) 4.8 - 10.8 K/uL    RBC 4.91 4.70 - 6.10 M/uL    Hemoglobin 16.3 14.0 - 18.0 g/dL    Hematocrit 49.7 42.0 - 52.0 %    .2 (H) 81.4 - 97.8 fL    MCH 33.2 (H) 27.0 - 33.0 pg    MCHC 32.8 (L) 33.7 - 35.3 g/dL    RDW 53.2 (H) 35.9 - 50.0 fL    Platelet Count 175 164 - 446 K/uL    MPV 11.4 9.0 - 12.9 fL    Neutrophils-Polys 53.30 44.00 - 72.00 %    Lymphocytes 29.00 22.00 - 41.00 %    Monocytes 11.40 0.00 - 13.40 %    Eosinophils 5.70 0.00 - 6.90 %    Basophils 0.20 0.00 - 1.80 %    Immature Granulocytes 0.40 0.00 - 0.90 %    Nucleated RBC 0.00 /100 WBC    Neutrophils (Absolute) 2.42 1.82 - 7.42 K/uL    Lymphs (Absolute) 1.32 1.00 - 4.80 K/uL    Monos (Absolute) 0.52 0.00 - 0.85 K/uL    Eos (Absolute) 0.26 0.00 - 0.51 K/uL    Baso (Absolute) 0.01 0.00 - 0.12 K/uL    Immature Granulocytes (abs) 0.02 0.00 - 0.11 K/uL    NRBC (Absolute) 0.00 K/uL   Complete Metabolic Panel (CMP)   Result Value Ref Range    Sodium 142 135 - 145 mmol/L    Potassium 4.7 3.6 - 5.5 mmol/L    Chloride 108 96 - 112 mmol/L    Co2 21 20 - 33 mmol/L    Anion Gap 13.0 (H) 0.0 - 11.9    Glucose 153 (H) 65 - 99 mg/dL    Bun 18 8 - 22 mg/dL    Creatinine 1.45 (H) 0.50 - 1.40 mg/dL    Calcium 8.6 8.4 - 10.2 mg/dL    AST(SGOT) 24 12 - 45 U/L    ALT(SGPT) 30 2 - 50 U/L    Alkaline Phosphatase 81 30 - 99  U/L    Total Bilirubin 0.8 0.1 - 1.5 mg/dL    Albumin 4.1 3.2 - 4.9 g/dL    Total Protein 7.0 6.0 - 8.2 g/dL    Globulin 2.9 1.9 - 3.5 g/dL    A-G Ratio 1.4 g/dL   Troponin STAT   Result Value Ref Range    Troponin T 152 (H) 6 - 19 ng/L   D-Dimer   Result Value Ref Range    D-Dimer Screen 2.62 (H) 0.00 - 0.50 ug/mL (FEU)   Influenza A/B By PCR (Adult - Flu Only)   Result Value Ref Range    Influenza virus A RNA Negative Negative    Influenza virus B, PCR Negative Negative   ESTIMATED GFR   Result Value Ref Range    GFR If  57 (A) >60 mL/min/1.73 m 2    GFR If Non  47 (A) >60 mL/min/1.73 m 2   proBrain Natriuretic Peptide, NT   Result Value Ref Range    NT-proBNP 3386 (H) 0 - 125 pg/mL   EKG   Result Value Ref Range    Report       [facility] Emergency Dept.    Test Date:  2019  Pt Name:    KYUNG LEONARD               Department: [facility]  MRN:        [id number]                      Room:       [place] [address]  Gender:     Male                         Technician: [id number]  :        1940                   Requested By:ER TRIAGE PROTOCOL  Order #:    [id number]                    Reading MD: YIN WILLINGHAM MD    Measurements  Intervals                                Axis  Rate:       63                           P:  ID:         168                          QRS:        69  QRSD:       123                          T:          176  QT:         630  QTc:        646    Interpretive Statements  Atrial-paced rhythm  Nonspecific intraventricular conduction delay  Abnormal T, consider ischemia, diffuse leads  Compared to ECG 2019 10:17:17  T-wave abnormality now present  Possible ischemia now present  First degree AV block no longer present  Electronically Signed On 2019 8:56:22 PST by SHEMAR WILLINGHAM MD     EKG   Result Value Ref Range    Report       [facility] Emergency Dept.    Test Date:  2019  Pt Name:    KYUNG HORACIO                Department: [facility]  MRN:        [id number]                      [facility]  Gender:     Male                         Technician: [id number]  :        1940                   Requested By:YIN WILLINGHAM  Order #:    [id number]                    Reading MD: YIN WILLINGHAM MD    Measurements  Intervals                                Axis  Rate:       60                           P:          63  SD:         396                          QRS:        -4  QRSD:       135                          T:          172  QT:         537  QTc:        537    Interpretive Statements  Failure to sense and/or capture (?magnet)  Sinus rhythm  Prolonged SD interval  Nonspecific intraventricular conduction delay  Nonspecific T abnormalities, diffuse leads  Compared to ECG 2019 08:37:47  Failure to Sense now present  First degree AV block now present  Atrial-paced complex(es) or r hythm no longer present  Ventricular-paced complex(es) or rhythm no longer present  Possible ischemia no longer present  T-wave abnormality still present  Electronically Signed On 2019 12:14:38 PST by YIN WILLINGHAM MD        9:00 AM An IV was started and labs were drawn.  The patient was placed on oxygen for comfort and given an albuterol treatment for his wheezing.    9:58 AM I spoke with Dr. Anderson who is on-call for cardiology and he wants the patient to have 40 mg of IV Lasix.  I went into discussed this with the patient he states that he was taken off of Lasix over a week ago because he only has 1 kidney.  He obviously needs some sort of diuretic to get the fluid off of his lungs and improve his heart function.  His kidney function is okay today so we will give him the IV 40 Lasix Dr. Anderson will check on him around noon to make sure that he is diuresing well and can be discharged with medication adjustment.  If he is not improved he will be admitted.  The patient is not having any pain or distress at this time.      12:15 PM after  Monica saw the patient in the emergency department and he still had rales and shortness of breath.  He will be admitted to the hospitalist service for diuresis and medication adjustment.      12:30pm I spoke with Dr. Bailey who accepts the patient for admission            FINAL IMPRESSION  1. Dyspnea, unspecified type     2. Acute on chronic combined systolic and diastolic congestive heart failure (HCC)             Electronically signed by: Melissa Acosta, 12/26/2019 8:50 AM

## 2019-12-26 NOTE — CONSULTS
DATE OF SERVICE:  12/26/2019    CHIEF COMPLAINT:  Dyspnea.    HISTORY OF PRESENT ILLNESS:  The patient is a very pleasant 79-year-old   gentleman with known nonischemic cardiomyopathy, admitted to the ER with   dyspnea and seen in consultation at the request of Dr. Acosta for evaluation   of above problems.  He has a long history of known nonischemic cardiomyopathy   and was initially diagnosed in [place], [place] and moved here last July, was   seen initially in our office at that time.  He has a history of a previous   pacemaker that was upgraded to an AICD in approximately 1999.  He had a   generator change about 5 years ago.  The patient was doing well on his medical   therapy.  He was seen in the heart failure clinic in early December.  At that   time, he was changed to Entresto from his losartan, although he recalls he   does not have any problems at that time.  For the past 3 days, he has had   exertional dyspnea and during the last night had recurrent episodes of PND   requiring him to get up and walk around, leave the home.  On his last visit,   his thrice weekly furosemide was also discontinued.    The patient has history of a solitary kidney and chronic kidney disease.    His LV function is severely depressed with EF of 15% by echo in 08/2019.  His   RV systolic pressure was approximately 35 at that time.  There was no   significant mitral insufficiency.    The patient denies any dependent edema, chest pain or pressure or   defibrillator discharges.    MEDICATIONS ON ADMISSION:  Albuterol inhaler, amiodarone 200 mg daily,   amlodipine 5 mg daily, aspirin 81 mg a day, atorvastatin 20 mg a day,   carvedilol 25 mg twice daily, Sensipar 3 times a week, multivitamin, omega-3   fatty acids, Entresto 24/26 daily, tamsulosin 0.8 mg daily, vitamin D   supplementation.    ILLNESS:  PAF.  History of AICD.  Nonischemic cardiomyopathy.  Aortic   aneurysm.  History of TIA.  High risk medication use, history of  hypertension,   and history of hyperlipidemia.    SOCIAL HISTORY:  The patient is not .  He moved here from [place] in   approximately 05/2019.    ALLERGIES:  None known.    REVIEW OF SYSTEMS:  NEUROLOGIC:  History of TIA.  No recent numbness or difficulty speaking.  PULMONARY:  Exertional dyspnea and orthopnea as described above.  No wheezing,   no hemoptysis.  GASTROINTESTINAL:  No history of abdominal pain, nausea or vomiting.  CARDIAC:  As above.  MUSCULOSKELETAL:  No recent trauma.  No edema.  ENDOCRINE:  He has had no weight loss.  No heat or cold intolerance.  All   others reviewed and negative.    PHYSICAL EXAMINATION:  GENERAL:  The patient is resting comfortably.  VITAL SIGNS:  On the monitor, he is in sinus rhythm, heart rate is 68, blood   pressure is 126/85.  HEENT:  Pupils equal, gaze conjugate, sclerae nonicteric.  NECK:  JVD is present.  Carotid upstroke intact without bruit.  LUNGS:  Reveal rales at the bases bilaterally.  No wheezes.  BACK:  Without deformity.  CHEST:  AICD in place, left upper chest.  Previous scars over the incision   from generator replacements.  No erythema.  No erosion.  HEART:  Regular rate and rhythm with II/VI systolic murmur.  ABDOMEN:  Soft and nontender.  No hepatomegaly, no pain to palpation.  EXTREMITIES:  No edema.  Posterior tibial pulses are 2+.  SKIN:  Warm and dry.    IMAGING:  Chest x-ray images personally reviewed.  There is marked   cardiomegaly, evidence of an AICD and placed his dual chamber device, evidence   of pulmonary vascular redistribution with possible early pulmonary edema.    EKG again personally reviewed.  This demonstrates atrial paced rhythm with   poor R-wave progression and nonspecific T-wave abnormalities.    PERTINENT LABORATORY:  Potassium is 4.7, creatinine is 1.45.  GFR is 47.    Troponin T is elevated at 152.    IMPRESSION:  1.  Dyspnea.  The patient presents with 2-3 days of dyspnea and paroxysmal   nocturnal dyspnea.  He has  acute-on-chronic systolic heart failure, prompted   probably from discussing of his diuretic therapy.  He was seen in the office   on 12/11, changed from his losartan to Entresto and his diuretic was   discontinued.  He does recall that he had been quite stable prior to his   medication changes and had no dyspnea or other problems.  He has been treated   with intravenous Lasix.  We would recommend getting him back on oral diuretic   regimen.  He does not want to take Entresto any longer, so we will start him   back on his losartan.  Unfortunately, he does not recall his dose, so we will   start him on low dose.  We would also recommend stopping his amlodipine as   this is not adding anything to his regimen.  He should be started on   spironolactone per ACC guidelines and this will be started.  2.  Status post automatic implantable cardioverter defibrillator.  3.  Chronic kidney disease stage III.  BUN and creatinine are stable compared   to prior studies.  4.  History of atrial fibrillation.  5.  High risk medication use with amiodarone.    It is not entirely clear why the patient is not on anticoagulants.  We will   review his defibrillator interrogation to determine if he had any episodes of   PAF.  Also, he is at high risk for embolic event because of his poor LV   function.  Cardiology will follow.       ____________________________________     ROBERT COLEMAN MD    RPS / NTS    DD:  12/26/2019 12:55:39  DT:  12/26/2019 13:59:04    D#:  [id number]  Job#:  [id number]

## 2019-12-26 NOTE — PROGRESS NOTES
Pt arrived to unit via gurney. Ambulated from gurney to bed, standby assist. Tele monitor applied, vitals taken. Pt assessed. A&O x 4. Admit profile and med rec complete. Discussed POC with pt, including IV lasix and other medications administered, and safety precautions. Welcome folder provided and discussed. Communication board filled out. Questions and concerns addressed, verbalized understanding. Fall precautions in place. Pt demonstrates ability to use call light appropriately. Pt left in lowest position. Bed locked and low.

## 2019-12-26 NOTE — ASSESSMENT & PLAN NOTE
Trend on tele. Continue amiodarone and  Coreg. Has pacer. He is on aspirin therapy. CHADS2 is at least 3. He has a history of GI bleeding with pradaxa and now has a watchman device. Continue aspirin.

## 2019-12-26 NOTE — ASSESSMENT & PLAN NOTE
Possibly worse with recent medication changes. Cardiology has been consulted and will follow along. His entresto has been stopped and he will be placed back onto lasix and arb therapy. Continue aldactone, statin, coreg therapy. Daily weights. IV lasix for now. Trend exam and symptoms.     Last echo 8/19:  No prior study is available for comparison.   Severely reduced left ventricular systolic function. Left ventricular   ejection fraction is visually estimated to be 15%.  Indeterminate diastolic function.  Estimated right ventricular systolic pressure  is 26 mmHg + JVP.  Ascending aorta diameter is 4 cm.

## 2019-12-26 NOTE — ED NOTES
Jurgen from Lab called with critical result of troponin 152 at 0945. Critical lab result read back to Jurgen.   Dr. Acosta notified of critical lab result at 0947.  Critical lab result read back by Dr. Acosta.

## 2019-12-26 NOTE — DISCHARGE INSTRUCTIONS
Discharge Instructions per Oma Bailey M.D.    Follow up [facility] cardiology in the next 2 weeks.    DIET: cardiac    ACTIVITY: as tolerated    DIAGNOSIS: heart failure    Return to ER for swelling, shortness of breath.    Discharge Instructions    Discharged to home by car with relative. Discharged via wheelchair, hospital escort: Yes.  Special equipment needed: Not Applicable    Be sure to schedule a follow-up appointment with your primary care doctor or any specialists as instructed.     Discharge Plan:   Diet Plan: Discussed  Activity Level: Discussed  Confirmed Follow up Appointment: Appointment Scheduled  Confirmed Symptoms Management: Discussed  Medication Reconciliation Updated: Yes  Influenza Vaccine Indication: Not indicated: Previously immunized this influenza season and > 8 years of age    I understand that a diet low in cholesterol, fat, and sodium is recommended for good health. Unless I have been given specific instructions below for another diet, I accept this instruction as my diet prescription.   Other diet: CARDIAC    Special Instructions:     HF Patient Discharge Instructions  · Monitor your weight daily, and maintain a weight chart, to track your weight changes.   · Activity as tolerated, unless your Doctor has ordered otherwise. Other activity order: NONE.  · Follow a low fat, low cholesterol, low salt diet unless instructed otherwise by your Doctor. Read the labels on the back of food products and track your intake of fat, cholesterol and salt.   · Fluid Restriction No. If a Fluid Restriction has been ordered by your Doctor, measure fluids with a measuring cup to ensure that you are not exceeding the restriction.   · No smoking.  · Oxygen No. If your Doctor has ordered that you wear Oxygen at home, it is important to wear it as ordered.  · Did you receive an explanation from staff on the importance of taking each of your medications and why it is necessary to stay on the medications the  physician/care provider has ordered? Yes  · Do you have any questions concerning how to manage your heart failure and what to do should you have any increased signs and symptoms after you go home? No  · Do you feel like your heart failure care team involved you in the care treatment plan and allowed you to make decisions regarding your care while in the hospital and addressed any discharge needs you might have? Yes    See the educational handout provided at discharge for more information on monitoring your daily weight, activity and diet. This also explains more about Heart Failure, symptoms of a flare-up and some of the tests that you have undergone.     Warning Signs of a Flare-Up include:  · Swelling in the ankles or lower legs.  · Shortness of breath, while at rest, or while doing normal activities.   · Shortness of breath at night when in bed, or coughing in bed.   · Requiring more pillows to sleep at night, or needing to sit up at night to sleep.  · Feeling weak, dizzy or fatigued.     When to call your Doctor:  · Call [facility] about questions regarding the discharge instructions you were given [phone].  (Discharge Unit MED/TELE)  · Call your Primary Care Physician or Cardiologist if:   1. You experience any pain radiating to your jaw or neck.  2. You have any difficulty breathing.  3. You experience weight gain of 2 lbs in a day or 5 lbs in a week.   4. You feel any palpitations or irregular heartbeats.  5. You become dizzy or lose consciousness.   If you have had an angiogram or had a pacemaker or AICD placed, and experience:  1. Bleeding, drainage or swelling at the surgical / puncture site.  2. Fever greater than 100.0 F  3. Shock from internal defibrillator.  4. Cool and / or numb extremities.      · Is patient discharged on Warfarin / Coumadin?   No     Depression / Suicide Risk    As you are discharged from this [facility], it is important to learn how to  keep safe from harming yourself.    Recognize the warning signs:  · Abrupt changes in personality, positive or negative- including increase in energy   · Giving away possessions  · Change in eating patterns- significant weight changes-  positive or negative  · Change in sleeping patterns- unable to sleep or sleeping all the time   · Unwillingness or inability to communicate  · Depression  · Unusual sadness, discouragement and loneliness  · Talk of wanting to die  · Neglect of personal appearance   · Rebelliousness- reckless behavior  · Withdrawal from people/activities they love  · Confusion- inability to concentrate     If you or a loved one observes any of these behaviors or has concerns about self-harm, here's what you can do:  · Talk about it- your feelings and reasons for harming yourself  · Remove any means that you might use to hurt yourself (examples: pills, rope, extension cords, firearm)  · Get professional help from the community (Mental Health, Substance Abuse, psychological counseling)  · Do not be alone:Call your Safe Contact- someone whom you trust who will be there for you.  · Call your local CRISIS HOTLINE 119-6098 or 249-378-4695  · Call your local Children's Mobile Crisis Response Team [place] (451) 149-3319 or www.[employer]  · Call the toll free National Suicide Prevention Hotlines   · National Suicide Prevention Lifeline 123-620-OEEK (8560)  · National Hope Line Network 800-SUICIDE (035-4910)        Heart Failure  Heart failure means your heart has trouble pumping blood. This makes it hard for your body to work well. Heart failure is usually a long-term (chronic) condition. You must take good care of yourself and follow your doctor's treatment plan.  HOME CARE  · Take your heart medicine as told by your doctor.  ¨ Do not stop taking medicine unless your doctor tells you to.  ¨ Do not skip any dose of medicine.  ¨ Refill your medicines before they run out.  ¨ Take other medicines  only as told by your doctor or pharmacist.  · Stay active if told by your doctor. The elderly and people with severe heart failure should talk with a doctor about physical activity.  · Eat heart-healthy foods. Choose foods that are without trans fat and are low in saturated fat, cholesterol, and salt (sodium). This includes fresh or frozen fruits and vegetables, fish, lean meats, fat-free or low-fat dairy foods, whole grains, and high-fiber foods. Lentils and dried peas and beans (legumes) are also good choices.  · Limit salt if told by your doctor.  · Cook in a healthy way. Roast, grill, broil, bake, poach, steam, or stir-shea foods.  · Limit fluids as told by your doctor.  · Weigh yourself every morning. Do this after you pee (urinate) and before you eat breakfast. Write down your weight to give to your doctor.  · Take your blood pressure and write it down if your doctor tells you to.  · Ask your doctor how to check your pulse. Check your pulse as told.  · Lose weight if told by your doctor.  · Stop smoking or chewing tobacco. Do not use gum or patches that help you quit without your doctor's approval.  · Schedule and go to doctor visits as told.  · Nonpregnant women should have no more than 1 drink a day. Men should have no more than 2 drinks a day. Talk to your doctor about drinking alcohol.  · Stop illegal drug use.  · Stay current with shots (immunizations).  · Manage your health conditions as told by your doctor.  · Learn to manage your stress.  · Rest when you are tired.  · If it is really hot outside:  ¨ Avoid intense activities.  ¨ Use air conditioning or fans, or get in a cooler place.  ¨ Avoid caffeine and alcohol.  ¨ Wear loose-fitting, lightweight, and light-colored clothing.  · If it is really cold outside:  ¨ Avoid intense activities.  ¨ Layer your clothing.  ¨ Wear mittens or gloves, a hat, and a scarf when going outside.  ¨ Avoid alcohol.  · Learn about heart failure and get support as  needed.  · Get help to maintain or improve your quality of life and your ability to care for yourself as needed.  GET HELP IF:   · You gain weight quickly.  · You are more short of breath than usual.  · You cannot do your normal activities.  · You tire easily.  · You cough more than normal, especially with activity.  · You have any or more puffiness (swelling) in areas such as your hands, feet, ankles, or belly (abdomen).  · You cannot sleep because it is hard to breathe.  · You feel like your heart is beating fast (palpitations).  · You get dizzy or light-headed when you stand up.  GET HELP RIGHT AWAY IF:   · You have trouble breathing.  · There is a change in mental status, such as becoming less alert or not being able to focus.  · You have chest pain or discomfort.  · You faint.  MAKE SURE YOU:   · Understand these instructions.  · Will watch your condition.  · Will get help right away if you are not doing well or get worse.  This information is not intended to replace advice given to you by your health care provider. Make sure you discuss any questions you have with your health care provider.  Document Released: 09/26/2009 Document Revised: 01/08/2016 Document Reviewed: 02/03/2014  [employer] Interactive Patient Education © 2017 [employer]

## 2019-12-26 NOTE — PROGRESS NOTES
2 RN skin check complete.   Devices in place tele box and hearing aids.  Skin assessed under devices yes.  Confirmed pressure ulcers found on n/a.  New potential pressure ulcers noted on n/a. Wound consult placed n/a.  The following interventions in place pillows provided for support, encouraged patient to turn in bed.

## 2019-12-27 ENCOUNTER — PATIENT OUTREACH (OUTPATIENT)
Dept: HEALTH INFORMATION MANAGEMENT | Facility: OTHER | Age: 79
End: 2019-12-27

## 2019-12-27 VITALS
DIASTOLIC BLOOD PRESSURE: 75 MMHG | HEART RATE: 59 BPM | RESPIRATION RATE: 18 BRPM | BODY MASS INDEX: 25.89 KG/M2 | HEIGHT: 74 IN | TEMPERATURE: 97.5 F | OXYGEN SATURATION: 97 % | SYSTOLIC BLOOD PRESSURE: 120 MMHG | WEIGHT: 201.72 LBS

## 2019-12-27 PROBLEM — I50.23 ACUTE ON CHRONIC SYSTOLIC (CONGESTIVE) HEART FAILURE (HCC): Status: RESOLVED | Noted: 2019-12-26 | Resolved: 2019-12-27

## 2019-12-27 LAB
ALBUMIN SERPL BCP-MCNC: 3.4 G/DL (ref 3.2–4.9)
ALBUMIN/GLOB SERPL: 1.3 G/DL
ALP SERPL-CCNC: 69 U/L (ref 30–99)
ALT SERPL-CCNC: 27 U/L (ref 2–50)
ANION GAP SERPL CALC-SCNC: 15 MMOL/L (ref 0–11.9)
AST SERPL-CCNC: 24 U/L (ref 12–45)
BASOPHILS # BLD AUTO: 0.5 % (ref 0–1.8)
BASOPHILS # BLD: 0.02 K/UL (ref 0–0.12)
BILIRUB SERPL-MCNC: 1.1 MG/DL (ref 0.1–1.5)
BUN SERPL-MCNC: 20 MG/DL (ref 8–22)
CALCIUM SERPL-MCNC: 8.2 MG/DL (ref 8.4–10.2)
CHLORIDE SERPL-SCNC: 107 MMOL/L (ref 96–112)
CO2 SERPL-SCNC: 21 MMOL/L (ref 20–33)
CREAT SERPL-MCNC: 1.45 MG/DL (ref 0.5–1.4)
EOSINOPHIL # BLD AUTO: 0.28 K/UL (ref 0–0.51)
EOSINOPHIL NFR BLD: 6.6 % (ref 0–6.9)
ERYTHROCYTE [DISTWIDTH] IN BLOOD BY AUTOMATED COUNT: 52.1 FL (ref 35.9–50)
GLOBULIN SER CALC-MCNC: 2.7 G/DL (ref 1.9–3.5)
GLUCOSE SERPL-MCNC: 99 MG/DL (ref 65–99)
HCT VFR BLD AUTO: 46.2 % (ref 42–52)
HGB BLD-MCNC: 15.5 G/DL (ref 14–18)
IMM GRANULOCYTES # BLD AUTO: 0.01 K/UL (ref 0–0.11)
IMM GRANULOCYTES NFR BLD AUTO: 0.2 % (ref 0–0.9)
LYMPHOCYTES # BLD AUTO: 1.65 K/UL (ref 1–4.8)
LYMPHOCYTES NFR BLD: 38.7 % (ref 22–41)
MCH RBC QN AUTO: 33.4 PG (ref 27–33)
MCHC RBC AUTO-ENTMCNC: 33.5 G/DL (ref 33.7–35.3)
MCV RBC AUTO: 99.6 FL (ref 81.4–97.8)
MONOCYTES # BLD AUTO: 0.51 K/UL (ref 0–0.85)
MONOCYTES NFR BLD AUTO: 12 % (ref 0–13.4)
NEUTROPHILS # BLD AUTO: 1.79 K/UL (ref 1.82–7.42)
NEUTROPHILS NFR BLD: 42 % (ref 44–72)
NRBC # BLD AUTO: 0 K/UL
NRBC BLD-RTO: 0 /100 WBC
PLATELET # BLD AUTO: 163 K/UL (ref 164–446)
PMV BLD AUTO: 11.8 FL (ref 9–12.9)
POTASSIUM SERPL-SCNC: 3.7 MMOL/L (ref 3.6–5.5)
PROT SERPL-MCNC: 6.1 G/DL (ref 6–8.2)
RBC # BLD AUTO: 4.64 M/UL (ref 4.7–6.1)
SODIUM SERPL-SCNC: 143 MMOL/L (ref 135–145)
WBC # BLD AUTO: 4.3 K/UL (ref 4.8–10.8)

## 2019-12-27 PROCEDURE — A9270 NON-COVERED ITEM OR SERVICE: HCPCS | Performed by: INTERNAL MEDICINE

## 2019-12-27 PROCEDURE — 99239 HOSP IP/OBS DSCHRG MGMT >30: CPT | Performed by: HOSPITALIST

## 2019-12-27 PROCEDURE — A9270 NON-COVERED ITEM OR SERVICE: HCPCS | Performed by: HOSPITALIST

## 2019-12-27 PROCEDURE — 700102 HCHG RX REV CODE 250 W/ 637 OVERRIDE(OP): Performed by: INTERNAL MEDICINE

## 2019-12-27 PROCEDURE — 99232 SBSQ HOSP IP/OBS MODERATE 35: CPT | Performed by: INTERNAL MEDICINE

## 2019-12-27 PROCEDURE — 80053 COMPREHEN METABOLIC PANEL: CPT

## 2019-12-27 PROCEDURE — 700102 HCHG RX REV CODE 250 W/ 637 OVERRIDE(OP): Performed by: HOSPITALIST

## 2019-12-27 PROCEDURE — 85025 COMPLETE CBC W/AUTO DIFF WBC: CPT

## 2019-12-27 RX ORDER — SPIRONOLACTONE 25 MG/1
25 TABLET ORAL DAILY
Qty: 30 TAB | Refills: 3 | Status: SHIPPED | OUTPATIENT
Start: 2019-12-28 | End: 2020-01-10

## 2019-12-27 RX ORDER — SPIRONOLACTONE 25 MG/1
25 TABLET ORAL
Status: DISCONTINUED | OUTPATIENT
Start: 2019-12-27 | End: 2019-12-27

## 2019-12-27 RX ORDER — LOSARTAN POTASSIUM 25 MG/1
25 TABLET ORAL DAILY
Qty: 30 TAB | Refills: 2 | Status: SHIPPED | OUTPATIENT
Start: 2019-12-28 | End: 2020-09-09

## 2019-12-27 RX ADMIN — AMIODARONE HYDROCHLORIDE 200 MG: 200 TABLET ORAL at 05:52

## 2019-12-27 RX ADMIN — CINACALCET HYDROCHLORIDE 30 MG: 30 TABLET, COATED ORAL at 05:52

## 2019-12-27 RX ADMIN — ASPIRIN 81 MG: 81 TABLET, COATED ORAL at 05:52

## 2019-12-27 RX ADMIN — TORSEMIDE 10 MG: 10 TABLET ORAL at 05:52

## 2019-12-27 RX ADMIN — CARVEDILOL 25 MG: 25 TABLET, FILM COATED ORAL at 05:51

## 2019-12-27 RX ADMIN — LOSARTAN POTASSIUM 25 MG: 25 TABLET ORAL at 05:52

## 2019-12-27 RX ADMIN — TAMSULOSIN HYDROCHLORIDE 0.8 MG: 0.4 CAPSULE ORAL at 08:54

## 2019-12-27 RX ADMIN — SPIRONOLACTONE 25 MG: 25 TABLET ORAL at 05:52

## 2019-12-27 RX ADMIN — ATORVASTATIN CALCIUM 20 MG: 10 TABLET, FILM COATED ORAL at 05:51

## 2019-12-27 ASSESSMENT — ENCOUNTER SYMPTOMS
NEUROLOGICAL NEGATIVE: 1
AGITATION: 0
BRUISES/BLEEDS EASILY: 0
CARDIOVASCULAR NEGATIVE: 1
CONSTITUTIONAL NEGATIVE: 1
RESPIRATORY NEGATIVE: 1

## 2019-12-27 NOTE — PROGRESS NOTES
Telemetry Shift Summary    Rhythm SR, Paced  HR Range 59-67  Ectopy RPVC          Normal Values  Rhythm SR  HR Range    Measurements 0.12-0.20 / 0.06-0.10  / 0.30-0.52

## 2019-12-27 NOTE — PROGRESS NOTES
Telemetry Shift Summary    Rhythm A Paced w/ BBB  HR Range 62  Ectopy R PVCs  Measurements -/.12/.42        Normal Values  Rhythm SR  HR Range    Measurements 0.12-0.20 / 0.06-0.10  / 0.30-0.52

## 2019-12-27 NOTE — PROGRESS NOTES
Patient given discharge information verbalized understanding. IV and tele discontinued. Patient taken to front lobby via dejuan accompanied by RN.

## 2019-12-27 NOTE — PROGRESS NOTES
Report received from Alexandre MERCHANT. Pt comfortably resting. Bed in locked and lowest position, call bell within reach.

## 2019-12-27 NOTE — CARE PLAN
Problem: Communication  Goal: The ability to communicate needs accurately and effectively will improve  Outcome: PROGRESSING AS EXPECTED  Note:   Pt effectively communicates needs. Call bell within reach.     Problem: Safety  Goal: Will remain free from injury  Outcome: PROGRESSING AS EXPECTED  Note:   Bed in locked and lowest position, call bell within reach.      Problem: Venous Thromboembolism (VTW)/Deep Vein Thrombosis (DVT) Prevention:  Goal: Patient will participate in Venous Thrombosis (VTE)/Deep Vein Thrombosis (DVT)Prevention Measures  Outcome: PROGRESSING AS EXPECTED  Flowsheets (Taken 12/27/2019 0900)  Pharmacologic Prophylaxis Used: Unfractionated Heparin  Note:   Unfractionated Heparin injection refused. Ambulation encouraged with verbal acceptance.

## 2019-12-27 NOTE — PROGRESS NOTES
Telemetry Strip     Strip printed: 0641  Measurements from am strip were as follows:  Rhythm:paced  HR:63  Measurements:-/.14/.40          Telemetry Shift Summary:    Rhythm:paced  HR: 50-70s  Ectopy:r-o PVC        Normal Values  Rhythm SR  HR Range    Measurements 0.12-0.20 / 0.06-0.10  / 0.30-0.52

## 2019-12-27 NOTE — PROGRESS NOTES
Cardiology Follow Up Progress Note    Date of Service  12/27/2019    Attending Physician  Oma Bailey M.D.    Chief Complaint   Dyspnea and PND    HPI  Lencho Parker is a 79 y.o. male admitted 12/26/2019 with the above complaints.  Chest x-ray demonstrated pulmonary infiltrates compatible with early pulmonary edema.  He has known severe LV dysfunction.  Patient was recently taken off losartan and started on Entresto in the office and his thrice weekly Lasix was stopped.  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  He had a good diuresis overnight with IV Lasix and is feeling back to normal.\    He had a Watchman device placed because of anemia when he was on anticoagulation.  Therefore he is not a candidate for anticoagulation for embolic prophylaxis from his LV dysfunction.    Review of Systems  Review of Systems   Constitutional: Negative.    HENT: Negative.    Respiratory: Negative.    Cardiovascular: Negative.    Neurological: Negative.    Hematological: Does not bruise/bleed easily.   Psychiatric/Behavioral: Negative for agitation.       Vital signs in last 24 hours  Temp:  [36.2 °C (97.1 °F)-36.8 °C (98.3 °F)] 36.4 °C (97.5 °F)  Pulse:  [32-73] 61  Resp:  [7-24] 18  BP: ()/(63-96) 121/82  SpO2:  [89 %-99 %] 91 %    Physical Exam  Physical Exam  Constitutional:       General: He is not in acute distress.     Appearance: Normal appearance. He is not ill-appearing.   HENT:      Head: Normocephalic and atraumatic.   Eyes:      Conjunctiva/sclera: Conjunctivae normal.      Pupils: Pupils are equal, round, and reactive to light.   Cardiovascular:      Rate and Rhythm: Normal rate and regular rhythm.      Heart sounds: Murmur present.   Pulmonary:      Effort: Pulmonary effort is normal. No respiratory distress.      Breath sounds: No wheezing or rales.   Musculoskeletal:         General: No swelling.   Skin:     General: Skin is dry.   Neurological:      General: No focal deficit present.       Mental Status: He is alert.   Psychiatric:         Mood and Affect: Mood normal.         Lab Review  Lab Results   Component Value Date/Time    WBC 4.3 (L) 12/27/2019 03:54 AM    RBC 4.64 (L) 12/27/2019 03:54 AM    HEMOGLOBIN 15.5 12/27/2019 03:54 AM    HEMATOCRIT 46.2 12/27/2019 03:54 AM    MCV 99.6 (H) 12/27/2019 03:54 AM    MCH 33.4 (H) 12/27/2019 03:54 AM    MCHC 33.5 (L) 12/27/2019 03:54 AM    MPV 11.8 12/27/2019 03:54 AM      Lab Results   Component Value Date/Time    SODIUM 143 12/27/2019 03:54 AM    POTASSIUM 3.7 12/27/2019 03:54 AM    CHLORIDE 107 12/27/2019 03:54 AM    CO2 21 12/27/2019 03:54 AM    GLUCOSE 99 12/27/2019 03:54 AM    BUN 20 12/27/2019 03:54 AM    CREATININE 1.45 (H) 12/27/2019 03:54 AM      Lab Results   Component Value Date/Time    ASTSGOT 24 12/27/2019 03:54 AM    ALTSGPT 27 12/27/2019 03:54 AM     Lab Results   Component Value Date/Time    CHOLSTRLTOT 192 07/17/2019 08:37 AM     (H) 07/17/2019 08:37 AM    HDL 61 07/17/2019 08:37 AM    TRIGLYCERIDE 89 07/17/2019 08:37 AM    TROPONINT 152 (H) 12/26/2019 09:06 AM       Recent Labs     12/26/19  0906   NTPROBNP 3386*       Dilated cardiomyopathy:  Patient has known severe LV dysfunction that has been present for many years.  He was admitted with acute on chronic systolic heart failure secondary to medication change.  Overnight, has had a good diuresis.  No VT on the monitor.  The patient does not want to go back on Entresto.  Recommend he be placed back on his previous outpatient regimen which was losartan (dose currently unknown) plus thrice weekly furosemide.  The only addition I would make would be to add low-dose spironolactone per ACC/AHA guidelines.  Because of his unitnephric  status would purposely start him on a very low dose.  We will make arrangements for follow-up in the cardiology office.   Recommend he have a BMP and proBNP midweek next week.  Cardiology will sign off.  Renan Anderson M.D.   Cardiologist, [place]  [phone]

## 2019-12-27 NOTE — PROGRESS NOTES
Report given to Alexandre MERCHANT. Plan of care discussed. Patient resting comfortably in bed. Safety precautions in place.

## 2019-12-27 NOTE — DISCHARGE SUMMARY
Discharge Summary    CHIEF COMPLAINT ON ADMISSION  Chief Complaint   Patient presents with   • Shortness of Breath     sob x 3 days       Reason for Admission  Shortness of Breath     Admission Date  12/26/2019    CODE STATUS  Full Code    HPI & HOSPITAL COURSE  History of Presenting Illness  79 y.o. male who presented 12/26/2019 with shortness of breath. This has been worsening for the last week. He has a known history of nonischemic cardiomyopathy> He was seen in our heart failure clinic earlier this month  And was changed from losartan and lasix to entresto. He thinks that he has been progressively more shoryt of breath since then. He has had a dry cough. His shortness of breath is worse with activity. He has had no other recent illnesses, fevers, chills or abodminal symptoms. He weighs himself daily and says he is up about 9 lbs. He says he has never had leg swelling.      HOSPITAL COURSE: Patient was recently taken off losartan and started on Entresto in the office and his 3 times weekly Lasix was stopped, he was diuresed with IV Lasix overnight and was feeling back to his normal baseline with a very rapid recovery.  He has a watchman device and does not need anticoagulation.  I discussed the case with Dr. Anderson he is recommending patient start on low-dose spironolactone and go back on losartan in the outpatient setting and stop the Entresto.  Will need to be seen in cardiology clinic in the next 2 weeks.    Therefore, he is discharged in good and stable condition to home with close outpatient follow-up.    The patient recovered much more quickly than anticipated on admission.    Discharge Date  12/27/19    FOLLOW UP ITEMS POST DISCHARGE  Cardiology follow up 1/10/20    DISCHARGE DIAGNOSES  Active Problems:    Cardiomyopathy (HCC) POA: Yes      Overview: August 2019: Echocardiogram with mildly dilated LV, mild concentric, LVEF       15%. Severely dilated LA. Trace MR, trace AI, mild TR. RVSP 26mmHg.     Essential hypertension, benign POA: Yes    Atrial fibrillation (HCC) POA: Yes      Overview: Status post Watchman procedure in [place]. Now off of anticoagulation.    AICD (automatic cardioverter/defibrillator) present POA: Yes      Overview: January 2017: Generator replacement with [employer] MRI XT  [id number]       implanted in [place].    Ventricular tachycardia (HCC) POA: Yes    COPD (chronic obstructive pulmonary disease) (HCC) POA: Yes    PVD (peripheral vascular disease) (HCC) POA: Unknown  Resolved Problems:    Acute on chronic systolic (congestive) heart failure (HCC) POA: Unknown      FOLLOW UP  Future Appointments   Date Time Provider Department Center   1/10/2020 11:00 AM EUGENE Munoz [facility] None   2/13/2020  8:20 AM Varghese Peñaloza M.D. [facility] None   2/28/2020  9:45 AM Becka Martines M.D. [facility] None   3/2/2020  8:00 AM RERE Ordonez [facility] None   3/2/2020  8:20 AM RERE Ordonez [facility] None   6/3/2020  8:15 AM [facility] None   6/3/2020  9:15 AM Morelia Schroeder M.D. [facility] None     No follow-up provider specified.    MEDICATIONS ON DISCHARGE     Medication List      START taking these medications      Instructions   losartan 25 MG Tabs  Start taking on:  December 28, 2019  Commonly known as:  COZAAR   Take 1 Tab by mouth every day.  Dose:  25 mg     spironolactone 25 MG Tabs  Start taking on:  December 28, 2019  Commonly known as:  ALDACTONE   Take 1 Tab by mouth every day.  Dose:  25 mg        CONTINUE taking these medications      Instructions   albuterol 108 (90 Base) MCG/ACT Aers inhalation aerosol   Inhale 2 Puffs by mouth every 6 hours as needed for Shortness of Breath.  Dose:  2 Puff     amiodarone 200 MG Tabs  Commonly known as:  CORDARONE   Take 200 mg by mouth every day.  Dose:  200 mg     aspirin EC 81 MG Tbec  Commonly known as:  ECOTRIN   Take 81 mg by mouth every day.  Dose:  81 mg     atorvastatin 20 MG Tabs  Commonly known as:  LIPITOR   Take 20 mg by mouth every day.  Dose:  20 mg      carvedilol 25 MG Tabs  Commonly known as:  COREG   Take 12.5-25 mg by mouth 2 times a day. Pt takes 25MG in AM and 12.5MG HS  Dose:  12.5-25 mg     cinacalcet 30 MG Tabs  Commonly known as:  SENSIPAR   Take 30 mg by mouth 3 times a week.  Dose:  30 mg     fish oil 1000 MG Caps capsule   Take 1,000 mg by mouth every day.  Dose:  1,000 mg     multivitamin Tabs   Take 1 Tab by mouth every day.  Dose:  1 Tab     tamsulosin 0.4 MG capsule  Commonly known as:  FLOMAX   Take 0.8 mg by mouth ONE-HALF HOUR AFTER BREAKFAST.  Dose:  0.8 mg     vitamin D (Ergocalciferol) 25281 units Caps capsule  Commonly known as:  DRISDOL   Take 50,000 Units by mouth every 7 days. MONDAY  Dose:  50,000 Units        STOP taking these medications    sacubitril-valsartan 24-26 MG Tabs tablet  Commonly known as:  ENTRESTO            Allergies  No Known Allergies    DIET  Orders Placed This Encounter   Procedures   • Diet Order Regular, 2 Gram Sodium     Standing Status:   Standing     Number of Occurrences:   1     Order Specific Question:   Diet:     Answer:   Regular [1]     Order Specific Question:   Diet:     Answer:   2 Gram Sodium [7]       ACTIVITY  As tolerated.  Weight bearing as tolerated    CONSULTATIONS  Cardiology    PROCEDURES  None    LABORATORY  Lab Results   Component Value Date    SODIUM 143 12/27/2019    POTASSIUM 3.7 12/27/2019    CHLORIDE 107 12/27/2019    CO2 21 12/27/2019    GLUCOSE 99 12/27/2019    BUN 20 12/27/2019    CREATININE 1.45 (H) 12/27/2019        Lab Results   Component Value Date    WBC 4.3 (L) 12/27/2019    HEMOGLOBIN 15.5 12/27/2019    HEMATOCRIT 46.2 12/27/2019    PLATELETCT 163 (L) 12/27/2019        Total time of the discharge process exceeds 35 minutes.

## 2019-12-27 NOTE — PROGRESS NOTES
Pt discharged to home with wife and escorted via wheelchair. IV's and tele discontinued. Medications available for pickup at pt's pharmacy. Follow up appt placed with cardiology, lab, and PCP. Discharge information given and verbalized understanding.

## 2020-01-02 ENCOUNTER — OFFICE VISIT (OUTPATIENT)
Dept: CARDIOLOGY | Facility: MEDICAL CENTER | Age: 80
End: 2020-01-02
Payer: MEDICARE

## 2020-01-02 VITALS
DIASTOLIC BLOOD PRESSURE: 80 MMHG | HEIGHT: 74 IN | HEART RATE: 74 BPM | BODY MASS INDEX: 25.8 KG/M2 | OXYGEN SATURATION: 94 % | SYSTOLIC BLOOD PRESSURE: 112 MMHG | WEIGHT: 201 LBS

## 2020-01-02 DIAGNOSIS — E78.2 MIXED HYPERLIPIDEMIA: ICD-10-CM

## 2020-01-02 DIAGNOSIS — I50.20 ACC/AHA STAGE C SYSTOLIC HEART FAILURE (HCC): ICD-10-CM

## 2020-01-02 DIAGNOSIS — Z79.899 HIGH RISK MEDICATION USE: ICD-10-CM

## 2020-01-02 DIAGNOSIS — I48.0 PAROXYSMAL ATRIAL FIBRILLATION (HCC): ICD-10-CM

## 2020-01-02 DIAGNOSIS — I50.9 HEART FAILURE, NYHA CLASS 3 (HCC): ICD-10-CM

## 2020-01-02 DIAGNOSIS — I42.0 DILATED CARDIOMYOPATHY (HCC): ICD-10-CM

## 2020-01-02 DIAGNOSIS — I47.20 VENTRICULAR TACHYCARDIA (HCC): ICD-10-CM

## 2020-01-02 DIAGNOSIS — I25.10 CORONARY ARTERY DISEASE INVOLVING NATIVE CORONARY ARTERY OF NATIVE HEART WITHOUT ANGINA PECTORIS: ICD-10-CM

## 2020-01-02 DIAGNOSIS — I10 ESSENTIAL HYPERTENSION, BENIGN: ICD-10-CM

## 2020-01-02 DIAGNOSIS — Z95.810 AICD (AUTOMATIC CARDIOVERTER/DEFIBRILLATOR) PRESENT: ICD-10-CM

## 2020-01-02 PROCEDURE — 99214 OFFICE O/P EST MOD 30 MIN: CPT | Performed by: NURSE PRACTITIONER

## 2020-01-02 RX ORDER — FUROSEMIDE 20 MG/1
20 TABLET ORAL
Status: ON HOLD | COMMUNITY
End: 2021-07-10 | Stop reason: SDUPTHER

## 2020-01-02 ASSESSMENT — ENCOUNTER SYMPTOMS
PND: 0
FEVER: 0
SHORTNESS OF BREATH: 1
MYALGIAS: 0
ABDOMINAL PAIN: 0
CLAUDICATION: 0
COUGH: 0
PALPITATIONS: 0
ORTHOPNEA: 0
DIZZINESS: 1

## 2020-01-02 NOTE — PROGRESS NOTES
Chief Complaint   Patient presents with   • Cardiomyopathy (Non-ischemic)     HF Est.        Subjective:   Lencho Parker is a 79 y.o. male who presents today for follow-up on his cardiomyopathy.    Patient of Dr. Martines.  He was last seen in clinic on 12/11/2019 with Dr. Sanders.  During that visit, Losartan and furosemide were discontinued. He was started on Entresto.    Pt then went to the ER on 12/26/19 after having increased SOB over the previous week.  He had some increasing weight gain after his medication changes.  Patient was diuresed in the hospital, restarted on losartan, started on spironolactone.    He currently is taking furosemide on Mondays, Tuesdays, Thursdays, Fridays.    His weights have been at 196 pounds.    For his symptoms, he reports dyspnea with exertion, and at other times such as rest.  He also reports some dizziness episodes.  He denies chest pain, palpitations, orthopnea, PND, edema.    He moved to the [facility] from [place] this past year.    Prior to moving from [place], he was recommended for a device to help monitor his heart failure.  He is unsure the name of it.    Prior to his hospitalization, he was exercising regularly by swimming 3 times a week for about 45 minutes.    He was diagnosed with cardiomyopathy in the 90s.    Patient was a mild social smoker for about 5 years    He drinks 2 to 3 glasses of wine per night.    His girlfriend reports he stops breathing in his sleep but reports rare snoring.    Additonally, patient has the following medical problems:    -Non-ischemic cardiomyopathy last EF in [place] was 20%    -Paroxysmal A. fib, has a watchman device placed approximately 10/2015    -Hx sustained slow VT, had RFA (9/2012) has an ICD    -Nonobstructive CAD per angiogram 4/09 and 05/2016 (per medical records)    -History of a AAA with repair about 10 years ago    -Has 1 solitary left kidney.  Gave 1 to son in the 90s, followed by nephrology    -History of  TIA/CVA    -History Colon Cancer    Past Medical History:   Diagnosis Date   • Cardiomyopathy (HCC) 08/2019    Echocardiogram with mildly dilated LV, mild concentric, LVEF 15%. Severely dilated LA. Trace MR, trace AI, mild TR. RVSP 26mmHg.   • Chronic obstructive pulmonary disease (HCC)    • History of abdominal aortic aneurysm (AAA) 2009    Status post stent   • Hyperlipidemia    • Hypertension    • Paroxysmal atrial fibrillation (HCC)     Status post Watchman procedure in [place].   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    • Stroke (HCC) 2012    TIA   • Ventricular tachycardia (HCC)      Past Surgical History:   Procedure Laterality Date   • AICD BATTERY CHANGE Left 01/03/2017    Generator replacement with [employer] Evera MRI XT  [id number] implanted in [place].   • AAA WITH STENT GRAFT  2009     History reviewed. No pertinent family history.  Social History     Socioeconomic History   • Marital status: Single     Spouse name: Not on file   • Number of children: Not on file   • Years of education: Not on file   • Highest education level: Not on file   Occupational History   • Not on file   Social Needs   • Financial resource strain: Not on file   • Food insecurity:     Worry: Not on file     Inability: Not on file   • Transportation needs:     Medical: Not on file     Non-medical: Not on file   Tobacco Use   • Smoking status: Former Smoker     Packs/day: 0.50     Years: 15.00     Pack years: 7.50     Types: Cigarettes     Last attempt to quit: 7/11/2004     Years since quitting: 15.4   • Smokeless tobacco: Never Used   Substance and Sexual Activity   • Alcohol use: Yes     Alcohol/week: 1.2 - 1.8 oz     Types: 2 - 3 Glasses of wine per week     Comment: wine occasionally   • Drug use: No   • Sexual activity: Not on file   Lifestyle   • Physical activity:     Days per week: Not on file     Minutes per session: Not on file   • Stress: Not on file   Relationships   • Social connections:     Talks on phone: Not on  file     Gets together: Not on file     Attends Voodoo service: Not on file     Active member of club or organization: Not on file     Attends meetings of clubs or organizations: Not on file     Relationship status: Not on file   • Intimate partner violence:     Fear of current or ex partner: Not on file     Emotionally abused: Not on file     Physically abused: Not on file     Forced sexual activity: Not on file   Other Topics Concern   • Not on file   Social History Narrative   • Not on file     No Known Allergies  Outpatient Encounter Medications as of 1/2/2020   Medication Sig Dispense Refill   • losartan (COZAAR) 25 MG Tab Take 1 Tab by mouth every day. 30 Tab 2   • spironolactone (ALDACTONE) 25 MG Tab Take 1 Tab by mouth every day. 30 Tab 3   • vitamin D, Ergocalciferol, (DRISDOL) 98651 units Cap capsule Take 50,000 Units by mouth every 7 days. MONDAY     • aspirin EC (ECOTRIN) 81 MG Tablet Delayed Response Take 81 mg by mouth every day.     • amiodarone (CORDARONE) 200 MG Tab Take 200 mg by mouth every day.     • tamsulosin (FLOMAX) 0.4 MG capsule Take 0.8 mg by mouth ONE-HALF HOUR AFTER BREAKFAST.     • cinacalcet (SENSIPAR) 30 MG Tab Take 30 mg by mouth 3 times a week.     • carvedilol (COREG) 25 MG Tab Take 12.5-25 mg by mouth 2 times a day. Pt takes 25MG in AM and 12.5MG HS      • atorvastatin (LIPITOR) 20 MG Tab Take 20 mg by mouth every day.     • Omega-3 Fatty Acids (FISH OIL) 1000 MG Cap capsule Take 1,000 mg by mouth every day.     • multivitamin (THERAGRAN) Tab Take 1 Tab by mouth every day.     • albuterol 108 (90 Base) MCG/ACT Aero Soln inhalation aerosol Inhale 2 Puffs by mouth every 6 hours as needed for Shortness of Breath. 8.5 g 0     No facility-administered encounter medications on file as of 1/2/2020.      Review of Systems   Constitutional: Negative for fever and malaise/fatigue.   Respiratory: Positive for shortness of breath. Negative for cough.    Cardiovascular: Negative for chest  "pain, palpitations, orthopnea, claudication, leg swelling and PND.   Gastrointestinal: Negative for abdominal pain.   Musculoskeletal: Negative for myalgias.   Neurological: Positive for dizziness.   All other systems reviewed and are negative.       Objective:   /80 (BP Location: Left arm, Patient Position: Sitting, BP Cuff Size: Adult)   Pulse 74   Ht 1.88 m (6' 2\")   Wt 91.2 kg (201 lb)   SpO2 94%   BMI 25.81 kg/m²     Physical Exam   Constitutional: He is oriented to person, place, and time. He appears well-developed and well-nourished.   HENT:   Head: Normocephalic and atraumatic.   Eyes: Pupils are equal, round, and reactive to light. EOM are normal.   Neck: Normal range of motion. Neck supple. No JVD present.   Cardiovascular: Normal rate, regular rhythm and normal heart sounds.   Pulmonary/Chest: Effort normal and breath sounds normal. No respiratory distress. He has no wheezes. He has no rales.   Left chest ICD-no erosion, drainage or Erythema   Abdominal: Soft. Bowel sounds are normal.   Musculoskeletal:         General: No edema.   Neurological: He is alert and oriented to person, place, and time.   Skin: Skin is warm and dry.   Psychiatric: He has a normal mood and affect. His behavior is normal.   Vitals reviewed.    Lab Results   Component Value Date/Time    CHOLSTRLTOT 192 07/17/2019 08:37 AM     (H) 07/17/2019 08:37 AM    HDL 61 07/17/2019 08:37 AM    TRIGLYCERIDE 89 07/17/2019 08:37 AM       Lab Results   Component Value Date/Time    SODIUM 143 12/27/2019 03:54 AM    POTASSIUM 3.7 12/27/2019 03:54 AM    CHLORIDE 107 12/27/2019 03:54 AM    CO2 21 12/27/2019 03:54 AM    GLUCOSE 99 12/27/2019 03:54 AM    BUN 20 12/27/2019 03:54 AM    CREATININE 1.45 (H) 12/27/2019 03:54 AM     Lab Results   Component Value Date/Time    ALKPHOSPHAT 69 12/27/2019 03:54 AM    ASTSGOT 24 12/27/2019 03:54 AM    ALTSGPT 27 12/27/2019 03:54 AM    TBILIRUBIN 1.1 12/27/2019 03:54 AM      Transthoracic Echo " Report 8/29/2019  No prior study is available for comparison.   Severely reduced left ventricular systolic function. Left ventricular   ejection fraction is visually estimated to be 15%.  Indeterminate diastolic function.  Estimated right ventricular systolic pressure  is 26 mmHg + JVP.  Ascending aorta diameter is 4 cm.     Assessment:     1. ACC/AHA stage C systolic heart failure (HCC)  Basic Metabolic Panel   2. Heart failure, NYHA class 3 (HCC)  Basic Metabolic Panel   3. High risk medication use  Basic Metabolic Panel   4. Paroxysmal atrial fibrillation (HCC)     5. Dilated cardiomyopathy (HCC)     6. AICD (automatic cardioverter/defibrillator) present     7. Mixed hyperlipidemia     8. Ventricular tachycardia (HCC)     9. Coronary artery disease involving native coronary artery of native heart without angina pectoris     10. Essential hypertension, benign         Medical Decision Making:  Today's Assessment / Status / Plan:   1.  HFrEF, Stage C, Class 2-3, LVEF 15%: Based on physical examination findings, patient is euvolemic. No JVD, lungs are clear to auscultation, no pitting edema in bilateral lower extremities, no ascites.  -Reinforced with patient importance of monitoring weights at home and alerting the clinic if weight gain is present.  -Patient to obtain a BMP this week  -Continue losartan 25 mg daily  -Continue spironolactone 25 mg daily  -Continue carvedilol 25 mg in a.m. and 12.5 mg in p.m.  -Continue furosemide on Mondays, Tuesdays, Thursdays, Fridays  -Patient has a solitary kidney  -Has ICD for history of VT, but denies any current shocks  -Reinforced s/sx of worsening heart failure with patient and weight monitoring. Pt verbalizes understanding. Pt to call office or RTC if present.   -Did discuss with patient possibility of needing advanced heart failure therapies in the future such as LVAD.  Also discussed the intracardiac pressure monitoring system, CardioMEMS, information given to patient.   Patient will look it over and let us know if he has any questions.  -Patient may have been worked up in the past for heart logic?    2.  Paroxysmal A. Fib, has watchman:   -No episodes on last device check  -Continue aspirin 81 mg daily  -Continue amiodarone 200 mg daily    3.  Hyperlipidemia/nonobstructive CAD: Last  on 7/17/2019  -Continue aspirin 81 mg daily  -Continue atorvastatin 20 mg daily  -will recheck lipid in the future    4.  Hypertension: Stable   -Continue recommendations per above    5.  Apnea in sleep:  -Referred patient for sleep study, appt on 2/13/2019    FU in clinic in 3 weeks to see if meds can further be opitmized and labs this week. Sooner if needed.    Patient verbalizes understanding and agrees with the plan of care.     Collaborating MD: AYSE Martines MD

## 2020-01-08 ENCOUNTER — HOSPITAL ENCOUNTER (OUTPATIENT)
Dept: LAB | Facility: MEDICAL CENTER | Age: 80
End: 2020-01-08
Attending: INTERNAL MEDICINE
Payer: MEDICARE

## 2020-01-08 ENCOUNTER — HOSPITAL ENCOUNTER (OUTPATIENT)
Dept: LAB | Facility: MEDICAL CENTER | Age: 80
End: 2020-01-08
Attending: NURSE PRACTITIONER
Payer: MEDICARE

## 2020-01-08 DIAGNOSIS — E78.2 MIXED HYPERLIPIDEMIA: ICD-10-CM

## 2020-01-08 DIAGNOSIS — I47.20 VENTRICULAR TACHYCARDIA (HCC): ICD-10-CM

## 2020-01-08 DIAGNOSIS — Z86.73 HISTORY OF TIA (TRANSIENT ISCHEMIC ATTACK): ICD-10-CM

## 2020-01-08 DIAGNOSIS — Z86.79 HISTORY OF ABDOMINAL AORTIC ANEURYSM (AAA): ICD-10-CM

## 2020-01-08 DIAGNOSIS — Z79.899 HIGH RISK MEDICATION USE: ICD-10-CM

## 2020-01-08 LAB
ANION GAP SERPL CALC-SCNC: 8 MMOL/L (ref 0–11.9)
BUN SERPL-MCNC: 29 MG/DL (ref 8–22)
CALCIUM SERPL-MCNC: 8.8 MG/DL (ref 8.5–10.5)
CHLORIDE SERPL-SCNC: 109 MMOL/L (ref 96–112)
CO2 SERPL-SCNC: 22 MMOL/L (ref 20–33)
CREAT SERPL-MCNC: 1.89 MG/DL (ref 0.5–1.4)
FASTING STATUS PATIENT QL REPORTED: NORMAL
GLUCOSE SERPL-MCNC: 132 MG/DL (ref 65–99)
NT-PROBNP SERPL IA-MCNC: 2601 PG/ML (ref 0–125)
POTASSIUM SERPL-SCNC: 4.8 MMOL/L (ref 3.6–5.5)
SODIUM SERPL-SCNC: 139 MMOL/L (ref 135–145)

## 2020-01-08 PROCEDURE — 83880 ASSAY OF NATRIURETIC PEPTIDE: CPT | Mod: GA

## 2020-01-08 PROCEDURE — 80048 BASIC METABOLIC PNL TOTAL CA: CPT

## 2020-01-08 PROCEDURE — 36415 COLL VENOUS BLD VENIPUNCTURE: CPT | Mod: GA

## 2020-01-10 ENCOUNTER — TELEPHONE (OUTPATIENT)
Dept: CARDIOLOGY | Facility: MEDICAL CENTER | Age: 80
End: 2020-01-10

## 2020-01-10 DIAGNOSIS — I50.20 ACC/AHA STAGE C SYSTOLIC HEART FAILURE (HCC): ICD-10-CM

## 2020-01-10 DIAGNOSIS — Z79.899 HIGH RISK MEDICATION USE: ICD-10-CM

## 2020-01-10 RX ORDER — SPIRONOLACTONE 25 MG/1
12.5 TABLET ORAL DAILY
Qty: 45 TAB | Refills: 3 | Status: SHIPPED | OUTPATIENT
Start: 2020-01-10 | End: 2022-12-09 | Stop reason: SDUPTHER

## 2020-01-10 NOTE — PROGRESS NOTES
Slight worsening of kidney function, will have patient reduce spironolactone to 12.5 mg daily and repeat a BMP before his next visit in 2 weeks.  Patient also to stay adequately hydrated.  Will follow kidney function of solitary kidney.

## 2020-01-10 NOTE — TELEPHONE ENCOUNTER
Result Notes for Basic Metabolic Panel     Notes recorded by EUGENE Munoz on 1/10/2020 at 10:15 AM PST  Can you please let the patient know that his kidney function has worsened slightly, please have him decrease his spironolactone to 12.5 mg daily and make sure he is adequately hydrating.  If possible, have him repeat another blood test in 2 weeks before his next visit with me.  ------    Notes recorded by Aleah Metzger R.N. on 1/8/2020 at 2:25 PM PST  Pt scheduled to see CHERRIE 1/27     Pt called. No answer, voicemail left with detailed instructions as above and contact information provided for questions.    Rx changed and labs ordered by NORMA. Med change faxed to Josh BARONE with receipt confirmation received.

## 2020-01-16 ENCOUNTER — HOSPITAL ENCOUNTER (OUTPATIENT)
Dept: LAB | Facility: MEDICAL CENTER | Age: 80
End: 2020-01-16
Attending: NURSE PRACTITIONER
Payer: MEDICARE

## 2020-01-16 DIAGNOSIS — I50.20 ACC/AHA STAGE C SYSTOLIC HEART FAILURE (HCC): ICD-10-CM

## 2020-01-16 DIAGNOSIS — Z79.899 HIGH RISK MEDICATION USE: ICD-10-CM

## 2020-01-16 LAB
ANION GAP SERPL CALC-SCNC: 10 MMOL/L (ref 0–11.9)
BUN SERPL-MCNC: 28 MG/DL (ref 8–22)
CALCIUM SERPL-MCNC: 9 MG/DL (ref 8.5–10.5)
CHLORIDE SERPL-SCNC: 107 MMOL/L (ref 96–112)
CO2 SERPL-SCNC: 19 MMOL/L (ref 20–33)
CREAT SERPL-MCNC: 1.51 MG/DL (ref 0.5–1.4)
FASTING STATUS PATIENT QL REPORTED: NORMAL
GLUCOSE SERPL-MCNC: 124 MG/DL (ref 65–99)
POTASSIUM SERPL-SCNC: 4.5 MMOL/L (ref 3.6–5.5)
SODIUM SERPL-SCNC: 136 MMOL/L (ref 135–145)

## 2020-01-16 PROCEDURE — 80048 BASIC METABOLIC PNL TOTAL CA: CPT

## 2020-01-16 PROCEDURE — 36415 COLL VENOUS BLD VENIPUNCTURE: CPT

## 2020-01-27 ENCOUNTER — TELEPHONE (OUTPATIENT)
Dept: CARDIOLOGY | Facility: MEDICAL CENTER | Age: 80
End: 2020-01-27

## 2020-01-27 ENCOUNTER — OFFICE VISIT (OUTPATIENT)
Dept: CARDIOLOGY | Facility: MEDICAL CENTER | Age: 80
End: 2020-01-27
Payer: MEDICARE

## 2020-01-27 ENCOUNTER — DOCUMENTATION (OUTPATIENT)
Dept: CARDIOLOGY | Facility: MEDICAL CENTER | Age: 80
End: 2020-01-27

## 2020-01-27 VITALS
HEART RATE: 97 BPM | BODY MASS INDEX: 25.15 KG/M2 | WEIGHT: 196 LBS | SYSTOLIC BLOOD PRESSURE: 102 MMHG | OXYGEN SATURATION: 97 % | DIASTOLIC BLOOD PRESSURE: 62 MMHG | HEIGHT: 74 IN

## 2020-01-27 DIAGNOSIS — I25.10 CORONARY ARTERY DISEASE INVOLVING NATIVE CORONARY ARTERY OF NATIVE HEART WITHOUT ANGINA PECTORIS: ICD-10-CM

## 2020-01-27 DIAGNOSIS — I50.20 ACC/AHA STAGE C SYSTOLIC HEART FAILURE (HCC): ICD-10-CM

## 2020-01-27 DIAGNOSIS — I10 ESSENTIAL HYPERTENSION, BENIGN: ICD-10-CM

## 2020-01-27 DIAGNOSIS — I47.20 VENTRICULAR TACHYCARDIA (HCC): ICD-10-CM

## 2020-01-27 DIAGNOSIS — I42.0 DILATED CARDIOMYOPATHY (HCC): ICD-10-CM

## 2020-01-27 DIAGNOSIS — E78.2 MIXED HYPERLIPIDEMIA: ICD-10-CM

## 2020-01-27 DIAGNOSIS — R06.81 APNEA: ICD-10-CM

## 2020-01-27 DIAGNOSIS — Z95.810 AICD (AUTOMATIC CARDIOVERTER/DEFIBRILLATOR) PRESENT: ICD-10-CM

## 2020-01-27 DIAGNOSIS — I48.0 PAROXYSMAL ATRIAL FIBRILLATION (HCC): ICD-10-CM

## 2020-01-27 DIAGNOSIS — I50.9 HEART FAILURE, NYHA CLASS 3 (HCC): ICD-10-CM

## 2020-01-27 DIAGNOSIS — Z79.899 HIGH RISK MEDICATION USE: ICD-10-CM

## 2020-01-27 PROBLEM — Z00.6 RESEARCH STUDY PATIENT: Status: ACTIVE | Noted: 2020-01-27

## 2020-01-27 PROCEDURE — 99214 OFFICE O/P EST MOD 30 MIN: CPT | Performed by: NURSE PRACTITIONER

## 2020-01-27 RX ORDER — CLOPIDOGREL BISULFATE 75 MG/1
75 TABLET ORAL DAILY
Qty: 30 TAB | Refills: 0 | Status: SHIPPED | OUTPATIENT
Start: 2020-01-27 | End: 2020-01-31

## 2020-01-27 RX ORDER — CLOPIDOGREL BISULFATE 75 MG/1
75 TABLET ORAL DAILY
Qty: 30 TAB | Refills: 0 | Status: SHIPPED | OUTPATIENT
Start: 2020-01-27 | End: 2020-01-27 | Stop reason: SDUPTHER

## 2020-01-27 ASSESSMENT — ENCOUNTER SYMPTOMS
MYALGIAS: 0
COUGH: 0
SHORTNESS OF BREATH: 1
CLAUDICATION: 0
PALPITATIONS: 0
ABDOMINAL PAIN: 0
ORTHOPNEA: 0
FEVER: 0
DIZZINESS: 1
PND: 0

## 2020-01-27 NOTE — PROGRESS NOTES
Study patient: GUIDE-HF CardioMEMS implant    Met with patient and research team in the HF office for CardioMEMS pre-implant teaching session. Patient watched [employer] CardioMEMS videos and reviewed home equipment. Discussion about the implant, purpose of the tool and partnership with patient and HF team. All questions answered.

## 2020-01-27 NOTE — PROGRESS NOTES
No chief complaint on file.      Subjective:   Lencho Parker is a 79 y.o. male who presents today for follow-up on his cardiomyopathy.    Patient of Dr. Martines.  He was last seen in clinic on 1/2/2020.  During that visit, no changes were made to his medical regimen, he was sent for repeat lab testing.    His kidney function had worsened slightly, his spironolactone was decreased to 12.5 mg daily.    For his symptoms, he continues to report dyspnea with exertion, occasional dizziness.  He denies chest pain, palpitations, orthopnea, PND or edema.    His home weights have been stable at 196 pounds.    He has agreed to pursue intracardiac pressure monitoring device. He is scheduled on 2/4/2020 for implantation.     He was diagnosed with cardiomyopathy in the 90s.    Patient was a mild social smoker for about 5 years    He drinks 2 to 3 glasses of wine per night.    He scheduled his sleep study appointment on 12/13/2020.    Additonally, patient has the following medical problems:    -Non-ischemic cardiomyopathy last EF in [place] was 20%    -Paroxysmal A. fib, has a watchman device placed approximately 10/2015    -Hx sustained slow VT, had RFA (9/2012) has an ICD    -Nonobstructive CAD per angiogram 4/09 and 05/2016 (per medical records)    -History of a AAA with repair about 10 years ago    -Has 1 solitary left kidney.  Gave 1 to son in the 90s, followed by nephrology    -History of TIA/CVA    -History Colon Cancer    Past Medical History:   Diagnosis Date   • Cardiomyopathy (HCC) 08/2019    Echocardiogram with mildly dilated LV, mild concentric, LVEF 15%. Severely dilated LA. Trace MR, trace AI, mild TR. RVSP 26mmHg.   • Chronic obstructive pulmonary disease (HCC)    • History of abdominal aortic aneurysm (AAA) 2009    Status post stent   • Hyperlipidemia    • Hypertension    • Paroxysmal atrial fibrillation (HCC)     Status post Watchman procedure in [place].   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     • Stroke (HCC) 2012    TIA   • Ventricular tachycardia (HCC)      Past Surgical History:   Procedure Laterality Date   • AICD BATTERY CHANGE Left 01/03/2017    Generator replacement with Hydrocapsule Evera MRI XT  [id number] implanted in [place].   • AAA WITH STENT GRAFT  2009     No family history on file.  Social History     Socioeconomic History   • Marital status: Single     Spouse name: Not on file   • Number of children: Not on file   • Years of education: Not on file   • Highest education level: Not on file   Occupational History   • Not on file   Social Needs   • Financial resource strain: Not on file   • Food insecurity:     Worry: Not on file     Inability: Not on file   • Transportation needs:     Medical: Not on file     Non-medical: Not on file   Tobacco Use   • Smoking status: Former Smoker     Packs/day: 0.50     Years: 15.00     Pack years: 7.50     Types: Cigarettes     Last attempt to quit: 7/11/2004     Years since quitting: 15.5   • Smokeless tobacco: Never Used   Substance and Sexual Activity   • Alcohol use: Yes     Alcohol/week: 1.2 - 1.8 oz     Types: 2 - 3 Glasses of wine per week     Comment: wine occasionally   • Drug use: No   • Sexual activity: Not on file   Lifestyle   • Physical activity:     Days per week: Not on file     Minutes per session: Not on file   • Stress: Not on file   Relationships   • Social connections:     Talks on phone: Not on file     Gets together: Not on file     Attends Hoahaoism service: Not on file     Active member of club or organization: Not on file     Attends meetings of clubs or organizations: Not on file     Relationship status: Not on file   • Intimate partner violence:     Fear of current or ex partner: Not on file     Emotionally abused: Not on file     Physically abused: Not on file     Forced sexual activity: Not on file   Other Topics Concern   • Not on file   Social History Narrative   • Not on file     No Known Allergies  Outpatient Encounter Medications  "as of 1/27/2020   Medication Sig Dispense Refill   • spironolactone (ALDACTONE) 25 MG Tab Take 0.5 Tabs by mouth every day. 45 Tab 3   • furosemide (LASIX) 20 MG Tab Take 20 mg by mouth as needed. Take Mon, Tues, Th, Fri     • losartan (COZAAR) 25 MG Tab Take 1 Tab by mouth every day. 30 Tab 2   • vitamin D, Ergocalciferol, (DRISDOL) 00466 units Cap capsule Take 50,000 Units by mouth every 7 days. MONDAY     • aspirin EC (ECOTRIN) 81 MG Tablet Delayed Response Take 81 mg by mouth every day.     • amiodarone (CORDARONE) 200 MG Tab Take 200 mg by mouth every day.     • tamsulosin (FLOMAX) 0.4 MG capsule Take 0.8 mg by mouth ONE-HALF HOUR AFTER BREAKFAST.     • cinacalcet (SENSIPAR) 30 MG Tab Take 30 mg by mouth 3 times a week.     • carvedilol (COREG) 25 MG Tab Take 12.5-25 mg by mouth 2 times a day. Pt takes 25MG in AM and 12.5MG HS      • atorvastatin (LIPITOR) 20 MG Tab Take 20 mg by mouth every day.     • Omega-3 Fatty Acids (FISH OIL) 1000 MG Cap capsule Take 1,000 mg by mouth every day.     • multivitamin (THERAGRAN) Tab Take 1 Tab by mouth every day.     • albuterol 108 (90 Base) MCG/ACT Aero Soln inhalation aerosol Inhale 2 Puffs by mouth every 6 hours as needed for Shortness of Breath. 8.5 g 0     No facility-administered encounter medications on file as of 1/27/2020.      Review of Systems   Constitutional: Negative for fever and malaise/fatigue.   Respiratory: Positive for shortness of breath. Negative for cough.    Cardiovascular: Negative for chest pain, palpitations, orthopnea, claudication, leg swelling and PND.   Gastrointestinal: Negative for abdominal pain.   Musculoskeletal: Negative for myalgias.   Neurological: Positive for dizziness.   All other systems reviewed and are negative.       Objective:   /62 (BP Location: Right arm, Patient Position: Sitting, BP Cuff Size: Adult)   Pulse 97   Ht 1.88 m (6' 2\")   Wt 88.9 kg (196 lb)   SpO2 97%   BMI 25.16 kg/m²     Physical Exam "   Constitutional: He is oriented to person, place, and time. He appears well-developed and well-nourished.   HENT:   Head: Normocephalic and atraumatic.   Eyes: Pupils are equal, round, and reactive to light. EOM are normal.   Neck: Normal range of motion. Neck supple. No JVD present.   Cardiovascular: Normal rate, regular rhythm and normal heart sounds.   Pulmonary/Chest: Effort normal and breath sounds normal. No respiratory distress. He has no wheezes. He has no rales.   Left chest ICD-no erosion, drainage or Erythema   Abdominal: Soft. Bowel sounds are normal.   Musculoskeletal:         General: Edema present.   Neurological: He is alert and oriented to person, place, and time.   Skin: Skin is warm and dry.   Psychiatric: He has a normal mood and affect. His behavior is normal.   Vitals reviewed.    Lab Results   Component Value Date/Time    CHOLSTRLTOT 192 07/17/2019 08:37 AM     (H) 07/17/2019 08:37 AM    HDL 61 07/17/2019 08:37 AM    TRIGLYCERIDE 89 07/17/2019 08:37 AM       Lab Results   Component Value Date/Time    SODIUM 136 01/16/2020 09:08 AM    POTASSIUM 4.5 01/16/2020 09:08 AM    CHLORIDE 107 01/16/2020 09:08 AM    CO2 19 (L) 01/16/2020 09:08 AM    GLUCOSE 124 (H) 01/16/2020 09:08 AM    BUN 28 (H) 01/16/2020 09:08 AM    CREATININE 1.51 (H) 01/16/2020 09:08 AM     Lab Results   Component Value Date/Time    ALKPHOSPHAT 69 12/27/2019 03:54 AM    ASTSGOT 24 12/27/2019 03:54 AM    ALTSGPT 27 12/27/2019 03:54 AM    TBILIRUBIN 1.1 12/27/2019 03:54 AM      Transthoracic Echo Report 8/29/2019  No prior study is available for comparison.   Severely reduced left ventricular systolic function. Left ventricular   ejection fraction is visually estimated to be 15%.  Indeterminate diastolic function.  Estimated right ventricular systolic pressure  is 26 mmHg + JVP.  Ascending aorta diameter is 4 cm.     Assessment:     1. ACC/AHA stage C systolic heart failure (HCC)  clopidogrel (PLAVIX) 75 MG Tab     DISCONTINUED: clopidogrel (PLAVIX) 75 MG Tab   2. Heart failure, NYHA class 3 (HCC)  clopidogrel (PLAVIX) 75 MG Tab    DISCONTINUED: clopidogrel (PLAVIX) 75 MG Tab   3. Dilated cardiomyopathy (HCC)     4. Paroxysmal atrial fibrillation (HCC)     5. AICD (automatic cardioverter/defibrillator) present     6. High risk medication use     7. Mixed hyperlipidemia     8. Coronary artery disease involving native coronary artery of native heart without angina pectoris     9. Ventricular tachycardia (HCC)     10. Essential hypertension, benign     11. Apnea         Medical Decision Making:  Today's Assessment / Status / Plan:   1.  HFrEF, Stage C, Class 2-3, LVEF 15%: Based on physical examination findings, patient is euvolemic. No JVD, lungs are clear to auscultation, no pitting edema in bilateral lower extremities, no ascites.  -Patient is getting CardioMEMS implantation on 2/4/2020  -He will start clopidogrel 75 mg daily after implantation for 30 days  -Continue losartan 25 mg daily  -Continue spironolactone 12.5 mg daily  -Continue carvedilol 25 mg in a.m. and 12.5 mg in p.m.  -Continue furosemide on Mondays, Wednesday, Fridays  -Patient has a solitary kidney  -Has ICD for history of VT, but denies any current shocks  -Reinforced s/sx of worsening heart failure with patient and weight monitoring. Pt verbalizes understanding. Pt to call office or RTC if present.   -Consider further discussion of advanced heart failure therapies in the future such as LVAD.    -Patient may have been worked up in the past for heart logic?    2.  Paroxysmal A. Fib, has watchman:   -No episodes on last device check  -Continue aspirin 81 mg daily  -Continue amiodarone 200 mg daily (patient worried about side effects from this medication, he does have some dizziness is not sure if this medication is causing it.  May look at discontinuing in the future)    3.  Hyperlipidemia/nonobstructive CAD: Last  on 7/17/2019  -Continue aspirin 81 mg  daily  -Continue atorvastatin 20 mg daily  -will recheck lipid in the future    4.  Hypertension: Stable   -Continue recommendations per above    5.  Apnea in sleep:  -Referred patient for sleep study, appt on 2/13/2019    FU in clinic in 2 weeks after CardioMEMS implantation. Sooner if needed.    Patient verbalizes understanding and agrees with the plan of care.     Collaborating MD: Francesco Velázquez MD

## 2020-01-27 NOTE — TELEPHONE ENCOUNTER
----- Message from Jyoti Aguilar sent at 1/24/2020  2:16 PM PST -----  Regarding: GUIDE HF/Cardiomems  Usha Chang is coming in Monday to sign consent and get scheduled for the February 4th implant date.

## 2020-01-27 NOTE — TELEPHONE ENCOUNTER
Patient is scheduled on 2-4-20 for a RHC w/cardiomems with Dr. Velázquez. Patient was told to hold lasix AM day of procedure and to check in at 7:30 for a 9:30 procedure. H&P was done on 1-27-20 by Sole Larose. Pre admit is scheduled on 2-3-20 at 9:15.

## 2020-01-28 NOTE — PROGRESS NOTES
GUIDE HF         NCT [id number]  Protocol Title: Hemodynamic-GUIDEd Management of Heart Failure (GUIDE-HF)  Single Arm     Screening/Baseline visit:  Clinical trial participation was discussed with Lencho damian. He was given the study consent with ample time to review. I informed him that the clinical trial is voluntary and he may withdraw consent at any time by notifying the study team. All aspects of the study purpose and procedures were discussed per consent form. Dr. Velázquez present to answer all questions to his satisfaction. Subject signed consent without coercion and undue influence and was given a copy of the signed consent. No research specific procedures took place prior to consenting and all assessments were conducted per protocol. Subject completed the EQ-5D-5L & KCCQ-12 questionnaires prior to completing the 6MWT and obtaining vitals.       BP 102/62 mmHg  Weight 196 lbs  BMI 25 kg/m2  HR 97 bpm     6MWT- Total distance walked: 390 Meters     Medications and medical history were reviewed. Study team contact information was given for him to call with any more questions.     CardioMEMS pre-implant teaching completed by Hetal Kendall RN.     Implant procedure scheduled for February 4, 2020.

## 2020-01-29 DIAGNOSIS — I50.9 HEART FAILURE, NYHA CLASS 3 (HCC): ICD-10-CM

## 2020-01-29 DIAGNOSIS — Z00.6 RESEARCH STUDY PATIENT: ICD-10-CM

## 2020-01-31 ENCOUNTER — TELEPHONE (OUTPATIENT)
Dept: CARDIOLOGY | Facility: MEDICAL CENTER | Age: 80
End: 2020-01-31

## 2020-01-31 DIAGNOSIS — Z01.810 PRE-OPERATIVE CARDIOVASCULAR EXAMINATION: ICD-10-CM

## 2020-01-31 DIAGNOSIS — Z01.812 PRE-OPERATIVE LABORATORY EXAMINATION: ICD-10-CM

## 2020-01-31 LAB
ALBUMIN SERPL BCP-MCNC: 4 G/DL (ref 3.2–4.9)
ALBUMIN/GLOB SERPL: 1.3 G/DL
ALP SERPL-CCNC: 68 U/L (ref 30–99)
ALT SERPL-CCNC: 41 U/L (ref 2–50)
ANION GAP SERPL CALC-SCNC: 12 MMOL/L (ref 0–11.9)
APTT PPP: 29.3 SEC (ref 24.7–36)
AST SERPL-CCNC: 32 U/L (ref 12–45)
BILIRUB SERPL-MCNC: 1 MG/DL (ref 0.1–1.5)
BUN SERPL-MCNC: 34 MG/DL (ref 8–22)
CALCIUM SERPL-MCNC: 9.4 MG/DL (ref 8.5–10.5)
CHLORIDE SERPL-SCNC: 107 MMOL/L (ref 96–112)
CO2 SERPL-SCNC: 20 MMOL/L (ref 20–33)
CREAT SERPL-MCNC: 2.01 MG/DL (ref 0.5–1.4)
EKG IMPRESSION: NORMAL
ERYTHROCYTE [DISTWIDTH] IN BLOOD BY AUTOMATED COUNT: 51 FL (ref 35.9–50)
GLOBULIN SER CALC-MCNC: 3.1 G/DL (ref 1.9–3.5)
GLUCOSE SERPL-MCNC: 123 MG/DL (ref 65–99)
HCT VFR BLD AUTO: 52.7 % (ref 42–52)
HGB BLD-MCNC: 17.6 G/DL (ref 14–18)
INR PPP: 1.02 (ref 0.87–1.13)
MAGNESIUM SERPL-MCNC: 2.1 MG/DL (ref 1.5–2.5)
MCH RBC QN AUTO: 33.9 PG (ref 27–33)
MCHC RBC AUTO-ENTMCNC: 33.4 G/DL (ref 33.7–35.3)
MCV RBC AUTO: 101.5 FL (ref 81.4–97.8)
NT-PROBNP SERPL IA-MCNC: 2567 PG/ML (ref 0–125)
PLATELET # BLD AUTO: 152 K/UL (ref 164–446)
PMV BLD AUTO: 11.9 FL (ref 9–12.9)
POTASSIUM SERPL-SCNC: 4.9 MMOL/L (ref 3.6–5.5)
PROT SERPL-MCNC: 7.1 G/DL (ref 6–8.2)
PROTHROMBIN TIME: 13.6 SEC (ref 12–14.6)
RBC # BLD AUTO: 5.19 M/UL (ref 4.7–6.1)
SODIUM SERPL-SCNC: 139 MMOL/L (ref 135–145)
WBC # BLD AUTO: 4.7 K/UL (ref 4.8–10.8)

## 2020-01-31 PROCEDURE — 85610 PROTHROMBIN TIME: CPT

## 2020-01-31 PROCEDURE — 36415 COLL VENOUS BLD VENIPUNCTURE: CPT

## 2020-01-31 PROCEDURE — 93005 ELECTROCARDIOGRAM TRACING: CPT

## 2020-01-31 PROCEDURE — 85730 THROMBOPLASTIN TIME PARTIAL: CPT

## 2020-01-31 PROCEDURE — 83735 ASSAY OF MAGNESIUM: CPT

## 2020-01-31 PROCEDURE — 80053 COMPREHEN METABOLIC PANEL: CPT

## 2020-01-31 PROCEDURE — 85027 COMPLETE CBC AUTOMATED: CPT

## 2020-01-31 PROCEDURE — 83880 ASSAY OF NATRIURETIC PEPTIDE: CPT

## 2020-01-31 PROCEDURE — 93010 ELECTROCARDIOGRAM REPORT: CPT | Performed by: INTERNAL MEDICINE

## 2020-01-31 RX ORDER — CLOPIDOGREL BISULFATE 75 MG/1
75 TABLET ORAL DAILY
Qty: 30 TAB | Refills: 2 | Status: ON HOLD | OUTPATIENT
Start: 2020-01-31 | End: 2020-02-04

## 2020-01-31 RX ORDER — CLOPIDOGREL BISULFATE 75 MG/1
75 TABLET ORAL DAILY
Qty: 30 TAB | Refills: 2 | Status: SHIPPED | OUTPATIENT
Start: 2020-01-31 | End: 2020-09-03

## 2020-02-01 NOTE — TELEPHONE ENCOUNTER
Explained to Lencho his labs today showed slight worsening of his kidney function and that Dr. Velázquez would like him to increase his fluid intake.  We will recheck labs Tuesday prior to CardioMEMS procedure.  Also notified that Plavix prescription sent to his pharmacy.

## 2020-02-04 ENCOUNTER — HOSPITAL ENCOUNTER (OUTPATIENT)
Facility: MEDICAL CENTER | Age: 80
End: 2020-02-04
Attending: INTERNAL MEDICINE | Admitting: INTERNAL MEDICINE
Payer: MEDICARE

## 2020-02-04 ENCOUNTER — APPOINTMENT (OUTPATIENT)
Dept: CARDIOLOGY | Facility: MEDICAL CENTER | Age: 80
End: 2020-02-04
Attending: INTERNAL MEDICINE
Payer: MEDICARE

## 2020-02-04 VITALS
TEMPERATURE: 97.4 F | HEIGHT: 74 IN | WEIGHT: 196.43 LBS | DIASTOLIC BLOOD PRESSURE: 67 MMHG | BODY MASS INDEX: 25.21 KG/M2 | SYSTOLIC BLOOD PRESSURE: 93 MMHG | HEART RATE: 62 BPM | OXYGEN SATURATION: 97 % | RESPIRATION RATE: 16 BRPM

## 2020-02-04 DIAGNOSIS — I50.9 HEART FAILURE, NYHA CLASS 3 (HCC): ICD-10-CM

## 2020-02-04 PROBLEM — I51.89 LEFT VENTRICULAR SYSTOLIC DYSFUNCTION, NYHA CLASS 3: Status: ACTIVE | Noted: 2020-02-04

## 2020-02-04 PROBLEM — I50.20 ACC/AHA STAGE C SYSTOLIC HEART FAILURE (HCC): Status: ACTIVE | Noted: 2020-02-04

## 2020-02-04 LAB
ACTION RANGE TRIGGERED IACRT: YES
BODY TEMPERATURE: NORMAL DEGREES
INST. QUALIFIED PATIENT IIQPT: YES
PCO2 BLDV: 42.7 MMHG (ref 41–51)
PO2 BLDV: 34 MMHG (ref 25–40)
SAO2 % BLDV: 60 %
SPECIMEN DRAWN FROM PATIENT: NORMAL

## 2020-02-04 PROCEDURE — 700117 HCHG RX CONTRAST REV CODE 255: Performed by: INTERNAL MEDICINE

## 2020-02-04 PROCEDURE — 33289 TCAT IMPL WRLS P-ART PRS SNR: CPT | Mod: Q0 | Performed by: INTERNAL MEDICINE

## 2020-02-04 PROCEDURE — C1894 INTRO/SHEATH, NON-LASER: HCPCS

## 2020-02-04 PROCEDURE — 700111 HCHG RX REV CODE 636 W/ 250 OVERRIDE (IP)

## 2020-02-04 PROCEDURE — 700105 HCHG RX REV CODE 258: Performed by: INTERNAL MEDICINE

## 2020-02-04 PROCEDURE — 160002 HCHG RECOVERY MINUTES (STAT)

## 2020-02-04 PROCEDURE — 700101 HCHG RX REV CODE 250

## 2020-02-04 PROCEDURE — 99152 MOD SED SAME PHYS/QHP 5/>YRS: CPT | Mod: Q1 | Performed by: INTERNAL MEDICINE

## 2020-02-04 PROCEDURE — 82803 BLOOD GASES ANY COMBINATION: CPT

## 2020-02-04 RX ORDER — HEPARIN SODIUM 200 [USP'U]/100ML
INJECTION, SOLUTION INTRAVENOUS
Status: COMPLETED
Start: 2020-02-04 | End: 2020-02-04

## 2020-02-04 RX ORDER — SODIUM CHLORIDE 9 MG/ML
INJECTION, SOLUTION INTRAVENOUS CONTINUOUS
Status: DISCONTINUED | OUTPATIENT
Start: 2020-02-04 | End: 2020-02-04 | Stop reason: HOSPADM

## 2020-02-04 RX ORDER — LIDOCAINE HYDROCHLORIDE 20 MG/ML
INJECTION, SOLUTION INFILTRATION; PERINEURAL
Status: COMPLETED
Start: 2020-02-04 | End: 2020-02-04

## 2020-02-04 RX ORDER — MIDAZOLAM HYDROCHLORIDE 1 MG/ML
INJECTION INTRAMUSCULAR; INTRAVENOUS
Status: COMPLETED
Start: 2020-02-04 | End: 2020-02-04

## 2020-02-04 RX ORDER — HEPARIN SODIUM,PORCINE 1000/ML
VIAL (ML) INJECTION
Status: COMPLETED
Start: 2020-02-04 | End: 2020-02-04

## 2020-02-04 RX ADMIN — FENTANYL CITRATE 50 MCG: 0.05 INJECTION, SOLUTION INTRAMUSCULAR; INTRAVENOUS at 10:49

## 2020-02-04 RX ADMIN — IOHEXOL 10 ML: 350 INJECTION, SOLUTION INTRAVENOUS at 10:48

## 2020-02-04 RX ADMIN — SODIUM CHLORIDE: 9 INJECTION, SOLUTION INTRAVENOUS at 08:16

## 2020-02-04 RX ADMIN — MIDAZOLAM HYDROCHLORIDE 2 MG: 1 INJECTION, SOLUTION INTRAMUSCULAR; INTRAVENOUS at 10:17

## 2020-02-04 RX ADMIN — HEPARIN SODIUM: 1000 INJECTION, SOLUTION INTRAVENOUS; SUBCUTANEOUS at 10:17

## 2020-02-04 RX ADMIN — LIDOCAINE HYDROCHLORIDE: 20 INJECTION, SOLUTION INFILTRATION; PERINEURAL at 10:17

## 2020-02-04 RX ADMIN — HEPARIN SODIUM 2000 UNITS: 200 INJECTION, SOLUTION INTRAVENOUS at 10:17

## 2020-02-04 NOTE — PROCEDURES
Cardiomems implantation documentation    Indication for procedure:    This is a 79 years old patient with prior history of NYHA class 3 along with recent hospitalization for heart failure exacerbation and worsening symptom of exertional dyspnea. Patient is indicated to undergo Cardiomems implantation to reduce repeated heart failure hospitalization and also to improve overall quality of life. Patient meets criteria to undergo such procedure. Patient has been managed in our heart failure clinic.    Procedures performed:  1) Ultrasound guided femoral venous access.  2) Right heart catheterization.  3) Pulmonary angiogram.  4) Wiring of the pulmonary arterial system.  5) Implantation of Cardiomems device.  6) Calibration between intracardiac pressures via pulmonary arterial catheter along with Cardiomems readings.      Procedures:  Patient's right femoral venous area was prepped per protocol. It was sterilized per protocol. Based on patient's body habitus, venous assess through conventional means was difficult to perform safely without significant risk of retroperitoneal bleeding. Ultrasound was used to successfully obtain right femoral venous access after 2% lidocaine was given for local anesthesia. The right femoral vein was then dilated with sequential dilator to target goal of 12 Malaysian. Through that, a 12 Malaysian sheath was then inserted and locked in place. The pulmonary arterial Arnold-Afua catheter was then advanced into the 12 Malaysian sheath, inferior vena cava, right atrium, right ventricle and main pulmonary artery. The pulmonary arterial pressure was then obtained. Cardiac index and output was obtained via thermodilution and Saurav's method. Pulmonary arterial wedge pressure was also successfully obtained. After that, the Arnold-Afua catheter was then placed within the left pulmonary arterial system. Through that, a pulmonary angiogram was successfully obtained. This was done to delineate the appropriate palmar  arterial branch for successful deployment of Cardiomems device. Through the [place]-Afua catheter, a steel core wire was advanced into the appropriate branch of the left pulmonary arterial tree. The [place]-Afua catheter was then removed over the wire fashion. After which, the Cardiomems system was advanced over the wire into the appropriate branch of the pulmonary arterial tree. Measurement was made and the device was placed successfully without complications. After which, the catheter was removed and the steel core wire was left in place within the main pulmonary arterial system. The [place]-Afua catheter was then reintroduced back into the main pulmonary artery. The wire was then removed. Calibration between the main pulmonary artery readings and the device readings were made to ensure consistency. After that was done successfully, the [place]-Afua catheter was then pulled back into the right ventricle and then the right atrium for which appropriate measures of pressures were obtained. The [place]-Afua catheter was then completely removed from the body and the sheath was removed with hemostasis obtained through direct pressure.    Hemodynamics:  1) Pulmonary arterial pressure: Systolic of 35 mm Hg, diastolic of 17 mm Hg, mean of 24 mm Hg.  2) Pulmonary arterial wedge pressure with mean of 15 mm Hg.  3) Cardiac output of 2.9 and Cardiac index of 1.3 through thermodilution method.  4) Right ventricular pressure: Systolic of 30 mm Hg, end diastolic pressure of 5 mm Hg.  5) Right atrial pressure: A wave of 3 mm Hg, V wave of 5 mm Hg, mean of 3 mm Hg.    Conclusions:  1) Successful implantation of Cardiomems device for remote monitor of intracardiac pressures.  2) Patient will be monitored as part of our protocol in our heart failure program to further reduce repeated heart failure hospitalization and also to improve overall quality of life.    Ksenia Velázquez M.D.

## 2020-02-04 NOTE — DISCHARGE INSTRUCTIONS
ACTIVITY: Rest and take it easy for the first 24 hours.  A responsible adult is recommended to remain with you during that time.  It is normal to feel sleepy.  We encourage you to not do anything that requires balance, judgment or coordination.    MILD FLU-LIKE SYMPTOMS ARE NORMAL. YOU MAY EXPERIENCE GENERALIZED MUSCLE ACHES, THROAT IRRITATION, HEADACHE AND/OR SOME NAUSEA.    FOR 24 HOURS DO NOT:  Drive, operate machinery or run household appliances.  Drink beer or alcoholic beverages.   Make important decisions or sign legal documents.    SPECIAL INSTRUCTIONS: Follow up with your Cardiologist.  Resume home medications.    For Questions Regarding Cardiomems System Please Contact:  Jyoti Aguilar  Lead Clinical Research Coordinator  [phone]    DIET: To avoid nausea, slowly advance diet as tolerated, avoiding spicy or greasy foods for the first day.  Add more substantial food to your diet according to your physician's instructions.  Babies can be fed formula or breast milk as soon as they are hungry.  INCREASE FLUIDS AND FIBER TO AVOID CONSTIPATION.    SURGICAL DRESSING/BATHING: Keep dressing dry for 24 hours. May remove dressing and shower after 11:00 AM on 2/5, do not need to replace dressing. Do not submerge in water or bathe for 7 days.    FOLLOW-UP APPOINTMENT:  A follow-up appointment should be arranged with your doctor; call to schedule.    You should CALL YOUR PHYSICIAN if you develop:  Fever greater than 101 degrees F.  Pain not relieved by medication, or persistent nausea or vomiting.  Excessive bleeding (blood soaking through dressing) or unexpected drainage from the wound.  Extreme redness or swelling around the incision site, drainage of pus or foul smelling drainage.  Inability to urinate or empty your bladder within 8 hours.  Problems with breathing or chest pain.    You should call 911 if you develop problems with breathing or chest pain.  If you are unable to contact your doctor or surgical  center, you should go to the nearest emergency room or urgent care center.  Physician's telephone #: [phone]    If any questions arise, call your doctor.  If your doctor is not available, please feel free to call the Surgical Center at [phone].  The Center is open Monday through Friday from 7AM to 7PM.  You can also call the HEALTH HOTLINE open 24 hours/day, 7 days/week and speak to a nurse at [phone], or toll free at [phone].    A registered nurse may call you a few days after your surgery to see how you are doing after your procedure.    MEDICATIONS: Resume taking daily medication.  Take prescribed pain medication with food.  If no medication is prescribed, you may take non-aspirin pain medication if needed.  PAIN MEDICATION CAN BE VERY CONSTIPATING.  Take a stool softener or laxative such as senokot, pericolace, or milk of magnesia if needed.    If your physician has prescribed pain medication that includes Acetaminophen (Tylenol), do not take additional Acetaminophen (Tylenol) while taking the prescribed medication.    Depression / Suicide Risk    As you are discharged from this [facility] facility, it is important to learn how to keep safe from harming yourself.    Recognize the warning signs:  · Abrupt changes in personality, positive or negative- including increase in energy   · Giving away possessions  · Change in eating patterns- significant weight changes-  positive or negative  · Change in sleeping patterns- unable to sleep or sleeping all the time   · Unwillingness or inability to communicate  · Depression  · Unusual sadness, discouragement and loneliness  · Talk of wanting to die  · Neglect of personal appearance   · Rebelliousness- reckless behavior  · Withdrawal from people/activities they love  · Confusion- inability to concentrate     If you or a loved one observes any of these behaviors or has concerns about self-harm, here's what you can do:  · Talk about it- your  "feelings and reasons for harming yourself  · Remove any means that you might use to hurt yourself (examples: pills, rope, extension cords, firearm)  · Get professional help from the community (Mental Health, Substance Abuse, psychological counseling)  · Do not be alone:Call your Safe Contact- someone whom you trust who will be there for you.  · Call your local CRISIS HOTLINE 547-0648 or 299-640-2224  · Call your local Children's Mobile Crisis Response Team [place] (521) 305-1820 or wwwTrooval  · Call the toll free National Suicide Prevention Hotlines   · National Suicide Prevention Lifeline 089-927-BDJS (8617)  National Hope Line Network 800-SUICIDE (905-9729)    Post Angiogram Groin Care Instructions     INSTRUCTIONS  Examine (look and feel) the site of your incision site TODAY so you can recognize changes that should be called to your doctor (see below).  Avoid straining either by lifting or pulling objects for 4-5 days. Avoid lifting over 5 pounds.   For at least 72 hours, if you should sneeze or cough, please hold pressure over your groin area.  If you should begin to have oozing from the catheterization site, please hold firm pressure and call your doctor's office immediately.  If profuse bleeding occurs from the catheterization site, hold firm pressure and call \"[phone]\" immediately for assistance.  Remove bandage after 24 hours.     ACTIVITY  Limit activity as instructed by your doctor.  No driving or very limited driving with frequent stops for one week.   If you must take a long car ride, stop every hour and walk around the car.   Warm showers or baths are permitted after the bandage is removed. Avoid hot showers, baths, hot tubs, and swimming for one week.    PLEASE CALL YOUR DOCTOR IF:  Temperature elevation occurs.  Catheterization site becomes reddened or begins to drain.   Bruising appears to be new or not resolving. The bruise may move down your leg. This is normal.  The small round lump " in the groin increases in size.  Any leg numbness, aching, or discomfort (immediately).  Increasing discomfort in the leg at the insertion site.  Chest pains, even if relieved by Nitroglycerin.    MISCELLANEOUS INSTRUCTIONS  Bruising may occur as a result of heart catheterization. Some of the discoloration may travel down the leg, going from blue to green in color.  A small round lump under the catheterization site will remain for up to six weeks.  If any questions arise call your physician's office. You can also call the [employer] HOTLINE open 24 hours/day, 7 days/week and speak to a nurse at [phone], or toll free at [phone].   You should call [phone] if you develop problems with breathing or chest pain.    FOR PROBLEMS CALL YOUR DOCTOR AT: [phone]    I acknowledge receipt and understanding of these Home Care instructions.

## 2020-02-04 NOTE — RESEARCH
Met with patient after procedure. CardioMEMS home monitor reviewed. Education on equipment completed, daily reading procedure reviewed, and all questions answered.  Temporary implant card and contact card for HF clinic given. All equipment left with patient. Follow up scheduled.        For questions or concerns please contact:    Jyoti Arceo Clinical Research Coordinator  [phone]    HF CardioMEMS team    Hetal Kendall RN  HF   [phone]    Kenyetta Caro, RN  HF Clinic RN  [phone]    Caitlyn Mayes RN  HF Nurse Navigator  [phone]

## 2020-02-04 NOTE — PROCEDURES
Moderate sedation was used by me (Ksenia Velázquez MD).    Agents: Fentanyl and Versed via intravenous injection (please see media tab for details of dosage).    Start time: 09:44 AM.    Stop time: 10:47 AM.    Complications: none.    Ksenia Velázquez M.D.

## 2020-02-04 NOTE — OR NURSING
1105: Patient arrived from cath lab s/p right heart cath and cardiomems implatation with RN. Right femoral access site; clean, dry, and soft. Patient is alert and awake. Updated on POC.    1115: Patient tolerating PO intake.    1145: Spouse updated on POC via telephone.    1445: Patient up to ambulate with a steady gait upon completion of bedrest. Right femoral site remains clean, dry, and soft. Criteria met to discharge patient.    1500: Discharge paperwork reviewed with patient and spouse. Discussed activity, site care, worsening symptoms, and follow-up. Verbalized understanding. No further questions. PIV removed, tip intact.    1515: Patient escorted out in wheelchair with RN. Discharge instructions and personal belongings in possession of the patient. Copy of discharge instructions signed and placed in the chart. Patient discharged to home with spouse.

## 2020-02-06 ENCOUNTER — TELEPHONE (OUTPATIENT)
Dept: CARDIOLOGY | Facility: MEDICAL CENTER | Age: 80
End: 2020-02-06

## 2020-02-06 NOTE — TELEPHONE ENCOUNTER
Returned patient call. Pt took bandage off. Little bleeding. Not bleeding now. Little bit of bruise. No ache or pain. Little swelling. Not hard. Area is soft. Started Plavix yesterday. Pt encouraged to use gentle pressure for continued light bleeding, intermittent ice for 10 min intervals. ER/911 for heavy bleeding advised. Pt states understanding and appreciates call back.     Discussed with NORMA

## 2020-02-06 NOTE — TELEPHONE ENCOUNTER
CHERRIE    Pt's incision is bleeding a little bit from taking the gauze off, doesn't know if it's bc he pulled it off too hard. He'd like a call back for advice at [phone]

## 2020-02-07 ENCOUNTER — TELEPHONE (OUTPATIENT)
Dept: CARDIOLOGY | Facility: MEDICAL CENTER | Age: 80
End: 2020-02-07

## 2020-02-10 ENCOUNTER — OFFICE VISIT (OUTPATIENT)
Dept: CARDIOLOGY | Facility: MEDICAL CENTER | Age: 80
End: 2020-02-10
Payer: MEDICARE

## 2020-02-10 VITALS
BODY MASS INDEX: 25.49 KG/M2 | RESPIRATION RATE: 16 BRPM | SYSTOLIC BLOOD PRESSURE: 116 MMHG | DIASTOLIC BLOOD PRESSURE: 70 MMHG | HEART RATE: 74 BPM | OXYGEN SATURATION: 96 % | HEIGHT: 74 IN | WEIGHT: 198.6 LBS

## 2020-02-10 DIAGNOSIS — I25.10 CORONARY ARTERY DISEASE INVOLVING NATIVE CORONARY ARTERY OF NATIVE HEART WITHOUT ANGINA PECTORIS: ICD-10-CM

## 2020-02-10 DIAGNOSIS — I10 ESSENTIAL HYPERTENSION, BENIGN: ICD-10-CM

## 2020-02-10 DIAGNOSIS — Z00.6 RESEARCH STUDY PATIENT: ICD-10-CM

## 2020-02-10 DIAGNOSIS — R06.81 APNEA: ICD-10-CM

## 2020-02-10 DIAGNOSIS — I47.20 VENTRICULAR TACHYCARDIA (HCC): ICD-10-CM

## 2020-02-10 DIAGNOSIS — E78.2 MIXED HYPERLIPIDEMIA: ICD-10-CM

## 2020-02-10 DIAGNOSIS — I50.20 ACC/AHA STAGE C SYSTOLIC HEART FAILURE (HCC): ICD-10-CM

## 2020-02-10 DIAGNOSIS — I50.9 HEART FAILURE, NYHA CLASS 2 (HCC): ICD-10-CM

## 2020-02-10 DIAGNOSIS — Z95.810 AICD (AUTOMATIC CARDIOVERTER/DEFIBRILLATOR) PRESENT: ICD-10-CM

## 2020-02-10 DIAGNOSIS — Z79.899 HIGH RISK MEDICATION USE: ICD-10-CM

## 2020-02-10 DIAGNOSIS — I48.0 PAROXYSMAL ATRIAL FIBRILLATION (HCC): ICD-10-CM

## 2020-02-10 DIAGNOSIS — I42.0 DILATED CARDIOMYOPATHY (HCC): ICD-10-CM

## 2020-02-10 PROCEDURE — 99215 OFFICE O/P EST HI 40 MIN: CPT | Performed by: NURSE PRACTITIONER

## 2020-02-10 ASSESSMENT — ENCOUNTER SYMPTOMS
CLAUDICATION: 0
ABDOMINAL PAIN: 0
PND: 0
PALPITATIONS: 0
SHORTNESS OF BREATH: 1
MYALGIAS: 0
FEVER: 0
COUGH: 0
DIZZINESS: 1
ORTHOPNEA: 0

## 2020-02-10 NOTE — PROGRESS NOTES
"CC: \"Test\"    HPI:  Mr. Lencho Parker is a 79-year-old kindly referred by NORMA Ordonez for evaluation of obstructive sleep apnea syndrome.    Patient briefly describes his sleep problem as \"stop breathing.\"  He rates the severity of his problem as 5 out of 10.  He is never previously been evaluated for sleep.    He has a regular bed partner.  He goes to bed at 10 PM and gets up at 5 AM.  He estimates his sleep latency to be about 10 to 15 minutes.  He reports 2-3 episodes of nocturia and wakes up 2 or 3 times each night    Symptoms include tiredness during the day.  He also experiences shortness of breath when sleeping.  He is not known to snore.  He has no restless legs and denies parasomnia and narcolepsy symptoms..    He may fall asleep accidentally when reading a book.  His total [place] score is 1 out of 24.  His wife is noticed light snoring, pauses in breathing, and sleep talking.  His wife is noted the pauses in his breathing for 16 years.    Review of his sleep diary shows that he napped for of 6 days.  He awakened feeling \"okay\" each and every morning.    Significant comorbidities and modifying factors include systolic heart failure, AICD, atrial fibrillation status post watchman device,, cardiomyopathy, hypertension, abdominal aortic aneurysm status post stent, TIA, mixed hyperlipidemia, chronic kidney disease, solitary kidney, peripheral vascular disease, need for influenza vaccination, CardioMEMS, polycythemia, and former smoker.      Patient Active Problem List    Diagnosis Date Noted   • Cardiomyopathy (HCC) 10/28/2019     Priority: High   • Essential hypertension, benign 10/28/2019     Priority: High   • Mixed hyperlipidemia 10/28/2019     Priority: High   • Atrial fibrillation (HCC) 10/28/2019     Priority: High   • AICD (automatic cardioverter/defibrillator) present 10/28/2019     Priority: High   • Ventricular tachycardia (HCC) 10/28/2019     Priority: High   • COPD (chronic obstructive " pulmonary disease) ([facility]) 10/28/2019     Priority: Medium   • Renal disorder 10/28/2019     Priority: Medium   • High risk medication use 10/28/2019     Priority: Medium   • History of TIA (transient ischemic attack) 10/28/2019     Priority: Medium   • History of abdominal aortic aneurysm (AAA) 10/28/2019     Priority: Medium   • HARPAL (obstructive sleep apnea) 02/13/2020   • Former smoker 02/13/2020   • Need for influenza vaccination 02/13/2020   • ACC/AHA stage C systolic heart failure ([facility]) 02/04/2020   • Left ventricular systolic dysfunction, NYHA class 3 02/04/2020   • Research study patient 01/27/2020   • [facility] Billing 01/27/2020   • PVD (peripheral vascular disease) ([facility]) 12/26/2019       Past Medical History:   Diagnosis Date   • Breath shortness     on exertion; no c/o today; improving slightly   • Cardiomyopathy ([facility]) 08/2019    Echocardiogram with mildly dilated LV, mild concentric, LVEF 15%. Severely dilated LA. Trace MR, trace AI, mild TR. RVSP 26mmHg.   • Chronic obstructive pulmonary disease (HCC)    • Welsh measles    • Heart attack ([facility])    • History of abdominal aortic aneurysm (AAA) 2009    Status post stent   • Hyperlipidemia    • Hypertension    • Mumps    • Pacemaker    • Paroxysmal atrial fibrillation (HCC)     Status post Watchman procedure in [place].   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    • Stroke (HCC) 2012    TIA   • Tonsillitis    • Ventricular tachycardia (HCC)         Past Surgical History:   Procedure Laterality Date   • AICD BATTERY CHANGE Left 01/03/2017    Generator replacement with Medtronic Evera MRI XT  [id number] implanted in [place].   • OTHER CARDIAC SURGERY  2014    watchman device   • AAA WITH STENT GRAFT  2009   • OTHER ORTHOPEDIC SURGERY  2005    hip surgery   • OTHER  2000    kidney removed   • OTHER CARDIAC SURGERY  1991    AICD implanted       Family History   Problem Relation Age of Onset   • Cancer Mother    • Cancer Sister        Social  History     Socioeconomic History   • Marital status: Single     Spouse name: Not on file   • Number of children: Not on file   • Years of education: Not on file   • Highest education level: Not on file   Occupational History   • Not on file   Social Needs   • Financial resource strain: Not on file   • Food insecurity     Worry: Not on file     Inability: Not on file   • Transportation needs     Medical: Not on file     Non-medical: Not on file   Tobacco Use   • Smoking status: Former Smoker     Packs/day: 0.50     Years: 15.00     Pack years: 7.50     Types: Cigarettes     Last attempt to quit: 7/11/2004     Years since quitting: 15.6   • Smokeless tobacco: Never Used   Substance and Sexual Activity   • Alcohol use: Yes     Alcohol/week: 1.2 - 1.8 oz     Types: 2 - 3 Glasses of wine per week     Comment: 2 per day   • Drug use: No   • Sexual activity: Not on file   Lifestyle   • Physical activity     Days per week: Not on file     Minutes per session: Not on file   • Stress: Not on file   Relationships   • Social connections     Talks on phone: Not on file     Gets together: Not on file     Attends Bahai service: Not on file     Active member of club or organization: Not on file     Attends meetings of clubs or organizations: Not on file     Relationship status: Not on file   • Intimate partner violence     Fear of current or ex partner: Not on file     Emotionally abused: Not on file     Physically abused: Not on file     Forced sexual activity: Not on file   Other Topics Concern   • Not on file   Social History Narrative   • Not on file       Current Outpatient Medications   Medication Sig Dispense Refill   • zolpidem (AMBIEN) 5 MG Tab Take 1 Tab by mouth at bedtime as needed for Sleep (1 to 2 po qhs prn insomnia/sleep study. Bring to sleep study.) for up to 2 doses. 2 Tab 0   • clopidogrel (PLAVIX) 75 MG Tab Take 1 Tab by mouth every day. 30 Tab 2   • spironolactone (ALDACTONE) 25 MG Tab Take 0.5 Tabs by  "mouth every day. 45 Tab 3   • furosemide (LASIX) 20 MG Tab Take 20 mg by mouth as needed. Take Mon, Wed, Fri     • losartan (COZAAR) 25 MG Tab Take 1 Tab by mouth every day. 30 Tab 2   • vitamin D, Ergocalciferol, (DRISDOL) 55500 units Cap capsule Take 50,000 Units by mouth every 7 days. MONDAY     • aspirin EC (ECOTRIN) 81 MG Tablet Delayed Response Take 81 mg by mouth every day.     • amiodarone (CORDARONE) 200 MG Tab Take 200 mg by mouth every day.     • tamsulosin (FLOMAX) 0.4 MG capsule Take 0.8 mg by mouth ONE-HALF HOUR AFTER BREAKFAST.     • cinacalcet (SENSIPAR) 30 MG Tab Take 30 mg by mouth 3 times a week.     • carvedilol (COREG) 25 MG Tab Take 12.5-25 mg by mouth 2 times a day. Pt takes 25MG in AM and 12.5MG HS      • atorvastatin (LIPITOR) 20 MG Tab Take 20 mg by mouth every day.     • Omega-3 Fatty Acids (FISH OIL) 1000 MG Cap capsule Take 1,000 mg by mouth every day.     • multivitamin (THERAGRAN) Tab Take 1 Tab by mouth every day.     • albuterol 108 (90 Base) MCG/ACT Aero Soln inhalation aerosol Inhale 2 Puffs by mouth every 6 hours as needed for Shortness of Breath. 8.5 g 0     No current facility-administered medications for this visit.     \"CURRENT RX\"    ALLERGIES: Patient has no known allergies.    ROS    Constitutional: Denies fever, chills, sweats,  weight loss, fatigue.  Eyes: Denies vision loss, pain, drainage, double vision, glasses.  Ears/Nose/Mouth/Throat: Denies earache, difficulty hearing, rhinitis/nasal congestion, injury, recurrent sore throat, persistent hoarseness, decayed teeth/toothaches, ringing or buzzing in the ears.  Cardiovascular: Denies chest pain, tightness, palpitations, swelling in legs/feet, fainting, difficulty breathing when lying down but gets better when sitting up.   Respiratory: Positive for cough and shortness of breath  Sleep: per HPI  Gastrointestinal: Denies  difficulty swallowing, nausea, abdominal pain, diarrhea, constipation, heartburn.  Genitourinary: " "Denies  blood in urine, discharge, frequent urination.   Musculoskeletal: Denies painful joints, sore muscles, back pain.   Integumentary: Denies rashes, lumps, color changes.   Neurological: Denies frequent headaches,weakness, dizziness.    PHYSICAL EXAM  Normal weight    /74 (BP Location: Right arm, Patient Position: Sitting, BP Cuff Size: Adult)   Pulse 90   Resp 16   Ht 1.88 m (6' 2\")   Wt 88.9 kg (196 lb)   SpO2 96%   BMI 25.16 kg/m²   Appearance: Well-nourished, well-developed, no acute distress  Eyes:  PERRLA, EOMI  ENMT: without lesions, deformities;hearing grossly intact; tongue normal, posterior pharynx without erythema or exudate;   Modified Mallampati (AKA Hernandez) Score: 1  Neck: Supple, trachea midline, no masses  Respiratory effort:  No intercostal retractions or use of accessory muscles  Lung auscultation:  No wheezes rhonchi rubs or rales  Cardiac: No murmurs, rubs, or gallops; regular rhythm, normal rate; no edema  Abdomen:  No tenderness, no organomegaly.  Normal weight  Musculoskeletal:  Grossly normal; gait and station normal; digits and nails normal  Skin:  No rashes, petechiae, cyanosis  Neurologic: without focal signs; oriented to person, time, place, and purpose; judgement intact  Psychiatric:  No depression, anxiety, agitation        PROBLEMS:    1. HARPAL (obstructive sleep apnea)    - Polysomnography, 4 or More; Future  - zolpidem (AMBIEN) 5 MG Tab; Take 1 Tab by mouth at bedtime as needed for Sleep (1 to 2 po qhs prn insomnia/sleep study. Bring to sleep study.) for up to 2 doses.  Dispense: 2 Tab; Refill: 0    2. ACC/AHA stage C systolic heart failure (HCC)      3. AICD (automatic cardioverter/defibrillator) present      4. Paroxysmal atrial fibrillation (HCC)      5. Dilated cardiomyopathy (HCC)      6. Essential hypertension, benign      7. History of abdominal aortic aneurysm (AAA)      8. History of TIA (transient ischemic attack)      9. Former smoker      10. Need for " influenza vaccination        PLAN:   The patient has signs and symptoms consistent with obstructive sleep apnea hypopnea syndrome. Will schedule to have a nocturnal polysomnogram using zolpidem to assist with sleep onset and maintenance should the need arise. Will return after the results are available to determine further diagnostic needs and/or treatment options.    The risks of untreated sleep apnea were discussed with the patient at length. Patients with HARPAL are at increased risk of cardiovascular disease including coronary artery disease, systemic arterial hypertension, pulmonary arterial hypertension, cardiac arrythmias, and stroke. HARPAL patients have an increased risk of motor vehicle accidents, type 2 diabetes, chronic kidney disease, and non-alcoholic liver disease. The patient was advised to avoid driving a motor vehicle when drowsy.    Positive airway pressure, such as CPAP, is considered first-line and preferred therapy for sleep apnea and may reverse both symptoms and risks.    His 8/29/2019 echocardiogram showed left ventricular ejection fraction of only 15%.    Have advised the patient to follow up with the appropriate healthcare practitioners for all other medical problems and issues.      Return for after sleep study.

## 2020-02-10 NOTE — PROGRESS NOTES
Chief Complaint   Patient presents with   • Congestive Heart Failure       Subjective:   Lencho Parker is a 79 y.o. male who presents today for follow-up on his cardiomyopathy.    Patient of Dr. Martines and of the Heart Failure clinic.  He was last seen in clinic on 1/27/2020.  During that visit, no changes were made to his medical regimen, he was planning on getting a CardioMEMS implantation on 2/4/2020.    Patient reports that the procedure went fairly well.  He did have a spot of bleeding after he took off the bandage from his angiogram.  He has no other problems since.    For his symptoms, he continues to have some dyspnea with exertion and occasional dizziness, but denies chest pain, palpitations, orthopnea, PND or edema.    His home weights remain stable.    He is planning on seeing the kidney doctor in March.    He was diagnosed with cardiomyopathy in the 90s.    Patient was a mild social smoker for about 5 years    He drinks 2 to 3 glasses of wine per night.    He scheduled his sleep study appointment on 2/13/2020.    Additonally, patient has the following medical problems:    -Non-ischemic cardiomyopathy last EF in [place] was 20%    -Paroxysmal A. fib, has a watchman device placed approximately 10/2015    -Hx sustained slow VT, had RFA (9/2012) has an ICD    -Nonobstructive CAD per angiogram 4/09 and 05/2016 (per medical records)    -History of a AAA with repair about 10 years ago    -Has 1 solitary left kidney.  Gave 1 to son in the 90s, followed by nephrology    -History of TIA/CVA    -History Colon Cancer    Past Medical History:   Diagnosis Date   • Breath shortness     on exertion; no c/o today; improving slightly   • Cardiomyopathy (HCC) 08/2019    Echocardiogram with mildly dilated LV, mild concentric, LVEF 15%. Severely dilated LA. Trace MR, trace AI, mild TR. RVSP 26mmHg.   • Chronic obstructive pulmonary disease (HCC)    • History of abdominal aortic aneurysm (AAA) 2009    Status post  stent   • Hyperlipidemia    • Hypertension    • Pacemaker    • Paroxysmal atrial fibrillation (HCC)     Status post Watchman procedure in [place].   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    • Stroke (HCC) 2012    TIA   • Ventricular tachycardia (HCC)      Past Surgical History:   Procedure Laterality Date   • AICD BATTERY CHANGE Left 01/03/2017    Generator replacement with Medtronic Evera MRI XT  [id number] implanted in [place].   • OTHER CARDIAC SURGERY  2014    watchman device   • AAA WITH STENT GRAFT  2009   • OTHER ORTHOPEDIC SURGERY  2005    hip surgery   • OTHER  2000    kidney removed   • OTHER CARDIAC SURGERY  1991    AICD implanted     History reviewed. No pertinent family history.  Social History     Socioeconomic History   • Marital status: Single     Spouse name: Not on file   • Number of children: Not on file   • Years of education: Not on file   • Highest education level: Not on file   Occupational History   • Not on file   Social Needs   • Financial resource strain: Not on file   • Food insecurity:     Worry: Not on file     Inability: Not on file   • Transportation needs:     Medical: Not on file     Non-medical: Not on file   Tobacco Use   • Smoking status: Former Smoker     Packs/day: 0.50     Years: 15.00     Pack years: 7.50     Types: Cigarettes     Last attempt to quit: 7/11/2004     Years since quitting: 15.5   • Smokeless tobacco: Never Used   Substance and Sexual Activity   • Alcohol use: Yes     Alcohol/week: 1.2 - 1.8 oz     Types: 2 - 3 Glasses of wine per week     Comment: 2 per day   • Drug use: No   • Sexual activity: Not on file   Lifestyle   • Physical activity:     Days per week: Not on file     Minutes per session: Not on file   • Stress: Not on file   Relationships   • Social connections:     Talks on phone: Not on file     Gets together: Not on file     Attends Quaker service: Not on file     Active member of club or organization: Not on file     Attends meetings of clubs or  organizations: Not on file     Relationship status: Not on file   • Intimate partner violence:     Fear of current or ex partner: Not on file     Emotionally abused: Not on file     Physically abused: Not on file     Forced sexual activity: Not on file   Other Topics Concern   • Not on file   Social History Narrative   • Not on file     No Known Allergies  Outpatient Encounter Medications as of 2/10/2020   Medication Sig Dispense Refill   • clopidogrel (PLAVIX) 75 MG Tab Take 1 Tab by mouth every day. 30 Tab 2   • spironolactone (ALDACTONE) 25 MG Tab Take 0.5 Tabs by mouth every day. 45 Tab 3   • furosemide (LASIX) 20 MG Tab Take 20 mg by mouth as needed. Take Mon, Wed, Fri     • losartan (COZAAR) 25 MG Tab Take 1 Tab by mouth every day. 30 Tab 2   • vitamin D, Ergocalciferol, (DRISDOL) 12898 units Cap capsule Take 50,000 Units by mouth every 7 days. MONDAY     • aspirin EC (ECOTRIN) 81 MG Tablet Delayed Response Take 81 mg by mouth every day.     • amiodarone (CORDARONE) 200 MG Tab Take 200 mg by mouth every day.     • tamsulosin (FLOMAX) 0.4 MG capsule Take 0.8 mg by mouth ONE-HALF HOUR AFTER BREAKFAST.     • cinacalcet (SENSIPAR) 30 MG Tab Take 30 mg by mouth 3 times a week.     • carvedilol (COREG) 25 MG Tab Take 12.5-25 mg by mouth 2 times a day. Pt takes 25MG in AM and 12.5MG HS      • atorvastatin (LIPITOR) 20 MG Tab Take 20 mg by mouth every day.     • Omega-3 Fatty Acids (FISH OIL) 1000 MG Cap capsule Take 1,000 mg by mouth every day.     • multivitamin (THERAGRAN) Tab Take 1 Tab by mouth every day.     • albuterol 108 (90 Base) MCG/ACT Aero Soln inhalation aerosol Inhale 2 Puffs by mouth every 6 hours as needed for Shortness of Breath. 8.5 g 0     No facility-administered encounter medications on file as of 2/10/2020.      Review of Systems   Constitutional: Negative for fever and malaise/fatigue.   Respiratory: Positive for shortness of breath. Negative for cough.    Cardiovascular: Negative for chest  "pain, palpitations, orthopnea, claudication, leg swelling and PND.   Gastrointestinal: Negative for abdominal pain.   Musculoskeletal: Negative for myalgias.   Neurological: Positive for dizziness.   All other systems reviewed and are negative.       Objective:   /70 (BP Location: Right arm, Patient Position: Sitting, BP Cuff Size: Adult)   Pulse 74   Resp 16   Ht 1.88 m (6' 2\")   Wt 90.1 kg (198 lb 9.6 oz)   SpO2 96%   BMI 25.50 kg/m²     Physical Exam   Constitutional: He is oriented to person, place, and time. He appears well-developed and well-nourished.   HENT:   Head: Normocephalic and atraumatic.   Eyes: Pupils are equal, round, and reactive to light. EOM are normal.   Neck: Normal range of motion. Neck supple. No JVD present.   Cardiovascular: Normal rate, regular rhythm and normal heart sounds.   Pulmonary/Chest: Effort normal and breath sounds normal. No respiratory distress. He has no wheezes. He has no rales.   Left chest ICD-no erosion, drainage or Erythema   Abdominal: Soft. Bowel sounds are normal.   Musculoskeletal:         General: No edema.   Neurological: He is alert and oriented to person, place, and time.   Skin: Skin is warm and dry.   Right groin site, soft, no hematoma, erosion or drainage.  Patient does have tenderness with palpation.   Psychiatric: He has a normal mood and affect. His behavior is normal.   Vitals reviewed.    Lab Results   Component Value Date/Time    CHOLSTRLTOT 192 07/17/2019 08:37 AM     (H) 07/17/2019 08:37 AM    HDL 61 07/17/2019 08:37 AM    TRIGLYCERIDE 89 07/17/2019 08:37 AM       Lab Results   Component Value Date/Time    SODIUM 139 01/31/2020 10:41 AM    POTASSIUM 4.9 01/31/2020 10:41 AM    CHLORIDE 107 01/31/2020 10:41 AM    CO2 20 01/31/2020 10:41 AM    GLUCOSE 123 (H) 01/31/2020 10:41 AM    BUN 34 (H) 01/31/2020 10:41 AM    CREATININE 2.01 (H) 01/31/2020 10:41 AM     Lab Results   Component Value Date/Time    ALKPHOSPHAT 68 01/31/2020 10:41 AM "    ASTSGOT 32 01/31/2020 10:41 AM    ALTSGPT 41 01/31/2020 10:41 AM    TBILIRUBIN 1.0 01/31/2020 10:41 AM      Transthoracic Echo Report 8/29/2019  No prior study is available for comparison.   Severely reduced left ventricular systolic function. Left ventricular   ejection fraction is visually estimated to be 15%.  Indeterminate diastolic function.  Estimated right ventricular systolic pressure  is 26 mmHg + JVP.  Ascending aorta diameter is 4 cm.    CardioMEMS implantation 2/4/2020  Hemodynamics:  1) Pulmonary arterial pressure: Systolic of 35 mm Hg, diastolic of 17 mm Hg, mean of 24 mm Hg.  2) Pulmonary arterial wedge pressure with mean of 15 mm Hg.  3) Cardiac output of 2.9 and Cardiac index of 1.3 through thermodilution method.  4) Right ventricular pressure: Systolic of 30 mm Hg, end diastolic pressure of 5 mm Hg.  5) Right atrial pressure: A wave of 3 mm Hg, V wave of 5 mm Hg, mean of 3 mm Hg.     Conclusions:  1) Successful implantation of Cardiomems device for remote monitor of intracardiac pressures.  2) Patient will be monitored as part of our protocol in our heart failure program to further reduce repeated heart failure hospitalization and also to improve overall quality of life.        Assessment:     1. ACC/AHA stage C systolic heart failure (HCC)  Basic Metabolic Panel   2. High risk medication use  Basic Metabolic Panel   3. Heart failure, NYHA class 2 (HCC)     4. Research study patient     5. AICD (automatic cardioverter/defibrillator) present     6. Paroxysmal atrial fibrillation (HCC)     7. Dilated cardiomyopathy (HCC)     8. Mixed hyperlipidemia     9. Coronary artery disease involving native coronary artery of native heart without angina pectoris     10. Ventricular tachycardia (HCC)     11. Essential hypertension, benign     12. Apnea         Medical Decision Making:  Today's Assessment / Status / Plan:   1.  HFrEF, Stage C, Class 2-3, LVEF 15%: Based on physical examination findings, patient  is euvolemic. No JVD, lungs are clear to auscultation, no pitting edema in bilateral lower extremities, no ascites.  -Discussed results of his right heart cath, cardiac index, cardiac output  -Had CardioMEMS implantation on 2/4/2020, he is a part of the single arm of the Guide HF study.  Continue regular readings daily  -Continue clopidogrel 75 mg daily for 30 days after implantation  -Continue losartan 25 mg daily (reintroduced patient is switching back to Entresto, patient is not interested at this time, and states does not like the medications and will not try it again)  -Continue spironolactone 12.5 mg daily (may need to discontinue if kidney function not better with next lab test)  -Continue carvedilol 25 mg in a.m. and 12.5 mg in p.m.  -Continue furosemide on Mondays, Wednesday, Fridays  -Patient has a solitary kidney  -Obtain repeat BMP in the next couple of weeks  -Has ICD for history of VT, but denies any current shocks  -Reinforced s/sx of worsening heart failure with patient and weight monitoring. Pt verbalizes understanding. Pt to call office or RTC if present.  Has upcoming schedule  -Discussed prognosis of his heart failure, currently euvolemic, may need to consider advanced heart failure therapies in the future such as LVAD.      2.  Paroxysmal A. Fib, has watchman:   -No episodes on last device check  -Continue aspirin 81 mg daily  -Continue amiodarone 200 mg daily     3.  Hyperlipidemia/nonobstructive CAD: Last  on 7/17/2019  -Continue aspirin 81 mg daily  -Continue atorvastatin 20 mg daily  -will recheck lipid in the future    4.  Hypertension: Stable   -Continue recommendations per above    5.  Apnea in sleep:  -Referred patient for sleep study, appt on 2/13/2019    FU in clinic in 3 weeks with Dr. Martines, back in the Heart failure clinic in 3 months with Dr. Velázquez. Sooner if needed.    Patient verbalizes understanding and agrees with the plan of care.     Collaborating MD: Axel  MD Hebert

## 2020-02-13 ENCOUNTER — SLEEP CENTER VISIT (OUTPATIENT)
Dept: SLEEP MEDICINE | Facility: MEDICAL CENTER | Age: 80
End: 2020-02-13
Payer: MEDICARE

## 2020-02-13 VITALS
BODY MASS INDEX: 25.15 KG/M2 | HEART RATE: 90 BPM | WEIGHT: 196 LBS | HEIGHT: 74 IN | DIASTOLIC BLOOD PRESSURE: 74 MMHG | SYSTOLIC BLOOD PRESSURE: 102 MMHG | RESPIRATION RATE: 16 BRPM | OXYGEN SATURATION: 96 %

## 2020-02-13 DIAGNOSIS — Z86.79 HISTORY OF ABDOMINAL AORTIC ANEURYSM (AAA): ICD-10-CM

## 2020-02-13 DIAGNOSIS — I48.0 PAROXYSMAL ATRIAL FIBRILLATION (HCC): ICD-10-CM

## 2020-02-13 DIAGNOSIS — I42.0 DILATED CARDIOMYOPATHY (HCC): ICD-10-CM

## 2020-02-13 DIAGNOSIS — Z23 NEED FOR INFLUENZA VACCINATION: ICD-10-CM

## 2020-02-13 DIAGNOSIS — I10 ESSENTIAL HYPERTENSION, BENIGN: ICD-10-CM

## 2020-02-13 DIAGNOSIS — Z95.810 AICD (AUTOMATIC CARDIOVERTER/DEFIBRILLATOR) PRESENT: ICD-10-CM

## 2020-02-13 DIAGNOSIS — I50.20 ACC/AHA STAGE C SYSTOLIC HEART FAILURE (HCC): ICD-10-CM

## 2020-02-13 DIAGNOSIS — G47.33 OSA (OBSTRUCTIVE SLEEP APNEA): ICD-10-CM

## 2020-02-13 DIAGNOSIS — Z86.73 HISTORY OF TIA (TRANSIENT ISCHEMIC ATTACK): ICD-10-CM

## 2020-02-13 DIAGNOSIS — Z87.891 FORMER SMOKER: ICD-10-CM

## 2020-02-13 PROCEDURE — 99204 OFFICE O/P NEW MOD 45 MIN: CPT | Performed by: INTERNAL MEDICINE

## 2020-02-13 RX ORDER — ZOLPIDEM TARTRATE 5 MG/1
5 TABLET ORAL NIGHTLY PRN
Qty: 2 TAB | Refills: 0 | Status: SHIPPED | OUTPATIENT
Start: 2020-02-13 | End: 2020-03-13

## 2020-02-19 ENCOUNTER — HOSPITAL ENCOUNTER (OUTPATIENT)
Dept: LAB | Facility: MEDICAL CENTER | Age: 80
End: 2020-02-19
Attending: NURSE PRACTITIONER
Payer: MEDICARE

## 2020-02-19 LAB
25(OH)D3 SERPL-MCNC: 67 NG/ML (ref 30–100)
ALBUMIN SERPL BCP-MCNC: 4.2 G/DL (ref 3.2–4.9)
APPEARANCE UR: CLEAR
BASOPHILS # BLD AUTO: 0.5 % (ref 0–1.8)
BASOPHILS # BLD: 0.02 K/UL (ref 0–0.12)
BILIRUB UR QL STRIP.AUTO: NEGATIVE
BUN SERPL-MCNC: 26 MG/DL (ref 8–22)
CALCIUM SERPL-MCNC: 9.3 MG/DL (ref 8.5–10.5)
CHLORIDE SERPL-SCNC: 107 MMOL/L (ref 96–112)
CO2 SERPL-SCNC: 20 MMOL/L (ref 20–33)
COLOR UR: YELLOW
CREAT SERPL-MCNC: 1.81 MG/DL (ref 0.5–1.4)
CREAT UR-MCNC: 114.2 MG/DL
EOSINOPHIL # BLD AUTO: 0.15 K/UL (ref 0–0.51)
EOSINOPHIL NFR BLD: 3.7 % (ref 0–6.9)
ERYTHROCYTE [DISTWIDTH] IN BLOOD BY AUTOMATED COUNT: 50.9 FL (ref 35.9–50)
GLUCOSE SERPL-MCNC: 131 MG/DL (ref 65–99)
GLUCOSE UR STRIP.AUTO-MCNC: NEGATIVE MG/DL
HCT VFR BLD AUTO: 49.8 % (ref 42–52)
HGB BLD-MCNC: 16.5 G/DL (ref 14–18)
IMM GRANULOCYTES # BLD AUTO: 0.01 K/UL (ref 0–0.11)
IMM GRANULOCYTES NFR BLD AUTO: 0.2 % (ref 0–0.9)
KETONES UR STRIP.AUTO-MCNC: NEGATIVE MG/DL
LEUKOCYTE ESTERASE UR QL STRIP.AUTO: NEGATIVE
LYMPHOCYTES # BLD AUTO: 1.49 K/UL (ref 1–4.8)
LYMPHOCYTES NFR BLD: 37.1 % (ref 22–41)
MAGNESIUM SERPL-MCNC: 2.2 MG/DL (ref 1.5–2.5)
MCH RBC QN AUTO: 33.2 PG (ref 27–33)
MCHC RBC AUTO-ENTMCNC: 33.1 G/DL (ref 33.7–35.3)
MCV RBC AUTO: 100.2 FL (ref 81.4–97.8)
MICRO URNS: NORMAL
MONOCYTES # BLD AUTO: 0.42 K/UL (ref 0–0.85)
MONOCYTES NFR BLD AUTO: 10.4 % (ref 0–13.4)
NEUTROPHILS # BLD AUTO: 1.93 K/UL (ref 1.82–7.42)
NEUTROPHILS NFR BLD: 48.1 % (ref 44–72)
NITRITE UR QL STRIP.AUTO: NEGATIVE
NRBC # BLD AUTO: 0 K/UL
NRBC BLD-RTO: 0 /100 WBC
PH UR STRIP.AUTO: 6 [PH] (ref 5–8)
PHOSPHATE SERPL-MCNC: 3.7 MG/DL (ref 2.5–4.5)
PLATELET # BLD AUTO: 168 K/UL (ref 164–446)
PMV BLD AUTO: 11.9 FL (ref 9–12.9)
POTASSIUM SERPL-SCNC: 4.5 MMOL/L (ref 3.6–5.5)
PROT UR QL STRIP: NEGATIVE MG/DL
PROT UR-MCNC: 14.1 MG/DL (ref 0–15)
PROT/CREAT UR: 123 MG/G (ref 15–68)
PTH-INTACT SERPL-MCNC: 158.8 PG/ML (ref 14–72)
RBC # BLD AUTO: 4.97 M/UL (ref 4.7–6.1)
RBC UR QL AUTO: NEGATIVE
SODIUM SERPL-SCNC: 136 MMOL/L (ref 135–145)
SP GR UR STRIP.AUTO: 1.02
URATE SERPL-MCNC: 8.1 MG/DL (ref 2.5–8.3)
UROBILINOGEN UR STRIP.AUTO-MCNC: 1 MG/DL
WBC # BLD AUTO: 4 K/UL (ref 4.8–10.8)

## 2020-02-19 PROCEDURE — 82306 VITAMIN D 25 HYDROXY: CPT

## 2020-02-19 PROCEDURE — 81003 URINALYSIS AUTO W/O SCOPE: CPT

## 2020-02-19 PROCEDURE — 85025 COMPLETE CBC W/AUTO DIFF WBC: CPT

## 2020-02-19 PROCEDURE — 83970 ASSAY OF PARATHORMONE: CPT

## 2020-02-19 PROCEDURE — 80069 RENAL FUNCTION PANEL: CPT

## 2020-02-19 PROCEDURE — 83735 ASSAY OF MAGNESIUM: CPT

## 2020-02-19 PROCEDURE — 84550 ASSAY OF BLOOD/URIC ACID: CPT

## 2020-02-19 PROCEDURE — 36415 COLL VENOUS BLD VENIPUNCTURE: CPT

## 2020-02-28 ENCOUNTER — OFFICE VISIT (OUTPATIENT)
Dept: CARDIOLOGY | Facility: MEDICAL CENTER | Age: 80
End: 2020-02-28
Payer: MEDICARE

## 2020-02-28 VITALS
WEIGHT: 194.6 LBS | SYSTOLIC BLOOD PRESSURE: 112 MMHG | HEIGHT: 74 IN | BODY MASS INDEX: 24.97 KG/M2 | HEART RATE: 74 BPM | OXYGEN SATURATION: 96 % | DIASTOLIC BLOOD PRESSURE: 60 MMHG

## 2020-02-28 DIAGNOSIS — I50.20 ACC/AHA STAGE C SYSTOLIC HEART FAILURE (HCC): ICD-10-CM

## 2020-02-28 DIAGNOSIS — E78.2 MIXED HYPERLIPIDEMIA: ICD-10-CM

## 2020-02-28 DIAGNOSIS — Z79.899 ENCOUNTER FOR LONG-TERM (CURRENT) USE OF HIGH-RISK MEDICATION: ICD-10-CM

## 2020-02-28 DIAGNOSIS — I10 ESSENTIAL HYPERTENSION, BENIGN: ICD-10-CM

## 2020-02-28 DIAGNOSIS — I48.0 PAROXYSMAL ATRIAL FIBRILLATION (HCC): ICD-10-CM

## 2020-02-28 DIAGNOSIS — I47.20 VENTRICULAR TACHYCARDIA (HCC): ICD-10-CM

## 2020-02-28 PROCEDURE — 99215 OFFICE O/P EST HI 40 MIN: CPT | Mod: 25 | Performed by: INTERNAL MEDICINE

## 2020-02-28 PROCEDURE — 99358 PROLONG SERVICE W/O CONTACT: CPT | Performed by: INTERNAL MEDICINE

## 2020-02-28 ASSESSMENT — ENCOUNTER SYMPTOMS
ORTHOPNEA: 0
DEPRESSION: 0
LOSS OF CONSCIOUSNESS: 0
PALPITATIONS: 0
DIZZINESS: 1
FALLS: 0
ABDOMINAL PAIN: 0
PND: 0
SHORTNESS OF BREATH: 0

## 2020-02-28 NOTE — LETTER
[facility]   [address]  [place] [address]  Phone: [phone]  Fax: [phone]              Lencho Parker  1940    Encounter Date: 2/28/2020    Becka Martines M.D.          PROGRESS NOTE:  Chief Complaint   Patient presents with   • Cardiomyopathy (Non-ischemic)   • Atrial Fibrillation   • HTN (Controlled)   • CHF (Systolic)       Subjective:   Lencho Parker is a 79-year-old male presenting for follow-up on cardiomyopathy.    Pertinent history:  - Nonischemic cardiomyopathy, coronary angiogram in 2009 showed nonobstructive coronary artery disease.  Last coronary angiogram in 2016 showing nonobstructive disease.  - Status post ICD for primary prevention in 2010.  Patient previously had a pacemaker  - Paroxysmal atrial fibrillation status post watchman device in October 2015  - History of ventricular tachycardia status post ablation in 2012  - PFO  - Hypertension  - Hyperlipidemia  - History of severe anemia of unclear etiology, 2015  - History of AAA status post stent approximately 10 years ago  - Chronic kidney disease, solitary kidney (patient donated a kidney to his son)   - History of TIA/CVA, diagnosed on brain imaging.  Patient denies having any neurologic deficits.  - History of right MCA bifurcation aneurysm, patient previously evaluated by neurology and thought to be low risk for bleeding with anticoagulation (per previous cardiology records)        After his last visit, he was referred to the heart failure clinic by NORMA Miranda.  He is now status post CardioMEMS on 2/4/2020.  Patient tolerated the procedure well.  He denies having any heart failure symptoms.  He has been compliant with his medications and salt restriction.    He has been doing well overall since his last visit.  He reports his blood pressures have been at goal.  He occasionally gets dizzy if he stands up too fast but denies any syncopal episodes.    For his  hyperlipidemia continues to be on Lipitor which he is tolerating well.    In regard to his atrial fibrillation, he denies any palpitations.  Continues to be on amiodarone.    Past Medical History:   Diagnosis Date   • Breath shortness     on exertion; no c/o today; improving slightly   • Cardiomyopathy (HCC) 08/2019    Echocardiogram with mildly dilated LV, mild concentric, LVEF 15%. Severely dilated LA. Trace MR, trace AI, mild TR. RVSP 26mmHg.   • Chronic obstructive pulmonary disease (HCC)    • [place] measles    • Heart attack (HCC)    • History of abdominal aortic aneurysm (AAA) 2009    Status post stent   • Hyperlipidemia    • Hypertension    • Mumps    • Pacemaker    • Paroxysmal atrial fibrillation (HCC)     Status post Watchman procedure in [place].   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    • Stroke (HCC) 2012    TIA   • Tonsillitis    • Ventricular tachycardia (HCC)      Past Surgical History:   Procedure Laterality Date   • AICD BATTERY CHANGE Left 01/03/2017    Generator replacement with Medtronic Evera MRI XT  [id number] implanted in [place].   • OTHER CARDIAC SURGERY  2014    watchman device   • AAA WITH STENT GRAFT  2009   • OTHER ORTHOPEDIC SURGERY  2005    hip surgery   • OTHER  2000    kidney removed   • OTHER CARDIAC SURGERY  1991    AICD implanted     Family History   Problem Relation Age of Onset   • Cancer Mother    • Cancer Sister      Social History     Socioeconomic History   • Marital status: Single     Spouse name: Not on file   • Number of children: Not on file   • Years of education: Not on file   • Highest education level: Not on file   Occupational History   • Not on file   Social Needs   • Financial resource strain: Not on file   • Food insecurity     Worry: Not on file     Inability: Not on file   • Transportation needs     Medical: Not on file     Non-medical: Not on file   Tobacco Use   • Smoking status: Former Smoker     Packs/day: 0.50     Years: 15.00     Pack years: 7.50      Types: Cigarettes     Last attempt to quit: 7/11/2004     Years since quitting: 15.6   • Smokeless tobacco: Never Used   Substance and Sexual Activity   • Alcohol use: Yes     Alcohol/week: 1.2 - 1.8 oz     Types: 2 - 3 Glasses of wine per week     Comment: 2 per day   • Drug use: No   • Sexual activity: Not on file   Lifestyle   • Physical activity     Days per week: Not on file     Minutes per session: Not on file   • Stress: Not on file   Relationships   • Social connections     Talks on phone: Not on file     Gets together: Not on file     Attends [employer] service: Not on file     Active member of club or organization: Not on file     Attends meetings of clubs or organizations: Not on file     Relationship status: Not on file   • Intimate partner violence     Fear of current or ex partner: Not on file     Emotionally abused: Not on file     Physically abused: Not on file     Forced sexual activity: Not on file   Other Topics Concern   • Not on file   Social History Narrative   • Not on file     No Known Allergies  Outpatient Encounter Medications as of 2/28/2020   Medication Sig Dispense Refill   • zolpidem (AMBIEN) 5 MG Tab Take 1 Tab by mouth at bedtime as needed for Sleep (1 to 2 po qhs prn insomnia/sleep study. Bring to sleep study.) for up to 2 doses. 2 Tab 0   • clopidogrel (PLAVIX) 75 MG Tab Take 1 Tab by mouth every day. 30 Tab 2   • spironolactone (ALDACTONE) 25 MG Tab Take 0.5 Tabs by mouth every day. 45 Tab 3   • furosemide (LASIX) 20 MG Tab Take 20 mg by mouth as needed. Take Mon, Wed, Fri     • losartan (COZAAR) 25 MG Tab Take 1 Tab by mouth every day. 30 Tab 2   • vitamin D, Ergocalciferol, (DRISDOL) 32564 units Cap capsule Take 50,000 Units by mouth every 7 days. MONDAY     • aspirin EC (ECOTRIN) 81 MG Tablet Delayed Response Take 81 mg by mouth every day.     • amiodarone (CORDARONE) 200 MG Tab Take 200 mg by mouth every day.     • tamsulosin (FLOMAX) 0.4 MG capsule Take 0.8 mg by mouth ONE-HALF  "HOUR AFTER BREAKFAST.     • cinacalcet (SENSIPAR) 30 MG Tab Take 30 mg by mouth 3 times a week.     • carvedilol (COREG) 25 MG Tab Take 12.5-25 mg by mouth 2 times a day. Pt takes 25MG in AM and 12.5MG HS      • atorvastatin (LIPITOR) 20 MG Tab Take 20 mg by mouth every day.     • Omega-3 Fatty Acids (FISH OIL) 1000 MG Cap capsule Take 1,000 mg by mouth every day.     • multivitamin (THERAGRAN) Tab Take 1 Tab by mouth every day.     • albuterol 108 (90 Base) MCG/ACT Aero Soln inhalation aerosol Inhale 2 Puffs by mouth every 6 hours as needed for Shortness of Breath. 8.5 g 0     No facility-administered encounter medications on file as of 2/28/2020.      Review of Systems   Constitutional: Negative for malaise/fatigue.   Respiratory: Negative for shortness of breath.    Cardiovascular: Negative for chest pain, palpitations, orthopnea, leg swelling and PND.   Gastrointestinal: Negative for abdominal pain.   Musculoskeletal: Negative for falls.   Neurological: Positive for dizziness. Negative for loss of consciousness.   Psychiatric/Behavioral: Negative for depression.   All other systems reviewed and are negative.       Objective:   /60 (BP Location: Left arm, Patient Position: Sitting, BP Cuff Size: Adult)   Pulse 74   Ht 1.88 m (6' 2\")   Wt 88.3 kg (194 lb 9.6 oz)   SpO2 96%   BMI 24.99 kg/m²      Physical Exam   Constitutional: He is oriented to person, place, and time. He appears well-developed and well-nourished. No distress.   HENT:   Head: Normocephalic and atraumatic.   Eyes: Conjunctivae are normal. No scleral icterus.   Neck: Normal range of motion. Neck supple.   Cardiovascular: Normal rate, regular rhythm and normal heart sounds. Exam reveals no gallop and no friction rub.   No murmur heard.  Pulmonary/Chest: Effort normal and breath sounds normal. No respiratory distress. He has no wheezes. He has no rales.   Abdominal: Soft. He exhibits no distension. There is no abdominal tenderness.   "   Musculoskeletal:         General: No edema.   Neurological: He is alert and oriented to person, place, and time.   Skin: Skin is warm and dry. He is not diaphoretic.   Psychiatric: He has a normal mood and affect. His behavior is normal.   Nursing note and vitals reviewed.    Echocardiogram performed August 2019 was personally reviewed and per my interpretation showed severely reduced LV systolic function with EF of 15%.  RVSP 26 mmHg plus JVP.  Indeterminate diastolic function.    Labs performed February 2020 were reviewed and showed normal hemoglobin.  Creatinine 1.8 (baseline creatinine between 1.4-2.0)    Right heart catheterization performed in February 2020 showed the following hemodynamics:  1) Pulmonary arterial pressure: Systolic of 35 mm Hg, diastolic of 17 mm Hg, mean of 24 mm Hg.  2) Pulmonary arterial wedge pressure with mean of 15 mm Hg.  3) Cardiac output of 2.9 and Cardiac index of 1.3 through thermodilution method.  4) Right ventricular pressure: Systolic of 30 mm Hg, end diastolic pressure of 5 mm Hg.  5) Right atrial pressure: A wave of 3 mm Hg, V wave of 5 mm Hg, mean of 3 mm Hg.     Assessment:     1. ACC/AHA stage C systolic heart failure (HCC)     2. Ventricular tachycardia (HCC)     3. Paroxysmal atrial fibrillation (HCC)     4. Mixed hyperlipidemia     5. Essential hypertension, benign     6. Encounter for long-term (current) use of high-risk medication         Medical Decision Making:  Today's Assessment / Status / Plan:     Cardiomyopathy:  Hypertension:  Dizziness:  Patient is euvolemic on exam.  NYHA class I-II.  He is status post CardioMEMS.  His blood pressure is at goal and his dizziness has improved from before.    Continue losartan, carvedilol and Aldactone at current dose.  Continue Lasix as needed.  Continue Plavix for 30 days post CardioMEMS implantation      Paroxysmal atrial fibrillation:  Ventricular tachycardia:  Status post post ICD:    He is status post watchman device  and has undergone ablation for ventricular tachycardia.  He has had no arrhythmias noted on his ICD interrogation.  Continue amiodarone at current dose.  His LFTs and thyroid function testing has been checked in the past 6 months and is normal.  Continue carvedilol.  No anticoagulation since he is status post watchman device and has a history of severe anemia of unclear etiology.  Continue aspirin.  His repeat ICD interrogation is scheduled for next week.    Hyperlipidemia: Continue Lipitor at current dose.    32 minutes of non-face-to-face time were spent reviewing the patient's extensive chart since his last visit with me along with records from his previous cardiologist as noted above.  Start and stop time: 9:54 AM to 10:06 AM, 10:21 AM to 10:31 AM, 5:29 PM to 5:40 PM.       Return to clinic in 9 mos or earlier if needed.    Thank you for allowing me to participate in the care of this patient. Please do not hesitate to contact me with any questions.    Becka Martines MD  Cardiologist  [facility]      PLEASE NOTE: This dictation was created using voice recognition software.           Brennan Castorena M.D.  [address]  [place] [address]  VIA Mail

## 2020-02-28 NOTE — PROGRESS NOTES
Chief Complaint   Patient presents with   • Cardiomyopathy (Non-ischemic)   • Atrial Fibrillation   • HTN (Controlled)   • CHF (Systolic)       Subjective:   Lencho Parker is a 79-year-old male presenting for follow-up on cardiomyopathy.    Pertinent history:  - Nonischemic cardiomyopathy, coronary angiogram in 2009 showed nonobstructive coronary artery disease.  Last coronary angiogram in 2016 showing nonobstructive disease.  - Status post ICD for primary prevention in 2010.  Patient previously had a pacemaker  - Paroxysmal atrial fibrillation status post watchman device in October 2015  - History of ventricular tachycardia status post ablation in 2012  - PFO  - Hypertension  - Hyperlipidemia  - History of severe anemia of unclear etiology, 2015  - History of AAA status post stent approximately 10 years ago  - Chronic kidney disease, solitary kidney (patient donated a kidney to his son)   - History of TIA/CVA, diagnosed on brain imaging.  Patient denies having any neurologic deficits.  - History of right MCA bifurcation aneurysm, patient previously evaluated by neurology and thought to be low risk for bleeding with anticoagulation (per previous cardiology records)        After his last visit, he was referred to the heart failure clinic by NORMA Miranda.  He is now status post CardioMEMS on 2/4/2020.  Patient tolerated the procedure well.  He denies having any heart failure symptoms.  He has been compliant with his medications and salt restriction.    He has been doing well overall since his last visit.  He reports his blood pressures have been at goal.  He occasionally gets dizzy if he stands up too fast but denies any syncopal episodes.    For his hyperlipidemia continues to be on Lipitor which he is tolerating well.    In regard to his atrial fibrillation, he denies any palpitations.  Continues to be on amiodarone.    Past Medical History:   Diagnosis Date   • Breath shortness     on exertion; no c/o today;  improving slightly   • Cardiomyopathy (HCC) 08/2019    Echocardiogram with mildly dilated LV, mild concentric, LVEF 15%. Severely dilated LA. Trace MR, trace AI, mild TR. RVSP 26mmHg.   • Chronic obstructive pulmonary disease (HCC)    • Maced[place] measles    • Heart attack (HCC)    • History of abdominal aortic aneurysm (AAA) 2009    Status post stent   • Hyperlipidemia    • Hypertension    • Mumps    • Pacemaker    • Paroxysmal atrial fibrillation (HCC)     Status post Watchman procedure in [place].   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    • Stroke (HCC) 2012    TIA   • Tonsillitis    • Ventricular tachycardia (HCC)      Past Surgical History:   Procedure Laterality Date   • AICD BATTERY CHANGE Left 01/03/2017    Generator replacement with Medtronic Evera MRI XT  [id number] implanted in [place].   • OTHER CARDIAC SURGERY  2014    watchman device   • AAA WITH STENT GRAFT  2009   • OTHER ORTHOPEDIC SURGERY  2005    hip surgery   • OTHER  2000    kidney removed   • OTHER CARDIAC SURGERY  1991    AICD implanted     Family History   Problem Relation Age of Onset   • Cancer Mother    • Cancer Sister      Social History     Socioeconomic History   • Marital status: Single     Spouse name: Not on file   • Number of children: Not on file   • Years of education: Not on file   • Highest education level: Not on file   Occupational History   • Not on file   Social Needs   • Financial resource strain: Not on file   • Food insecurity     Worry: Not on file     Inability: Not on file   • Transportation needs     Medical: Not on file     Non-medical: Not on file   Tobacco Use   • Smoking status: Former Smoker     Packs/day: 0.50     Years: 15.00     Pack years: 7.50     Types: Cigarettes     Last attempt to quit: 7/11/2004     Years since quitting: 15.6   • Smokeless tobacco: Never Used   Substance and Sexual Activity   • Alcohol use: Yes     Alcohol/week: 1.2 - 1.8 oz     Types: 2 - 3 Glasses of wine per week     Comment: 2 per day    • Drug use: No   • Sexual activity: Not on file   Lifestyle   • Physical activity     Days per week: Not on file     Minutes per session: Not on file   • Stress: Not on file   Relationships   • Social connections     Talks on phone: Not on file     Gets together: Not on file     Attends Orthodox service: Not on file     Active member of club or organization: Not on file     Attends meetings of clubs or organizations: Not on file     Relationship status: Not on file   • Intimate partner violence     Fear of current or ex partner: Not on file     Emotionally abused: Not on file     Physically abused: Not on file     Forced sexual activity: Not on file   Other Topics Concern   • Not on file   Social History Narrative   • Not on file     No Known Allergies  Outpatient Encounter Medications as of 2/28/2020   Medication Sig Dispense Refill   • zolpidem (AMBIEN) 5 MG Tab Take 1 Tab by mouth at bedtime as needed for Sleep (1 to 2 po qhs prn insomnia/sleep study. Bring to sleep study.) for up to 2 doses. 2 Tab 0   • clopidogrel (PLAVIX) 75 MG Tab Take 1 Tab by mouth every day. 30 Tab 2   • spironolactone (ALDACTONE) 25 MG Tab Take 0.5 Tabs by mouth every day. 45 Tab 3   • furosemide (LASIX) 20 MG Tab Take 20 mg by mouth as needed. Take Mon, Wed, Fri     • losartan (COZAAR) 25 MG Tab Take 1 Tab by mouth every day. 30 Tab 2   • vitamin D, Ergocalciferol, (DRISDOL) 31905 units Cap capsule Take 50,000 Units by mouth every 7 days. MONDAY     • aspirin EC (ECOTRIN) 81 MG Tablet Delayed Response Take 81 mg by mouth every day.     • amiodarone (CORDARONE) 200 MG Tab Take 200 mg by mouth every day.     • tamsulosin (FLOMAX) 0.4 MG capsule Take 0.8 mg by mouth ONE-HALF HOUR AFTER BREAKFAST.     • cinacalcet (SENSIPAR) 30 MG Tab Take 30 mg by mouth 3 times a week.     • carvedilol (COREG) 25 MG Tab Take 12.5-25 mg by mouth 2 times a day. Pt takes 25MG in AM and 12.5MG HS      • atorvastatin (LIPITOR) 20 MG Tab Take 20 mg by mouth  "every day.     • Omega-3 Fatty Acids (FISH OIL) 1000 MG Cap capsule Take 1,000 mg by mouth every day.     • multivitamin (THERAGRAN) Tab Take 1 Tab by mouth every day.     • albuterol 108 (90 Base) MCG/ACT Aero Soln inhalation aerosol Inhale 2 Puffs by mouth every 6 hours as needed for Shortness of Breath. 8.5 g 0     No facility-administered encounter medications on file as of 2/28/2020.      Review of Systems   Constitutional: Negative for malaise/fatigue.   Respiratory: Negative for shortness of breath.    Cardiovascular: Negative for chest pain, palpitations, orthopnea, leg swelling and PND.   Gastrointestinal: Negative for abdominal pain.   Musculoskeletal: Negative for falls.   Neurological: Positive for dizziness. Negative for loss of consciousness.   Psychiatric/Behavioral: Negative for depression.   All other systems reviewed and are negative.       Objective:   /60 (BP Location: Left arm, Patient Position: Sitting, BP Cuff Size: Adult)   Pulse 74   Ht 1.88 m (6' 2\")   Wt 88.3 kg (194 lb 9.6 oz)   SpO2 96%   BMI 24.99 kg/m²     Physical Exam   Constitutional: He is oriented to person, place, and time. He appears well-developed and well-nourished. No distress.   HENT:   Head: Normocephalic and atraumatic.   Eyes: Conjunctivae are normal. No scleral icterus.   Neck: Normal range of motion. Neck supple.   Cardiovascular: Normal rate, regular rhythm and normal heart sounds. Exam reveals no gallop and no friction rub.   No murmur heard.  Pulmonary/Chest: Effort normal and breath sounds normal. No respiratory distress. He has no wheezes. He has no rales.   Abdominal: Soft. He exhibits no distension. There is no abdominal tenderness.   Musculoskeletal:         General: No edema.   Neurological: He is alert and oriented to person, place, and time.   Skin: Skin is warm and dry. He is not diaphoretic.   Psychiatric: He has a normal mood and affect. His behavior is normal.   Nursing note and vitals " reviewed.    Echocardiogram performed August 2019 was personally reviewed and per my interpretation showed severely reduced LV systolic function with EF of 15%.  RVSP 26 mmHg plus JVP.  Indeterminate diastolic function.    Labs performed February 2020 were reviewed and showed normal hemoglobin.  Creatinine 1.8 (baseline creatinine between 1.4-2.0)    Right heart catheterization performed in February 2020 showed the following hemodynamics:  1) Pulmonary arterial pressure: Systolic of 35 mm Hg, diastolic of 17 mm Hg, mean of 24 mm Hg.  2) Pulmonary arterial wedge pressure with mean of 15 mm Hg.  3) Cardiac output of 2.9 and Cardiac index of 1.3 through thermodilution method.  4) Right ventricular pressure: Systolic of 30 mm Hg, end diastolic pressure of 5 mm Hg.  5) Right atrial pressure: A wave of 3 mm Hg, V wave of 5 mm Hg, mean of 3 mm Hg.     Assessment:     1. ACC/AHA stage C systolic heart failure (HCC)     2. Ventricular tachycardia (HCC)     3. Paroxysmal atrial fibrillation (HCC)     4. Mixed hyperlipidemia     5. Essential hypertension, benign     6. Encounter for long-term (current) use of high-risk medication         Medical Decision Making:  Today's Assessment / Status / Plan:     Cardiomyopathy:  Hypertension:  Dizziness:  Patient is euvolemic on exam.  NYHA class I-II.  He is status post CardioMEMS.  His blood pressure is at goal and his dizziness has improved from before.    Continue losartan, carvedilol and Aldactone at current dose.  Continue Lasix as needed.  Continue Plavix for 30 days post CardioMEMS implantation      Paroxysmal atrial fibrillation:  Ventricular tachycardia:  Status post post ICD:    He is status post watchman device and has undergone ablation for ventricular tachycardia.  He has had no arrhythmias noted on his ICD interrogation.  Continue amiodarone at current dose.  His LFTs and thyroid function testing has been checked in the past 6 months and is normal.  Continue  carvedilol.  No anticoagulation since he is status post watchman device and has a history of severe anemia of unclear etiology.  Continue aspirin.  His repeat ICD interrogation is scheduled for next week.    Hyperlipidemia: Continue Lipitor at current dose.    32 minutes of non-face-to-face time were spent reviewing the patient's extensive chart since his last visit with me along with records from his previous cardiologist as noted above.  Start and stop time: 9:54 AM to 10:06 AM, 10:21 AM to 10:31 AM, 5:29 PM to 5:40 PM.       Return to clinic in 9 mos or earlier if needed.    Thank you for allowing me to participate in the care of this patient. Please do not hesitate to contact me with any questions.    Becka Martines MD  Cardiologist  [facility]      PLEASE NOTE: This dictation was created using voice recognition software.

## 2020-03-02 ENCOUNTER — NON-PROVIDER VISIT (OUTPATIENT)
Dept: CARDIOLOGY | Facility: MEDICAL CENTER | Age: 80
End: 2020-03-02
Payer: MEDICARE

## 2020-03-02 VITALS
DIASTOLIC BLOOD PRESSURE: 70 MMHG | BODY MASS INDEX: 25.15 KG/M2 | OXYGEN SATURATION: 97 % | HEIGHT: 74 IN | HEART RATE: 80 BPM | WEIGHT: 196 LBS | SYSTOLIC BLOOD PRESSURE: 100 MMHG

## 2020-03-02 DIAGNOSIS — I47.20 VENTRICULAR TACHYCARDIA (HCC): ICD-10-CM

## 2020-03-02 DIAGNOSIS — I48.0 PAROXYSMAL ATRIAL FIBRILLATION (HCC): ICD-10-CM

## 2020-03-02 DIAGNOSIS — I42.0 DILATED CARDIOMYOPATHY (HCC): ICD-10-CM

## 2020-03-02 DIAGNOSIS — Z95.810 AICD (AUTOMATIC CARDIOVERTER/DEFIBRILLATOR) PRESENT: ICD-10-CM

## 2020-03-02 PROCEDURE — 93283 PRGRMG EVAL IMPLANTABLE DFB: CPT | Performed by: NURSE PRACTITIONER

## 2020-03-02 ASSESSMENT — FIBROSIS 4 INDEX: FIB4 SCORE: 2.29

## 2020-03-02 NOTE — PROGRESS NOTES
Device is working normally.  No device therapy; 4 NSVT episodes, no therapy.  No mode switching episodes.  Normal sensing and capture of RA and RV leads; stable impedances. Battery longevity is 5.7 years.  No changes are made today.    FU in 6 months for next AICD check with me.    He will be seeing Dr. Velázquez in June 2020, and Dr. Martines in November 2020.    Collaborating MD: Helga

## 2020-03-12 ENCOUNTER — SLEEP STUDY (OUTPATIENT)
Dept: SLEEP MEDICINE | Facility: MEDICAL CENTER | Age: 80
End: 2020-03-12
Attending: INTERNAL MEDICINE
Payer: MEDICARE

## 2020-03-12 DIAGNOSIS — G47.33 OSA (OBSTRUCTIVE SLEEP APNEA): ICD-10-CM

## 2020-03-13 NOTE — PROCEDURES
Technical summary: The patient underwent a split-night polysomnogram. This was a 16 channel montage study to include a 6 channel EEG, a 2 channel EOG, and chin EMG, left and right leg EMG, a snore channel, a nasal pressure transducer, and a nasal oral airflow  thermistor and a CFLOW pressure transducer.   Respiratory effort was assessed with the use of a thoracic and abdominal monitor and overnight oximetry was obtained. Audio and video recordings were reviewed. This was a fully attended study and sleep stage scoring was performed. The test was technically adequate.    Scoring Criteria: A modification of the the AASM Manual for the Scoring of Sleep and Associated Events, 2012, was used.   Obstructive apnea was scored by cessation of airflow for at least 10 seconds with continuing respiratory effort.  Central apnea was scored by cessation of airflow for at least 10 seconds with no effort.  Hypopnea was scored by a 30% or more reduction in airflow for at least 10 seconds accompanied by an arterial oxygen desaturation of 3% or more.  (For Medicare patients, hypopneas were scored by a 30% or more reduction in airflow for at least 10 seconds accompanied by an arterial oxygen saturation of 4% or more, as required by their insurance, CMS.      Interpretation:  Study start time was 09:59:01 PM.  Total recording time was 3h 7.5m (187 minutes) with a total sleep time of 2h 17.5m (137 minutes) resulting in a sleep efficiency of 73.33%.Sleep latency from the start fo the study was 08 minutes minutes and REM latency from sleep onset was 142 minutes minutes.    Respiratory:   There were 115 apneas in total consisting of 1 obstructive apneas, 0 mixed apneas, and 114 central apneas.  There were 10 hypopneas in total.The apnea index was 50.18 per hour and the hypopnea index was 4.36 per hour.The overall AHI was 54.5, with a REM AHI of 38.40, and a supine AHI of 83.08.  91% of the apneas were central apneas.    Limb Movements:  There  were a total of 0 periodic leg movements, of which 0 were PLMS arousals.  This resulted in a PLMS index of 0.0 and a PLMS arousal index of 0.0    Oximetry:  The mean SaO2 was 91.0% for the diagnostic portion of the study, with a minimum SaO2 of 80.0%.      Treatment:  Interpretation:  Treatment recording time was 3h 59.5m (239 minutes) with a total sleep time of 2h 50.0m (170 minutes) resulting in a sleep efficiency of 71.0%.  Sleep latency from the start of treatment was 20 minutes minutes and REM latency from sleep onset was 1h 36.0m minutes.  The patient had 68 arousals in total for an arousal index of 24.0.    Respiratory:   There were 49 apneas in total consisting of  0 obstructive apneas, 49 central apneas, and 0 mixed apneas for an apnea index of 17.29.  The patient had 30 hypopneas in total, which resulted in a hypopnea index of 10.59.  The overall AHI was 27.88, with a REM AHI of 10.71, and a supine AHI of 46.62.     62% of the events were central apneas.    Limb Movements:  There were a total of 57 periodic leg movements, of which 2 were PLMS arousals.  This resulted in a PLMS index of 20.1 and a PLMS arousal index of 0.7.    Oximetry:  The mean SaO2 during treatment was 91.0%, with a minimum oxygen saturation of 77.0%.     CPAP was tried from 5 to 8cm H2O. BiPAP was tried from 8/4 to 10/6cm H2O. BiPAP was tried from 10/6 cm H2O BR 12 to 14/8cm H2O BR 12.      CPAP Titration:  Due to the significant number of obstructive respiratory events observed during the diagnostic portion of the study a CPAP titration trial was performed during the second half of the night. The CPAP pressure was initiated at 5 cm of water and the pressure was increased in an attempt to eliminate all sleep disordered breathing and snoring. CPAP was increased to 8  Cm before switching to BiPAP. The BiPAP was titrated between 8/4 cm to 10/6 cm. However due to persistent central apneas BiPAP ST was tried as well. BiPAP ST was titrated  between 10/6 cm to 14/8 cm with back up rate 12 BPM. At this final pressure the patient was observed in non supine REM sleep stage.The apnea hypopnea index improved to 0/hr. He spent 26 min of sleep time below 89% O2 saturation.   The PAP was well-tolerated and there was minimal air leaks.     Impression:  1.  Severe primary central sleep apnea with AHI of 54.5/hr and O2 mauricio 82 %. Due to severity of the disease he met the split study protocol. The titration started with CPAP 5 cm and the best tolerated was BiPAP ST 14/8 cm with breanna up rate 12 BPM. The AHI improved to 0/hr with and average O2 saturation of 91 %.     2.  Sleep related hypoxia      Recommendations:  I recommend BiPAP ST 14/8 cm with breanna up rate 12 BPM with medium simplus mask. Consider supplemental O2 bleed in and f/u with OPO on the recommended pressure due to residual sleep hypoxia.I also recommend 30 day compliance download to assess the efficacy to the recommended pressure, measure leak, apnea hypopnea index and compliance for further outpatient monitoring and management of CPAP therapy. In some cases alternative treatment options may prove effective in resolving sleep apnea and these options include upper airway surgery, the use of a dental orthotic or weight loss and positional therapy. Clinical correlation is required. In general patients with sleep apnea are advised to avoid alcohol and sedatives and to not operate a motor vehicle while drowsy and are at a greater risk for cardiovascular disease.

## 2020-03-30 ENCOUNTER — SLEEP CENTER VISIT (OUTPATIENT)
Dept: SLEEP MEDICINE | Facility: MEDICAL CENTER | Age: 80
End: 2020-03-30
Payer: MEDICARE

## 2020-03-30 VITALS
HEART RATE: 79 BPM | BODY MASS INDEX: 25.15 KG/M2 | DIASTOLIC BLOOD PRESSURE: 70 MMHG | OXYGEN SATURATION: 97 % | HEIGHT: 74 IN | WEIGHT: 196 LBS | SYSTOLIC BLOOD PRESSURE: 130 MMHG | RESPIRATION RATE: 16 BRPM

## 2020-03-30 DIAGNOSIS — Z95.810 AICD (AUTOMATIC CARDIOVERTER/DEFIBRILLATOR) PRESENT: ICD-10-CM

## 2020-03-30 DIAGNOSIS — Z86.73 HISTORY OF TIA (TRANSIENT ISCHEMIC ATTACK): ICD-10-CM

## 2020-03-30 DIAGNOSIS — G47.31 COMPLEX SLEEP APNEA SYNDROME: ICD-10-CM

## 2020-03-30 DIAGNOSIS — I50.20 ACC/AHA STAGE C SYSTOLIC HEART FAILURE (HCC): ICD-10-CM

## 2020-03-30 DIAGNOSIS — Z87.891 FORMER SMOKER: ICD-10-CM

## 2020-03-30 DIAGNOSIS — R94.30 EJECTION FRACTION < 50%: ICD-10-CM

## 2020-03-30 DIAGNOSIS — I10 ESSENTIAL HYPERTENSION, BENIGN: ICD-10-CM

## 2020-03-30 DIAGNOSIS — I42.0 DILATED CARDIOMYOPATHY (HCC): ICD-10-CM

## 2020-03-30 PROBLEM — G47.33 OSA (OBSTRUCTIVE SLEEP APNEA): Status: RESOLVED | Noted: 2020-02-13 | Resolved: 2020-03-30

## 2020-03-30 PROCEDURE — 99214 OFFICE O/P EST MOD 30 MIN: CPT | Performed by: NURSE PRACTITIONER

## 2020-03-30 ASSESSMENT — FIBROSIS 4 INDEX: FIB4 SCORE: 2.29

## 2020-03-30 NOTE — PROGRESS NOTES
Chief Complaint   Patient presents with   • Results     SS        HPI:  Lencho Parker is a 79 y.o. year old male here today for follow-up on sleep study results.    Please see Dr. Peñaloza dictation 2/13/2020 for full history.    In short patient has a complex history with a history of systolic heart failure, cardiomyopathy, TIA, hypertension, AAA, chronic kidney disease, PFD, polycythemia former smoker.  BMI 25.    PSG split-night 3/12/2020 indicated severe sleep apnea with an overall AHI 54.5/h and an O2 mauricio of 82%.  Patient met split-night criteria and ultimately did best on BiPAP ST 14/8 cm with a backup rate of 12 breaths/min.  AHI reduced to 0/h with a mean SPO2 91%.  I reviewed finds with patient.  He is amenable to starting therapy.  He denies significant cardiac respiratory symptoms today.  He notes minimal shortness of breath and walking his dog at times.          ROS: As per HPI and otherwise negative if not stated.    Past Medical History:   Diagnosis Date   • Breath shortness     on exertion; no c/o today; improving slightly   • Cardiomyopathy (HCC) 08/2019    Echocardiogram with mildly dilated LV, mild concentric, LVEF 15%. Severely dilated LA. Trace MR, trace AI, mild TR. RVSP 26mmHg.   • Chronic obstructive pulmonary disease (HCC)    • [place] measles    • Heart attack (HCC)    • History of abdominal aortic aneurysm (AAA) 2009    Status post stent   • Hyperlipidemia    • Hypertension    • Mumps    • Pacemaker    • Paroxysmal atrial fibrillation (HCC)     Status post Watchman procedure in [place].   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    • Stroke (HCC) 2012    TIA   • Tonsillitis    • Ventricular tachycardia (HCC)        Past Surgical History:   Procedure Laterality Date   • AICD BATTERY CHANGE Left 01/03/2017    Generator replacement with [employer] MRI XT  [id number] implanted in [place].   • OTHER CARDIAC SURGERY  2014    watchman device   • AAA WITH STENT GRAFT  2009   • OTHER  ORTHOPEDIC SURGERY  2005    hip surgery   • OTHER  2000    kidney removed   • OTHER CARDIAC SURGERY  1991    AICD implanted       Family History   Problem Relation Age of Onset   • Cancer Mother    • Cancer Sister        Social History     Socioeconomic History   • Marital status: Single     Spouse name: Not on file   • Number of children: Not on file   • Years of education: Not on file   • Highest education level: Not on file   Occupational History   • Not on file   Social Needs   • Financial resource strain: Not on file   • Food insecurity     Worry: Not on file     Inability: Not on file   • Transportation needs     Medical: Not on file     Non-medical: Not on file   Tobacco Use   • Smoking status: Former Smoker     Packs/day: 0.50     Years: 15.00     Pack years: 7.50     Types: Cigarettes     Last attempt to quit: 7/11/2004     Years since quitting: 15.7   • Smokeless tobacco: Never Used   Substance and Sexual Activity   • Alcohol use: Yes     Alcohol/week: 1.2 - 1.8 oz     Types: 2 - 3 Glasses of wine per week     Comment: 2 per day   • Drug use: No   • Sexual activity: Not on file   Lifestyle   • Physical activity     Days per week: Not on file     Minutes per session: Not on file   • Stress: Not on file   Relationships   • Social connections     Talks on phone: Not on file     Gets together: Not on file     Attends [employer] service: Not on file     Active member of club or organization: Not on file     Attends meetings of clubs or organizations: Not on file     Relationship status: Not on file   • Intimate partner violence     Fear of current or ex partner: Not on file     Emotionally abused: Not on file     Physically abused: Not on file     Forced sexual activity: Not on file   Other Topics Concern   • Not on file   Social History Narrative   • Not on file       Allergies as of 03/30/2020   • (No Known Allergies)        Vitals:  /70 (BP Location: Left arm, Patient Position: Sitting, BP Cuff Size:  "Adult)   Pulse 79   Resp 16   Ht 1.88 m (6' 2\")   Wt 88.9 kg (196 lb)   SpO2 97%     Current medications as of today   Current Outpatient Medications   Medication Sig Dispense Refill   • clopidogrel (PLAVIX) 75 MG Tab Take 1 Tab by mouth every day. 30 Tab 2   • spironolactone (ALDACTONE) 25 MG Tab Take 0.5 Tabs by mouth every day. 45 Tab 3   • furosemide (LASIX) 20 MG Tab Take 20 mg by mouth as needed. Take Mon, Wed, Fri     • losartan (COZAAR) 25 MG Tab Take 1 Tab by mouth every day. 30 Tab 2   • vitamin D, Ergocalciferol, (DRISDOL) 07641 units Cap capsule Take 50,000 Units by mouth every 7 days. MONDAY     • aspirin EC (ECOTRIN) 81 MG Tablet Delayed Response Take 81 mg by mouth every day.     • amiodarone (CORDARONE) 200 MG Tab Take 200 mg by mouth every day.     • tamsulosin (FLOMAX) 0.4 MG capsule Take 0.8 mg by mouth ONE-HALF HOUR AFTER BREAKFAST.     • cinacalcet (SENSIPAR) 30 MG Tab Take 30 mg by mouth 3 times a week.     • carvedilol (COREG) 25 MG Tab Take 12.5-25 mg by mouth 2 times a day. Pt takes 25MG in AM and 12.5MG HS      • atorvastatin (LIPITOR) 20 MG Tab Take 20 mg by mouth every day.     • Omega-3 Fatty Acids (FISH OIL) 1000 MG Cap capsule Take 1,000 mg by mouth every day.     • multivitamin (THERAGRAN) Tab Take 1 Tab by mouth every day.     • albuterol 108 (90 Base) MCG/ACT Aero Soln inhalation aerosol Inhale 2 Puffs by mouth every 6 hours as needed for Shortness of Breath. 8.5 g 0     No current facility-administered medications for this visit.          Physical Exam:   Gen:           Alert and oriented, No apparent distress. Mood and affect appropriate, normal interaction with examiner.  Eyes:          PERRL, EOM intact, sclere white, conjunctive moist.  Ears:          Not examined.   Hearing:     Grossly intact.  Nose:          Normal, no lesions or deformities.  Dentition:    Good dentition.  Oropharynx:   Tongue normal, posterior pharynx without erythema or exudate.  Mallampati " Classification: not examined.  Neck:        Supple, trachea midline, no masses.  Respiratory Effort: No intercostal retractions or use of accessory muscles.   Lung Auscultation:      Clear to auscultation bilaterally; no rales, rhonchi or wheezing.  CV:            Regular rate and rhythm. No murmurs, rubs or gallops.  Abd:           Not examined.   Lymphadenopathy: Not examined.  Gait and Station: Normal.  Digits and Nails: No clubbing, cyanosis, petechiae, or nodes.   Cranial Nerves: II-XII grossly intact.  Skin:        No rashes, lesions or ulcers noted.               Ext:           No cyanosis or edema.      Assessment:  1. Complex sleep apnea syndrome     2. ACC/AHA stage C systolic heart failure (HCC)     3. Ejection fraction < 50%     4. AICD (automatic cardioverter/defibrillator) present     5. Dilated cardiomyopathy (HCC)     6. Essential hypertension, benign     7. History of TIA (transient ischemic attack)     8. BMI 25.0-25.9,adult     9. Former smoker         Immunizations:    Flu:10/2019  Pneumovax 23:recommend  Prevnar 13:recommend    Plan:  1.  DME BiPAP ST 14/8 cm, breaths per minute 12.  Patient understands they may have mask fits within first 30 days of therapy covered by insurance to obtain best fit.  Patient understands the need to use device every night for >4hrs to meet compliance standards for insurance purposes.  2.  Discussed sleep hygiene.  3.  Follow-up primary care for the health concerns.  4.  Follow-up in 2 to 3 months for first compliance check, sooner if needed.    Please note that this dictation was created using voice recognition software. I have made every reasonable attempt to correct obvious errors, but it is possible there are errors of grammar and possibly content that I did not discover before finalizing the note.

## 2020-04-13 ENCOUNTER — HOSPITAL ENCOUNTER (OUTPATIENT)
Dept: LAB | Facility: MEDICAL CENTER | Age: 80
End: 2020-04-13
Attending: NURSE PRACTITIONER
Payer: MEDICARE

## 2020-04-13 ENCOUNTER — HOSPITAL ENCOUNTER (OUTPATIENT)
Dept: LAB | Facility: MEDICAL CENTER | Age: 80
End: 2020-04-13
Attending: INTERNAL MEDICINE
Payer: MEDICARE

## 2020-04-13 DIAGNOSIS — Z79.899 HIGH RISK MEDICATION USE: ICD-10-CM

## 2020-04-13 DIAGNOSIS — I50.20 ACC/AHA STAGE C SYSTOLIC HEART FAILURE (HCC): ICD-10-CM

## 2020-04-13 LAB
ALBUMIN SERPL BCP-MCNC: 4.1 G/DL (ref 3.2–4.9)
ANION GAP SERPL CALC-SCNC: 10 MMOL/L (ref 7–16)
APPEARANCE UR: CLEAR
BASOPHILS # BLD AUTO: 0.2 % (ref 0–1.8)
BASOPHILS # BLD: 0.01 K/UL (ref 0–0.12)
BILIRUB UR QL STRIP.AUTO: NEGATIVE
BUN SERPL-MCNC: 32 MG/DL (ref 8–22)
BUN SERPL-MCNC: 32 MG/DL (ref 8–22)
CALCIUM SERPL-MCNC: 9 MG/DL (ref 8.4–10.2)
CALCIUM SERPL-MCNC: 9 MG/DL (ref 8.4–10.2)
CHLORIDE SERPL-SCNC: 106 MMOL/L (ref 96–112)
CHLORIDE SERPL-SCNC: 107 MMOL/L (ref 96–112)
CO2 SERPL-SCNC: 20 MMOL/L (ref 20–33)
CO2 SERPL-SCNC: 21 MMOL/L (ref 20–33)
COLOR UR: YELLOW
CREAT SERPL-MCNC: 1.75 MG/DL (ref 0.5–1.4)
CREAT SERPL-MCNC: 1.81 MG/DL (ref 0.5–1.4)
CREAT UR-MCNC: 155.94 MG/DL
EOSINOPHIL # BLD AUTO: 0.15 K/UL (ref 0–0.51)
EOSINOPHIL NFR BLD: 3.3 % (ref 0–6.9)
ERYTHROCYTE [DISTWIDTH] IN BLOOD BY AUTOMATED COUNT: 50.5 FL (ref 35.9–50)
FASTING STATUS PATIENT QL REPORTED: NORMAL
GLUCOSE SERPL-MCNC: 114 MG/DL (ref 65–99)
GLUCOSE SERPL-MCNC: 115 MG/DL (ref 65–99)
GLUCOSE UR STRIP.AUTO-MCNC: NEGATIVE MG/DL
HCT VFR BLD AUTO: 47.1 % (ref 42–52)
HGB BLD-MCNC: 15.8 G/DL (ref 14–18)
IMM GRANULOCYTES # BLD AUTO: 0.01 K/UL (ref 0–0.11)
IMM GRANULOCYTES NFR BLD AUTO: 0.2 % (ref 0–0.9)
KETONES UR STRIP.AUTO-MCNC: NEGATIVE MG/DL
LEUKOCYTE ESTERASE UR QL STRIP.AUTO: NEGATIVE
LYMPHOCYTES # BLD AUTO: 1.28 K/UL (ref 1–4.8)
LYMPHOCYTES NFR BLD: 28.1 % (ref 22–41)
MAGNESIUM SERPL-MCNC: 2.1 MG/DL (ref 1.5–2.5)
MCH RBC QN AUTO: 33.3 PG (ref 27–33)
MCHC RBC AUTO-ENTMCNC: 33.5 G/DL (ref 33.7–35.3)
MCV RBC AUTO: 99.2 FL (ref 81.4–97.8)
MICRO URNS: NORMAL
MONOCYTES # BLD AUTO: 0.54 K/UL (ref 0–0.85)
MONOCYTES NFR BLD AUTO: 11.8 % (ref 0–13.4)
NEUTROPHILS # BLD AUTO: 2.57 K/UL (ref 1.82–7.42)
NEUTROPHILS NFR BLD: 56.4 % (ref 44–72)
NITRITE UR QL STRIP.AUTO: NEGATIVE
NRBC # BLD AUTO: 0 K/UL
NRBC BLD-RTO: 0 /100 WBC
PH UR STRIP.AUTO: 6 [PH] (ref 5–8)
PHOSPHATE SERPL-MCNC: 4 MG/DL (ref 2.5–4.5)
PLATELET # BLD AUTO: 150 K/UL (ref 164–446)
PMV BLD AUTO: 11.2 FL (ref 9–12.9)
POTASSIUM SERPL-SCNC: 5.1 MMOL/L (ref 3.6–5.5)
POTASSIUM SERPL-SCNC: 5.1 MMOL/L (ref 3.6–5.5)
PROT UR QL STRIP: NEGATIVE MG/DL
PROT UR-MCNC: 16 MG/DL (ref 0–15)
PROT/CREAT UR: 103 MG/G (ref 15–68)
RBC # BLD AUTO: 4.75 M/UL (ref 4.7–6.1)
RBC UR QL AUTO: NEGATIVE
SODIUM SERPL-SCNC: 138 MMOL/L (ref 135–145)
SODIUM SERPL-SCNC: 139 MMOL/L (ref 135–145)
SP GR UR STRIP.AUTO: 1.02
WBC # BLD AUTO: 4.6 K/UL (ref 4.8–10.8)

## 2020-04-13 PROCEDURE — 80069 RENAL FUNCTION PANEL: CPT

## 2020-04-13 PROCEDURE — 36415 COLL VENOUS BLD VENIPUNCTURE: CPT

## 2020-04-13 PROCEDURE — 85025 COMPLETE CBC W/AUTO DIFF WBC: CPT

## 2020-04-13 PROCEDURE — 81003 URINALYSIS AUTO W/O SCOPE: CPT

## 2020-04-13 PROCEDURE — 82570 ASSAY OF URINE CREATININE: CPT

## 2020-04-13 PROCEDURE — 80048 BASIC METABOLIC PNL TOTAL CA: CPT

## 2020-04-13 PROCEDURE — 84156 ASSAY OF PROTEIN URINE: CPT

## 2020-04-13 PROCEDURE — 83735 ASSAY OF MAGNESIUM: CPT

## 2020-04-20 ENCOUNTER — TELEPHONE (OUTPATIENT)
Dept: CARDIOLOGY | Facility: MEDICAL CENTER | Age: 80
End: 2020-04-20

## 2020-04-20 NOTE — TELEPHONE ENCOUNTER
Called patient regarding decreased cardiomems reading, patient PaD currently at 5, down from 9 yesterday. Patient is feeling well, no dizziness, no low BP, did not take any extra diuretics and actually forget to take his meds a couple days ago and did not attempt to take any additional to make up for it. Patient instructed to hydrate and call if he begins feeling dizzy or lightheaded. Requested us to review his readings again tomorrow.

## 2020-04-20 NOTE — TELEPHONE ENCOUNTER
Pt CardioMEMs reading is trending down Yesterday PaD was 9 mmHg and Today PaD was 5 mmHg.  Please find out if he is feeling ok or doing something different.

## 2020-05-26 ENCOUNTER — TELEPHONE (OUTPATIENT)
Dept: CARDIOLOGY | Facility: MEDICAL CENTER | Age: 80
End: 2020-05-26

## 2020-05-26 ENCOUNTER — HOSPITAL ENCOUNTER (EMERGENCY)
Facility: MEDICAL CENTER | Age: 80
End: 2020-05-26
Attending: EMERGENCY MEDICINE
Payer: MEDICARE

## 2020-05-26 VITALS
OXYGEN SATURATION: 96 % | SYSTOLIC BLOOD PRESSURE: 109 MMHG | WEIGHT: 192.9 LBS | DIASTOLIC BLOOD PRESSURE: 76 MMHG | TEMPERATURE: 97.5 F | HEART RATE: 61 BPM | BODY MASS INDEX: 24.76 KG/M2 | HEIGHT: 74 IN | RESPIRATION RATE: 14 BRPM

## 2020-05-26 DIAGNOSIS — T16.2XXA FOREIGN BODY OF LEFT EAR, INITIAL ENCOUNTER: ICD-10-CM

## 2020-05-26 PROCEDURE — 69200 CLEAR OUTER EAR CANAL: CPT

## 2020-05-26 PROCEDURE — 99282 EMERGENCY DEPT VISIT SF MDM: CPT

## 2020-05-26 ASSESSMENT — FIBROSIS 4 INDEX: FIB4 SCORE: 2.57

## 2020-05-26 NOTE — TELEPHONE ENCOUNTER
Called pt to discuss. Advised recent lab work including BMP, CBC, GFR, and Mag levels noted in pt's chart from 04/13/20. Pt verbalized understanding, appreciative of call back.

## 2020-05-26 NOTE — ED PROVIDER NOTES
ED Provider Note    CHIEF COMPLAINT  Chief Complaint   Patient presents with   • Foreign Body in Ear     Left, part of hearing aide       HPI  Lencho Parker is a 79 y.o. male who presents with the ear piece of his hearing in the left ear.  Better since last night.  Try to get out could not get it so he presents here.  There is no other complaint.    PAST MEDICAL HISTORY  Past Medical History:   Diagnosis Date   • Breath shortness     on exertion; no c/o today; improving slightly   • Cardiomyopathy (HCC) 08/2019    Echocardiogram with mildly dilated LV, mild concentric, LVEF 15%. Severely dilated LA. Trace MR, trace AI, mild TR. RVSP 26mmHg.   • Chronic obstructive pulmonary disease (HCC)    • Irish measles    • Heart attack (HCC)    • History of abdominal aortic aneurysm (AAA) 2009    Status post stent   • Hyperlipidemia    • Hypertension    • Mumps    • Pacemaker    • Paroxysmal atrial fibrillation (HCC)     Status post Watchman procedure in [place].   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    • Stroke (HCC) 2012    TIA   • Tonsillitis    • Ventricular tachycardia (HCC)        FAMILY HISTORY  Family History   Problem Relation Age of Onset   • Cancer Mother    • Cancer Sister        SOCIAL HISTORY  Social History     Tobacco Use   • Smoking status: Former Smoker     Packs/day: 0.50     Years: 15.00     Pack years: 7.50     Types: Cigarettes     Last attempt to quit: 7/11/2004     Years since quitting: 15.8   • Smokeless tobacco: Never Used   Substance Use Topics   • Alcohol use: Yes     Alcohol/week: 1.2 - 1.8 oz     Types: 2 - 3 Glasses of wine per week     Comment: 2 per day   • Drug use: No         SURGICAL HISTORY  Past Surgical History:   Procedure Laterality Date   • AICD BATTERY CHANGE Left 01/03/2017    Generator replacement with [employer] Evera MRI XT  [id number] implanted in [place].   • OTHER CARDIAC SURGERY  2014    watchman device   • AAA WITH STENT GRAFT  2009   • OTHER ORTHOPEDIC SURGERY   "2005    hip surgery   • OTHER  2000    kidney removed   • OTHER CARDIAC SURGERY  1991    AICD implanted       CURRENT MEDICATIONS  Home Medications     Reviewed by Alfonso Singh (Pharmacy Tech) on 05/26/20 at 0914  Med List Status: Not Addressed   Medication Last Dose Status   albuterol 108 (90 Base) MCG/ACT Aero Soln inhalation aerosol  Active   amiodarone (CORDARONE) 200 MG Tab  Active   aspirin EC (ECOTRIN) 81 MG Tablet Delayed Response  Active   atorvastatin (LIPITOR) 20 MG Tab  Active   carvedilol (COREG) 25 MG Tab  Active   cinacalcet (SENSIPAR) 30 MG Tab  Active   clopidogrel (PLAVIX) 75 MG Tab  Active   furosemide (LASIX) 20 MG Tab  Active   losartan (COZAAR) 25 MG Tab  Active   multivitamin (THERAGRAN) Tab  Active   Omega-3 Fatty Acids (FISH OIL) 1000 MG Cap capsule  Active   spironolactone (ALDACTONE) 25 MG Tab  Active   tamsulosin (FLOMAX) 0.4 MG capsule  Active   vitamin D, Ergocalciferol, (DRISDOL) 03237 units Cap capsule  Active                I have reviewed the nurses notes and/or the list brought with the patient.    ALLERGIES  No Known Allergies    REVIEW OF SYSTEMS  See HPI for further details. Review of systems as above, foreign body in ear    PHYSICAL EXAM  VITAL SIGNS: /82   Pulse 82   Temp 36.4 °C (97.5 °F) (Temporal)   Resp 18   Ht 1.88 m (6' 2\")   Wt 87.5 kg (192 lb 14.4 oz)   SpO2 96%   BMI 24.77 kg/m²     Constitutional: Well appearing patient in no acute distress.  Not toxic, nor ill in appearance.  HENT: Mucus membranes moist.  Oropharynx is clear.  There appears to be a foreign body in his left ear.  This is removed with alligator forceps.  It is a ear bud.  It appears to be completely intact.  After removal, the ear canal looks unremarkable, the eardrum is normal.  There is no suggestion of continued foreign body.    MEDICAL RECORD  I have reviewed patient's medical record and pertinent results are listed above.    COURSE & MEDICAL DECISION MAKING  I have reviewed " any medical record information, laboratory studies and radiographic results as noted above.  Patient has ear foreign body.  This is removed in entirety.  Instructions on ear foreign body.    FINAL IMPRESSION  1. Foreign body of left ear, initial encounter           This dictation was created using voice recognition software.    Electronically signed by: Vishnu Hong M.D., 5/26/2020 9:15 AM

## 2020-05-26 NOTE — ED TRIAGE NOTES
"Chief Complaint   Patient presents with   • Foreign Body in Ear     Left, part of hearing aide     Pt has part of his hearing air in left ear,  \" it's starting to feel swollen\".  Hearing aid seen in place in left ear.     Pt has not traveled   within 30 days internationally or domestic, has not had direct contact with known COVID-19 positive patient, or/ and  does not have fever or respiratory symptoms.    "

## 2020-05-26 NOTE — TELEPHONE ENCOUNTER
MEHDI/janee    Pt calling to ask if lab work is necessary in preparation for 6/1 appt with TT.    Please call Lencho Christie

## 2020-05-26 NOTE — ED TRIAGE NOTES
"Chief Complaint   Patient presents with   • Foreign Body in Ear     Left, part of hearing aide     /82   Pulse 82   Temp 36.4 °C (97.5 °F) (Temporal)   Resp 18   Ht 1.88 m (6' 2\")   Wt 87.5 kg (192 lb 14.4 oz)   SpO2 96%   BMI 24.77 kg/m²     "

## 2020-05-26 NOTE — ED NOTES
Called pt on cell phone @ [phone], pt did not answer.  Will attempt later if pt gets admitted.

## 2020-05-26 NOTE — ED NOTES
Assisted w/ pt care.  Reviewed discharge instructions w/ pt, verbalized understanding to information provided including follow up care and return precautions, denied questions/concerns.  Pt ambulated from ED.

## 2020-06-01 ENCOUNTER — OFFICE VISIT (OUTPATIENT)
Dept: CARDIOLOGY | Facility: MEDICAL CENTER | Age: 80
End: 2020-06-01
Payer: MEDICARE

## 2020-06-01 VITALS
HEIGHT: 74 IN | OXYGEN SATURATION: 95 % | DIASTOLIC BLOOD PRESSURE: 60 MMHG | HEART RATE: 82 BPM | WEIGHT: 198 LBS | SYSTOLIC BLOOD PRESSURE: 92 MMHG | BODY MASS INDEX: 25.41 KG/M2

## 2020-06-01 DIAGNOSIS — Z86.73 HISTORY OF TIA (TRANSIENT ISCHEMIC ATTACK): ICD-10-CM

## 2020-06-01 DIAGNOSIS — E78.2 MIXED HYPERLIPIDEMIA: ICD-10-CM

## 2020-06-01 DIAGNOSIS — Z00.6 RESEARCH STUDY PATIENT: ICD-10-CM

## 2020-06-01 DIAGNOSIS — I47.20 VENTRICULAR TACHYCARDIA (HCC): ICD-10-CM

## 2020-06-01 DIAGNOSIS — N18.30 CKD (CHRONIC KIDNEY DISEASE) STAGE 3, GFR 30-59 ML/MIN: ICD-10-CM

## 2020-06-01 DIAGNOSIS — I10 HTN (HYPERTENSION), MALIGNANT: ICD-10-CM

## 2020-06-01 DIAGNOSIS — R06.09 DYSPNEA ON EXERTION: ICD-10-CM

## 2020-06-01 DIAGNOSIS — Z79.899 HIGH RISK MEDICATION USE: ICD-10-CM

## 2020-06-01 DIAGNOSIS — I50.9 HEART FAILURE, NYHA CLASS 2 (HCC): ICD-10-CM

## 2020-06-01 DIAGNOSIS — I50.20 ACC/AHA STAGE C SYSTOLIC HEART FAILURE (HCC): ICD-10-CM

## 2020-06-01 DIAGNOSIS — Z86.79 HISTORY OF ABDOMINAL AORTIC ANEURYSM (AAA): ICD-10-CM

## 2020-06-01 DIAGNOSIS — I48.0 PAROXYSMAL ATRIAL FIBRILLATION (HCC): ICD-10-CM

## 2020-06-01 DIAGNOSIS — Z95.810 AICD (AUTOMATIC CARDIOVERTER/DEFIBRILLATOR) PRESENT: ICD-10-CM

## 2020-06-01 PROCEDURE — 99215 OFFICE O/P EST HI 40 MIN: CPT | Performed by: INTERNAL MEDICINE

## 2020-06-01 ASSESSMENT — ENCOUNTER SYMPTOMS
DIAPHORESIS: 0
COUGH: 0
DIZZINESS: 0
MYALGIAS: 0
DOUBLE VISION: 0
FALLS: 0
SHORTNESS OF BREATH: 1
ABDOMINAL PAIN: 0
FEVER: 0
SENSORY CHANGE: 0
BRUISES/BLEEDS EASILY: 0
MEMORY LOSS: 0
BLURRED VISION: 0
DEPRESSION: 0
PALPITATIONS: 0
HEADACHES: 0

## 2020-06-01 ASSESSMENT — FIBROSIS 4 INDEX: FIB4 SCORE: 2.57

## 2020-06-01 NOTE — PROGRESS NOTES
Chief Complaint   Patient presents with   • Congestive Heart Failure       Subjective:   Lencho Parker is a 79 y.o. male who presents today for cardiac care and management due to cardiac issues of nonischemic cardiomyopathy diagnosed in 2009, with most recent cholangiogram in 2016 showing nonobstructive coronary to disease, status post dual-chamber ICD placed in 2010, paroxysmal atrial fibrillation status post watchman device in October 2015, prior history of ventricular tachycardia status post ablation in 2012, hypertension, hyperlipidemia, treated AAA, prior history of TIA without residual neurological deficits.    Of note, patient was seen by his cardiologist in [place] before he moved to [facility].  He was initially started on Entresto therapy but developed angioedema.    His most recent transthoracic echocardiogram showed LV function of 15%.  He is status post CardioMEMS implantation for remote monitoring of volume status.    Patient is feeling better these days. Does get winded upon walking up inclines or for distance. No symptoms at rest or with daily living activities.      Past Medical History:   Diagnosis Date   • Breath shortness     on exertion; no c/o today; improving slightly   • Cardiomyopathy (HCC) 08/2019    Echocardiogram with mildly dilated LV, mild concentric, LVEF 15%. Severely dilated LA. Trace MR, trace AI, mild TR. RVSP 26mmHg.   • Chronic obstructive pulmonary disease (HCC)    • [place] measles    • Heart attack (HCC)    • History of abdominal aortic aneurysm (AAA) 2009    Status post stent   • Hyperlipidemia    • Hypertension    • Mumps    • Pacemaker    • Paroxysmal atrial fibrillation (HCC)     Status post Watchman procedure in [place].   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    • Stroke (HCC) 2012    TIA   • Tonsillitis    • Ventricular tachycardia (HCC)      Past Surgical History:   Procedure Laterality Date   • AICD BATTERY CHANGE Left 01/03/2017    Generator  replacement with [employer] Evera MRI XT  [id number] implanted in [place].   • OTHER CARDIAC SURGERY  2014    watchman device   • AAA WITH STENT GRAFT  2009   • OTHER ORTHOPEDIC SURGERY  2005    hip surgery   • OTHER  2000    kidney removed   • OTHER CARDIAC SURGERY  1991    AICD implanted     Family History   Problem Relation Age of Onset   • Cancer Mother    • Cancer Sister      Social History     Socioeconomic History   • Marital status: Single     Spouse name: Not on file   • Number of children: Not on file   • Years of education: Not on file   • Highest education level: Not on file   Occupational History   • Not on file   Social Needs   • Financial resource strain: Not on file   • Food insecurity     Worry: Not on file     Inability: Not on file   • Transportation needs     Medical: Not on file     Non-medical: Not on file   Tobacco Use   • Smoking status: Former Smoker     Packs/day: 0.50     Years: 15.00     Pack years: 7.50     Types: Cigarettes     Last attempt to quit: 7/11/2004     Years since quitting: 15.9   • Smokeless tobacco: Never Used   Substance and Sexual Activity   • Alcohol use: Yes     Alcohol/week: 1.2 - 1.8 oz     Types: 2 - 3 Glasses of wine per week     Comment: 2 per day   • Drug use: No   • Sexual activity: Not on file   Lifestyle   • Physical activity     Days per week: Not on file     Minutes per session: Not on file   • Stress: Not on file   Relationships   • Social connections     Talks on phone: Not on file     Gets together: Not on file     Attends Sabianism service: Not on file     Active member of club or organization: Not on file     Attends meetings of clubs or organizations: Not on file     Relationship status: Not on file   • Intimate partner violence     Fear of current or ex partner: Not on file     Emotionally abused: Not on file     Physically abused: Not on file     Forced sexual activity: Not on file   Other Topics Concern   • Not on file   Social History Narrative   • Not on  file     Allergies   Allergen Reactions   • Entresto [Sacubitril-Valsartan]      Swollen tongue. Angioedema.     Outpatient Encounter Medications as of 6/1/2020   Medication Sig Dispense Refill   • clopidogrel (PLAVIX) 75 MG Tab Take 1 Tab by mouth every day. 30 Tab 2   • spironolactone (ALDACTONE) 25 MG Tab Take 0.5 Tabs by mouth every day. 45 Tab 3   • furosemide (LASIX) 20 MG Tab Take 20 mg by mouth as needed. Take Mon, Wed, Fri     • losartan (COZAAR) 25 MG Tab Take 1 Tab by mouth every day. 30 Tab 2   • vitamin D, Ergocalciferol, (DRISDOL) 32015 units Cap capsule Take 50,000 Units by mouth every 7 days. MONDAY     • aspirin EC (ECOTRIN) 81 MG Tablet Delayed Response Take 81 mg by mouth every day.     • amiodarone (CORDARONE) 200 MG Tab Take 200 mg by mouth every day.     • cinacalcet (SENSIPAR) 30 MG Tab Take 30 mg by mouth 3 times a week.     • carvedilol (COREG) 25 MG Tab Take 12.5-25 mg by mouth 2 times a day. Pt takes 25MG in AM and 12.5MG HS      • atorvastatin (LIPITOR) 20 MG Tab Take 20 mg by mouth every day.     • Omega-3 Fatty Acids (FISH OIL) 1000 MG Cap capsule Take 1,000 mg by mouth every day.     • multivitamin (THERAGRAN) Tab Take 1 Tab by mouth every day.     • albuterol 108 (90 Base) MCG/ACT Aero Soln inhalation aerosol Inhale 2 Puffs by mouth every 6 hours as needed for Shortness of Breath. 8.5 g 0   • [DISCONTINUED] tamsulosin (FLOMAX) 0.4 MG capsule Take 0.8 mg by mouth ONE-HALF HOUR AFTER BREAKFAST.       No facility-administered encounter medications on file as of 6/1/2020.      Review of Systems   Constitutional: Negative for diaphoresis and fever.   HENT: Negative for nosebleeds.    Eyes: Negative for blurred vision and double vision.   Respiratory: Positive for shortness of breath. Negative for cough.    Cardiovascular: Negative for chest pain and palpitations.   Gastrointestinal: Negative for abdominal pain.   Genitourinary: Negative for dysuria and frequency.   Musculoskeletal:  "Negative for falls and myalgias.   Skin: Negative for rash.   Neurological: Negative for dizziness, sensory change and headaches.   Endo/Heme/Allergies: Does not bruise/bleed easily.   Psychiatric/Behavioral: Negative for depression and memory loss.        Objective:   BP (!) 92/60 (BP Location: Right arm, Patient Position: Sitting)   Pulse 82   Ht 1.88 m (6' 2\")   Wt 89.8 kg (198 lb)   SpO2 95%   BMI 25.42 kg/m²     Physical Exam   Constitutional: He is oriented to person, place, and time. No distress.   HENT:   Head: Normocephalic and atraumatic.   Right Ear: External ear normal.   Left Ear: External ear normal.   Eyes: Right eye exhibits no discharge. Left eye exhibits no discharge.   Neck: No JVD present. No thyromegaly present.   Cardiovascular: Normal rate, regular rhythm, normal heart sounds and intact distal pulses. Exam reveals no gallop and no friction rub.   No murmur heard.  Pulmonary/Chest: Breath sounds normal. No respiratory distress.   Abdominal: Bowel sounds are normal. He exhibits no distension. There is no abdominal tenderness.   Musculoskeletal:         General: No tenderness or edema.   Neurological: He is alert and oriented to person, place, and time. No cranial nerve deficit.   Skin: Skin is warm and dry. He is not diaphoretic.   Psychiatric: He has a normal mood and affect. His behavior is normal.   Nursing note and vitals reviewed.      Assessment:     1. ACC/AHA stage C systolic heart failure (HCC)  REFERRAL TO CARDIAC ELECTROPHYSIOLOGY   2. Heart failure, NYHA class 2 (HCC)  REFERRAL TO CARDIAC ELECTROPHYSIOLOGY   3. AICD (automatic cardioverter/defibrillator) present  REFERRAL TO CARDIAC ELECTROPHYSIOLOGY   4. Ventricular tachycardia (HCC)     5. Paroxysmal atrial fibrillation (HCC)     6. Mixed hyperlipidemia     7. HTN (hypertension), malignant     8. Dyspnea on exertion     9. High risk medication use     10. Research study patient     11. History of TIA (transient ischemic " attack)     12. CKD (chronic kidney disease) stage 3, GFR 30-59 ml/min ([facility])     13. History of abdominal aortic aneurysm (AAA)         Medical Decision Making:  Today's Assessment / Status / Plan:   Non-ischemic Cardiomyopathy LVEF of 15% with already optimized medical therapy:  Chronically illed condition which requires ongoing close monitoring and treatment to improve survival rate along with decreasing risk of clinical decompensation and hospitalization.    Today, based on physical examination findings, patient is euvolemic. No JVD, lungs are clear to auscultation, no pitting edema in bilateral lower extremities, no ascites.     Dry weight is 198 lbs.     Continue losartan 25 mg po daily, coreg 25 mg po bid. Unable to titrate up due to borderline BP readings.     Diuretic with Lasix 20 mg twice weekly now. Patient was instructed to monitor symptoms of lightheadedness or syncope rather than actual numbers.      Aldactone antagonist with Spironolactone 12.5 mg daily.    At this time, I will refer patient to EP for evaluation of possible adding LV lead via coronary sinus. I personally interpreted the EKG which showed QRS of more than 120 ms. I do think that patient will be benefited from upgrade of synchronized therapy.    Continue remote monitor with MEMs.    Paroxysmal Atrial fibrillation:  No anticoagulation, already with Watchman device.    Hypertension:  Optimize control using cardiomyopathy medical regimen as well.    Hyperlipidemia:  Optimize statin as within guidelines of CAD treatment as above.

## 2020-06-02 ENCOUNTER — TELEPHONE (OUTPATIENT)
Dept: PULMONOLOGY | Facility: HOSPICE | Age: 80
End: 2020-06-02

## 2020-06-02 NOTE — TELEPHONE ENCOUNTER
Pt wishes to come in clinic complete PFT and go over results. States will discuss ct order in clinic.

## 2020-06-02 NOTE — TELEPHONE ENCOUNTER
Patient scheduled to see Dr. Schroeder 6/3/2020 with PFT''s per last Ov 12/3/19 HRCt was ordered. I contacted the patient, states he was unaware, has a defibrillator is he okay with doing Ct scans? I informed the patient I'm not aware we can discuss during visit. Patient will hold off on completing Ct till his appt with Dr. Catherine.

## 2020-06-02 NOTE — TELEPHONE ENCOUNTER
CT scans are fine with defibrillator. If he wants to just do PFTs that is fine but he would be fine to have CT as well.

## 2020-06-03 ENCOUNTER — OFFICE VISIT (OUTPATIENT)
Dept: PULMONOLOGY | Facility: HOSPICE | Age: 80
End: 2020-06-03
Payer: MEDICARE

## 2020-06-03 ENCOUNTER — NON-PROVIDER VISIT (OUTPATIENT)
Dept: PULMONOLOGY | Facility: HOSPICE | Age: 80
End: 2020-06-03
Attending: INTERNAL MEDICINE
Payer: MEDICARE

## 2020-06-03 VITALS
RESPIRATION RATE: 16 BRPM | HEART RATE: 87 BPM | SYSTOLIC BLOOD PRESSURE: 112 MMHG | TEMPERATURE: 97.5 F | OXYGEN SATURATION: 100 % | DIASTOLIC BLOOD PRESSURE: 60 MMHG

## 2020-06-03 VITALS — WEIGHT: 195 LBS | BODY MASS INDEX: 25.03 KG/M2 | HEIGHT: 74 IN

## 2020-06-03 DIAGNOSIS — Z79.899 ON AMIODARONE THERAPY: ICD-10-CM

## 2020-06-03 DIAGNOSIS — Z87.891 HISTORY OF TOBACCO USE: ICD-10-CM

## 2020-06-03 DIAGNOSIS — I42.0 DILATED CARDIOMYOPATHY (HCC): ICD-10-CM

## 2020-06-03 PROCEDURE — 99213 OFFICE O/P EST LOW 20 MIN: CPT | Mod: 25 | Performed by: INTERNAL MEDICINE

## 2020-06-03 PROCEDURE — 94729 DIFFUSING CAPACITY: CPT | Performed by: INTERNAL MEDICINE

## 2020-06-03 PROCEDURE — 94060 EVALUATION OF WHEEZING: CPT | Performed by: INTERNAL MEDICINE

## 2020-06-03 PROCEDURE — 94726 PLETHYSMOGRAPHY LUNG VOLUMES: CPT | Performed by: INTERNAL MEDICINE

## 2020-06-03 SDOH — HEALTH STABILITY: MENTAL HEALTH: HOW MANY STANDARD DRINKS CONTAINING ALCOHOL DO YOU HAVE ON A TYPICAL DAY?: 1 OR 2

## 2020-06-03 SDOH — HEALTH STABILITY: MENTAL HEALTH: HOW OFTEN DO YOU HAVE A DRINK CONTAINING ALCOHOL?: NEVER

## 2020-06-03 SDOH — HEALTH STABILITY: MENTAL HEALTH: HOW OFTEN DO YOU HAVE 6 OR MORE DRINKS ON ONE OCCASION?: NEVER

## 2020-06-03 ASSESSMENT — PULMONARY FUNCTION TESTS
FEV1/FVC_PREDICTED: 74
FEV1: 3.15
FEV1_PERCENT_PREDICTED: 95
FVC_PERCENT_PREDICTED: 82
FEV1/FVC_PERCENT_PREDICTED: 74
FEV1/FVC_PERCENT_LLN: 62
FEV1/FVC_PERCENT_PREDICTED: 112
FEV1/FVC_PERCENT_PREDICTED: 110
FEV1/FVC_PERCENT_PREDICTED: 109
FEV1_LLN: 2.81
FEV1: 3.22
FVC: 3.77
FEV1/FVC: 81.31
FEV1_PREDICTED: 3.36
FVC_PERCENT_PREDICTED: 86
FEV1/FVC: 84
FEV1/FVC: 81
FEV1_PERCENT_CHANGE: 4
FEV1/FVC_PERCENT_PREDICTED: 113
FEV1_PERCENT_CHANGE: 2
FEV1/FVC_PERCENT_CHANGE: 50
FEV1/FVC: 83
FVC_LLN: 3.81
FVC_PREDICTED: 4.57
FVC: 3.96
FEV1/FVC_PERCENT_CHANGE: -2
FEV1_PERCENT_PREDICTED: 93

## 2020-06-03 ASSESSMENT — ENCOUNTER SYMPTOMS
WEAKNESS: 0
ORTHOPNEA: 0
SPEECH CHANGE: 0
FEVER: 0
STRIDOR: 0
DOUBLE VISION: 0
HEADACHES: 0
CLAUDICATION: 0
DEPRESSION: 0
SINUS PAIN: 0
EYE DISCHARGE: 0
SPUTUM PRODUCTION: 0
TREMORS: 0
HEMOPTYSIS: 0
VOMITING: 0
WEIGHT LOSS: 0
BACK PAIN: 0
SORE THROAT: 0
MYALGIAS: 0
PND: 0
NECK PAIN: 0
CHILLS: 0
DIARRHEA: 0
FALLS: 0
FOCAL WEAKNESS: 0
PHOTOPHOBIA: 0
EYE REDNESS: 0
SHORTNESS OF BREATH: 0
HEARTBURN: 0
COUGH: 0
DIAPHORESIS: 0
NAUSEA: 0
ABDOMINAL PAIN: 0
BLURRED VISION: 0
CONSTIPATION: 0
WHEEZING: 0
EYE PAIN: 0
PALPITATIONS: 0
DIZZINESS: 0

## 2020-06-03 ASSESSMENT — FIBROSIS 4 INDEX: FIB4 SCORE: 2.57

## 2020-06-03 NOTE — PROCEDURES
Technician: KEDAR Faria    Technician Comment:  Good patient effort & cooperation.  The results of this test meet the ATS/ERS standards for acceptability & reproducibility.  Test was performed on the natue Body Plethysmograph-Elite DX system.  Predicted values were GLI-2012 for spirometry, GLI-2017 for DLCO, ITS for Lung Volumes.  The DLCO was uncorrected for Hgb.  A bronchodilator of Ventolin HFA -2puffs via spacer administered.  DLCO performed during dilation period.    Interpretation:  1.  Baseline spirometry shows normal airflows with FEV1 of 3.15 L or 93% predicted and FVC of 3.77 L or 82% predicted.  FEV1-FVC ratio is 83.  2.  There is no significant bronchodilator response.  3.  Lung volumes are within normal limits.  4.  DLCO is within normal limits.  Normal pulmonary function testing.

## 2020-06-03 NOTE — PROGRESS NOTES
Chief Complaint   Patient presents with   • Follow-Up     LAST SEEN 12/3/19    • Results     PFT 60 6/3/2020, CXR 12/26/19    • Apnea     last seen 3/3/2020 erika Angeles          HPI: This patient is a 79 y.o. male whom is followed in our clinic for abnormal PFTs in the setting of chronic amiodarone therapy last seen by me on 12/3/19.  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  The patient is a former smoker with only roughly 7-8-pack-year history and quit in 2004.  The patient removed to the mediastinal area from just above segmental in [place] in 2019.  He did have dyspnea on exertion following his initial patient however has subsequently adjusted 2002.  Pulmonary function testing from October 2019 showed an FEV1 of 2.97 L or 80% predicted and FVC at 84% predicted with a ratio of 77.  His total lung capacity was 84% predicted and a DLCO 78% predicted.  He did not desaturate on 6-minute walk test done in November and chest x-ray from May 2019 showed cardiomegaly but no interstitial markings.  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  We had plan to obtain high solution CT chest and PFTs in 6 months.  Patient declined high-resolution CT given he was concerned that he could not get this test with his AICD he remains asymptomatic from a pulmonary standpoint presents today with routine follow-up pulmonary function testing which shows FEV1 of 3.15 L or 93% predicted and FVC of 3.77 L or 82% predicted with FEV1/FVC ratio of 83.  His total lung capacity 6.85 or 87%  predicted and DLCO was 83% predicted.    Past Medical History:   Diagnosis Date   • Breath shortness     on exertion; no c/o today; improving slightly   • Cardiomyopathy (HCC) 08/2019    Echocardiogram with mildly dilated LV, mild concentric, LVEF 15%. Severely dilated LA. Trace MR, trace AI, mild TR. RVSP 26mmHg.   • Chronic obstructive pulmonary disease (HCC)    • [place] measles    • Heart attack (HCC)    • History of abdominal aortic aneurysm (AAA) 2009    Status post stent   • Hyperlipidemia    • Hypertension    • Mumps    • Pacemaker    • Paroxysmal atrial fibrillation (HCC)     Status post Watchman procedure in [place].   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    • Stroke (HCC) 2012    TIA   • Tonsillitis    • Ventricular tachycardia (HCC)        Social History     Socioeconomic History   • Marital status: Single     Spouse name: Not on file   • Number of children: Not on file   • Years of education: Not on file   • Highest education level: Not on file   Occupational History   • Not on file   Social Needs   • Financial resource strain: Not on file   • Food insecurity     Worry: Not on file     Inability: Not on file   • Transportation needs     Medical: Not on file     Non-medical: Not on file   Tobacco Use   • Smoking status: Former Smoker     Packs/day: 0.50     Years: 15.00     Pack years: 7.50     Types: Cigarettes     Last attempt to quit: 7/11/2004     Years since quitting: 15.9   • Smokeless tobacco: Never Used   Substance and Sexual Activity   • Alcohol use: Yes     Alcohol/week: 1.2 oz     Types: 2 Glasses of wine per week     Frequency: Never     Drinks per session: 1 or 2     Binge frequency: Never   • Drug use: No   • Sexual activity: Not on file   Lifestyle   • Physical activity     Days per week: Not on file     Minutes per session: Not on file   • Stress: Not on file   Relationships   • Social connections     Talks on phone: Not on file     Gets together: Not on file     Attends [employer]  service: Not on file     Active member of club or organization: Not on file     Attends meetings of clubs or organizations: Not on file     Relationship status: Not on file   • Intimate partner violence     Fear of current or ex partner: Not on file     Emotionally abused: Not on file     Physically abused: Not on file     Forced sexual activity: Not on file   Other Topics Concern   • Not on file   Social History Narrative   • Not on file       Family History   Problem Relation Age of Onset   • Cancer Mother    • Cancer Sister        Current Outpatient Medications on File Prior to Visit   Medication Sig Dispense Refill   • clopidogrel (PLAVIX) 75 MG Tab Take 1 Tab by mouth every day. 30 Tab 2   • spironolactone (ALDACTONE) 25 MG Tab Take 0.5 Tabs by mouth every day. 45 Tab 3   • furosemide (LASIX) 20 MG Tab Take 20 mg by mouth as needed. Take Mon, Wed, Fri     • losartan (COZAAR) 25 MG Tab Take 1 Tab by mouth every day. 30 Tab 2   • vitamin D, Ergocalciferol, (DRISDOL) 14763 units Cap capsule Take 50,000 Units by mouth every 7 days. MONDAY     • aspirin EC (ECOTRIN) 81 MG Tablet Delayed Response Take 81 mg by mouth every day.     • amiodarone (CORDARONE) 200 MG Tab Take 200 mg by mouth every day.     • cinacalcet (SENSIPAR) 30 MG Tab Take 30 mg by mouth 3 times a week.     • carvedilol (COREG) 25 MG Tab Take 12.5-25 mg by mouth 2 times a day. Pt takes 25MG in AM and 12.5MG HS      • atorvastatin (LIPITOR) 20 MG Tab Take 20 mg by mouth every day.     • Omega-3 Fatty Acids (FISH OIL) 1000 MG Cap capsule Take 1,000 mg by mouth every day.     • multivitamin (THERAGRAN) Tab Take 1 Tab by mouth every day.     • albuterol 108 (90 Base) MCG/ACT Aero Soln inhalation aerosol Inhale 2 Puffs by mouth every 6 hours as needed for Shortness of Breath. 8.5 g 0     No current facility-administered medications on file prior to visit.        Entresto [sacubitril-valsartan]      ROS:   Review of Systems   Constitutional: Negative for  chills, diaphoresis, fever, malaise/fatigue and weight loss.   HENT: Negative for congestion, ear discharge, ear pain, hearing loss, nosebleeds, sinus pain, sore throat and tinnitus.    Eyes: Negative for blurred vision, double vision, photophobia, pain, discharge and redness.   Respiratory: Negative for cough, hemoptysis, sputum production, shortness of breath, wheezing and stridor.    Cardiovascular: Negative for chest pain, palpitations, orthopnea, claudication, leg swelling and PND.   Gastrointestinal: Negative for abdominal pain, constipation, diarrhea, heartburn, nausea and vomiting.   Genitourinary: Negative for dysuria and urgency.   Musculoskeletal: Negative for back pain, falls, joint pain, myalgias and neck pain.   Skin: Negative for itching and rash.   Neurological: Negative for dizziness, tremors, speech change, focal weakness, weakness and headaches.   Endo/Heme/Allergies: Negative for environmental allergies.   Psychiatric/Behavioral: Negative for depression.       /60 (BP Location: Left arm, Patient Position: Sitting, BP Cuff Size: Adult)   Pulse 87   Temp 36.4 °C (97.5 °F) (Temporal)   Resp 16   SpO2 100%   Physical Exam  Vitals signs reviewed.   Constitutional:       General: He is not in acute distress.     Appearance: Normal appearance. He is normal weight. He is not toxic-appearing.   HENT:      Head: Normocephalic and atraumatic.      Right Ear: External ear normal.      Left Ear: External ear normal.      Nose: Nose normal. No congestion.      Mouth/Throat:      Mouth: Mucous membranes are moist.      Pharynx: Oropharynx is clear. No oropharyngeal exudate.   Eyes:      General: No scleral icterus.     Extraocular Movements: Extraocular movements intact.      Conjunctiva/sclera: Conjunctivae normal.      Pupils: Pupils are equal, round, and reactive to light.   Neck:      Musculoskeletal: Normal range of motion and neck supple.   Cardiovascular:      Rate and Rhythm: Normal rate and  regular rhythm.      Heart sounds: Normal heart sounds.   Pulmonary:      Effort: Pulmonary effort is normal. No respiratory distress.      Breath sounds: Normal breath sounds. No wheezing or rales.   Abdominal:      General: There is no distension.      Palpations: Abdomen is soft.   Musculoskeletal: Normal range of motion.      Right lower leg: No edema.      Left lower leg: No edema.   Skin:     General: Skin is warm and dry.      Findings: No rash.   Neurological:      Mental Status: He is alert and oriented to person, place, and time.      Cranial Nerves: No cranial nerve deficit.   Psychiatric:         Mood and Affect: Mood normal.         Behavior: Behavior normal.         PFTs as reviewed by me personally:as per hPI    Imaging as reviewed by me personally:  As per hPI    Assessment:  1. On amiodarone therapy  PULMONARY FUNCTION TESTS -Test requested: Complete Pulmonary Function Test   2. Dilated cardiomyopathy (HCC)     3. History of tobacco use         Plan:  1.  This is chronic and PFTs today are stable to slightly improved from PFTs in October.  Exam is within normal limits, normal oxygenation and chest x-ray from 1 year ago showed no evidence of interstitial lung disease.  I think at this point we can forego any high-resolution CT chest and see him on an annual basis with PFTs to ensure they are stable or sooner if symptoms develop.  2.  Patient is followed by cardiology.  He has low EF but has remained stable and fairly functional.  No evidence of decompensation on exam today.  He is currently being considered for resynchronization therapy.  3.  Patient is tobacco free with no evidence of COPD.  Encouraged ongoing abstinence.  He is not a candidate for lung cancer screening.  Return in about 1 year (around 6/3/2021) for pfts.

## 2020-06-08 ENCOUNTER — APPOINTMENT (OUTPATIENT)
Dept: SLEEP MEDICINE | Facility: MEDICAL CENTER | Age: 80
End: 2020-06-08
Payer: MEDICARE

## 2020-06-08 ENCOUNTER — SLEEP CENTER VISIT (OUTPATIENT)
Dept: SLEEP MEDICINE | Facility: MEDICAL CENTER | Age: 80
End: 2020-06-08
Payer: MEDICARE

## 2020-06-08 VITALS
HEART RATE: 66 BPM | BODY MASS INDEX: 25.15 KG/M2 | HEIGHT: 74 IN | WEIGHT: 196 LBS | OXYGEN SATURATION: 94 % | SYSTOLIC BLOOD PRESSURE: 128 MMHG | DIASTOLIC BLOOD PRESSURE: 62 MMHG

## 2020-06-08 DIAGNOSIS — I50.20 ACC/AHA STAGE C SYSTOLIC HEART FAILURE (HCC): ICD-10-CM

## 2020-06-08 DIAGNOSIS — I48.0 PAROXYSMAL ATRIAL FIBRILLATION (HCC): ICD-10-CM

## 2020-06-08 DIAGNOSIS — G47.31 COMPLEX SLEEP APNEA SYNDROME: ICD-10-CM

## 2020-06-08 DIAGNOSIS — Z95.810 AICD (AUTOMATIC CARDIOVERTER/DEFIBRILLATOR) PRESENT: ICD-10-CM

## 2020-06-08 PROCEDURE — 99213 OFFICE O/P EST LOW 20 MIN: CPT | Performed by: NURSE PRACTITIONER

## 2020-06-08 ASSESSMENT — FIBROSIS 4 INDEX: FIB4 SCORE: 2.57

## 2020-06-08 NOTE — PROGRESS NOTES
"Chief Complaint   Patient presents with   • Follow-Up     Apnea-Last seen 03/30/2020       HPI:      Mr. Parker is a 78 y/o male patient who is in today for four month HARPAL f/u. Patient has a complex history with a history of systolic heart failure, PVD, cardiomyopathy, TIA, hypertension, AAA, chronic kidney disease, PFD, COPD, polycythemia, former smoker.     PSG split-night 3/12/2020 indicated severe sleep apnea with an overall AHI 54.5/h and an O2 mauricio of 82%.  Patient met split-night criteria and ultimately did best on BiPAP ST 14/8 cm with a backup rate of 12 breaths/min.  AHI reduced to 0/h with a mean SPO2 91%.     Compliance report from 5/9/20-6/7/20 was downloaded and reviewed with the patient which showed BiPAP ST 14/8 cmH2O, RR 12 bpm, 97% compliance, 4 hrs 6 min (>4 hrs 53%, < 4 hrs 43%), AHI of 12.3.  He had trouble utilizing the machine for longer than 4 hours due to mask fitting and leak problems.  He has gone through 3 different masks and now feels he has found a fullface mask which seems to work much better.  He will utilize the machine as long as he can over the next month to meet compliance.  He notices that the machine is \"forcing him to take a breath\" which he still needs to get used to.  He does not feel as if he is suffocating or as if the machine is making him breathe too fast.  The ramp-up time is set for 5 minutes.  Since he has been using the BiPAP he has felt more rested and much better in the mornings. He goes to bed at 10 pm and wakes up between 6- 7am.  He is able to sleep through the night without any issues, and occasionally wakes up to use the restroom.  He denies morning headaches.  He has noticed increased dry mouth in the morning.  He denies any new health problems or medications.      ROS:  Constitutional: Denies fevers, chills, sweats, fatigue, weight loss  Eyes: Denies glasses, vision loss, pain, drainage, double vision  Ears/Nose/Mouth/Throat: Denies rhinitis, nasal " congestion, ear ache, difficulty hearing, sore throat, persistent hoarseness, decayed teeth/toothache  Cardiovascular: Denies chest pain, tightness, palpitations, swelling in feet/legs, fainting, difficulty breathing when laying down  Respiratory: Denies shortness of breath, cough, sputum, wheezing, painful breathing, coughing up blood  GI: Denies heartburn, difficulty swallowing, nausea, vomiting, abdominal pain, diarrhea, constiptation  : Denies frequent urination, painful urination  Integumentary: Denies rashes, lumps or color changes  Neurological: Denies frequent headaches, dizziness, weakness  Sleep: Denies daytime sleepiness, loud snoring, stops breathing during sleep, waking up gasping for air, insomnia, morning headaches      Past Medical History:   Diagnosis Date   • Breath shortness     on exertion; no c/o today; improving slightly   • Cardiomyopathy (HCC) 08/2019    Echocardiogram with mildly dilated LV, mild concentric, LVEF 15%. Severely dilated LA. Trace MR, trace AI, mild TR. RVSP 26mmHg.   • Chronic obstructive pulmonary disease (HCC)    • Amharic measles    • Heart attack (HCC)    • History of abdominal aortic aneurysm (AAA) 2009    Status post stent   • Hyperlipidemia    • Hypertension    • Mumps    • Pacemaker    • Paroxysmal atrial fibrillation (HCC)     Status post Watchman procedure in [place].   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    • Stroke (HCC) 2012    TIA   • Tonsillitis    • Ventricular tachycardia (HCC)        Past Surgical History:   Procedure Laterality Date   • AICD BATTERY CHANGE Left 01/03/2017    Generator replacement with [employer] Evera MRI XT  [id number] implanted in [place].   • OTHER CARDIAC SURGERY  2014    watchman device   • AAA WITH STENT GRAFT  2009   • OTHER ORTHOPEDIC SURGERY  2005    hip surgery   • OTHER  2000    kidney removed   • OTHER CARDIAC SURGERY  1991    AICD implanted       Family History   Problem Relation Age of Onset   • Cancer Mother    • Cancer  Sister        Social History     Socioeconomic History   • Marital status: Single     Spouse name: Not on file   • Number of children: Not on file   • Years of education: Not on file   • Highest education level: Not on file   Occupational History   • Not on file   Social Needs   • Financial resource strain: Not on file   • Food insecurity     Worry: Not on file     Inability: Not on file   • Transportation needs     Medical: Not on file     Non-medical: Not on file   Tobacco Use   • Smoking status: Former Smoker     Packs/day: 0.50     Years: 15.00     Pack years: 7.50     Types: Cigarettes     Last attempt to quit: 7/11/2004     Years since quitting: 15.9   • Smokeless tobacco: Never Used   Substance and Sexual Activity   • Alcohol use: Yes     Alcohol/week: 1.2 oz     Types: 2 Glasses of wine per week     Frequency: Never     Drinks per session: 1 or 2     Binge frequency: Never   • Drug use: No   • Sexual activity: Not on file   Lifestyle   • Physical activity     Days per week: Not on file     Minutes per session: Not on file   • Stress: Not on file   Relationships   • Social connections     Talks on phone: Not on file     Gets together: Not on file     Attends Islam service: Not on file     Active member of club or organization: Not on file     Attends meetings of clubs or organizations: Not on file     Relationship status: Not on file   • Intimate partner violence     Fear of current or ex partner: Not on file     Emotionally abused: Not on file     Physically abused: Not on file     Forced sexual activity: Not on file   Other Topics Concern   • Not on file   Social History Narrative   • Not on file       Allergies as of 06/08/2020 - Reviewed 06/08/2020   Allergen Reaction Noted   • Entresto [sacubitril-valsartan]  06/01/2020        Vitals:  Vitals:    06/08/20 1112   BP: 128/62   Pulse: 66   SpO2: 94%         Current medications as of today   Current Outpatient Medications   Medication Sig Dispense Refill    • clopidogrel (PLAVIX) 75 MG Tab Take 1 Tab by mouth every day. 30 Tab 2   • furosemide (LASIX) 20 MG Tab Take 20 mg by mouth as needed. Take Mon, Wed, Fri     • losartan (COZAAR) 25 MG Tab Take 1 Tab by mouth every day. 30 Tab 2   • vitamin D, Ergocalciferol, (DRISDOL) 75915 units Cap capsule Take 50,000 Units by mouth every 7 days. MONDAY     • aspirin EC (ECOTRIN) 81 MG Tablet Delayed Response Take 81 mg by mouth every day.     • cinacalcet (SENSIPAR) 30 MG Tab Take 30 mg by mouth 3 times a week.     • carvedilol (COREG) 25 MG Tab Take 12.5-25 mg by mouth 2 times a day. Pt takes 25MG in AM and 12.5MG HS      • atorvastatin (LIPITOR) 20 MG Tab Take 20 mg by mouth every day.     • Omega-3 Fatty Acids (FISH OIL) 1000 MG Cap capsule Take 1,000 mg by mouth every day.     • multivitamin (THERAGRAN) Tab Take 1 Tab by mouth every day.     • albuterol 108 (90 Base) MCG/ACT Aero Soln inhalation aerosol Inhale 2 Puffs by mouth every 6 hours as needed for Shortness of Breath. 8.5 g 0   • spironolactone (ALDACTONE) 25 MG Tab Take 0.5 Tabs by mouth every day. 45 Tab 3   • amiodarone (CORDARONE) 200 MG Tab Take 200 mg by mouth every day.       No current facility-administered medications for this visit.          Physical Exam:   Appearance: Well developed, well nourished, no acute distress  Eyes: PERRL, EOM intact, sclera white, conjunctiva moist  Ears: no lesions or deformities  Hearing: grossly intact  Nose: no lesions or deformities  Respiratory effort: no intercostal retractions or use of accessory muscles  Extremities: no cyanosis or edema  Abdomen: soft ,non tender, no masses  Gait and Station: normal  Digits and nails: no clubbing, cyanosis, petechiae or nodes.  Cranial nerves: grossly intact  Skin: no rashes, lesions or ulcers noted  Orientation: Oriented to time, person and place  Mood and affect: mood and affect appropriate, normal interaction with examiner  Judgement: Intact    Assessment:  1. Complex sleep apnea  syndrome     2. ACC/AHA stage C systolic heart failure (HCC)     3. AICD (automatic cardioverter/defibrillator) present     4. Paroxysmal atrial fibrillation (HCC)           Plan    1. Compliance report from 5/9/20-6/7/20 was downloaded and reviewed with the patient which showed BiPAP ST 14/8 cmH2O, RR 12 bpm, 97% compliance, 4 hrs 6 min (>4 hrs 53%, < 4 hrs 43%), AHI of 12.3. Continue BiPAP ST. Patient is compliant and benefiting from BiPAP ST therapy for management of HARPAL.   2. Encouraged patient to use the BiPAP longer than 4 hrs on all days he uses the machine to meet compliance and obtain optimal health benefit.   3. Advised patient to increase ramp up time to allow him to fall asleep. He will reach out to [employer].   4. Advised patient to increase humidifier setting to 2-3 to help with dry mouth.  5. Depending on next compliance review and patient's adjustment to therapy, will determine if decreasing respiratory rate to 11 and decreasing IPAP to 13 cmH2O will be appropriate.  6. F/u in 4 weeks or sooner.      PAULA Arias.      This dictation was created using voice recognition software. The accuracy of the dictation is limited to the abilities of the software. I expect there may be some errors of grammar and possibly content.

## 2020-06-09 ENCOUNTER — OFFICE VISIT (OUTPATIENT)
Dept: CARDIOLOGY | Facility: MEDICAL CENTER | Age: 80
End: 2020-06-09
Payer: MEDICARE

## 2020-06-09 VITALS
HEART RATE: 85 BPM | HEIGHT: 74 IN | OXYGEN SATURATION: 96 % | SYSTOLIC BLOOD PRESSURE: 118 MMHG | WEIGHT: 201 LBS | DIASTOLIC BLOOD PRESSURE: 74 MMHG | BODY MASS INDEX: 25.8 KG/M2

## 2020-06-09 DIAGNOSIS — I50.9 HEART FAILURE, NYHA CLASS 2 (HCC): ICD-10-CM

## 2020-06-09 PROCEDURE — 99215 OFFICE O/P EST HI 40 MIN: CPT | Mod: 25 | Performed by: INTERNAL MEDICINE

## 2020-06-09 PROCEDURE — 93283 PRGRMG EVAL IMPLANTABLE DFB: CPT | Performed by: INTERNAL MEDICINE

## 2020-06-09 ASSESSMENT — FIBROSIS 4 INDEX: FIB4 SCORE: 2.57

## 2020-06-09 NOTE — PROGRESS NOTES
Arrhythmia Clinic Note (New patient)     DOS: 6/9/2020    Referring physician: Dr Velázquez    Chief complaint/Reason for consult: CHF    HPI: 78 y/o M with chronic systolic heart failure, EF 15%, s/p ICD, LBBB, pAF s/p LAAO, referred for management. H/o dual chamber PPM in 2000 with ICD upgrade in 2002 (abandoned RV P/S lead). He has chronic NYHA II symptoms with SOB on exertion. No chest pain. No syncope or presyncope. Reports history of appropriate ICD shock. Referred for consideration of CRT upgrade.    ROS (+ highlighted in bold):  Constitutional: Fevers/chills/fatigue/weightloss  HEENT: Blurry vision/eye pain/sore throat/hearing loss  Respiratory: Shortness of breath/cough  Cardiovascular: Chest pain/palpitations/edema/orthopnea/syncope  GI: Nausea/vomitting/diarrhea  MSK: Arthralgias/myagias/muscle weakness  Skin: Rash/sores  Neurological: Numbness/tremors/vertigo  Endocrine: Excessive thirst/polyuria/cold intolerance/heat intolerance  Psych: Depression/anxiety    Past Medical History:   Diagnosis Date   • Breath shortness     on exertion; no c/o today; improving slightly   • Cardiomyopathy (HCC) 08/2019    Echocardiogram with mildly dilated LV, mild concentric, LVEF 15%. Severely dilated LA. Trace MR, trace AI, mild TR. RVSP 26mmHg.   • Chronic obstructive pulmonary disease (HCC)    • Congolese measles    • Heart attack (HCC)    • History of abdominal aortic aneurysm (AAA) 2009    Status post stent   • Hyperlipidemia    • Hypertension    • Mumps    • Pacemaker    • Paroxysmal atrial fibrillation (HCC)     Status post Watchman procedure in [place].   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    • Stroke (HCC) 2012    TIA   • Tonsillitis    • Ventricular tachycardia (HCC)        Past Surgical History:   Procedure Laterality Date   • AICD BATTERY CHANGE Left 01/03/2017    Generator replacement with [employer] Evera MRI XT  [id number] implanted in [place].   • OTHER CARDIAC SURGERY  2014    watchman device   • AAA WITH STENT  GRAFT  2009   • OTHER ORTHOPEDIC SURGERY  2005    hip surgery   • OTHER  2000    kidney removed   • OTHER CARDIAC SURGERY  1991    AICD implanted       Social History     Socioeconomic History   • Marital status: Single     Spouse name: Not on file   • Number of children: Not on file   • Years of education: Not on file   • Highest education level: Not on file   Occupational History   • Not on file   Social Needs   • Financial resource strain: Not on file   • Food insecurity     Worry: Not on file     Inability: Not on file   • Transportation needs     Medical: Not on file     Non-medical: Not on file   Tobacco Use   • Smoking status: Former Smoker     Packs/day: 0.50     Years: 15.00     Pack years: 7.50     Types: Cigarettes     Last attempt to quit: 7/11/2004     Years since quitting: 15.9   • Smokeless tobacco: Never Used   Substance and Sexual Activity   • Alcohol use: Yes     Alcohol/week: 1.2 oz     Types: 2 Glasses of wine per week     Frequency: Never     Drinks per session: 1 or 2     Binge frequency: Never   • Drug use: No   • Sexual activity: Not on file   Lifestyle   • Physical activity     Days per week: Not on file     Minutes per session: Not on file   • Stress: Not on file   Relationships   • Social connections     Talks on phone: Not on file     Gets together: Not on file     Attends Jain service: Not on file     Active member of club or organization: Not on file     Attends meetings of clubs or organizations: Not on file     Relationship status: Not on file   • Intimate partner violence     Fear of current or ex partner: Not on file     Emotionally abused: Not on file     Physically abused: Not on file     Forced sexual activity: Not on file   Other Topics Concern   • Not on file   Social History Narrative   • Not on file       Family History   Problem Relation Age of Onset   • Cancer Mother    • Cancer Sister        Allergies   Allergen Reactions   • Entresto [Sacubitril-Valsartan]       "Swollen tongue. Angioedema.       Current Outpatient Medications   Medication Sig Dispense Refill   • spironolactone (ALDACTONE) 25 MG Tab Take 0.5 Tabs by mouth every day. 45 Tab 3   • furosemide (LASIX) 20 MG Tab Take 20 mg by mouth as needed. Take Mon, Fri     • losartan (COZAAR) 25 MG Tab Take 1 Tab by mouth every day. 30 Tab 2   • vitamin D, Ergocalciferol, (DRISDOL) 12350 units Cap capsule Take 50,000 Units by mouth every 7 days. MONDAY     • aspirin EC (ECOTRIN) 81 MG Tablet Delayed Response Take 81 mg by mouth every day.     • amiodarone (CORDARONE) 200 MG Tab Take 200 mg by mouth every day.     • cinacalcet (SENSIPAR) 30 MG Tab Take 30 mg by mouth 3 times a week.     • carvedilol (COREG) 25 MG Tab Take 12.5-25 mg by mouth 2 times a day. Pt takes 25MG in AM and 12.5MG HS      • atorvastatin (LIPITOR) 20 MG Tab Take 20 mg by mouth every day.     • Omega-3 Fatty Acids (FISH OIL) 1000 MG Cap capsule Take 1,000 mg by mouth every day.     • multivitamin (THERAGRAN) Tab Take 1 Tab by mouth every day.     • albuterol 108 (90 Base) MCG/ACT Aero Soln inhalation aerosol Inhale 2 Puffs by mouth every 6 hours as needed for Shortness of Breath. 8.5 g 0   • clopidogrel (PLAVIX) 75 MG Tab Take 1 Tab by mouth every day. (Patient not taking: Reported on 6/9/2020) 30 Tab 2     No current facility-administered medications for this visit.        Physical Exam:  Vitals:    06/09/20 0832   BP: 118/74   BP Location: Left arm   Patient Position: Sitting   BP Cuff Size: Adult   Pulse: 85   SpO2: 96%   Weight: 91.2 kg (201 lb)   Height: 1.88 m (6' 2\")     General appearance: NAD, conversant   Eyes: anicteric sclerae, moist conjunctivae; no lid-lag; PERRLA  HENT: Atraumatic; oropharynx clear with moist mucous membranes and no mucosal ulcerations; normal hard and soft palate  Neck: Trachea midline; FROM, supple, no thyromegaly or lymphadenopathy  Lungs: CTA, with normal respiratory effort and no intercostal retractions  CV: RRR, no " MRGs, no JVD   Abdomen: Soft, non-tender; no masses or HSM  Extremities: No peripheral edema or extremity lymphadenopathy  Skin: Normal temperature, turgor and texture; no rash, ulcers or subcutaneous nodules  Psych: Appropriate affect, alert and oriented to person, place and time    Data:  Lipids:   Lab Results   Component Value Date/Time    CHOLSTRLTOT 192 07/17/2019 08:37 AM    TRIGLYCERIDE 89 07/17/2019 08:37 AM    HDL 61 07/17/2019 08:37 AM     (H) 07/17/2019 08:37 AM        BMP:  Lab Results   Component Value Date/Time    SODIUM 138 04/13/2020 0855    SODIUM 139 04/13/2020 0855    POTASSIUM 5.1 04/13/2020 0855    POTASSIUM 5.1 04/13/2020 0855    CHLORIDE 107 04/13/2020 0855    CHLORIDE 106 04/13/2020 0855    CO2 21 04/13/2020 0855    CO2 20 04/13/2020 0855    GLUCOSE 115 (H) 04/13/2020 0855    GLUCOSE 114 (H) 04/13/2020 0855    BUN 32 (H) 04/13/2020 0855    BUN 32 (H) 04/13/2020 0855    CREATININE 1.75 (H) 04/13/2020 0855    CREATININE 1.81 (H) 04/13/2020 0855    CALCIUM 9.0 04/13/2020 0855    CALCIUM 9.0 04/13/2020 0855    ANION 10.0 04/13/2020 0855        TSH:   Lab Results   Component Value Date/Time    TSHULTRASEN 4.630 07/17/2019 0837        THYROXINE (T4):   No results found for: KEATON     CBC:   Lab Results   Component Value Date/Time    WBC 4.6 (L) 04/13/2020 08:55 AM    RBC 4.75 04/13/2020 08:55 AM    HEMOGLOBIN 15.8 04/13/2020 08:55 AM    HEMATOCRIT 47.1 04/13/2020 08:55 AM    MCV 99.2 (H) 04/13/2020 08:55 AM    MCH 33.3 (H) 04/13/2020 08:55 AM    MCHC 33.5 (L) 04/13/2020 08:55 AM    RDW 50.5 (H) 04/13/2020 08:55 AM    PLATELETCT 150 (L) 04/13/2020 08:55 AM    MPV 11.2 04/13/2020 08:55 AM    NEUTSPOLYS 56.40 04/13/2020 08:55 AM    LYMPHOCYTES 28.10 04/13/2020 08:55 AM    MONOCYTES 11.80 04/13/2020 08:55 AM    EOSINOPHILS 3.30 04/13/2020 08:55 AM    BASOPHILS 0.20 04/13/2020 08:55 AM    IMMGRAN 0.20 04/13/2020 08:55 AM    NRBC 0.00 04/13/2020 08:55 AM    NEUTS 2.57 04/13/2020 08:55 AM     LYMPHS 1.28 04/13/2020 08:55 AM    MONOS 0.54 04/13/2020 08:55 AM    EOS 0.15 04/13/2020 08:55 AM    BASO 0.01 04/13/2020 08:55 AM    IMMGRANAB 0.01 04/13/2020 08:55 AM    NRBCAB 0.00 04/13/2020 08:55 AM        CBC w/o DIFF  Lab Results   Component Value Date/Time    WBC 4.6 (L) 04/13/2020 08:55 AM    RBC 4.75 04/13/2020 08:55 AM    HEMOGLOBIN 15.8 04/13/2020 08:55 AM    MCV 99.2 (H) 04/13/2020 08:55 AM    MCH 33.3 (H) 04/13/2020 08:55 AM    MCHC 33.5 (L) 04/13/2020 08:55 AM    RDW 50.5 (H) 04/13/2020 08:55 AM    MPV 11.2 04/13/2020 08:55 AM       Prior echo/stress results reviewed: EF 15%    EKG interpreted by me: Ap Vs with , LBBB pattern    Impression/Plan:  1. Heart failure, NYHA class 2 ([facility])  CL-BIV UPGRADE (FROM AICD OR PPM)     1. Chronic systolic heart failure  2. NICM  3. LBBB  4. ICD in situ    - Discussed CRT upgrade as a treatment of heart failure. The risk, benefits, and alternatives to pacemaker placement were discussed in great detail, specific risks mentioned including bleeding, infection, cardiac perforation with possible tamponade requiring pericardiocentesis or open heart surgery.  In addition the possibility of lead dislodgment, pneumothorax, hemothorax were discussed. Also mentioned were the possibility of death, stroke, and myocardial infarction. The patient verbalized understanding of these potential complications and wishes to proceed with this procedure.   - Discussed that given severely reduced EF, and not overly wide LBBB, that there is a chance he doesn't derive a lot of benefit from CRT, but that it is worth a shot.  - Also discussed high likelihood of subclavian occlusion. Discussed that we may need to tunnel under anesthesia for R sided implantation.  - He understands there is a 10% risk of unsuccessful implant.    - F/u in device clinic post-upgrade    Tolu Barrios MD  Cardiac Electrophysiology

## 2020-06-10 ENCOUNTER — TELEPHONE (OUTPATIENT)
Dept: CARDIOLOGY | Facility: MEDICAL CENTER | Age: 80
End: 2020-06-10

## 2020-06-10 NOTE — TELEPHONE ENCOUNTER
Patient scheduled for upgrade to BiV ICD w/anesthesia on 7-10-20 at [facility] with Dr. Barrios.

## 2020-07-06 ENCOUNTER — OFFICE VISIT (OUTPATIENT)
Dept: ADMISSIONS | Facility: MEDICAL CENTER | Age: 80
End: 2020-07-06
Attending: INTERNAL MEDICINE
Payer: MEDICARE

## 2020-07-06 DIAGNOSIS — Z01.812 PRE-OPERATIVE LABORATORY EXAMINATION: ICD-10-CM

## 2020-07-06 DIAGNOSIS — Z01.810 PRE-OPERATIVE CARDIOVASCULAR EXAMINATION: ICD-10-CM

## 2020-07-06 LAB
ALBUMIN SERPL BCP-MCNC: 4.1 G/DL (ref 3.2–4.9)
ALBUMIN/GLOB SERPL: 1.7 G/DL
ALP SERPL-CCNC: 75 U/L (ref 30–99)
ALT SERPL-CCNC: 42 U/L (ref 2–50)
ANION GAP SERPL CALC-SCNC: 13 MMOL/L (ref 7–16)
AST SERPL-CCNC: 24 U/L (ref 12–45)
BILIRUB SERPL-MCNC: 0.6 MG/DL (ref 0.1–1.5)
BUN SERPL-MCNC: 31 MG/DL (ref 8–22)
CALCIUM SERPL-MCNC: 8.7 MG/DL (ref 8.5–10.5)
CHLORIDE SERPL-SCNC: 106 MMOL/L (ref 96–112)
CO2 SERPL-SCNC: 21 MMOL/L (ref 20–33)
COVID ORDER STATUS COVID19: NORMAL
CREAT SERPL-MCNC: 1.75 MG/DL (ref 0.5–1.4)
EKG IMPRESSION: NORMAL
ERYTHROCYTE [DISTWIDTH] IN BLOOD BY AUTOMATED COUNT: 51.8 FL (ref 35.9–50)
GLOBULIN SER CALC-MCNC: 2.4 G/DL (ref 1.9–3.5)
GLUCOSE SERPL-MCNC: 122 MG/DL (ref 65–99)
HCT VFR BLD AUTO: 47 % (ref 42–52)
HGB BLD-MCNC: 15.4 G/DL (ref 14–18)
INR PPP: 1 (ref 0.87–1.13)
MCH RBC QN AUTO: 34.7 PG (ref 27–33)
MCHC RBC AUTO-ENTMCNC: 32.8 G/DL (ref 33.7–35.3)
MCV RBC AUTO: 105.9 FL (ref 81.4–97.8)
PLATELET # BLD AUTO: 147 K/UL (ref 164–446)
PMV BLD AUTO: 11.6 FL (ref 9–12.9)
POTASSIUM SERPL-SCNC: 5.7 MMOL/L (ref 3.6–5.5)
PROT SERPL-MCNC: 6.5 G/DL (ref 6–8.2)
PROTHROMBIN TIME: 13.5 SEC (ref 12–14.6)
RBC # BLD AUTO: 4.44 M/UL (ref 4.7–6.1)
SARS-COV-2 RNA RESP QL NAA+PROBE: NOTDETECTED
SODIUM SERPL-SCNC: 140 MMOL/L (ref 135–145)
SPECIMEN SOURCE: NORMAL
WBC # BLD AUTO: 4.3 K/UL (ref 4.8–10.8)

## 2020-07-06 PROCEDURE — 80053 COMPREHEN METABOLIC PANEL: CPT

## 2020-07-06 PROCEDURE — 93005 ELECTROCARDIOGRAM TRACING: CPT

## 2020-07-06 PROCEDURE — 93010 ELECTROCARDIOGRAM REPORT: CPT | Performed by: INTERNAL MEDICINE

## 2020-07-06 PROCEDURE — 85610 PROTHROMBIN TIME: CPT

## 2020-07-06 PROCEDURE — 85027 COMPLETE CBC AUTOMATED: CPT

## 2020-07-06 PROCEDURE — U0003 INFECTIOUS AGENT DETECTION BY NUCLEIC ACID (DNA OR RNA); SEVERE ACUTE RESPIRATORY SYNDROME CORONAVIRUS 2 (SARS-COV-2) (CORONAVIRUS DISEASE [COVID-19]), AMPLIFIED PROBE TECHNIQUE, MAKING USE OF HIGH THROUGHPUT TECHNOLOGIES AS DESCRIBED BY CMS-2020-01-R: HCPCS

## 2020-07-06 PROCEDURE — 36415 COLL VENOUS BLD VENIPUNCTURE: CPT

## 2020-07-06 ASSESSMENT — FIBROSIS 4 INDEX: FIB4 SCORE: 2.57

## 2020-07-10 ENCOUNTER — APPOINTMENT (OUTPATIENT)
Dept: CARDIOLOGY | Facility: MEDICAL CENTER | Age: 80
End: 2020-07-10
Attending: INTERNAL MEDICINE
Payer: MEDICARE

## 2020-07-10 ENCOUNTER — HOSPITAL ENCOUNTER (OUTPATIENT)
Facility: MEDICAL CENTER | Age: 80
End: 2020-07-10
Attending: INTERNAL MEDICINE | Admitting: INTERNAL MEDICINE
Payer: MEDICARE

## 2020-07-10 VITALS
HEIGHT: 74 IN | WEIGHT: 198.19 LBS | HEART RATE: 71 BPM | SYSTOLIC BLOOD PRESSURE: 119 MMHG | TEMPERATURE: 98.6 F | OXYGEN SATURATION: 98 % | DIASTOLIC BLOOD PRESSURE: 80 MMHG | BODY MASS INDEX: 25.44 KG/M2

## 2020-07-10 DIAGNOSIS — I50.9 HEART FAILURE, NYHA CLASS 2 (HCC): ICD-10-CM

## 2020-07-10 LAB
ANION GAP SERPL CALC-SCNC: 12 MMOL/L (ref 7–16)
BUN SERPL-MCNC: 29 MG/DL (ref 8–22)
CALCIUM SERPL-MCNC: 8.5 MG/DL (ref 8.5–10.5)
CHLORIDE SERPL-SCNC: 109 MMOL/L (ref 96–112)
CO2 SERPL-SCNC: 20 MMOL/L (ref 20–33)
CREAT SERPL-MCNC: 1.75 MG/DL (ref 0.5–1.4)
GLUCOSE SERPL-MCNC: 81 MG/DL (ref 65–99)
POTASSIUM SERPL-SCNC: 4.9 MMOL/L (ref 3.6–5.5)
SODIUM SERPL-SCNC: 141 MMOL/L (ref 135–145)

## 2020-07-10 PROCEDURE — 80048 BASIC METABOLIC PNL TOTAL CA: CPT

## 2020-07-10 ASSESSMENT — FIBROSIS 4 INDEX: FIB4 SCORE: 1.94

## 2020-07-10 NOTE — OR NURSING
1507 Orders received for stat BMP, blood drawn and sent to lab.     1711 Spoke to Clara Eagle. Unknown when pt procedure will start at this time d/t emergent cases. Shagufta will call back with updates. Updated pt. Moved to [facility] Rm 3. Report given to GLENN Bowens RN.

## 2020-07-11 NOTE — OR NURSING
Per Shagufta, Cath Lab RN, Dr. Barrios wanted to admit and do procedure in AM. Pt would like to cancel and reschedule.

## 2020-07-14 ENCOUNTER — TELEPHONE (OUTPATIENT)
Dept: CARDIOLOGY | Facility: MEDICAL CENTER | Age: 80
End: 2020-07-14

## 2020-07-14 ENCOUNTER — SLEEP CENTER VISIT (OUTPATIENT)
Dept: SLEEP MEDICINE | Facility: MEDICAL CENTER | Age: 80
End: 2020-07-14
Payer: MEDICARE

## 2020-07-14 VITALS
OXYGEN SATURATION: 97 % | HEART RATE: 86 BPM | SYSTOLIC BLOOD PRESSURE: 126 MMHG | HEIGHT: 74 IN | DIASTOLIC BLOOD PRESSURE: 64 MMHG | BODY MASS INDEX: 25.54 KG/M2 | WEIGHT: 199 LBS

## 2020-07-14 DIAGNOSIS — I50.9 HEART FAILURE, NYHA CLASS 2 (HCC): ICD-10-CM

## 2020-07-14 DIAGNOSIS — G47.31 COMPLEX SLEEP APNEA SYNDROME: ICD-10-CM

## 2020-07-14 DIAGNOSIS — I10 ESSENTIAL HYPERTENSION, BENIGN: ICD-10-CM

## 2020-07-14 DIAGNOSIS — I50.9 HEART FAILURE, NYHA CLASS 3 (HCC): ICD-10-CM

## 2020-07-14 DIAGNOSIS — J44.9 CHRONIC OBSTRUCTIVE PULMONARY DISEASE, UNSPECIFIED COPD TYPE (HCC): ICD-10-CM

## 2020-07-14 DIAGNOSIS — Z95.810 AICD (AUTOMATIC CARDIOVERTER/DEFIBRILLATOR) PRESENT: ICD-10-CM

## 2020-07-14 DIAGNOSIS — I42.9 CARDIOMYOPATHY, UNSPECIFIED TYPE (HCC): ICD-10-CM

## 2020-07-14 DIAGNOSIS — I48.0 PAROXYSMAL ATRIAL FIBRILLATION (HCC): ICD-10-CM

## 2020-07-14 PROCEDURE — 99213 OFFICE O/P EST LOW 20 MIN: CPT | Performed by: NURSE PRACTITIONER

## 2020-07-14 ASSESSMENT — FIBROSIS 4 INDEX: FIB4 SCORE: 1.94

## 2020-07-14 NOTE — TELEPHONE ENCOUNTER
Dr. Barrios,    I called and offered this patient to reschedule this procedure next week. He can not come in at 6:00am. Due to anesthesia coverage and this patient's inability to come in at 6:00am, we are looking into September for a reschedule date. Is this patient ok to wait that long?    Thank You,  Argenis

## 2020-07-14 NOTE — PROGRESS NOTES
Chief Complaint   Patient presents with   • Follow-Up     Apnea-Last seen 06/08/2020       HPI:      Mr. Parker is a 80 y/o male patient who is in today for CSA f/u.  Patient has a complex history with a history of systolic heart failure, PVD, cardiomyopathy, TIA, hypertension, AAA, chronic kidney disease, PFD, COPD, polycythemia, former smoker.      PSG split-night 3/12/2020 indicated severe sleep apnea with an overall AHI 54.5/h and an O2 mauricio of 82%.  Patient met split-night criteria and ultimately did best on BiPAP ST 14/8 cm with a backup rate of 12 breaths/min.  AHI reduced to 0/h with a mean SPO2 91%.     Compliance report from 6/15/20-7/14/20 was downloaded and reviewed with the patient which showed BiPAP ST at IPAP/EPAP 14/8 cmH2O, RR 12 bpm, 97% compliance, 6 hrs 43 min use, AHI of 3.5. He is tolerating the pressure and mask well. He goes to bed 10 pm and wakes up between 6-7 am. He is able to sleep through the night without any issues, and occasionally wakes up to use the restroom.  He denies morning headaches.        ROS:  Constitutional: Denies fevers, chills, sweats, fatigue, weight loss  Eyes: Denies glasses, vision loss, pain, drainage, double vision  Ears/Nose/Mouth/Throat: Denies rhinitis, nasal congestion, ear ache, difficulty hearing, sore throat, persistent hoarseness, decayed teeth/toothache  Cardiovascular: Denies chest pain, tightness, palpitations, swelling in feet/legs, fainting, difficulty breathing when laying down  Respiratory: Denies shortness of breath, cough, sputum, wheezing, painful breathing, coughing up blood  GI: Denies heartburn, difficulty swallowing, nausea, vomiting, abdominal pain, diarrhea, constiptation  : Denies frequent urination, painful urination  Integumentary: Denies rashes, lumps or color changes  Neurological: Denies frequent headaches, dizziness, weakness  Sleep: Denies daytime sleepiness, loud snoring, stops breathing during sleep, waking up gasping for air,  insomnia, morning headaches      Past Medical History:   Diagnosis Date   • Breath shortness     on exertion; no c/o today; improving slightly   • Cardiomyopathy (HCC) 2019    Echocardiogram with mildly dilated LV, mild concentric, LVEF 15%. Severely dilated LA. Trace MR, trace AI, mild TR. RVSP 26mmHg.   • Chronic obstructive pulmonary disease (HCC)    • Occitan measles    • Heart attack (HCC)     hx    • History of abdominal aortic aneurysm (AAA)     Status post stent   • Hyperlipidemia    • Hypertension    • Mumps    • Pacemaker    • Paroxysmal atrial fibrillation (HCC)     Status post Watchman procedure in [place].   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    • Stroke (HCC)     TIA   • Tonsillitis    • Ventricular tachycardia (HCC)        Past Surgical History:   Procedure Laterality Date   • AICD BATTERY CHANGE Left 2017    Generator replacement with [employer] MRI XT  [id number] implanted in [place].   • OTHER CARDIAC SURGERY      watchman device   • AAA WITH STENT GRAFT     • OTHER ORTHOPEDIC SURGERY      hip surgery   • OTHER      kidney removed   • OTHER CARDIAC SURGERY      AICD implanted       Family History   Problem Relation Age of Onset   • Cancer Mother    • Cancer Sister        Social History     Socioeconomic History   • Marital status: Single     Spouse name: Not on file   • Number of children: Not on file   • Years of education: Not on file   • Highest education level: Not on file   Occupational History   • Not on file   Social Needs   • Financial resource strain: Not on file   • Food insecurity     Worry: Not on file     Inability: Not on file   • Transportation needs     Medical: Not on file     Non-medical: Not on file   Tobacco Use   • Smoking status: Former Smoker     Packs/day: 0.50     Years: 15.00     Pack years: 7.50     Types: Cigarettes     Last attempt to quit: 2004     Years since quittin.0   • Smokeless tobacco: Never Used    Substance and Sexual Activity   • Alcohol use: Yes     Alcohol/week: 1.2 oz     Types: 2 Glasses of wine per week     Frequency: Never     Drinks per session: 1 or 2     Binge frequency: Never     Comment: 2 per day   • Drug use: No   • Sexual activity: Not on file   Lifestyle   • Physical activity     Days per week: Not on file     Minutes per session: Not on file   • Stress: Not on file   Relationships   • Social connections     Talks on phone: Not on file     Gets together: Not on file     Attends [employer] service: Not on file     Active member of club or organization: Not on file     Attends meetings of clubs or organizations: Not on file     Relationship status: Not on file   • Intimate partner violence     Fear of current or ex partner: Not on file     Emotionally abused: Not on file     Physically abused: Not on file     Forced sexual activity: Not on file   Other Topics Concern   • Not on file   Social History Narrative   • Not on file       Allergies as of 07/14/2020 - Reviewed 07/14/2020   Allergen Reaction Noted   • Entresto [sacubitril-valsartan]  06/01/2020        Vitals:  Vitals:    07/14/20 1252   BP: 126/64   Pulse: 86   SpO2: 97%       Current medications as of today   No current outpatient medications on file.     No current facility-administered medications for this visit.          Physical Exam:   Appearance: Well developed, well nourished, no acute distress  Eyes: PERRL, EOM intact, sclera white, conjunctiva moist  Ears: no lesions or deformities  Hearing: grossly intact  Nose: no lesions or deformities  Respiratory effort: no intercostal retractions or use of accessory muscles  Extremities: no cyanosis or edema  Abdomen: soft  Gait and Station: normal  Digits and nails: no clubbing, cyanosis, petechiae or nodes.  Cranial nerves: grossly intact  Skin: no visible rashes, lesions or ulcers noted  Orientation: Oriented to time, person and place  Mood and affect: mood and affect appropriate,  normal interaction with examiner  Judgement: Intact    Assessment:  1. Complex sleep apnea syndrome     2. Paroxysmal atrial fibrillation (HCC)     3. Essential hypertension, benign     4. Chronic obstructive pulmonary disease, unspecified COPD type (HCC)           Plan  Discussed the cardiovascular and neuropsychiatric risks of untreated HARPAL; including but not limited to: HTN, DM, MI, ASCVD, CVA, CHF, traffic accidents.     1. Compliance report from 6/15/20-7/14/20 was downloaded and reviewed with the patient which showed BiPAP ST at IPAP/EPAP 14/8 cmH2O, RR 12 bpm, 97% compliance, 6 hrs 43 min use, AHI of 3.5. Continue BiPAP ST. Patient is compliant and benefiting from BiPAP ST therapy for management of CSA.   2. DME order ([employer]) for mask (MOC) and supplies was provided today. Continue to clean mask and supplies weekly, and change supplies per insurance guidelines.   3. Continue to stay active.   4. Follow up with the appropriate healthcare practitioners for all other medical problems and issues.  5. F/u in 1 year, sooner if needed.       PALUA Arias.      This dictation was created using voice recognition software. The accuracy of the dictation is limited to the abilities of the software. I expect there may be some errors of grammar and possibly content.

## 2020-07-14 NOTE — TELEPHONE ENCOUNTER
----- Message from Tolu Barrios M.D. sent at 7/10/2020  7:12 PM PDT -----  Our add-on case today, inpatient flutter, ended up being a long atypical case and Mr Parker the BiV upgrade went home because he didn't want to wait any longer. I called him and told him we would call him early next week to reschedule. He prefers early AM for same-day discharge, if possible.    ROSALINA Davison

## 2020-07-15 NOTE — TELEPHONE ENCOUNTER
Patient scheduled for upgrade to BiV ICD w/anesthesia on 8-24-20 with Dr. Barrios.           Tolu Barrios M.D.  Argenis Ramos, Med Ass't    Caller: Unspecified (Yesterday,  8:54 AM)               He is OK to wait, thanks

## 2020-07-21 ENCOUNTER — TELEPHONE (OUTPATIENT)
Dept: CARDIOLOGY | Facility: MEDICAL CENTER | Age: 80
End: 2020-07-21

## 2020-07-21 NOTE — TELEPHONE ENCOUNTER
Mr. Parker came into the office at [facility] today for his Pacer Check appt with Radha Miranda. He was informed that due COVID-19 we had a schedule change and we that we left him a message to r/s his appt. He was not happy about this. I offered to reschedule his appt but he refused and walked out.  07/21/2020

## 2020-07-24 ENCOUNTER — HOSPITAL ENCOUNTER (OUTPATIENT)
Facility: MEDICAL CENTER | Age: 80
End: 2020-07-24
Attending: INTERNAL MEDICINE | Admitting: INTERNAL MEDICINE
Payer: MEDICARE

## 2020-07-24 DIAGNOSIS — Z00.6 RESEARCH STUDY PATIENT: ICD-10-CM

## 2020-07-24 DIAGNOSIS — I50.9 HEART FAILURE, NYHA CLASS 3 (HCC): ICD-10-CM

## 2020-07-27 ENCOUNTER — HOSPITAL ENCOUNTER (OUTPATIENT)
Dept: LAB | Facility: MEDICAL CENTER | Age: 80
End: 2020-07-27
Attending: NURSE PRACTITIONER
Payer: MEDICARE

## 2020-07-27 DIAGNOSIS — I50.9 HEART FAILURE, NYHA CLASS 3 (HCC): ICD-10-CM

## 2020-07-27 DIAGNOSIS — Z00.6 RESEARCH STUDY PATIENT: ICD-10-CM

## 2020-07-27 LAB
CREAT SERPL-MCNC: 1.87 MG/DL (ref 0.5–1.4)
NT-PROBNP SERPL IA-MCNC: 2025 PG/ML (ref 0–125)

## 2020-07-27 PROCEDURE — 82565 ASSAY OF CREATININE: CPT

## 2020-07-27 PROCEDURE — 83880 ASSAY OF NATRIURETIC PEPTIDE: CPT

## 2020-07-27 PROCEDURE — 36415 COLL VENOUS BLD VENIPUNCTURE: CPT

## 2020-07-29 ENCOUNTER — DOCUMENTATION (OUTPATIENT)
Dept: CARDIOLOGY | Facility: MEDICAL CENTER | Age: 80
End: 2020-07-29

## 2020-07-29 ENCOUNTER — APPOINTMENT (OUTPATIENT)
Dept: VASCULAR LAB | Facility: MEDICAL CENTER | Age: 80
End: 2020-07-29
Payer: MEDICARE

## 2020-07-29 NOTE — PROGRESS NOTES
Guide HF single arm study.  NYHA class 2  6mwt of 384 m  PAD was 10 mm Hg    Ksenia Velázquez M.D.

## 2020-07-30 NOTE — PROGRESS NOTES
GUIDE HF Clinical Trial    Single Arm-6 Month Visit    Lencho seen in clinic today for his 6 month study visit.  He completed the KCCQ-12 & 5Q-5D-5L questionnaires prior to any other study procedures.   Cardiac medications were reviewed. No AE's were reported. Importance of taking daily readings was discussed.       To present to perform his heart failure exam, go over study lab results, and answer questions.      6MWT-He walked 384 meters over 6 minutes without stopping.        Next Visit- 12 Month visit due around 2/4/2021oth

## 2020-08-14 ENCOUNTER — HOSPITAL ENCOUNTER (OUTPATIENT)
Dept: LAB | Facility: MEDICAL CENTER | Age: 80
End: 2020-08-14
Attending: INTERNAL MEDICINE
Payer: MEDICARE

## 2020-08-14 LAB
25(OH)D3 SERPL-MCNC: 58 NG/ML (ref 30–100)
ALBUMIN SERPL BCP-MCNC: 4.1 G/DL (ref 3.2–4.9)
APPEARANCE UR: CLEAR
BASOPHILS # BLD AUTO: 0.2 % (ref 0–1.8)
BASOPHILS # BLD: 0.01 K/UL (ref 0–0.12)
BILIRUB UR QL STRIP.AUTO: NEGATIVE
BUN SERPL-MCNC: 34 MG/DL (ref 8–22)
CALCIUM SERPL-MCNC: 8.8 MG/DL (ref 8.4–10.2)
CHLORIDE SERPL-SCNC: 107 MMOL/L (ref 96–112)
CO2 SERPL-SCNC: 20 MMOL/L (ref 20–33)
COLOR UR: YELLOW
CREAT SERPL-MCNC: 1.79 MG/DL (ref 0.5–1.4)
CREAT UR-MCNC: 27.26 MG/DL
EOSINOPHIL # BLD AUTO: 0.2 K/UL (ref 0–0.51)
EOSINOPHIL NFR BLD: 4.4 % (ref 0–6.9)
ERYTHROCYTE [DISTWIDTH] IN BLOOD BY AUTOMATED COUNT: 47.8 FL (ref 35.9–50)
GLUCOSE SERPL-MCNC: 113 MG/DL (ref 65–99)
GLUCOSE UR STRIP.AUTO-MCNC: NEGATIVE MG/DL
HCT VFR BLD AUTO: 45.7 % (ref 42–52)
HGB BLD-MCNC: 15.5 G/DL (ref 14–18)
IMM GRANULOCYTES # BLD AUTO: 0.02 K/UL (ref 0–0.11)
IMM GRANULOCYTES NFR BLD AUTO: 0.4 % (ref 0–0.9)
KETONES UR STRIP.AUTO-MCNC: NEGATIVE MG/DL
LEUKOCYTE ESTERASE UR QL STRIP.AUTO: NEGATIVE
LYMPHOCYTES # BLD AUTO: 1.72 K/UL (ref 1–4.8)
LYMPHOCYTES NFR BLD: 38 % (ref 22–41)
MAGNESIUM SERPL-MCNC: 2 MG/DL (ref 1.5–2.5)
MCH RBC QN AUTO: 34 PG (ref 27–33)
MCHC RBC AUTO-ENTMCNC: 33.9 G/DL (ref 33.7–35.3)
MCV RBC AUTO: 100.2 FL (ref 81.4–97.8)
MICRO URNS: NORMAL
MONOCYTES # BLD AUTO: 0.54 K/UL (ref 0–0.85)
MONOCYTES NFR BLD AUTO: 11.9 % (ref 0–13.4)
NEUTROPHILS # BLD AUTO: 2.04 K/UL (ref 1.82–7.42)
NEUTROPHILS NFR BLD: 45.1 % (ref 44–72)
NITRITE UR QL STRIP.AUTO: NEGATIVE
NRBC # BLD AUTO: 0 K/UL
NRBC BLD-RTO: 0 /100 WBC
PH UR STRIP.AUTO: 5.5 [PH] (ref 5–8)
PHOSPHATE SERPL-MCNC: 3.6 MG/DL (ref 2.5–4.5)
PLATELET # BLD AUTO: 154 K/UL (ref 164–446)
PMV BLD AUTO: 10.8 FL (ref 9–12.9)
POTASSIUM SERPL-SCNC: 4.5 MMOL/L (ref 3.6–5.5)
PROT UR QL STRIP: NEGATIVE MG/DL
PROT UR-MCNC: 5 MG/DL (ref 0–15)
PROT/CREAT UR: 183 MG/G (ref 15–68)
PTH-INTACT SERPL-MCNC: 111 PG/ML (ref 14–72)
RBC # BLD AUTO: 4.56 M/UL (ref 4.7–6.1)
RBC UR QL AUTO: NEGATIVE
SODIUM SERPL-SCNC: 140 MMOL/L (ref 135–145)
SP GR UR STRIP.AUTO: 1.01
URATE SERPL-MCNC: 9.2 MG/DL (ref 2.5–8.3)
WBC # BLD AUTO: 4.5 K/UL (ref 4.8–10.8)

## 2020-08-14 PROCEDURE — 80069 RENAL FUNCTION PANEL: CPT

## 2020-08-14 PROCEDURE — 82306 VITAMIN D 25 HYDROXY: CPT

## 2020-08-14 PROCEDURE — 81003 URINALYSIS AUTO W/O SCOPE: CPT

## 2020-08-14 PROCEDURE — 83970 ASSAY OF PARATHORMONE: CPT

## 2020-08-14 PROCEDURE — 85025 COMPLETE CBC W/AUTO DIFF WBC: CPT

## 2020-08-14 PROCEDURE — 82570 ASSAY OF URINE CREATININE: CPT

## 2020-08-14 PROCEDURE — 36415 COLL VENOUS BLD VENIPUNCTURE: CPT

## 2020-08-14 PROCEDURE — 84550 ASSAY OF BLOOD/URIC ACID: CPT

## 2020-08-14 PROCEDURE — 84156 ASSAY OF PROTEIN URINE: CPT

## 2020-08-14 PROCEDURE — 83735 ASSAY OF MAGNESIUM: CPT

## 2020-08-19 VITALS — BODY MASS INDEX: 26 KG/M2 | WEIGHT: 202.6 LBS | HEIGHT: 74 IN

## 2020-08-19 DIAGNOSIS — Z01.812 PRE-OPERATIVE LABORATORY EXAMINATION: ICD-10-CM

## 2020-08-19 DIAGNOSIS — Z01.810 PRE-OPERATIVE CARDIOVASCULAR EXAMINATION: ICD-10-CM

## 2020-08-19 LAB
ALBUMIN SERPL BCP-MCNC: 4.3 G/DL (ref 3.2–4.9)
ALBUMIN/GLOB SERPL: 1.7 G/DL
ALP SERPL-CCNC: 71 U/L (ref 30–99)
ALT SERPL-CCNC: 49 U/L (ref 2–50)
ANION GAP SERPL CALC-SCNC: 12 MMOL/L (ref 7–16)
AST SERPL-CCNC: 24 U/L (ref 12–45)
BILIRUB SERPL-MCNC: 1.1 MG/DL (ref 0.1–1.5)
BUN SERPL-MCNC: 24 MG/DL (ref 8–22)
CALCIUM SERPL-MCNC: 8.9 MG/DL (ref 8.5–10.5)
CHLORIDE SERPL-SCNC: 103 MMOL/L (ref 96–112)
CO2 SERPL-SCNC: 22 MMOL/L (ref 20–33)
COVID ORDER STATUS COVID19: NORMAL
CREAT SERPL-MCNC: 1.61 MG/DL (ref 0.5–1.4)
EKG IMPRESSION: NORMAL
ERYTHROCYTE [DISTWIDTH] IN BLOOD BY AUTOMATED COUNT: 49.3 FL (ref 35.9–50)
GLOBULIN SER CALC-MCNC: 2.5 G/DL (ref 1.9–3.5)
GLUCOSE SERPL-MCNC: 111 MG/DL (ref 65–99)
HCT VFR BLD AUTO: 47.4 % (ref 42–52)
HGB BLD-MCNC: 15.7 G/DL (ref 14–18)
INR PPP: 0.99 (ref 0.87–1.13)
MCH RBC QN AUTO: 33.8 PG (ref 27–33)
MCHC RBC AUTO-ENTMCNC: 33.1 G/DL (ref 33.7–35.3)
MCV RBC AUTO: 102.2 FL (ref 81.4–97.8)
PLATELET # BLD AUTO: 153 K/UL (ref 164–446)
PMV BLD AUTO: 11.4 FL (ref 9–12.9)
POTASSIUM SERPL-SCNC: 4.9 MMOL/L (ref 3.6–5.5)
PROT SERPL-MCNC: 6.8 G/DL (ref 6–8.2)
PROTHROMBIN TIME: 13.3 SEC (ref 12–14.6)
RBC # BLD AUTO: 4.64 M/UL (ref 4.7–6.1)
SARS-COV-2 RNA RESP QL NAA+PROBE: NOTDETECTED
SODIUM SERPL-SCNC: 137 MMOL/L (ref 135–145)
SPECIMEN SOURCE: NORMAL
WBC # BLD AUTO: 6 K/UL (ref 4.8–10.8)

## 2020-08-19 PROCEDURE — 85027 COMPLETE CBC AUTOMATED: CPT

## 2020-08-19 PROCEDURE — 85610 PROTHROMBIN TIME: CPT

## 2020-08-19 PROCEDURE — 93010 ELECTROCARDIOGRAM REPORT: CPT | Performed by: INTERNAL MEDICINE

## 2020-08-19 PROCEDURE — 93005 ELECTROCARDIOGRAM TRACING: CPT

## 2020-08-19 PROCEDURE — 80053 COMPREHEN METABOLIC PANEL: CPT

## 2020-08-19 PROCEDURE — U0003 INFECTIOUS AGENT DETECTION BY NUCLEIC ACID (DNA OR RNA); SEVERE ACUTE RESPIRATORY SYNDROME CORONAVIRUS 2 (SARS-COV-2) (CORONAVIRUS DISEASE [COVID-19]), AMPLIFIED PROBE TECHNIQUE, MAKING USE OF HIGH THROUGHPUT TECHNOLOGIES AS DESCRIBED BY CMS-2020-01-R: HCPCS

## 2020-08-19 PROCEDURE — 36415 COLL VENOUS BLD VENIPUNCTURE: CPT

## 2020-08-19 ASSESSMENT — FIBROSIS 4 INDEX: FIB4 SCORE: 1.88

## 2020-08-21 NOTE — TELEPHONE ENCOUNTER
Recvd call from patient - he woke up with an UTI this morning. [facility] He will call me once they start him on a regiment of antibiotics. We will reschedule at that time.    Procedure cancelled at this time.    OLIVER on Randolph's voicemail notifying [employer] of this cancellation.

## 2020-08-22 ENCOUNTER — HOSPITAL ENCOUNTER (OUTPATIENT)
Facility: MEDICAL CENTER | Age: 80
End: 2020-08-22
Attending: PHYSICIAN ASSISTANT
Payer: MEDICARE

## 2020-08-22 ENCOUNTER — OFFICE VISIT (OUTPATIENT)
Dept: URGENT CARE | Facility: CLINIC | Age: 80
End: 2020-08-22
Payer: MEDICARE

## 2020-08-22 VITALS
OXYGEN SATURATION: 94 % | SYSTOLIC BLOOD PRESSURE: 108 MMHG | BODY MASS INDEX: 24.9 KG/M2 | RESPIRATION RATE: 16 BRPM | HEART RATE: 66 BPM | HEIGHT: 74 IN | WEIGHT: 194 LBS | TEMPERATURE: 97.3 F | DIASTOLIC BLOOD PRESSURE: 64 MMHG

## 2020-08-22 DIAGNOSIS — R35.0 URINARY FREQUENCY: ICD-10-CM

## 2020-08-22 LAB
APPEARANCE UR: CLEAR
BILIRUB UR STRIP-MCNC: NORMAL MG/DL
COLOR UR AUTO: NORMAL
GLUCOSE UR STRIP.AUTO-MCNC: NORMAL MG/DL
KETONES UR STRIP.AUTO-MCNC: NORMAL MG/DL
LEUKOCYTE ESTERASE UR QL STRIP.AUTO: NORMAL
NITRITE UR QL STRIP.AUTO: NORMAL
PH UR STRIP.AUTO: 5.5 [PH] (ref 5–8)
PROT UR QL STRIP: 30 MG/DL
RBC UR QL AUTO: NORMAL
SP GR UR STRIP.AUTO: 1.02
UROBILINOGEN UR STRIP-MCNC: 0.2 MG/DL

## 2020-08-22 PROCEDURE — 99214 OFFICE O/P EST MOD 30 MIN: CPT | Performed by: PHYSICIAN ASSISTANT

## 2020-08-22 PROCEDURE — 87186 SC STD MICRODIL/AGAR DIL: CPT

## 2020-08-22 PROCEDURE — 87077 CULTURE AEROBIC IDENTIFY: CPT

## 2020-08-22 PROCEDURE — 87086 URINE CULTURE/COLONY COUNT: CPT

## 2020-08-22 PROCEDURE — 81002 URINALYSIS NONAUTO W/O SCOPE: CPT | Performed by: PHYSICIAN ASSISTANT

## 2020-08-22 RX ORDER — SULFAMETHOXAZOLE AND TRIMETHOPRIM 800; 160 MG/1; MG/1
1 TABLET ORAL 2 TIMES DAILY
Qty: 10 TAB | Refills: 0 | Status: SHIPPED | OUTPATIENT
Start: 2020-08-22 | End: 2020-08-27

## 2020-08-22 ASSESSMENT — ENCOUNTER SYMPTOMS
CARDIOVASCULAR NEGATIVE: 1
HEADACHES: 0
SORE THROAT: 0
WEIGHT LOSS: 0
CHILLS: 0
SENSORY CHANGE: 0
COUGH: 0
ABDOMINAL PAIN: 0
FLANK PAIN: 0
FEVER: 0

## 2020-08-22 ASSESSMENT — FIBROSIS 4 INDEX: FIB4 SCORE: 1.75

## 2020-08-22 NOTE — PROGRESS NOTES
Subjective:   Lencho Parker is a 80 y.o. male who presents for Urinary Frequency (x3 days ), Back Pain, Fatigue, and Fever      Urinary Frequency  This is a new problem. The current episode started in the past 7 days. The problem occurs intermittently. The problem has been unchanged. Associated symptoms include urinary symptoms. Pertinent negatives include no abdominal pain, chills, congestion, coughing, fever, headaches or sore throat. Associated symptoms comments: Sweating at night . Nothing aggravates the symptoms. He has tried nothing for the symptoms.   He was scheduled for placement of AICD Monday, however, cancelled for possible UTI. History of UTI years ago.   Denies any dysuria, abdominal pain, nausea, vomiting, diarrhea, penile discharge, testicular pain.     Review of Systems   Constitutional: Negative for chills, fever, malaise/fatigue and weight loss.   HENT: Negative for congestion and sore throat.    Respiratory: Negative for cough.    Cardiovascular: Negative.    Gastrointestinal: Negative for abdominal pain.   Genitourinary: Positive for frequency and urgency. Negative for dysuria, flank pain and hematuria.   Skin: Negative.    Neurological: Negative for sensory change and headaches.       Medications:    • albuterol Aers  • amiodarone Tabs  • aspirin EC Tbec  • atorvastatin Tabs  • carvedilol Tabs  • cinacalcet Tabs  • clopidogrel Tabs  • fish oil Caps  • furosemide Tabs  • losartan Tabs  • multivitamin Tabs  • spironolactone Tabs  • vitamin D (Ergocalciferol) Caps    Allergies: Entresto [sacubitril-valsartan]    Problem List: Lencho Parker has Cardiomyopathy (HCC); Essential hypertension, benign; Mixed hyperlipidemia; Paroxysmal atrial fibrillation (HCC); COPD (chronic obstructive pulmonary disease) (HCC); Renal disorder; High risk medication use; History of TIA (transient ischemic attack); History of abdominal aortic aneurysm (AAA); AICD (automatic cardioverter/defibrillator)  "present; Ventricular tachycardia (HCC); PVD (peripheral vascular disease) (HCC); Research study patient; [facility] Research-Cardiology Billing; ACC/AHA stage C systolic heart failure (HCC); Left ventricular systolic dysfunction, NYHA class 3; Former smoker; and Need for influenza vaccination on their problem list.    Surgical History:  Past Surgical History:   Procedure Laterality Date   • AICD BATTERY CHANGE Left 01/03/2017    Generator replacement with [employer] Evera MRI XT  [id number] implanted in [place].   • OTHER CARDIAC SURGERY  2014    watchman device   • AAA WITH STENT GRAFT  2009   • OTHER ORTHOPEDIC SURGERY  2005    hip surgery   • OTHER  2000    kidney removed   • OTHER CARDIAC SURGERY  1991    AICD implanted       Past Social Hx: Lencho Parker  reports that he quit smoking about 16 years ago. His smoking use included cigarettes. He has a 7.50 pack-year smoking history. He has never used smokeless tobacco. He reports current alcohol use of about 1.2 oz of alcohol per week. He reports that he does not use drugs.     Past Family Hx:  Lencho Parker family history includes Cancer in his mother and sister.     Problem list, medications, and allergies reviewed by myself today in Epic.     Objective:     /64   Pulse 66   Temp 36.3 °C (97.3 °F) (Temporal)   Resp 16   Ht 1.88 m (6' 2\")   Wt 88 kg (194 lb)   SpO2 94%   BMI 24.91 kg/m²     Physical Exam  Vitals signs reviewed.   Constitutional:       General: He is not in acute distress.     Appearance: Normal appearance. He is not ill-appearing, toxic-appearing or diaphoretic.   Eyes:      Conjunctiva/sclera: Conjunctivae normal.      Pupils: Pupils are equal, round, and reactive to light.   Cardiovascular:      Rate and Rhythm: Normal rate and regular rhythm.      Heart sounds: Normal heart sounds.   Pulmonary:      Effort: Pulmonary effort is normal. No respiratory distress.      Breath sounds: Normal breath sounds. No wheezing, rhonchi or " rales.   Abdominal:      General: Abdomen is flat. Bowel sounds are normal. There is no distension.      Palpations: Abdomen is soft. There is no mass.      Tenderness: There is no abdominal tenderness. There is no right CVA tenderness, left CVA tenderness, guarding or rebound.      Hernia: No hernia is present.   Skin:     General: Skin is warm and dry.   Neurological:      General: No focal deficit present.      Mental Status: He is alert and oriented to person, place, and time.   Psychiatric:         Mood and Affect: Mood normal.         Behavior: Behavior normal.         Assessment/Plan:     Diagnosis and associated orders:     1. Urinary frequency  POCT Urinalysis    URINE CULTURE(NEW)    sulfamethoxazole-trimethoprim (BACTRIM DS) 800-160 MG tablet      Comments/MDM:     • UA: Trace Leukocyte esterase, Protein, Uro 2.0 E.U./dL   Discussed with patient signs and symptoms are consistent with possible simple urinary tract infection.   They are overall very well-appearing with normal vital signs and benign examination findings. Suspicions for pyelonephritis, prostatitis, kidney stone, or emergent pathology are low.     Discussed treatment of Bactrim for 5 days, increased fluid intake.   Urine culture: will call back only if positive and if necessary change in therapy.     Advised to return to the Urgent Care or follow up with their PCP if symptoms are not improving in 2-3 days or sooner if any worsening symptoms such as fever, chills, abdominal pain, back/flank pain, nausea, vomiting, or any other concerns.      Red flags discussed and indications to immediately call 911 or present to the Emergency Department.   Supportive care, differential diagnoses, and indications for immediate follow-up discussed with patient.    Pathogenesis of diagnosis discussed including typical length and natural progression. Patient expresses understanding and agrees to plan.    Advised the patient to follow-up with the primary care  physician for recheck, reevaluation, and consideration of further management.    Please note that this dictation was created using voice recognition software. I have made a reasonable attempt to correct obvious errors, but I expect that there are errors of grammar and possibly content that I did not discover before finalizing the note.    This note was electronically signed by Alfred Vale PA-C

## 2020-08-23 DIAGNOSIS — R35.0 URINARY FREQUENCY: ICD-10-CM

## 2020-08-24 ENCOUNTER — APPOINTMENT (OUTPATIENT)
Dept: CARDIOLOGY | Facility: MEDICAL CENTER | Age: 80
End: 2020-08-24
Attending: INTERNAL MEDICINE
Payer: MEDICARE

## 2020-08-25 LAB
BACTERIA UR CULT: ABNORMAL
BACTERIA UR CULT: ABNORMAL
SIGNIFICANT IND 70042: ABNORMAL
SITE SITE: ABNORMAL
SOURCE SOURCE: ABNORMAL

## 2020-08-28 ENCOUNTER — TELEPHONE (OUTPATIENT)
Dept: CARDIOLOGY | Facility: MEDICAL CENTER | Age: 80
End: 2020-08-28

## 2020-08-28 NOTE — TELEPHONE ENCOUNTER
Patient scheduled for upgrade to BIV ICD on 9-10-20 at [facility] with Dr. Barrios and anesthesia.

## 2020-08-31 ENCOUNTER — TELEMEDICINE (OUTPATIENT)
Dept: CARDIOLOGY | Facility: MEDICAL CENTER | Age: 80
End: 2020-08-31
Payer: MEDICARE

## 2020-08-31 VITALS
DIASTOLIC BLOOD PRESSURE: 72 MMHG | HEIGHT: 74 IN | SYSTOLIC BLOOD PRESSURE: 95 MMHG | HEART RATE: 88 BPM | WEIGHT: 193.5 LBS | BODY MASS INDEX: 24.83 KG/M2 | OXYGEN SATURATION: 95 %

## 2020-08-31 DIAGNOSIS — I50.20 ACC/AHA STAGE C SYSTOLIC HEART FAILURE (HCC): ICD-10-CM

## 2020-08-31 PROCEDURE — 99215 OFFICE O/P EST HI 40 MIN: CPT | Mod: 95,CR | Performed by: INTERNAL MEDICINE

## 2020-08-31 ASSESSMENT — ENCOUNTER SYMPTOMS
NAUSEA: 0
MYALGIAS: 0
DOUBLE VISION: 0
CHILLS: 0
BRUISES/BLEEDS EASILY: 0
PALPITATIONS: 0
BLURRED VISION: 0
FEVER: 0
DEPRESSION: 0
HEADACHES: 0
NECK PAIN: 0
HEARTBURN: 0
SORE THROAT: 0
DIZZINESS: 0
SHORTNESS OF BREATH: 1

## 2020-08-31 ASSESSMENT — FIBROSIS 4 INDEX: FIB4 SCORE: 1.75

## 2020-08-31 NOTE — PROGRESS NOTES
Telemedicine: New Patient   This evaluation was conducted via [employer] using secure and encrypted videoconferencing technology. The patient was in a private location in the state of [place].    The patient's identity was confirmed and verbal consent was obtained for this virtual visit.    Subjective:     CC:   Chief Complaint   Patient presents with   • CHF (Acute)     VOD: Doximity       Lencho Parker is a 80 y.o. male      I saw him last in June.  He has ICD, chronic systolic heart failure, EF 15%, left bundle branch block, and was referred to me for CRT upgrade.  We had to reschedule him a couple times due to scheduling conflict and he also had a UTI last week.  He is scheduled in 10 days to have his procedure done.  He has some questions about left bundle branch block, whether he developed it over time or whether he always had it.  Otherwise, he has no new complaints.      Review of Systems   Constitutional: Negative for chills and fever.   HENT: Negative for ear pain and sore throat.    Eyes: Negative for blurred vision and double vision.   Respiratory: Positive for shortness of breath.    Cardiovascular: Negative for chest pain and palpitations.   Gastrointestinal: Negative for heartburn and nausea.   Genitourinary: Negative for dysuria.   Musculoskeletal: Negative for myalgias and neck pain.   Skin: Negative for itching and rash.   Neurological: Negative for dizziness and headaches.   Endo/Heme/Allergies: Does not bruise/bleed easily.   Psychiatric/Behavioral: Negative for depression and suicidal ideas.         Allergies   Allergen Reactions   • Entresto [Sacubitril-Valsartan]      Swollen tongue. Angioedema.       Current medicines (including changes today)  Current Outpatient Medications   Medication Sig Dispense Refill   • spironolactone (ALDACTONE) 25 MG Tab Take 0.5 Tabs by mouth every day. 45 Tab 3   • furosemide (LASIX) 20 MG Tab Take 20 mg by mouth every Monday, Wednesday, and Friday.     •  losartan (COZAAR) 25 MG Tab Take 1 Tab by mouth every day. 30 Tab 2   • vitamin D, Ergocalciferol, (DRISDOL) 17365 units Cap capsule Take 50,000 Units by mouth every 7 days. MONDAY     • aspirin EC (ECOTRIN) 81 MG Tablet Delayed Response Take 81 mg by mouth every day.     • amiodarone (CORDARONE) 200 MG Tab Take 200 mg by mouth every day.     • cinacalcet (SENSIPAR) 30 MG Tab Take 30 mg by mouth every day.     • carvedilol (COREG) 25 MG Tab Take 12.5-25 mg by mouth 2 times a day. Pt takes 25MG in AM and 12.5MG HS      • atorvastatin (LIPITOR) 20 MG Tab Take 20 mg by mouth every day.     • Omega-3 Fatty Acids (FISH OIL) 1000 MG Cap capsule Take 1,000 mg by mouth every day.     • multivitamin (THERAGRAN) Tab Take 1 Tab by mouth every day.     • albuterol 108 (90 Base) MCG/ACT Aero Soln inhalation aerosol Inhale 2 Puffs by mouth every 6 hours as needed for Shortness of Breath. 8.5 g 0   • clopidogrel (PLAVIX) 75 MG Tab Take 1 Tab by mouth every day. (Patient not taking: Reported on 2020) 30 Tab 2     No current facility-administered medications for this visit.        He  has a past medical history of Breath shortness, Cardiomyopathy (HCC) (2019), Chronic obstructive pulmonary disease (HCC), Kiswahili measles, Heart attack (HCC), History of abdominal aortic aneurysm (AAA) (), Hyperlipidemia, Hypertension, Mumps, Pacemaker, Paroxysmal atrial fibrillation (HCC), Renal disorder, Sleep apnea, Snoring, Stroke (HCC) (), Tonsillitis, and Ventricular tachycardia (HCC).  He  has a past surgical history that includes aicd battery change (Left, 2017); aaa with stent graft (); other (); other orthopedic surgery (); other cardiac surgery (); and other cardiac surgery ().      Family History   Problem Relation Age of Onset   • Cancer Mother    • Cancer Sister      Family Status   Relation Name Status   • Mo   at age 84        heart    • Fa   at age 22        war    • Sis  Alive  "      Patient Active Problem List    Diagnosis Date Noted   • Cardiomyopathy ([facility]) 10/28/2019     Priority: High   • Essential hypertension, benign 10/28/2019     Priority: High   • Mixed hyperlipidemia 10/28/2019     Priority: High   • Paroxysmal atrial fibrillation ([facility]) 10/28/2019     Priority: High   • AICD (automatic cardioverter/defibrillator) present 10/28/2019     Priority: High   • Ventricular tachycardia ([facility]) 10/28/2019     Priority: High   • COPD (chronic obstructive pulmonary disease) ([facility]) 10/28/2019     Priority: Medium   • Renal disorder 10/28/2019     Priority: Medium   • High risk medication use 10/28/2019     Priority: Medium   • History of TIA (transient ischemic attack) 10/28/2019     Priority: Medium   • History of abdominal aortic aneurysm (AAA) 10/28/2019     Priority: Medium   • Former smoker 02/13/2020   • Need for influenza vaccination 02/13/2020   • ACC/AHA stage C systolic heart failure ([facility]) 02/04/2020   • Left ventricular systolic dysfunction, NYHA class 3 02/04/2020   • Research study patient 01/27/2020   • [facility] Research-Cardiology Billing 01/27/2020   • PVD (peripheral vascular disease) ([facility]) 12/26/2019          Objective:   BP (!) 95/72 (BP Location: Left arm, Patient Position: Sitting, BP Cuff Size: Adult)   Pulse 88   Ht 1.88 m (6' 2\")   Wt 87.8 kg (193 lb 8 oz)   SpO2 95%   BMI 24.84 kg/m²           Assessment and Plan:   The following treatment plan was discussed:     1. Chronic systolic heart failure  2.  Nonischemic cardiomyopathy, EF 15%  3.  Left bundle branch block  4.  Dual-chamber ICD in situ    -We will go ahead and plan to proceed with CRT upgrade of existing ICD.  May require tunneling.     The risk, benefits, and alternatives to ICD placement were discussed in great detail, specific risks mentioned including bleeding, infection, cardiac perforation with possible tamponade requiring pericardiocentesis or open heart surgery. The [place] patient decision making " tool was used to decide on ICD implantation.  In addition the possibility of lead dislodgment, inappropriate shocks, pneumothorax, hemothorax were discussed. Also mentioned were the possibility of death, stroke, and myocardial infarction. The patient verbalized understanding of these potential complications and wishes to proceed with this procedure.       Follow-up: No follow-ups on file.

## 2020-09-03 ENCOUNTER — PRE-ADMISSION TESTING (OUTPATIENT)
Dept: ADMISSIONS | Facility: MEDICAL CENTER | Age: 80
DRG: 227 | End: 2020-09-03
Attending: INTERNAL MEDICINE
Payer: MEDICARE

## 2020-09-03 DIAGNOSIS — Z01.810 PRE-OPERATIVE CARDIOVASCULAR EXAMINATION: ICD-10-CM

## 2020-09-03 DIAGNOSIS — Z01.812 PRE-OPERATIVE LABORATORY EXAMINATION: ICD-10-CM

## 2020-09-03 ASSESSMENT — FIBROSIS 4 INDEX: FIB4 SCORE: 1.75

## 2020-09-09 ENCOUNTER — OFFICE VISIT (OUTPATIENT)
Dept: CARDIOLOGY | Facility: MEDICAL CENTER | Age: 80
End: 2020-09-09
Payer: MEDICARE

## 2020-09-09 VITALS
HEIGHT: 74 IN | SYSTOLIC BLOOD PRESSURE: 122 MMHG | DIASTOLIC BLOOD PRESSURE: 80 MMHG | OXYGEN SATURATION: 91 % | BODY MASS INDEX: 25.8 KG/M2 | WEIGHT: 201 LBS | HEART RATE: 70 BPM

## 2020-09-09 DIAGNOSIS — E78.2 MIXED HYPERLIPIDEMIA: ICD-10-CM

## 2020-09-09 DIAGNOSIS — I48.0 PAROXYSMAL ATRIAL FIBRILLATION (HCC): ICD-10-CM

## 2020-09-09 DIAGNOSIS — I47.20 VENTRICULAR TACHYCARDIA (HCC): ICD-10-CM

## 2020-09-09 DIAGNOSIS — I10 HTN (HYPERTENSION), MALIGNANT: ICD-10-CM

## 2020-09-09 DIAGNOSIS — Z79.899 HIGH RISK MEDICATION USE: ICD-10-CM

## 2020-09-09 DIAGNOSIS — I42.9 CARDIOMYOPATHY, UNSPECIFIED TYPE (HCC): ICD-10-CM

## 2020-09-09 DIAGNOSIS — Z95.810 AICD (AUTOMATIC CARDIOVERTER/DEFIBRILLATOR) PRESENT: ICD-10-CM

## 2020-09-09 DIAGNOSIS — I50.20 ACC/AHA STAGE C SYSTOLIC HEART FAILURE (HCC): ICD-10-CM

## 2020-09-09 DIAGNOSIS — R06.09 DYSPNEA ON EXERTION: ICD-10-CM

## 2020-09-09 DIAGNOSIS — I50.9 HEART FAILURE, NYHA CLASS 2 (HCC): ICD-10-CM

## 2020-09-09 PROCEDURE — 99214 OFFICE O/P EST MOD 30 MIN: CPT | Performed by: INTERNAL MEDICINE

## 2020-09-09 RX ORDER — LOSARTAN POTASSIUM 50 MG/1
50 TABLET ORAL DAILY
Qty: 90 TAB | Refills: 4 | Status: SHIPPED | OUTPATIENT
Start: 2020-09-09 | End: 2021-02-10

## 2020-09-09 ASSESSMENT — FIBROSIS 4 INDEX: FIB4 SCORE: 1.55

## 2020-09-09 ASSESSMENT — ENCOUNTER SYMPTOMS
PALPITATIONS: 0
DOUBLE VISION: 0
ABDOMINAL PAIN: 0
SHORTNESS OF BREATH: 1
BLURRED VISION: 0
MYALGIAS: 0
HEADACHES: 0
COUGH: 0
FALLS: 0
SENSORY CHANGE: 0
MEMORY LOSS: 0
DIAPHORESIS: 0
DEPRESSION: 0
FEVER: 0
BRUISES/BLEEDS EASILY: 0
DIZZINESS: 0

## 2020-09-09 ASSESSMENT — MINNESOTA LIVING WITH HEART FAILURE QUESTIONNAIRE (MLHF)
DIFFICULTY WITH RECREATIONAL PASTIMES, SPORTS, HOBBIES: 0
WORKING AROUND THE HOUSE OR YARD DIFFICULT: 0
MAKING YOU STAY IN A HOSPITAL: 0
WALKING ABOUT OR CLIMBING STAIRS DIFFICULT: 0
LOSS OF SELF CONTROL IN YOUR LIFE: 0
DIFFICULTY TO CONCENTRATE OR REMEMBERING THINGS: 1
MAKING YOU SHORT OF BREATH: 0
COSTING YOU MONEY FOR MEDICAL CARE: 0
FEELING LIKE A BURDEN TO FAMILY AND FRIENDS: 0
MAKING YOU WORRY: 0
HAVING TO SIT OR LIE DOWN DURING THE DAY: 0
TIRED, FATIGUED OR LOW ON ENERGY: 0
DIFFICULTY SOCIALIZING WITH FAMILY OR FRIENDS: 0
DIFFICULTY GOING AWAY FROM HOME: 0
EATING LESS FOODS YOU LIKE: 0
SWELLING IN ANKLES OR LEGS: 0
GIVING YOU SIDE EFFECTS FROM TREATMENTS: 0
TOTAL_SCORE: 1
DIFFICULTY WITH SEXUAL ACTIVITIES: 0
MAKING YOU FEEL DEPRESSED: 0
DIFFICULTY WORKING TO EARN A LIVING: 0
DIFFICULTY SLEEPING WELL AT NIGHT: 0

## 2020-09-09 NOTE — OR NURSING
COVID-19 Pre-Surgery Screening:    Message left to return phone call to [phone] to complete COVID-19 screening.

## 2020-09-09 NOTE — PROGRESS NOTES
Chief Complaint   Patient presents with   • Congestive Heart Failure       Subjective:   Lencho Parker is a 79 y.o. male who presents today for cardiac care and management due to cardiac issues of nonischemic cardiomyopathy diagnosed in 2009, with most recent cholangiogram in 2016 showing nonobstructive coronary to disease, status post dual-chamber ICD placed in 2010, paroxysmal atrial fibrillation status post watchman device in October 2015, prior history of ventricular tachycardia status post ablation in 2012, hypertension, hyperlipidemia, treated AAA, prior history of TIA without residual neurological deficits.    Of note, patient was seen by his cardiologist in [place] before he moved to [facility].  He was initially started on Entresto therapy but developed angioedema.    His most recent transthoracic echocardiogram showed LV function of 15%.  He is status post CardioMEMS implantation for remote monitoring of volume status. Numbers have been good.    Patient is feeling better these days. Does get winded upon walking up inclines or for distance. No symptoms at rest or with daily living activities.      Past Medical History:   Diagnosis Date   • Breath shortness     on exertion   • Cardiomyopathy (HCC) 08/2019    Echocardiogram with mildly dilated LV, mild concentric, LVEF 15%. Severely dilated LA. Trace MR, trace AI, mild TR. RVSP 26mmHg.   • Chronic obstructive pulmonary disease (HCC)    • [place] measles    • Heart attack (HCC)     hx 2007   • History of abdominal aortic aneurysm (AAA) 2009    Status post stent   • Hyperlipidemia    • Hypertension    • Mumps    • Pacemaker     AICD   • Paroxysmal atrial fibrillation (HCC)     Status post Watchman procedure in [place].   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    • Sleep apnea    • Snoring    • Stroke (HCC) 2012    TIA   • Tonsillitis    • Ventricular tachycardia (HCC)      Past Surgical History:   Procedure Laterality Date   • AICD  BATTERY CHANGE Left 2017    Generator replacement with [employer] Evera MRI XT  [id number] implanted in [place].   • OTHER CARDIAC SURGERY      watchman device   • AAA WITH STENT GRAFT     • OTHER ORTHOPEDIC SURGERY      hip surgery   • OTHER      kidney removed   • OTHER CARDIAC SURGERY      AICD implanted     Family History   Problem Relation Age of Onset   • Cancer Mother    • Cancer Sister      Social History     Socioeconomic History   • Marital status: Single     Spouse name: Not on file   • Number of children: Not on file   • Years of education: Not on file   • Highest education level: Not on file   Occupational History   • Not on file   Social Needs   • Financial resource strain: Not on file   • Food insecurity     Worry: Not on file     Inability: Not on file   • Transportation needs     Medical: Not on file     Non-medical: Not on file   Tobacco Use   • Smoking status: Former Smoker     Packs/day: 0.50     Years: 15.00     Pack years: 7.50     Types: Cigarettes     Quit date: 2004     Years since quittin.1   • Smokeless tobacco: Never Used   Substance and Sexual Activity   • Alcohol use: Yes     Alcohol/week: 1.2 oz     Types: 2 Glasses of wine per week     Frequency: Never     Drinks per session: 1 or 2     Binge frequency: Never     Comment: 2 per day   • Drug use: No   • Sexual activity: Not on file   Lifestyle   • Physical activity     Days per week: Not on file     Minutes per session: Not on file   • Stress: Not on file   Relationships   • Social connections     Talks on phone: Not on file     Gets together: Not on file     Attends Rastafari service: Not on file     Active member of club or organization: Not on file     Attends meetings of clubs or organizations: Not on file     Relationship status: Not on file   • Intimate partner violence     Fear of current or ex partner: Not on file     Emotionally abused: Not on file     Physically abused: Not on file     Forced  sexual activity: Not on file   Other Topics Concern   • Not on file   Social History Narrative   • Not on file     Allergies   Allergen Reactions   • Entresto [Sacubitril-Valsartan]      Swollen tongue. Angioedema.     Outpatient Encounter Medications as of 9/9/2020   Medication Sig Dispense Refill   • spironolactone (ALDACTONE) 25 MG Tab Take 0.5 Tabs by mouth every day. 45 Tab 3   • furosemide (LASIX) 20 MG Tab Take 20 mg by mouth every Monday, Wednesday, and Friday.     • losartan (COZAAR) 25 MG Tab Take 1 Tab by mouth every day. 30 Tab 2   • vitamin D, Ergocalciferol, (DRISDOL) 12620 units Cap capsule Take 50,000 Units by mouth every 7 days. MONDAY     • aspirin EC (ECOTRIN) 81 MG Tablet Delayed Response Take 81 mg by mouth every day.     • amiodarone (CORDARONE) 200 MG Tab Take 200 mg by mouth every day.     • cinacalcet (SENSIPAR) 30 MG Tab Take 30 mg by mouth every day.     • carvedilol (COREG) 25 MG Tab Take 12.5-25 mg by mouth 2 times a day. Pt takes 25MG in AM and 12.5MG HS      • atorvastatin (LIPITOR) 20 MG Tab Take 20 mg by mouth every day.     • Omega-3 Fatty Acids (FISH OIL) 1000 MG Cap capsule Take 1,000 mg by mouth every day.     • multivitamin (THERAGRAN) Tab Take 1 Tab by mouth every day.     • albuterol 108 (90 Base) MCG/ACT Aero Soln inhalation aerosol Inhale 2 Puffs by mouth every 6 hours as needed for Shortness of Breath. 8.5 g 0     No facility-administered encounter medications on file as of 9/9/2020.      Review of Systems   Constitutional: Negative for diaphoresis and fever.   HENT: Negative for nosebleeds.    Eyes: Negative for blurred vision and double vision.   Respiratory: Positive for shortness of breath. Negative for cough.    Cardiovascular: Negative for chest pain and palpitations.   Gastrointestinal: Negative for abdominal pain.   Genitourinary: Negative for dysuria and frequency.   Musculoskeletal: Negative for falls and myalgias.   Skin: Negative for rash.   Neurological:  "Negative for dizziness, sensory change and headaches.   Endo/Heme/Allergies: Does not bruise/bleed easily.   Psychiatric/Behavioral: Negative for depression and memory loss.        Objective:   /80 (BP Location: Left arm, Patient Position: Sitting, BP Cuff Size: Adult)   Pulse 70   Ht 1.88 m (6' 2\")   Wt 91.2 kg (201 lb)   SpO2 91%   BMI 25.81 kg/m²     Physical Exam   Constitutional: He is oriented to person, place, and time. No distress.   HENT:   Head: Normocephalic and atraumatic.   Right Ear: External ear normal.   Left Ear: External ear normal.   Eyes: Right eye exhibits no discharge. Left eye exhibits no discharge.   Neck: No JVD present. No thyromegaly present.   Cardiovascular: Normal rate, regular rhythm, normal heart sounds and intact distal pulses. Exam reveals no gallop and no friction rub.   No murmur heard.  Pulmonary/Chest: Breath sounds normal. No respiratory distress.   Abdominal: Bowel sounds are normal. He exhibits no distension. There is no abdominal tenderness.   Musculoskeletal:         General: No tenderness or edema.   Neurological: He is alert and oriented to person, place, and time. No cranial nerve deficit.   Skin: Skin is warm and dry. He is not diaphoretic.   Psychiatric: He has a normal mood and affect. His behavior is normal.   Nursing note and vitals reviewed.      Assessment:     1. ACC/AHA stage C systolic heart failure (HCC)     2. Heart failure, NYHA class 2 (HCC)     3. AICD (automatic cardioverter/defibrillator) present     4. Ventricular tachycardia (HCC)     5. Cardiomyopathy, unspecified type (HCC)     6. Paroxysmal atrial fibrillation (HCC)     7. HTN (hypertension), malignant     8. Mixed hyperlipidemia     9. Dyspnea on exertion     10. High risk medication use         Medical Decision Making:  Today's Assessment / Status / Plan:   Non-ischemic Cardiomyopathy LVEF of 15% with already optimized medical therapy:  Chronically illed condition which requires ongoing " close monitoring and treatment to improve survival rate along with decreasing risk of clinical decompensation and hospitalization.    Today, based on physical examination findings, patient is euvolemic. No JVD, lungs are clear to auscultation, no pitting edema in bilateral lower extremities, no ascites.     Dry weight is 201 lbs. Gained 3 lbs since 06/2020.     Continue coreg 25 mg po bid.   Will increase Losartan to goal of 50 mg once a day.      Diuretic with Lasix 20 mg thrice weekly now. Patient was instructed to monitor symptoms of lightheadedness or syncope rather than actual numbers.      Aldactone antagonist with Spironolactone 12.5 mg daily.    At this time, await CRT upgrade by adding LV lead via coronary sinus. I personally interpreted the EKG which showed QRS of more than 120 ms of LBBB morphology. I do think that patient will be benefited from upgrade of synchronized therapy.    Continue remote monitor with MEMs.    Paroxysmal Atrial fibrillation:  No anticoagulation, already with Watchman device.    Hypertension:  Optimize control using cardiomyopathy medical regimen as well.    Hyperlipidemia:  Optimize statin as within guidelines of CAD treatment as above.

## 2020-09-10 ENCOUNTER — APPOINTMENT (OUTPATIENT)
Dept: RADIOLOGY | Facility: MEDICAL CENTER | Age: 80
DRG: 227 | End: 2020-09-10
Attending: INTERNAL MEDICINE
Payer: MEDICARE

## 2020-09-10 ENCOUNTER — ANESTHESIA EVENT (OUTPATIENT)
Dept: CARDIOLOGY | Facility: MEDICAL CENTER | Age: 80
DRG: 227 | End: 2020-09-10
Payer: MEDICARE

## 2020-09-10 ENCOUNTER — APPOINTMENT (OUTPATIENT)
Dept: CARDIOLOGY | Facility: MEDICAL CENTER | Age: 80
DRG: 227 | End: 2020-09-10
Attending: INTERNAL MEDICINE
Payer: MEDICARE

## 2020-09-10 ENCOUNTER — ANESTHESIA (OUTPATIENT)
Dept: CARDIOLOGY | Facility: MEDICAL CENTER | Age: 80
DRG: 227 | End: 2020-09-10
Payer: MEDICARE

## 2020-09-10 ENCOUNTER — HOSPITAL ENCOUNTER (INPATIENT)
Facility: MEDICAL CENTER | Age: 80
LOS: 1 days | DRG: 227 | End: 2020-09-11
Attending: INTERNAL MEDICINE | Admitting: INTERNAL MEDICINE
Payer: MEDICARE

## 2020-09-10 DIAGNOSIS — I50.9 HEART FAILURE, NYHA CLASS 3 (HCC): ICD-10-CM

## 2020-09-10 DIAGNOSIS — I51.89 LEFT VENTRICULAR SYSTOLIC DYSFUNCTION, NYHA CLASS 3: ICD-10-CM

## 2020-09-10 DIAGNOSIS — I42.9 CARDIOMYOPATHY, UNSPECIFIED TYPE (HCC): ICD-10-CM

## 2020-09-10 DIAGNOSIS — Z95.810 AICD (AUTOMATIC CARDIOVERTER/DEFIBRILLATOR) PRESENT: ICD-10-CM

## 2020-09-10 DIAGNOSIS — I50.20 ACC/AHA STAGE C SYSTOLIC HEART FAILURE (HCC): ICD-10-CM

## 2020-09-10 DIAGNOSIS — I47.20 VENTRICULAR TACHYCARDIA (HCC): ICD-10-CM

## 2020-09-10 LAB — EKG IMPRESSION: NORMAL

## 2020-09-10 PROCEDURE — 700101 HCHG RX REV CODE 250

## 2020-09-10 PROCEDURE — 0JH609Z INSERTION OF CARDIAC RESYNCHRONIZATION DEFIBRILLATOR PULSE GENERATOR INTO CHEST SUBCUTANEOUS TISSUE AND FASCIA, OPEN APPROACH: ICD-10-PCS | Performed by: INTERNAL MEDICINE

## 2020-09-10 PROCEDURE — 0JPT0PZ REMOVAL OF CARDIAC RHYTHM RELATED DEVICE FROM TRUNK SUBCUTANEOUS TISSUE AND FASCIA, OPEN APPROACH: ICD-10-PCS | Performed by: INTERNAL MEDICINE

## 2020-09-10 PROCEDURE — 700102 HCHG RX REV CODE 250 W/ 637 OVERRIDE(OP)

## 2020-09-10 PROCEDURE — A9270 NON-COVERED ITEM OR SERVICE: HCPCS

## 2020-09-10 PROCEDURE — A9270 NON-COVERED ITEM OR SERVICE: HCPCS | Performed by: INTERNAL MEDICINE

## 2020-09-10 PROCEDURE — 71045 X-RAY EXAM CHEST 1 VIEW: CPT

## 2020-09-10 PROCEDURE — 93010 ELECTROCARDIOGRAM REPORT: CPT | Performed by: INTERNAL MEDICINE

## 2020-09-10 PROCEDURE — 160002 HCHG RECOVERY MINUTES (STAT)

## 2020-09-10 PROCEDURE — C1894 INTRO/SHEATH, NON-LASER: HCPCS

## 2020-09-10 PROCEDURE — 93005 ELECTROCARDIOGRAM TRACING: CPT | Performed by: INTERNAL MEDICINE

## 2020-09-10 PROCEDURE — 700111 HCHG RX REV CODE 636 W/ 250 OVERRIDE (IP)

## 2020-09-10 PROCEDURE — 33264 RMVL & RPLCMT DFB GEN MLT LD: CPT | Performed by: INTERNAL MEDICINE

## 2020-09-10 PROCEDURE — 700111 HCHG RX REV CODE 636 W/ 250 OVERRIDE (IP): Performed by: INTERNAL MEDICINE

## 2020-09-10 PROCEDURE — 99152 MOD SED SAME PHYS/QHP 5/>YRS: CPT | Performed by: INTERNAL MEDICINE

## 2020-09-10 PROCEDURE — 700102 HCHG RX REV CODE 250 W/ 637 OVERRIDE(OP): Performed by: INTERNAL MEDICINE

## 2020-09-10 PROCEDURE — 02HL3KZ INSERTION OF DEFIBRILLATOR LEAD INTO LEFT VENTRICLE, PERCUTANEOUS APPROACH: ICD-10-PCS | Performed by: INTERNAL MEDICINE

## 2020-09-10 PROCEDURE — 33225 L VENTRIC PACING LEAD ADD-ON: CPT | Performed by: INTERNAL MEDICINE

## 2020-09-10 PROCEDURE — 700117 HCHG RX CONTRAST REV CODE 255: Performed by: INTERNAL MEDICINE

## 2020-09-10 PROCEDURE — 33225 L VENTRIC PACING LEAD ADD-ON: CPT

## 2020-09-10 PROCEDURE — 700105 HCHG RX REV CODE 258: Performed by: INTERNAL MEDICINE

## 2020-09-10 PROCEDURE — 770020 HCHG ROOM/CARE - TELE (206)

## 2020-09-10 RX ORDER — ATORVASTATIN CALCIUM 20 MG/1
20 TABLET, FILM COATED ORAL DAILY
Status: DISCONTINUED | OUTPATIENT
Start: 2020-09-11 | End: 2020-09-11 | Stop reason: HOSPADM

## 2020-09-10 RX ORDER — LIDOCAINE HYDROCHLORIDE 20 MG/ML
INJECTION, SOLUTION INFILTRATION; PERINEURAL
Status: COMPLETED
Start: 2020-09-10 | End: 2020-09-10

## 2020-09-10 RX ORDER — SODIUM CHLORIDE, SODIUM LACTATE, POTASSIUM CHLORIDE, CALCIUM CHLORIDE 600; 310; 30; 20 MG/100ML; MG/100ML; MG/100ML; MG/100ML
INJECTION, SOLUTION INTRAVENOUS CONTINUOUS
Status: ACTIVE | OUTPATIENT
Start: 2020-09-10 | End: 2020-09-10

## 2020-09-10 RX ORDER — ALBUTEROL SULFATE 90 UG/1
2 AEROSOL, METERED RESPIRATORY (INHALATION) EVERY 6 HOURS PRN
Status: DISCONTINUED | OUTPATIENT
Start: 2020-09-10 | End: 2020-09-11 | Stop reason: HOSPADM

## 2020-09-10 RX ORDER — BUPIVACAINE HYDROCHLORIDE 2.5 MG/ML
INJECTION, SOLUTION EPIDURAL; INFILTRATION; INTRACAUDAL
Status: COMPLETED
Start: 2020-09-10 | End: 2020-09-10

## 2020-09-10 RX ORDER — FUROSEMIDE 20 MG/1
20 TABLET ORAL
Status: DISCONTINUED | OUTPATIENT
Start: 2020-09-11 | End: 2020-09-11 | Stop reason: HOSPADM

## 2020-09-10 RX ORDER — MIDAZOLAM HYDROCHLORIDE 1 MG/ML
INJECTION INTRAMUSCULAR; INTRAVENOUS
Status: COMPLETED
Start: 2020-09-10 | End: 2020-09-10

## 2020-09-10 RX ORDER — CARVEDILOL 12.5 MG/1
25 TABLET ORAL EVERY MORNING
Status: DISCONTINUED | OUTPATIENT
Start: 2020-09-11 | End: 2020-09-11 | Stop reason: HOSPADM

## 2020-09-10 RX ORDER — CEFAZOLIN SODIUM 2 G/100ML
2 INJECTION, SOLUTION INTRAVENOUS EVERY 8 HOURS
Status: COMPLETED | OUTPATIENT
Start: 2020-09-11 | End: 2020-09-11

## 2020-09-10 RX ORDER — SPIRONOLACTONE 25 MG/1
12.5 TABLET ORAL DAILY
Status: DISCONTINUED | OUTPATIENT
Start: 2020-09-11 | End: 2020-09-11 | Stop reason: HOSPADM

## 2020-09-10 RX ORDER — LOSARTAN POTASSIUM 50 MG/1
50 TABLET ORAL DAILY
Status: DISCONTINUED | OUTPATIENT
Start: 2020-09-11 | End: 2020-09-11 | Stop reason: HOSPADM

## 2020-09-10 RX ORDER — CEFAZOLIN SODIUM 1 G/3ML
INJECTION, POWDER, FOR SOLUTION INTRAMUSCULAR; INTRAVENOUS
Status: COMPLETED
Start: 2020-09-10 | End: 2020-09-10

## 2020-09-10 RX ORDER — AMIODARONE HYDROCHLORIDE 200 MG/1
200 TABLET ORAL DAILY
Status: DISCONTINUED | OUTPATIENT
Start: 2020-09-11 | End: 2020-09-11 | Stop reason: HOSPADM

## 2020-09-10 RX ORDER — CARVEDILOL 12.5 MG/1
12.5 TABLET ORAL
Status: DISCONTINUED | OUTPATIENT
Start: 2020-09-10 | End: 2020-09-11 | Stop reason: HOSPADM

## 2020-09-10 RX ADMIN — LIDOCAINE HYDROCHLORIDE: 20 INJECTION, SOLUTION INFILTRATION; PERINEURAL at 15:45

## 2020-09-10 RX ADMIN — MIDAZOLAM HYDROCHLORIDE 2 MG: 1 INJECTION, SOLUTION INTRAMUSCULAR; INTRAVENOUS at 16:00

## 2020-09-10 RX ADMIN — MIDAZOLAM HYDROCHLORIDE 0.5 MG: 1 INJECTION, SOLUTION INTRAMUSCULAR; INTRAVENOUS at 17:09

## 2020-09-10 RX ADMIN — BUPIVACAINE HYDROCHLORIDE: 2.5 INJECTION, SOLUTION EPIDURAL; INFILTRATION; INTRACAUDAL; PERINEURAL at 15:45

## 2020-09-10 RX ADMIN — CEFAZOLIN SODIUM 2 G: 2 INJECTION, SOLUTION INTRAVENOUS at 23:14

## 2020-09-10 RX ADMIN — IOHEXOL 35 ML: 350 INJECTION, SOLUTION INTRAVENOUS at 17:17

## 2020-09-10 RX ADMIN — FENTANYL CITRATE 100 MCG: 50 INJECTION INTRAMUSCULAR; INTRAVENOUS at 16:30

## 2020-09-10 RX ADMIN — POVIDONE-IODINE 15 ML: 10 SOLUTION TOPICAL at 11:32

## 2020-09-10 RX ADMIN — FENTANYL CITRATE 50 MCG: 50 INJECTION INTRAMUSCULAR; INTRAVENOUS at 17:10

## 2020-09-10 RX ADMIN — FENTANYL CITRATE 100 MCG: 50 INJECTION INTRAMUSCULAR; INTRAVENOUS at 16:01

## 2020-09-10 RX ADMIN — MIDAZOLAM HYDROCHLORIDE 2 MG: 1 INJECTION, SOLUTION INTRAMUSCULAR; INTRAVENOUS at 16:28

## 2020-09-10 RX ADMIN — SODIUM CHLORIDE, POTASSIUM CHLORIDE, SODIUM LACTATE AND CALCIUM CHLORIDE: 600; 310; 30; 20 INJECTION, SOLUTION INTRAVENOUS at 12:00

## 2020-09-10 RX ADMIN — CARVEDILOL 12.5 MG: 12.5 TABLET, FILM COATED ORAL at 19:26

## 2020-09-10 RX ADMIN — CEFAZOLIN 3000 MG: 330 INJECTION, POWDER, FOR SOLUTION INTRAMUSCULAR; INTRAVENOUS at 15:46

## 2020-09-10 ASSESSMENT — FIBROSIS 4 INDEX: FIB4 SCORE: 1.55

## 2020-09-10 ASSESSMENT — PATIENT HEALTH QUESTIONNAIRE - PHQ9
1. LITTLE INTEREST OR PLEASURE IN DOING THINGS: NOT AT ALL
2. FEELING DOWN, DEPRESSED, IRRITABLE, OR HOPELESS: NOT AT ALL
SUM OF ALL RESPONSES TO PHQ9 QUESTIONS 1 AND 2: 0

## 2020-09-10 ASSESSMENT — COGNITIVE AND FUNCTIONAL STATUS - GENERAL
DAILY ACTIVITIY SCORE: 20
STANDING UP FROM CHAIR USING ARMS: A LITTLE
TOILETING: A LITTLE
MOVING FROM LYING ON BACK TO SITTING ON SIDE OF FLAT BED: A LITTLE
HELP NEEDED FOR BATHING: A LITTLE
DRESSING REGULAR UPPER BODY CLOTHING: A LITTLE
SUGGESTED CMS G CODE MODIFIER DAILY ACTIVITY: CJ
MOBILITY SCORE: 20
TURNING FROM BACK TO SIDE WHILE IN FLAT BAD: A LITTLE
SUGGESTED CMS G CODE MODIFIER MOBILITY: CJ
DRESSING REGULAR LOWER BODY CLOTHING: A LITTLE
MOVING TO AND FROM BED TO CHAIR: A LITTLE

## 2020-09-10 ASSESSMENT — LIFESTYLE VARIABLES
ON A TYPICAL DAY WHEN YOU DRINK ALCOHOL HOW MANY DRINKS DO YOU HAVE: 2
HAVE PEOPLE ANNOYED YOU BY CRITICIZING YOUR DRINKING: NO
CONSUMPTION TOTAL: NEGATIVE
TOTAL SCORE: 0
HAVE YOU EVER FELT YOU SHOULD CUT DOWN ON YOUR DRINKING: NO
ALCOHOL_USE: YES
HOW MANY TIMES IN THE PAST YEAR HAVE YOU HAD 5 OR MORE DRINKS IN A DAY: 0
DOES PATIENT WANT TO STOP DRINKING: NO
EVER FELT BAD OR GUILTY ABOUT YOUR DRINKING: NO
TOTAL SCORE: 0
EVER HAD A DRINK FIRST THING IN THE MORNING TO STEADY YOUR NERVES TO GET RID OF A HANGOVER: NO
TOTAL SCORE: 0
AVERAGE NUMBER OF DAYS PER WEEK YOU HAVE A DRINK CONTAINING ALCOHOL: 7

## 2020-09-10 NOTE — ANESTHESIA PREPROCEDURE EVALUATION
Relevant Problems   PULMONARY   (+) COPD (chronic obstructive pulmonary disease) (HCC)      NEURO   (+) History of TIA (transient ischemic attack)   (+) History of abdominal aortic aneurysm (AAA)      CARDIAC   (+) Essential hypertension, benign   (+) PVD (peripheral vascular disease) ([facility])   (+) Paroxysmal atrial fibrillation (HCC)   (+) Ventricular tachycardia (HCC)         (+) Renal disorder      Other   (+) ACC/AHA stage C systolic heart failure ([facility])   (+) AICD (automatic cardioverter/defibrillator) present   (+) Cardiomyopathy ([facility])   (+) Former smoker   (+) Left ventricular systolic dysfunction, NYHA class 3       Physical Exam    Airway   Mallampati: II  TM distance: >3 FB  Neck ROM: full       Cardiovascular - normal exam  Rhythm: regular  Rate: normal  (-) murmur     Dental - normal exam           Pulmonary - normal exam  Breath sounds clear to auscultation     Abdominal    Neurological - normal exam                 Anesthesia Plan    ASA 4   ASA physical status 4 criteria: severe reduction of ejection fractions    Plan - general       Airway plan will be ETT        Induction: intravenous    Postoperative Plan: Postoperative administration of opioids is intended.    Pertinent diagnostic labs and testing reviewed    Informed Consent:    Anesthetic plan and risks discussed with patient.    Use of blood products discussed with: patient whom consented to blood products.

## 2020-09-11 ENCOUNTER — APPOINTMENT (OUTPATIENT)
Dept: RADIOLOGY | Facility: MEDICAL CENTER | Age: 80
DRG: 227 | End: 2020-09-11
Attending: INTERNAL MEDICINE
Payer: MEDICARE

## 2020-09-11 VITALS
TEMPERATURE: 97.6 F | HEIGHT: 74 IN | OXYGEN SATURATION: 97 % | DIASTOLIC BLOOD PRESSURE: 70 MMHG | WEIGHT: 203 LBS | BODY MASS INDEX: 26.05 KG/M2 | SYSTOLIC BLOOD PRESSURE: 116 MMHG | RESPIRATION RATE: 18 BRPM | HEART RATE: 61 BPM

## 2020-09-11 LAB
EKG IMPRESSION: NORMAL
EKG IMPRESSION: NORMAL

## 2020-09-11 PROCEDURE — 99024 POSTOP FOLLOW-UP VISIT: CPT | Performed by: NURSE PRACTITIONER

## 2020-09-11 PROCEDURE — A9270 NON-COVERED ITEM OR SERVICE: HCPCS | Performed by: INTERNAL MEDICINE

## 2020-09-11 PROCEDURE — 700102 HCHG RX REV CODE 250 W/ 637 OVERRIDE(OP): Performed by: INTERNAL MEDICINE

## 2020-09-11 PROCEDURE — 93010 ELECTROCARDIOGRAM REPORT: CPT | Performed by: INTERNAL MEDICINE

## 2020-09-11 PROCEDURE — 700111 HCHG RX REV CODE 636 W/ 250 OVERRIDE (IP): Performed by: INTERNAL MEDICINE

## 2020-09-11 PROCEDURE — 93005 ELECTROCARDIOGRAM TRACING: CPT | Performed by: INTERNAL MEDICINE

## 2020-09-11 PROCEDURE — 71045 X-RAY EXAM CHEST 1 VIEW: CPT

## 2020-09-11 PROCEDURE — 93010 ELECTROCARDIOGRAM REPORT: CPT | Mod: 77 | Performed by: INTERNAL MEDICINE

## 2020-09-11 RX ORDER — ACETAMINOPHEN 325 MG/1
650 TABLET ORAL EVERY 4 HOURS PRN
Status: DISCONTINUED | OUTPATIENT
Start: 2020-09-11 | End: 2020-09-11 | Stop reason: HOSPADM

## 2020-09-11 RX ADMIN — LOSARTAN POTASSIUM 50 MG: 50 TABLET, FILM COATED ORAL at 05:03

## 2020-09-11 RX ADMIN — ACETAMINOPHEN 650 MG: 325 TABLET, FILM COATED ORAL at 11:34

## 2020-09-11 RX ADMIN — ATORVASTATIN CALCIUM 20 MG: 20 TABLET, FILM COATED ORAL at 05:03

## 2020-09-11 RX ADMIN — CARVEDILOL 25 MG: 12.5 TABLET, FILM COATED ORAL at 05:03

## 2020-09-11 RX ADMIN — AMIODARONE HYDROCHLORIDE 200 MG: 200 TABLET ORAL at 05:02

## 2020-09-11 RX ADMIN — SPIRONOLACTONE 12.5 MG: 25 TABLET ORAL at 05:00

## 2020-09-11 RX ADMIN — ACETAMINOPHEN 650 MG: 325 TABLET, FILM COATED ORAL at 01:40

## 2020-09-11 RX ADMIN — FUROSEMIDE 20 MG: 20 TABLET ORAL at 09:00

## 2020-09-11 RX ADMIN — CEFAZOLIN SODIUM 2 G: 2 INJECTION, SOLUTION INTRAVENOUS at 08:00

## 2020-09-11 ASSESSMENT — PAIN DESCRIPTION - PAIN TYPE
TYPE: ACUTE PAIN
TYPE: ACUTE PAIN;SURGICAL PAIN
TYPE: ACUTE PAIN

## 2020-09-11 NOTE — PROGRESS NOTES
Bedside report received. POC discussed with pt; all questions answered at this time. Pacemaker site assessed with dayshift RN, dressing is CDI. Patient is resting in bed and currently denies any further needs.

## 2020-09-11 NOTE — RESPIRATORY CARE
COPD EDUCATION by COPD CLINICAL EDUCATOR  9/11/2020 at 7:07 AM by Fallon Lance RRT     Patient reviewed by COPD education team. Patient does not have a history or diagnosis of COPD and is a non-smoker, therefore does not qualify for the COPD program. Of note, per Pulmonary function test on 6/3/2020 by Dr. Morelia Schroeder at [facility] confirms that patient does not have COPD. See below:     Interpretation:   1.  Baseline spirometry shows normal airflows with FEV1 of 3.15 L   or 93% predicted and FVC of 3.77 L or 82% predicted.  FEV1-FVC   ratio is 83.   2.  There is no significant bronchodilator response.   3.  Lung volumes are within normal limits.   4.  DLCO is within normal limits.   Normal pulmonary function testing.

## 2020-09-11 NOTE — HEART FAILURE PROGRAM
Patient not admitted for acute HF, was admitted for CRT-D Upgrade.    Thank you, Caitlyn Cardio RN Navigator [phone]

## 2020-09-11 NOTE — OR NURSING
1748 Pt over from cath lab post BIV ICD insertion. Left chest insertion site CDI and soft. No signs of bleeding. Pt awake and alert, denies pain or nausea. VSS.  1750 Tolerating orals  1755 Spoke with Karla and updated her on POC  1800 EKG at bedside  1810 Criteria met  1815 Report called to Adrian RN  1825 Pt transported to [facility] with all belongings by ACLS RN without incident. Placed on tele box and left chest pacer site checked with bedside RN.

## 2020-09-11 NOTE — OP REPORT
Electrophysiology Procedure Note  [facility]    PROCEDURE PERFORMED: Removal and replacement of multipolar ICD generator, placement of a LV lead, moderate sedation administered by RN and supervised by physician    : Tolu Barrios MD    ASSISTANT: none    ANESTHESIA: Moderate sedation,  start time 1544, stop time 1728  the moderate sedation document has been reviewed, signed and scanned into media     EBL: 30 cc    SPECIMENS: None    INDICATION: Chronic systolic heart failure, EF 15%, NICM, LBBB    PRE PROCEDURE ECG: SR with LBBB    POST PROCEDURE ECG: As-BiVp    COMPLICATIONS:  None    DESCRIPTION OF PROCEDURE:  After informed written consent, the patient was brought to the electrophysiology lab in the fasting, unsedated state. The patient was prepped and draped in the usual sterile fashion. The procedure was performed under moderate sedation with local anesthetic.   A left infraclavicular incision was made with a scalpel and the pectoral device pocket was opened using a combination of blunt dissection and electrocautery. The old leads were freed up from adhesions and tested fine.The modified Seldinger technique was used to gain access to the left axillary vein.   Next, the CS cannulated with a decapolar EP catheter and the inner sheath was placed in the CS. CS angiography showed adequate branches at anterolateral and posterolateral locations, unfortunately the balloon catheter dissected the distal SC and made the anterolateral branch unaccessable. We used a 130 degree subselector to engage the posterolateral branch, angiography revealed a lateral course. The quadripolar lead was placed over the wire; initial distal lateral location yielded prohibitive phrenic capture and otherwise poor thresholds. We pulled it back some and although the distal electrode still had no capture the other electrodes tested with adequate thresholds and with vectors without phrenic nerve simulation.  The CS  lead was sutured to the underlying pectoral muscle with interrupted non absorbable suture over a silastic suture sleeve. The device pocket was irrigated with antibiotic solution, inspected, and no bleeding was seen. The leads were connected to the ICD pulse generator and the device was inserted into the pocket. The device was upside-down in the pocket, there were too many adhesions to allow the device to be comfortably re-inserted right-side up so it was implanted upside down matching how it was chronically. The wound was closed with three layers of absorbable sutures.  I personally supervised the administration of moderate sedation by the RN and observed the level of consciousness and physiologic status throughout the procedure.  Following recovery from sedation, the patient was transferred to a monitored bed in good condition.     IMPLANTED DEVICE INFORMATION:  Pulse generator is a Medtronic model [id number]  Serial # [id number]    LEAD INFORMATION:  1)Right atrial lead is a Medtronic model # 5554 , serial # [id number] ,P wave 1.9 millivolts, threshold 0.75 Volts, pacing impedance 361 Ohms. Date of implant 5/3/2000    2)Right ventricular lead is a Medtronic model # 121063 , serial # [id number] ,R wave 7.8 millivolts, threshold 0.5 Volts, pacing impedance 988 Ohms. Date of implant 5/6/2002    3)Left ventricular lead is a Medtronic model # 4798 , serial # [id number]  threshold 2.25 Volts, pacing impedance 459 Ohms.    DEVICE PROGRAMMING:  DDDR     FLUOROSCOPY TIME: 18.7 min    IMPRESSIONS:  1. Successful CRT-D upgrade    RECOMMENDATIONS:  1. Transfer to monitored bed  2. PA and lateral chest x-ray  3. Device interrogation prior to hospital discharge  4. Followup in device clinic

## 2020-09-11 NOTE — DISCHARGE INSTRUCTIONS
Discharge Instructions    Discharged to home by car with relative. Discharged via wheelchair, hospital escort: Yes.  Special equipment needed: Not Applicable    Be sure to schedule a follow-up appointment with your primary care doctor or any specialists as instructed.        I understand that a diet low in cholesterol, fat, and sodium is recommended for good health. Unless I have been given specific instructions below for another diet, I accept this instruction as my diet prescription.       Pacemaker Implantation, Adult, Care After  This sheet gives you information about how to care for yourself after your procedure. Your health care provider may also give you more specific instructions. If you have problems or questions, contact your health care provider.  What can I expect after the procedure?  After the procedure, it is common to have:  · Mild pain.  · Slight bruising.  · Some swelling over the incision.  · A slight bump over the skin where the device was placed. Sometimes, it is possible to feel the device under the skin. This is normal.  Follow these instructions at home:  Medicines  · Take over-the-counter and prescription medicines only as told by your health care provider.  · If you were prescribed an antibiotic medicine, take it as told by your health care provider. Do not stop taking the antibiotic even if you start to feel better.  Wound care    · Do not remove the bandage on your chest until directed to do so by your health care provider.  · After your bandage is removed, you may see pieces of tape called skin adhesive strips over the area where the cut was made (incision site). Let them fall off on their own.  · Check the incision site every day to make sure it is not infected, bleeding, or starting to pull apart.  · Do not use lotions or ointments near the incision site unless directed to do so.  · Keep the incision area clean and dry for 2-3 days after the procedure or as directed by your health care  provider. It takes several weeks for the incision site to completely heal.  · Do not take baths, swim, or use a hot tub for 7-10 days or as otherwise directed by your health care provider.  Activity  · Do not drive or use heavy machinery while taking prescription pain medicine.  · Do not drive for 24 hours if you were given a medicine to help you relax (sedative).  · Check with your health care provider before you start to drive or play sports.  · Avoid sudden jerking, pulling, or chopping movements that pull your upper arm far away from your body. Avoid these movements for at least 6 weeks or as long as told by your health care provider.  · Do not lift your upper arm above your shoulders for at least 6 weeks or as long as told by your health care provider. This means no tennis, golf, or swimming.  · You may go back to work when your health care provider says it is okay.  Pacemaker care  · You may be shown how to transfer data from your pacemaker through the phone to your health care provider.  · Always let all health care providers know about your pacemaker before you have any medical procedures or tests.  · Wear a medical ID bracelet or necklace stating that you have a pacemaker. Carry a pacemaker ID card with you at all times.  · Your pacemaker battery will last for 5-15 years. Routine checks by your health care provider will let the health care provider know when the battery is starting to run down. The pacemaker will need to be replaced when the battery starts to run down.  · Do not use amateur radio equipment or electric welding torches. Other electrical devices are safe to use, including power tools, lawn mowers, and speakers. If you are unsure of whether something is safe to use, ask your health care provider.  · When using your cell phone, hold it to the ear opposite the pacemaker. Do not leave your cell phone in a pocket over the pacemaker.  · Avoid places or objects that have a strong electric or magnetic  field, including:  ? Airport security storey. When at the airport, let officials know that you have a pacemaker.  ? Power plants.  ? Large electrical generators.  ? Radiofrequency transmission towers, such as cell phone and radio towers.  General instructions  · Weigh yourself every day. If you suddenly gain weight, fluid may be building up in your body.  · Keep all follow-up visits as told by your health care provider. This is important.  Contact a health care provider if:  · You gain weight suddenly.  · Your legs or feet swell.  · It feels like your heart is fluttering or skipping beats (heart palpitations).  · You have chills or a fever.  · You have more redness, swelling, or pain around your incisions.  · You have more fluid or blood coming from your incisions.  · Your incisions feel warm to the touch.  · You have pus or a bad smell coming from your incisions.  Get help right away if:  · You have chest pain.  · You have trouble breathing or are short of breath.  · You become extremely tired.  · You are light-headed or you faint.  This information is not intended to replace advice given to you by your health care provider. Make sure you discuss any questions you have with your health care provider.      Depression / Suicide Risk    As you are discharged from this [facility] facility, it is important to learn how to keep safe from harming yourself.    Recognize the warning signs:  · Abrupt changes in personality, positive or negative- including increase in energy   · Giving away possessions  · Change in eating patterns- significant weight changes-  positive or negative  · Change in sleeping patterns- unable to sleep or sleeping all the time   · Unwillingness or inability to communicate  · Depression  · Unusual sadness, discouragement and loneliness  · Talk of wanting to die  · Neglect of personal appearance   · Rebelliousness- reckless behavior  · Withdrawal from people/activities they love  · Confusion-  inability to concentrate     If you or a loved one observes any of these behaviors or has concerns about self-harm, here's what you can do:  · Talk about it- your feelings and reasons for harming yourself  · Remove any means that you might use to hurt yourself (examples: pills, rope, extension cords, firearm)  · Get professional help from the community (Mental Health, Substance Abuse, psychological counseling)  · Do not be alone:Call your Safe Contact- someone whom you trust who will be there for you.  · Call your local CRISIS HOTLINE 553-4680 or 905-036-0387  · Call your local Children's Mobile Crisis Response Team [place] (174) 028-4056 or www.[employer]  · Call the toll free National Suicide Prevention Hotlines   · National Suicide Prevention Lifeline 340-403-XSEO (0133)  · National Hope Line Network 800-SUICIDE (198-7154)

## 2020-09-11 NOTE — DISCHARGE SUMMARY
Discharge Summary    CHIEF COMPLAINT ON ADMISSION  No chief complaint on file.      Reason for Admission  Cardiomyopathy, unspecified [I42.9]     Admission Date  9/10/2020    CODE STATUS  Full Code    HPI & HOSPITAL COURSE  This is a 80 y.o. male here with nonischemic cardiomyopathy diagnosed 2009 with most recent cardiac catheterization 2016 showing nonobstructive coronary disease,S/P ICD placement 2010, paroxysmal atrial fibrillation status post watchman device in October 2015, prior history of VT status post ablation 2012, HTN, HLD, treated AAA, prior TIA without residual neurological deficits.  This patient comes in for elective biventricular upgrade on 9/10/2020 by Dr. Barrios.    Patient's rhythm today is paced 60 bpm.  Patient's left anterior chest surgical site CDI, gauze and Tegaderm dressing, some ecchymosis, no hematoma.  Patient is doing well, has been up walking in the hallways without dizziness.       Therefore, he is discharged in good and stable condition to home with close outpatient follow-up.    The patient recovered much more quickly than anticipated on admission.    Discharge Date  9/11/2020    FOLLOW UP ITEMS POST DISCHARGE  None    DISCHARGE DIAGNOSES  Active Problems:    Cardiomyopathy (HCC) POA: Yes      Overview: August 2019: Echocardiogram with mildly dilated LV, mild concentric, LVEF       15%. Severely dilated LA. Trace MR, trace AI, mild TR. RVSP 26mmHg.    Paroxysmal atrial fibrillation (HCC) POA: Yes      Overview: Status post Watchman procedure in [place]. Now off of anticoagulation.    AICD (automatic cardioverter/defibrillator) present POA: Yes      Overview: January 2017: Generator replacement with [employer] Evera MRI XT  [id number]       implanted in [place].      9/10/20: AICD generator and LV lead change    Ventricular tachycardia (HCC) POA: Yes  Resolved Problems:    * No resolved hospital problems. *      FOLLOW UP  Future Appointments   Date Time Provider Department Center   9/21/2020  11:20 AM [facility] None     No follow-up provider specified.    MEDICATIONS ON DISCHARGE     Medication List      CONTINUE taking these medications      Instructions   albuterol 108 (90 Base) MCG/ACT Aers inhalation aerosol   Inhale 2 Puffs by mouth every 6 hours as needed for Shortness of Breath.  Dose: 2 Puff     amiodarone 200 MG Tabs  Commonly known as: CORDARONE   Take 200 mg by mouth every day.  Dose: 200 mg     aspirin EC 81 MG Tbec  Commonly known as: ECOTRIN   Take 81 mg by mouth every day.  Dose: 81 mg     atorvastatin 20 MG Tabs  Commonly known as: LIPITOR   Take 20 mg by mouth every day.  Dose: 20 mg     carvedilol 25 MG Tabs  Commonly known as: COREG   Take 12.5-25 mg by mouth 2 times a day. Pt takes 25MG in AM and 12.5MG HS  Dose: 12.5-25 mg     cinacalcet 30 MG Tabs  Commonly known as: SENSIPAR   Take 30 mg by mouth every day.  Dose: 30 mg     fish oil 1000 MG Caps capsule   Take 1,000 mg by mouth every day.  Dose: 1,000 mg     furosemide 20 MG Tabs  Commonly known as: LASIX   Take 20 mg by mouth every Monday, Wednesday, and Friday.  Dose: 20 mg     losartan 50 MG Tabs  Commonly known as: COZAAR   Take 1 Tab by mouth every day.  Dose: 50 mg     multivitamin Tabs   Take 1 Tab by mouth every day.  Dose: 1 Tab     spironolactone 25 MG Tabs  Commonly known as: ALDACTONE   Take 0.5 Tabs by mouth every day.  Dose: 12.5 mg     vitamin D (Ergocalciferol) 1.25 MG (79969 UT) Caps capsule  Commonly known as: DRISDOL   Take 50,000 Units by mouth every 7 days. MONDAY  Dose: 50,000 Units            Allergies  Allergies   Allergen Reactions   • Entresto [Sacubitril-Valsartan]      Swollen tongue. Angioedema.       DIET  Orders Placed This Encounter   Procedures   • Diet Order Cardiac     Standing Status:   Standing     Number of Occurrences:   1     Order Specific Question:   Diet:     Answer:   Cardiac [6]       ACTIVITY  As tolerated.  Weight bearing as  tolerated    CONSULTATIONS  None    PROCEDURES  PROCEDURE PERFORMED: Removal and replacement of multipolar ICD generator, placement of a LV lead, moderate sedation administered by RN and supervised by physician    LABORATORY  Lab Results   Component Value Date    SODIUM 138 09/08/2020    POTASSIUM 5.1 09/08/2020    CHLORIDE 105 09/08/2020    CO2 22 09/08/2020    GLUCOSE 91 09/08/2020    BUN 33 (H) 09/08/2020    CREATININE 1.76 (H) 09/08/2020        Lab Results   Component Value Date    WBC 5.7 09/08/2020    HEMOGLOBIN 15.7 09/08/2020    HEMATOCRIT 48.0 09/08/2020    PLATELETCT 186 09/08/2020

## 2020-09-11 NOTE — PROGRESS NOTES
Patient cleared by cardiologist for discharge. Reviewed discharge paperwork, answered any questions, and patient signed. Removed peripheral IV and telemetry box. Informed patient he would be taken down to lobby by wheelchair. Found bed empty upon return to room. Roommate witnessed patient speak with another staff member and walk out.

## 2020-09-11 NOTE — PROGRESS NOTES
2 RN skin check done by montrell RN and Ursula MERCHANT. Patient has dressing on his left chest from pacemaker site that is clean, dry, and intact. Patient has incision on his left wrist that has a pressure dressing over it that is clean, dry and intact. Patient has dry skin with scattered bruising. Patient's coccyx is red and blanchable.

## 2020-09-11 NOTE — PROGRESS NOTES
Patient arrived to unit alert and oriented with no complaints of pain. Patient's pacemaker site is clean, dry, and intact as well as his left wrist from his ART line. Patient oriented to room and placed on telemetry monitor. RN will continue to monitor.

## 2020-11-05 ENCOUNTER — NON-PROVIDER VISIT (OUTPATIENT)
Dept: CARDIOLOGY | Facility: MEDICAL CENTER | Age: 80
End: 2020-11-05
Payer: MEDICARE

## 2020-11-05 ENCOUNTER — TELEPHONE (OUTPATIENT)
Dept: CARDIOLOGY | Facility: MEDICAL CENTER | Age: 80
End: 2020-11-05

## 2020-11-05 PROCEDURE — 93284 PRGRMG EVAL IMPLANTABLE DFB: CPT | Performed by: INTERNAL MEDICINE

## 2020-11-05 NOTE — TELEPHONE ENCOUNTER
Guide HF Single arm Study patient:    Patient's CardioMEMS pressures are low at PaD 3 mmHg. Please call him and see how he is doing. Encourage hydration and tell him to decrease furosemide to 10 mg on his 3 days-Mon, Wed, Fri.

## 2020-11-05 NOTE — NON-PROVIDER
Final testing. Appropriate device function demonstrated. Wound site appears clear. Follow remotely.

## 2020-11-13 ENCOUNTER — TELEPHONE (OUTPATIENT)
Dept: TELEMETRY | Facility: MEDICAL CENTER | Age: 80
End: 2020-11-13

## 2020-11-13 NOTE — TELEPHONE ENCOUNTER
Called patient cell phone, LVM to decrease lasix to 10mg every M, W, F and the called home phone, spoke with Karla and left message    ----- Message from EUGENE Munoz sent at 11/13/2020 12:22 PM PST -----  Regarding: RE: GUIDE HF Med change  Yes, please have him reduce his diuretic, thanks!    Sole  ----- Message -----  From: Kenyetta Caro R.N.  Sent: 11/13/2020  11:31 AM PST  To: Jyoti Aguilar, EUGENE Munoz  Subject: RE: GUIDE HF Med change                          Let me know how you would like to proceed Sole...  ----- Message -----  From: Jyoti Aguilar  Sent: 11/13/2020  11:02 AM PST  To: Kenyetta Caro R.N., #  Subject: GUIDE HF Med change                              I just talked to Jacoby. He did not get the message Kenyetta left him on the 5th instructing him to cut his Furosemide down from 20mg to 10mg  M,W,F.  He has been super hydrating and his PAd is now at 11.  He is not having the symptoms of overly tired and dizzy(he just mentioned this today) like he was having before.  I told him to someone would call him if he is to still go ahead and reduce his Furosemide.

## 2020-12-04 DIAGNOSIS — Z00.6 RESEARCH STUDY PATIENT: ICD-10-CM

## 2020-12-04 DIAGNOSIS — I50.20 ACC/AHA STAGE C SYSTOLIC HEART FAILURE (HCC): ICD-10-CM

## 2020-12-11 ENCOUNTER — HOSPITAL ENCOUNTER (OUTPATIENT)
Dept: LAB | Facility: MEDICAL CENTER | Age: 80
End: 2020-12-11
Attending: INTERNAL MEDICINE
Payer: MEDICARE

## 2020-12-11 ENCOUNTER — APPOINTMENT (OUTPATIENT)
Dept: LAB | Facility: MEDICAL CENTER | Age: 80
End: 2020-12-11
Attending: NURSE PRACTITIONER
Payer: MEDICARE

## 2020-12-11 LAB
ALBUMIN SERPL BCP-MCNC: 3.8 G/DL (ref 3.2–4.9)
APPEARANCE UR: CLEAR
BASOPHILS # BLD AUTO: 0.2 % (ref 0–1.8)
BASOPHILS # BLD: 0.01 K/UL (ref 0–0.12)
BILIRUB UR QL STRIP.AUTO: NEGATIVE
BUN SERPL-MCNC: 32 MG/DL (ref 8–22)
CALCIUM SERPL-MCNC: 8.9 MG/DL (ref 8.4–10.2)
CHLORIDE SERPL-SCNC: 106 MMOL/L (ref 96–112)
CHOLEST SERPL-MCNC: 187 MG/DL (ref 100–199)
CO2 SERPL-SCNC: 23 MMOL/L (ref 20–33)
COLOR UR: YELLOW
CREAT SERPL-MCNC: 1.84 MG/DL (ref 0.5–1.4)
CREAT UR-MCNC: 92.84 MG/DL
EOSINOPHIL # BLD AUTO: 0.17 K/UL (ref 0–0.51)
EOSINOPHIL NFR BLD: 3.6 % (ref 0–6.9)
ERYTHROCYTE [DISTWIDTH] IN BLOOD BY AUTOMATED COUNT: 53.8 FL (ref 35.9–50)
FASTING STATUS PATIENT QL REPORTED: NORMAL
GLUCOSE SERPL-MCNC: 114 MG/DL (ref 65–99)
GLUCOSE UR STRIP.AUTO-MCNC: NEGATIVE MG/DL
HCT VFR BLD AUTO: 45.4 % (ref 42–52)
HDLC SERPL-MCNC: 64 MG/DL
HGB BLD-MCNC: 14.8 G/DL (ref 14–18)
IMM GRANULOCYTES # BLD AUTO: 0.01 K/UL (ref 0–0.11)
IMM GRANULOCYTES NFR BLD AUTO: 0.2 % (ref 0–0.9)
KETONES UR STRIP.AUTO-MCNC: NEGATIVE MG/DL
LDLC SERPL CALC-MCNC: 86 MG/DL
LEUKOCYTE ESTERASE UR QL STRIP.AUTO: NEGATIVE
LYMPHOCYTES # BLD AUTO: 1.49 K/UL (ref 1–4.8)
LYMPHOCYTES NFR BLD: 31.8 % (ref 22–41)
MAGNESIUM SERPL-MCNC: 2.1 MG/DL (ref 1.5–2.5)
MCH RBC QN AUTO: 34.1 PG (ref 27–33)
MCHC RBC AUTO-ENTMCNC: 32.6 G/DL (ref 33.7–35.3)
MCV RBC AUTO: 104.6 FL (ref 81.4–97.8)
MICRO URNS: NORMAL
MONOCYTES # BLD AUTO: 0.56 K/UL (ref 0–0.85)
MONOCYTES NFR BLD AUTO: 12 % (ref 0–13.4)
NEUTROPHILS # BLD AUTO: 2.44 K/UL (ref 1.82–7.42)
NEUTROPHILS NFR BLD: 52.2 % (ref 44–72)
NITRITE UR QL STRIP.AUTO: NEGATIVE
NRBC # BLD AUTO: 0 K/UL
NRBC BLD-RTO: 0 /100 WBC
PH UR STRIP.AUTO: 6 [PH] (ref 5–8)
PHOSPHATE SERPL-MCNC: 3.5 MG/DL (ref 2.5–4.5)
PLATELET # BLD AUTO: 144 K/UL (ref 164–446)
PMV BLD AUTO: 11.6 FL (ref 9–12.9)
POTASSIUM SERPL-SCNC: 5.4 MMOL/L (ref 3.6–5.5)
PROT UR QL STRIP: NEGATIVE MG/DL
PROT UR-MCNC: 12 MG/DL (ref 0–15)
PROT/CREAT UR: 129 MG/G (ref 15–68)
PTH-INTACT SERPL-MCNC: 97.1 PG/ML (ref 14–72)
RBC # BLD AUTO: 4.34 M/UL (ref 4.7–6.1)
RBC UR QL AUTO: NEGATIVE
SODIUM SERPL-SCNC: 138 MMOL/L (ref 135–145)
SP GR UR STRIP.AUTO: 1.02
TRIGL SERPL-MCNC: 186 MG/DL (ref 0–149)
URATE SERPL-MCNC: 9.2 MG/DL (ref 2.5–8.3)
WBC # BLD AUTO: 4.7 K/UL (ref 4.8–10.8)

## 2020-12-11 PROCEDURE — 84550 ASSAY OF BLOOD/URIC ACID: CPT

## 2020-12-11 PROCEDURE — 85025 COMPLETE CBC W/AUTO DIFF WBC: CPT

## 2020-12-11 PROCEDURE — 83735 ASSAY OF MAGNESIUM: CPT

## 2020-12-11 PROCEDURE — 83970 ASSAY OF PARATHORMONE: CPT

## 2020-12-11 PROCEDURE — 80061 LIPID PANEL: CPT

## 2020-12-11 PROCEDURE — 81003 URINALYSIS AUTO W/O SCOPE: CPT

## 2020-12-11 PROCEDURE — 80069 RENAL FUNCTION PANEL: CPT

## 2020-12-11 PROCEDURE — 82306 VITAMIN D 25 HYDROXY: CPT

## 2020-12-11 PROCEDURE — 36415 COLL VENOUS BLD VENIPUNCTURE: CPT

## 2020-12-12 LAB — 25(OH)D3 SERPL-MCNC: 54 NG/ML (ref 30–100)

## 2021-01-14 DIAGNOSIS — Z23 NEED FOR VACCINATION: ICD-10-CM

## 2021-02-04 ENCOUNTER — HOSPITAL ENCOUNTER (OUTPATIENT)
Dept: LAB | Facility: MEDICAL CENTER | Age: 81
End: 2021-02-04
Attending: NURSE PRACTITIONER
Payer: MEDICARE

## 2021-02-04 DIAGNOSIS — Z00.6 RESEARCH STUDY PATIENT: ICD-10-CM

## 2021-02-04 DIAGNOSIS — I50.20 ACC/AHA STAGE C SYSTOLIC HEART FAILURE (HCC): ICD-10-CM

## 2021-02-04 LAB — NT-PROBNP SERPL IA-MCNC: 1629 PG/ML (ref 0–125)

## 2021-02-04 PROCEDURE — 83880 ASSAY OF NATRIURETIC PEPTIDE: CPT | Mod: GA

## 2021-02-04 PROCEDURE — 36415 COLL VENOUS BLD VENIPUNCTURE: CPT | Mod: GA

## 2021-02-08 ENCOUNTER — HOSPITAL ENCOUNTER (OUTPATIENT)
Dept: LAB | Facility: MEDICAL CENTER | Age: 81
End: 2021-02-08
Attending: INTERNAL MEDICINE
Payer: MEDICARE

## 2021-02-08 DIAGNOSIS — R06.09 DYSPNEA ON EXERTION: ICD-10-CM

## 2021-02-08 DIAGNOSIS — I50.9 HEART FAILURE, NYHA CLASS 2 (HCC): ICD-10-CM

## 2021-02-08 DIAGNOSIS — I42.9 CARDIOMYOPATHY, UNSPECIFIED TYPE (HCC): ICD-10-CM

## 2021-02-08 DIAGNOSIS — I50.20 ACC/AHA STAGE C SYSTOLIC HEART FAILURE (HCC): ICD-10-CM

## 2021-02-08 LAB
ANION GAP SERPL CALC-SCNC: 11 MMOL/L (ref 7–16)
BUN SERPL-MCNC: 31 MG/DL (ref 8–22)
CALCIUM SERPL-MCNC: 8.7 MG/DL (ref 8.4–10.2)
CHLORIDE SERPL-SCNC: 110 MMOL/L (ref 96–112)
CO2 SERPL-SCNC: 21 MMOL/L (ref 20–33)
CREAT SERPL-MCNC: 1.76 MG/DL (ref 0.5–1.4)
FASTING STATUS PATIENT QL REPORTED: NORMAL
GLUCOSE SERPL-MCNC: 130 MG/DL (ref 65–99)
POTASSIUM SERPL-SCNC: 5 MMOL/L (ref 3.6–5.5)
SODIUM SERPL-SCNC: 142 MMOL/L (ref 135–145)

## 2021-02-08 PROCEDURE — 36415 COLL VENOUS BLD VENIPUNCTURE: CPT

## 2021-02-08 PROCEDURE — 80048 BASIC METABOLIC PNL TOTAL CA: CPT

## 2021-02-10 ENCOUNTER — OFFICE VISIT (OUTPATIENT)
Dept: CARDIOLOGY | Facility: MEDICAL CENTER | Age: 81
End: 2021-02-10
Payer: MEDICARE

## 2021-02-10 ENCOUNTER — RESEARCH ENCOUNTER (OUTPATIENT)
Dept: CARDIOLOGY | Facility: MEDICAL CENTER | Age: 81
End: 2021-02-10

## 2021-02-10 VITALS
RESPIRATION RATE: 16 BRPM | OXYGEN SATURATION: 96 % | HEIGHT: 74 IN | HEART RATE: 91 BPM | BODY MASS INDEX: 26.95 KG/M2 | DIASTOLIC BLOOD PRESSURE: 80 MMHG | WEIGHT: 210 LBS | SYSTOLIC BLOOD PRESSURE: 120 MMHG

## 2021-02-10 DIAGNOSIS — I10 HTN (HYPERTENSION), MALIGNANT: ICD-10-CM

## 2021-02-10 DIAGNOSIS — E78.2 MIXED HYPERLIPIDEMIA: ICD-10-CM

## 2021-02-10 DIAGNOSIS — I48.0 PAROXYSMAL ATRIAL FIBRILLATION (HCC): ICD-10-CM

## 2021-02-10 DIAGNOSIS — I50.20 ACC/AHA STAGE C SYSTOLIC HEART FAILURE (HCC): ICD-10-CM

## 2021-02-10 DIAGNOSIS — R06.09 DYSPNEA ON EXERTION: ICD-10-CM

## 2021-02-10 DIAGNOSIS — Z95.810 AICD (AUTOMATIC CARDIOVERTER/DEFIBRILLATOR) PRESENT: ICD-10-CM

## 2021-02-10 DIAGNOSIS — Z79.899 HIGH RISK MEDICATION USE: ICD-10-CM

## 2021-02-10 DIAGNOSIS — I50.9 HEART FAILURE, NYHA CLASS 2 (HCC): ICD-10-CM

## 2021-02-10 DIAGNOSIS — I47.20 VENTRICULAR TACHYCARDIA (HCC): ICD-10-CM

## 2021-02-10 LAB — EKG IMPRESSION: NORMAL

## 2021-02-10 PROCEDURE — 93000 ELECTROCARDIOGRAM COMPLETE: CPT | Performed by: INTERNAL MEDICINE

## 2021-02-10 PROCEDURE — 99215 OFFICE O/P EST HI 40 MIN: CPT | Mod: 25 | Performed by: INTERNAL MEDICINE

## 2021-02-10 RX ORDER — DAPAGLIFLOZIN 10 MG/1
10 TABLET, FILM COATED ORAL DAILY
Qty: 30 TABLET | Refills: 11 | Status: SHIPPED | OUTPATIENT
Start: 2021-02-10 | End: 2021-03-15 | Stop reason: SDUPTHER

## 2021-02-10 RX ORDER — LOSARTAN POTASSIUM 100 MG/1
100 TABLET ORAL DAILY
Qty: 90 EACH | Refills: 4 | Status: SHIPPED | OUTPATIENT
Start: 2021-02-10 | End: 2022-12-09 | Stop reason: SDUPTHER

## 2021-02-10 ASSESSMENT — ENCOUNTER SYMPTOMS
COUGH: 0
DIAPHORESIS: 0
FALLS: 0
DEPRESSION: 0
DIZZINESS: 0
ABDOMINAL PAIN: 0
SHORTNESS OF BREATH: 1
PALPITATIONS: 0
MYALGIAS: 0
DOUBLE VISION: 0
FEVER: 0
SENSORY CHANGE: 0
MEMORY LOSS: 0
BRUISES/BLEEDS EASILY: 0
BLURRED VISION: 0
HEADACHES: 0

## 2021-02-10 ASSESSMENT — FIBROSIS 4 INDEX: FIB4 SCORE: 2

## 2021-02-10 NOTE — RESEARCH NOTE
Name: Lencho Parker   MRN: [id number]  Participant ID:  [id number]  : 1940  Visit Date/Time: 2/10/2021 1:19 PM  Who is present: Jyoti Aguilar; Dr. Francesco Velázquez    Study:    [id number] - GUIDE HF / [id number]   Status Consented/Enrolled   Start Date 20   Participant ID [id number]   Coordinator Jyoti Aguilar Rui Alissa CHAPITO    IRB [id number]   NCT [id number]    Ksenia Velázquez M.D.       Study Specific Note: Lencho completed his EOS visit today per protocol. He was coached to continue taking daily readings. His CardioMEM's readings will be transferred from the GUIDE HF study site and managed by the [facility] clinic program.    Questionarres/QOL/Index’s Completed: KCCQ-12, EQ-5D-5L    6MWT not done due to COVID precautions.    Adverse Events: No     Vitals:  See Provider note      Meds:  Contraindicated Medication: No     Medication Changes: Yes  See Provider Note          Current Outpatient Medications   Medication Sig Dispense Refill   • losartan (COZAAR) 100 MG Tab Take 1 tablet by mouth every day. 90 Each 4   • Dapagliflozin Propanediol (FARXIGA) 10 MG Tab Take 10 mg by mouth every day. 30 tablet 11   • spironolactone (ALDACTONE) 25 MG Tab Take 0.5 Tabs by mouth every day. 45 Tab 3   • furosemide (LASIX) 20 MG Tab Take 20 mg by mouth every Monday, Wednesday, and Friday.     • vitamin D, Ergocalciferol, (DRISDOL) 46878 units Cap capsule Take 50,000 Units by mouth every 7 days. MONDAY     • aspirin EC (ECOTRIN) 81 MG Tablet Delayed Response Take 81 mg by mouth every day.     • amiodarone (CORDARONE) 200 MG Tab Take 200 mg by mouth every day.     • cinacalcet (SENSIPAR) 30 MG Tab Take 30 mg by mouth every day.     • carvedilol (COREG) 25 MG Tab Take 12.5-25 mg by mouth 2 times a day. Pt takes 25MG in AM and 12.5MG HS      • atorvastatin (LIPITOR) 20 MG Tab Take 20 mg by mouth every day.     • Omega-3 Fatty Acids (FISH OIL) 1000 MG Cap capsule Take 1,000 mg by mouth every day.      • multivitamin (THERAGRAN) Tab Take 1 Tab by mouth every day.     • albuterol 108 (90 Base) MCG/ACT Aero Soln inhalation aerosol Inhale 2 Puffs by mouth every 6 hours as needed for Shortness of Breath. 8.5 g 0     No current facility-administered medications for this visit.    current meds              Follow-up: No follow-ups on file.

## 2021-02-10 NOTE — PROGRESS NOTES
Chief Complaint   Patient presents with   • Congestive Heart Failure       Subjective:   Lencho Parker is a 80 y.o. male who presents today for cardiac care and management due to cardiac issues of nonischemic cardiomyopathy diagnosed in 2009, with most recent cholangiogram in 2016 showing nonobstructive coronary to disease, status post dual-chamber ICD placed in 2010, paroxysmal atrial fibrillation status post watchman device in October 2015, prior history of ventricular tachycardia status post ablation in 2012, hypertension, hyperlipidemia, treated AAA, prior history of TIA without residual neurological deficits.    Of note, patient was seen by his cardiologist in [place] before he moved to [facility].  He was initially started on Entresto therapy but developed angioedema.    His most recent transthoracic echocardiogram showed LV function of 15%.  He is status post CardioMEMS implantation for remote monitoring of volume status. Numbers have been good.    Patient is feeling better these days. Does get winded upon walking up inclines or for distance. No symptoms at rest or with daily living activities.      Past Medical History:   Diagnosis Date   • Breath shortness     on exertion   • Cardiomyopathy (HCC) 08/2019    Echocardiogram with mildly dilated LV, mild concentric, LVEF 15%. Severely dilated LA. Trace MR, trace AI, mild TR. RVSP 26mmHg.   • Chronic obstructive pulmonary disease (HCC)    • Croat[place] measles    • Heart attack (HCC)     hx 2007   • History of abdominal aortic aneurysm (AAA) 2009    Status post stent   • Hyperlipidemia    • Hypertension    • Mumps    • Pacemaker     AICD   • Paroxysmal atrial fibrillation (HCC)     Status post Watchman procedure in [place].   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    • Sleep apnea    • Snoring    • Stroke (HCC) 2012    TIA   • Tonsillitis    • Ventricular tachycardia (HCC)      Past Surgical History:   Procedure Laterality Date   • AICD  BATTERY CHANGE Left 2017    Generator replacement with [employer] Evera MRI XT  [id number] implanted in [place].   • OTHER CARDIAC SURGERY      watchman device   • AAA WITH STENT GRAFT     • OTHER ORTHOPEDIC SURGERY      hip surgery   • OTHER      kidney removed   • OTHER CARDIAC SURGERY      AICD implanted     Family History   Problem Relation Age of Onset   • Cancer Mother    • Cancer Sister      Social History     Socioeconomic History   • Marital status: Single     Spouse name: Not on file   • Number of children: Not on file   • Years of education: Not on file   • Highest education level: Not on file   Occupational History   • Not on file   Tobacco Use   • Smoking status: Former Smoker     Packs/day: 0.50     Years: 15.00     Pack years: 7.50     Types: Cigarettes     Quit date: 2004     Years since quittin.5   • Smokeless tobacco: Never Used   Substance and Sexual Activity   • Alcohol use: Yes     Alcohol/week: 1.2 oz     Types: 2 Glasses of wine per week     Comment: 2 per day   • Drug use: No   • Sexual activity: Not on file   Other Topics Concern   • Not on file   Social History Narrative   • Not on file     Social Determinants of Health     Financial Resource Strain:    • Difficulty of Paying Living Expenses:    Food Insecurity:    • Worried About Running Out of Food in the Last Year:    • Ran Out of Food in the Last Year:    Transportation Needs:    • Lack of Transportation (Medical):    • Lack of Transportation (Non-Medical):    Physical Activity:    • Days of Exercise per Week:    • Minutes of Exercise per Session:    Stress:    • Feeling of Stress :    Social Connections:    • Frequency of Communication with Friends and Family:    • Frequency of Social Gatherings with Friends and Family:    • Attends [employer] Services:    • Active Member of Clubs or Organizations:    • Attends Club or Organization Meetings:    • Marital Status:    Intimate Partner Violence:    • Fear of  Current or Ex-Partner:    • Emotionally Abused:    • Physically Abused:    • Sexually Abused:      Allergies   Allergen Reactions   • Entresto [Sacubitril-Valsartan]      Swollen tongue. Angioedema.     Outpatient Encounter Medications as of 2/10/2021   Medication Sig Dispense Refill   • losartan (COZAAR) 100 MG Tab Take 1 tablet by mouth every day. 90 Each 4   • Dapagliflozin Propanediol (FARXIGA) 10 MG Tab Take 10 mg by mouth every day. 30 tablet 11   • spironolactone (ALDACTONE) 25 MG Tab Take 0.5 Tabs by mouth every day. 45 Tab 3   • furosemide (LASIX) 20 MG Tab Take 20 mg by mouth every Monday, Wednesday, and Friday.     • vitamin D, Ergocalciferol, (DRISDOL) 71076 units Cap capsule Take 50,000 Units by mouth every 7 days. MONDAY     • aspirin EC (ECOTRIN) 81 MG Tablet Delayed Response Take 81 mg by mouth every day.     • amiodarone (CORDARONE) 200 MG Tab Take 200 mg by mouth every day.     • cinacalcet (SENSIPAR) 30 MG Tab Take 30 mg by mouth every day.     • carvedilol (COREG) 25 MG Tab Take 12.5-25 mg by mouth 2 times a day. Pt takes 25MG in AM and 12.5MG HS      • atorvastatin (LIPITOR) 20 MG Tab Take 20 mg by mouth every day.     • Omega-3 Fatty Acids (FISH OIL) 1000 MG Cap capsule Take 1,000 mg by mouth every day.     • multivitamin (THERAGRAN) Tab Take 1 Tab by mouth every day.     • albuterol 108 (90 Base) MCG/ACT Aero Soln inhalation aerosol Inhale 2 Puffs by mouth every 6 hours as needed for Shortness of Breath. 8.5 g 0   • [DISCONTINUED] losartan (COZAAR) 50 MG Tab Take 1 Tab by mouth every day. 90 Tab 4     No facility-administered encounter medications on file as of 2/10/2021.     Review of Systems   Constitutional: Negative for diaphoresis and fever.   HENT: Negative for nosebleeds.    Eyes: Negative for blurred vision and double vision.   Respiratory: Positive for shortness of breath. Negative for cough.    Cardiovascular: Negative for chest pain and palpitations.   Gastrointestinal: Negative for  "abdominal pain.   Genitourinary: Negative for dysuria and frequency.   Musculoskeletal: Negative for falls and myalgias.   Skin: Negative for rash.   Neurological: Negative for dizziness, sensory change and headaches.   Endo/Heme/Allergies: Does not bruise/bleed easily.   Psychiatric/Behavioral: Negative for depression and memory loss.        Objective:   /80 (BP Location: Right arm, Patient Position: Sitting, BP Cuff Size: Adult)   Pulse 91   Resp 16   Ht 1.88 m (6' 2\")   Wt 95.3 kg (210 lb)   SpO2 96%   BMI 26.96 kg/m²     Physical Exam   Constitutional: He is oriented to person, place, and time. No distress.   HENT:   Head: Normocephalic and atraumatic.   Right Ear: External ear normal.   Left Ear: External ear normal.   Eyes: Right eye exhibits no discharge. Left eye exhibits no discharge.   Neck: No JVD present. No thyromegaly present.   Cardiovascular: Normal rate, regular rhythm and intact distal pulses.   Ausculation was not performed to minimize the risk of COVID spread during current pandemic. This complies with Medicare policies and guidelines.     Pulmonary/Chest: No respiratory distress.   Abdominal: He exhibits no distension. There is no abdominal tenderness.   Musculoskeletal:         General: No edema.   Neurological: He is alert and oriented to person, place, and time. No cranial nerve deficit.   Skin: Skin is warm and dry. He is not diaphoretic.   Psychiatric: He has a normal mood and affect. His behavior is normal.   Nursing note and vitals reviewed.      Assessment:     1. ACC/AHA stage C systolic heart failure (HCC)  EC-ECHOCARDIOGRAM COMPLETE W/O CONT    Basic Metabolic Panel   2. Heart failure, NYHA class 2 (HCC)  EC-ECHOCARDIOGRAM COMPLETE W/O CONT    Basic Metabolic Panel   3. AICD (automatic cardioverter/defibrillator) present  EC-ECHOCARDIOGRAM COMPLETE W/O CONT    Basic Metabolic Panel   4. Ventricular tachycardia (HCC)  EC-ECHOCARDIOGRAM COMPLETE W/O CONT    Basic Metabolic " Panel   5. Paroxysmal atrial fibrillation (HCC)  EC-ECHOCARDIOGRAM COMPLETE W/O CONT    Basic Metabolic Panel   6. HTN (hypertension), malignant  EC-ECHOCARDIOGRAM COMPLETE W/O CONT    Basic Metabolic Panel   7. Mixed hyperlipidemia  EC-ECHOCARDIOGRAM COMPLETE W/O CONT   8. Dyspnea on exertion  EC-ECHOCARDIOGRAM COMPLETE W/O CONT    Basic Metabolic Panel   9. High risk medication use         Medical Decision Making:  Today's Assessment / Status / Plan:   Non-ischemic Cardiomyopathy LVEF of 15% with already optimized medical therapy:  Chronically illed condition which requires ongoing close monitoring and treatment to improve survival rate along with decreasing risk of clinical decompensation and hospitalization.    Today, based on physical examination findings, patient is euvolemic. No JVD, lungs are clear to auscultation, no pitting edema in bilateral lower extremities, no ascites.     Dry weight is 210 lbs. Gained 10 lbs since last visit.     Continue coreg 25 mg po bid.   Will increase Losartan to goal of 100 mg once a day. NO Entresto due to anaphylactic shock.    Based on recent data on SGLT2 and heart failure with reduced ejection fraction, patient will be benefited from Farxiga 10 mg p.o. once a day for further reduction in mortality and hospitalization with absolute risk reduction of 4.9%.  Therefore, I will start patient on Farxiga 10 mg p.o. once a day.  Risks and benefits were explained to patient and patient has agreed to proceed.     Diuretic with Lasix 20 mg thrice weekly now. Patient was instructed to monitor symptoms of lightheadedness or syncope rather than actual numbers.      Aldactone antagonist with Spironolactone 12.5 mg daily.    At this time, await CRT upgrade by adding LV lead via coronary sinus. I personally interpreted the EKG which showed QRS of more than 120 ms of LBBB morphology. I do think that patient will be benefited from upgrade of synchronized therapy.    Continue remote monitor  with MEMs.    Paroxysmal Atrial fibrillation:  No anticoagulation, already with Watchman device.    Hypertension:  Optimize control using cardiomyopathy medical regimen as well.    Hyperlipidemia:  Optimize statin as within guidelines of CAD treatment as above.     I will continue to closely monitor for side effects of patient's high risk medication(s) including liver, renal function and electrolytes.

## 2021-03-08 ENCOUNTER — HOSPITAL ENCOUNTER (OUTPATIENT)
Dept: LAB | Facility: MEDICAL CENTER | Age: 81
End: 2021-03-08
Attending: INTERNAL MEDICINE
Payer: MEDICARE

## 2021-03-08 DIAGNOSIS — R06.09 DYSPNEA ON EXERTION: ICD-10-CM

## 2021-03-08 DIAGNOSIS — I50.9 HEART FAILURE, NYHA CLASS 2 (HCC): ICD-10-CM

## 2021-03-08 DIAGNOSIS — I10 HTN (HYPERTENSION), MALIGNANT: ICD-10-CM

## 2021-03-08 DIAGNOSIS — I47.20 VENTRICULAR TACHYCARDIA (HCC): ICD-10-CM

## 2021-03-08 DIAGNOSIS — I50.20 ACC/AHA STAGE C SYSTOLIC HEART FAILURE (HCC): ICD-10-CM

## 2021-03-08 DIAGNOSIS — I48.0 PAROXYSMAL ATRIAL FIBRILLATION (HCC): ICD-10-CM

## 2021-03-08 DIAGNOSIS — Z95.810 AICD (AUTOMATIC CARDIOVERTER/DEFIBRILLATOR) PRESENT: ICD-10-CM

## 2021-03-08 LAB
ANION GAP SERPL CALC-SCNC: 11 MMOL/L (ref 7–16)
BUN SERPL-MCNC: 26 MG/DL (ref 8–22)
CALCIUM SERPL-MCNC: 8.7 MG/DL (ref 8.4–10.2)
CHLORIDE SERPL-SCNC: 105 MMOL/L (ref 96–112)
CO2 SERPL-SCNC: 19 MMOL/L (ref 20–33)
CREAT SERPL-MCNC: 1.62 MG/DL (ref 0.5–1.4)
GLUCOSE SERPL-MCNC: 127 MG/DL (ref 65–99)
POTASSIUM SERPL-SCNC: 5.1 MMOL/L (ref 3.6–5.5)
SODIUM SERPL-SCNC: 135 MMOL/L (ref 135–145)

## 2021-03-08 PROCEDURE — 80048 BASIC METABOLIC PNL TOTAL CA: CPT

## 2021-03-08 PROCEDURE — 36415 COLL VENOUS BLD VENIPUNCTURE: CPT

## 2021-03-15 ENCOUNTER — OFFICE VISIT (OUTPATIENT)
Dept: CARDIOLOGY | Facility: MEDICAL CENTER | Age: 81
End: 2021-03-15
Payer: MEDICARE

## 2021-03-15 VITALS
HEART RATE: 88 BPM | DIASTOLIC BLOOD PRESSURE: 78 MMHG | SYSTOLIC BLOOD PRESSURE: 108 MMHG | OXYGEN SATURATION: 95 % | WEIGHT: 205 LBS | BODY MASS INDEX: 26.31 KG/M2 | HEIGHT: 74 IN

## 2021-03-15 DIAGNOSIS — Z95.818 PRESENCE OF WATCHMAN LEFT ATRIAL APPENDAGE CLOSURE DEVICE: ICD-10-CM

## 2021-03-15 DIAGNOSIS — Z00.6 RESEARCH STUDY PATIENT: ICD-10-CM

## 2021-03-15 DIAGNOSIS — Z79.899 HIGH RISK MEDICATION USE: ICD-10-CM

## 2021-03-15 DIAGNOSIS — I48.0 PAROXYSMAL ATRIAL FIBRILLATION (HCC): ICD-10-CM

## 2021-03-15 DIAGNOSIS — I50.20 ACC/AHA STAGE C SYSTOLIC HEART FAILURE (HCC): ICD-10-CM

## 2021-03-15 DIAGNOSIS — I50.9 HEART FAILURE, NYHA CLASS 2 (HCC): ICD-10-CM

## 2021-03-15 DIAGNOSIS — Z95.810 AICD (AUTOMATIC CARDIOVERTER/DEFIBRILLATOR) PRESENT: ICD-10-CM

## 2021-03-15 DIAGNOSIS — I47.20 VENTRICULAR TACHYCARDIA (HCC): ICD-10-CM

## 2021-03-15 DIAGNOSIS — E78.2 MIXED HYPERLIPIDEMIA: ICD-10-CM

## 2021-03-15 DIAGNOSIS — R06.09 DYSPNEA ON EXERTION: ICD-10-CM

## 2021-03-15 DIAGNOSIS — I10 HTN (HYPERTENSION), MALIGNANT: ICD-10-CM

## 2021-03-15 DIAGNOSIS — Z95.810 BIVENTRICULAR ICD (IMPLANTABLE CARDIOVERTER-DEFIBRILLATOR) IN PLACE: ICD-10-CM

## 2021-03-15 LAB — EKG IMPRESSION: NORMAL

## 2021-03-15 PROCEDURE — 99214 OFFICE O/P EST MOD 30 MIN: CPT | Mod: 25 | Performed by: INTERNAL MEDICINE

## 2021-03-15 PROCEDURE — 93000 ELECTROCARDIOGRAM COMPLETE: CPT | Performed by: INTERNAL MEDICINE

## 2021-03-15 RX ORDER — AMIODARONE HYDROCHLORIDE 200 MG/1
200 TABLET ORAL DAILY
Qty: 90 TABLET | Refills: 4 | Status: SHIPPED | OUTPATIENT
Start: 2021-03-15 | End: 2022-12-09 | Stop reason: SDUPTHER

## 2021-03-15 RX ORDER — DAPAGLIFLOZIN 10 MG/1
10 TABLET, FILM COATED ORAL DAILY
Qty: 30 TABLET | Refills: 11 | Status: SHIPPED | OUTPATIENT
Start: 2021-03-15 | End: 2021-05-19 | Stop reason: SDUPTHER

## 2021-03-15 ASSESSMENT — ENCOUNTER SYMPTOMS
ABDOMINAL PAIN: 0
DEPRESSION: 0
DIAPHORESIS: 0
FALLS: 0
COUGH: 0
MEMORY LOSS: 0
MYALGIAS: 0
HEADACHES: 0
FEVER: 0
DIZZINESS: 0
DOUBLE VISION: 0
BLURRED VISION: 0
BRUISES/BLEEDS EASILY: 0
SENSORY CHANGE: 0
PALPITATIONS: 0
SHORTNESS OF BREATH: 1

## 2021-03-15 ASSESSMENT — FIBROSIS 4 INDEX: FIB4 SCORE: 2

## 2021-03-15 NOTE — PROGRESS NOTES
Chief Complaint   Patient presents with   • Congestive Heart Failure     & ACC/AHA stage C systolic heart failure (HCC)   • HTN (Controlled)   • Hyperlipidemia   • Atrial Fibrillation       Subjective:   Lencho Parker is a 80 y.o. male who presents today for cardiac care and management due to cardiac issues of nonischemic cardiomyopathy diagnosed in 2009, with most recent cholangiogram in 2016 showing nonobstructive coronary to disease, status post dual-chamber ICD placed in 2010, paroxysmal atrial fibrillation status post watchman device in October 2015, prior history of ventricular tachycardia status post ablation in 2012, hypertension, hyperlipidemia, treated AAA, prior history of TIA without residual neurological deficits.    Of note, patient was seen by his cardiologist in [place] before he moved to [facility].  He was initially started on Entresto therapy but developed angioedema.    His most recent transthoracic echocardiogram showed LV function of 15%.  He is status post CardioMEMS implantation for remote monitoring of volume status. Numbers have been good.    Patient is feeling better these days. Does get winded upon walking up inclines or for distance. No symptoms at rest or with daily living activities.    GFR is 41.    Past Medical History:   Diagnosis Date   • Breath shortness     on exertion   • Cardiomyopathy (HCC) 08/2019    Echocardiogram with mildly dilated LV, mild concentric, LVEF 15%. Severely dilated LA. Trace MR, trace AI, mild TR. RVSP 26mmHg.   • Chronic obstructive pulmonary disease (HCC)    • Urdu measles    • Heart attack (HCC)     hx 2007   • History of abdominal aortic aneurysm (AAA) 2009    Status post stent   • Hyperlipidemia    • Hypertension    • Mumps    • Pacemaker     AICD   • Paroxysmal atrial fibrillation (HCC)     Status post Watchman procedure in [place].   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    • Sleep apnea    • Snoring    • Stroke (HCC) 2012     TIA   • Tonsillitis    • Ventricular tachycardia (HCC)      Past Surgical History:   Procedure Laterality Date   • AICD BATTERY CHANGE Left 2017    Generator replacement with [employer] Evera MRI XT  [id number] implanted in [place].   • OTHER CARDIAC SURGERY      watchman device   • AAA WITH STENT GRAFT     • OTHER ORTHOPEDIC SURGERY      hip surgery   • OTHER      kidney removed   • OTHER CARDIAC SURGERY      AICD implanted     Family History   Problem Relation Age of Onset   • Cancer Mother    • Cancer Sister      Social History     Socioeconomic History   • Marital status: Single     Spouse name: Not on file   • Number of children: Not on file   • Years of education: Not on file   • Highest education level: Not on file   Occupational History   • Not on file   Tobacco Use   • Smoking status: Former Smoker     Packs/day: 0.50     Years: 15.00     Pack years: 7.50     Types: Cigarettes     Quit date: 2004     Years since quittin.6   • Smokeless tobacco: Never Used   Substance and Sexual Activity   • Alcohol use: Yes     Alcohol/week: 1.2 oz     Types: 2 Glasses of wine per week     Comment: 2 per day   • Drug use: No   • Sexual activity: Not on file   Other Topics Concern   • Not on file   Social History Narrative   • Not on file     Social Determinants of Health     Financial Resource Strain:    • Difficulty of Paying Living Expenses:    Food Insecurity:    • Worried About Running Out of Food in the Last Year:    • Ran Out of Food in the Last Year:    Transportation Needs:    • Lack of Transportation (Medical):    • Lack of Transportation (Non-Medical):    Physical Activity:    • Days of Exercise per Week:    • Minutes of Exercise per Session:    Stress:    • Feeling of Stress :    Social Connections:    • Frequency of Communication with Friends and Family:    • Frequency of Social Gatherings with Friends and Family:    • Attends [employer] Services:    • Active Member of Clubs or  Organizations:    • Attends Club or Organization Meetings:    • Marital Status:    Intimate Partner Violence:    • Fear of Current or Ex-Partner:    • Emotionally Abused:    • Physically Abused:    • Sexually Abused:      Allergies   Allergen Reactions   • Entresto [Sacubitril-Valsartan]      Swollen tongue. Angioedema.     Outpatient Encounter Medications as of 3/15/2021   Medication Sig Dispense Refill   • Dapagliflozin Propanediol (FARXIGA) 10 MG Tab Take 10 mg by mouth every day. 30 tablet 11   • amiodarone (CORDARONE) 200 MG Tab Take 1 tablet by mouth every day. 90 tablet 4   • losartan (COZAAR) 100 MG Tab Take 1 tablet by mouth every day. 90 Each 4   • spironolactone (ALDACTONE) 25 MG Tab Take 0.5 Tabs by mouth every day. 45 Tab 3   • furosemide (LASIX) 20 MG Tab Take 20 mg by mouth every Monday, Wednesday, and Friday.     • vitamin D, Ergocalciferol, (DRISDOL) 46990 units Cap capsule Take 50,000 Units by mouth every 7 days. MONDAY     • aspirin EC (ECOTRIN) 81 MG Tablet Delayed Response Take 81 mg by mouth every day.     • cinacalcet (SENSIPAR) 30 MG Tab Take 30 mg by mouth every day.     • carvedilol (COREG) 25 MG Tab Take 12.5-25 mg by mouth 2 times a day. Pt takes 25MG in AM and 12.5MG HS      • atorvastatin (LIPITOR) 20 MG Tab Take 20 mg by mouth every day.     • Omega-3 Fatty Acids (FISH OIL) 1000 MG Cap capsule Take 1,000 mg by mouth every day.     • multivitamin (THERAGRAN) Tab Take 1 Tab by mouth every day.     • albuterol 108 (90 Base) MCG/ACT Aero Soln inhalation aerosol Inhale 2 Puffs by mouth every 6 hours as needed for Shortness of Breath. 8.5 g 0   • [DISCONTINUED] Dapagliflozin Propanediol (FARXIGA) 10 MG Tab Take 10 mg by mouth every day. (Patient not taking: Reported on 3/15/2021) 30 tablet 11   • [DISCONTINUED] amiodarone (CORDARONE) 200 MG Tab Take 200 mg by mouth every day.       No facility-administered encounter medications on file as of 3/15/2021.     Review of Systems   Constitutional:  "Negative for diaphoresis and fever.   HENT: Negative for nosebleeds.    Eyes: Negative for blurred vision and double vision.   Respiratory: Positive for shortness of breath. Negative for cough.    Cardiovascular: Negative for chest pain and palpitations.   Gastrointestinal: Negative for abdominal pain.   Genitourinary: Negative for dysuria and frequency.   Musculoskeletal: Negative for falls and myalgias.   Skin: Negative for rash.   Neurological: Negative for dizziness, sensory change and headaches.   Endo/Heme/Allergies: Does not bruise/bleed easily.   Psychiatric/Behavioral: Negative for depression and memory loss.        Objective:   /78 (BP Location: Right arm, Patient Position: Sitting, BP Cuff Size: Adult)   Pulse 88   Ht 1.88 m (6' 2\")   Wt 93 kg (205 lb)   SpO2 95%   BMI 26.32 kg/m²     Physical Exam   Constitutional: He is oriented to person, place, and time. No distress.   HENT:   Head: Normocephalic and atraumatic.   Right Ear: External ear normal.   Left Ear: External ear normal.   Eyes: Right eye exhibits no discharge. Left eye exhibits no discharge.   Neck: No JVD present. No thyromegaly present.   Cardiovascular: Normal rate, regular rhythm and intact distal pulses.   Ausculation was not performed to minimize the risk of COVID spread during current pandemic. This complies with Medicare policies and guidelines.     Pulmonary/Chest: No respiratory distress.   Abdominal: He exhibits no distension. There is no abdominal tenderness.   Musculoskeletal:         General: No edema.   Neurological: He is alert and oriented to person, place, and time. No cranial nerve deficit.   Skin: Skin is warm and dry. He is not diaphoretic.   Psychiatric: He has a normal mood and affect. His behavior is normal.   Nursing note and vitals reviewed.      Assessment:     1. ACC/AHA stage C systolic heart failure (HCC)     2. Heart failure, NYHA class 2 (HCC)     3. AICD (automatic cardioverter/defibrillator) present "     4. Biventricular ICD (implantable cardioverter-defibrillator) in place     5. Ventricular tachycardia (HCC)     6. Paroxysmal atrial fibrillation (HCC)  EKG   7. HTN (hypertension), malignant     8. Mixed hyperlipidemia     9. Dyspnea on exertion     10. High risk medication use  ZZZ CHEST X-RAY 2 VW    TSH+FREE T4    Basic Metabolic Panel   11. Research study patient     12. Presence of Watchman left atrial appendage closure device         Medical Decision Making:  Today's Assessment / Status / Plan:   Non-ischemic Cardiomyopathy LVEF of 15% with already optimized medical therapy:  Chronically illed condition which requires ongoing close monitoring and treatment to improve survival rate along with decreasing risk of clinical decompensation and hospitalization.    Today, based on physical examination findings, patient is euvolemic. No JVD, lungs are clear to auscultation, no pitting edema in bilateral lower extremities, no ascites.     Dry weight is 205 lbs.     Continue coreg 25 mg po bid.   Will continue Losartan to goal of 100 mg once a day. NO Entresto due to anaphylactic shock.    Based on recent data on SGLT2 and heart failure with reduced ejection fraction, patient will be benefited from Farxiga 10 mg p.o. once a day for further reduction in mortality and hospitalization with absolute risk reduction of 4.9%.  Therefore, I will start patient on Farxiga 10 mg p.o. once a day.  Risks and benefits were explained to patient and patient has agreed to proceed. Will try again with medicare [facility] of [facility].     Diuretic with Lasix 20 mg thrice weekly now. Patient was instructed to monitor symptoms of lightheadedness or syncope rather than actual numbers.      Aldactone antagonist with Spironolactone 12.5 mg daily.    At this time, s/p CRT upgrade by adding LV lead via coronary sinus. I personally interpreted the EKG which showed QRS of more than 120 ms of LBBB morphology. I do think that  patient will be benefited from upgrade of synchronized therapy.    Continue remote monitor with MEMs.    Paroxysmal Atrial fibrillation:  No anticoagulation, already with Watchman device.  No mode switching seen on recent interrogation in 11/2020.    Hypertension:  Optimize control using cardiomyopathy medical regimen as well.    Hyperlipidemia:  Optimize statin as within guidelines of CAD treatment as above.     I will continue to closely monitor for side effects of patient's high risk medication(s) including liver, renal function and electrolytes.

## 2021-03-29 ENCOUNTER — NON-PROVIDER VISIT (OUTPATIENT)
Dept: CARDIOLOGY | Facility: MEDICAL CENTER | Age: 81
End: 2021-03-29
Payer: MEDICARE

## 2021-03-29 VITALS
DIASTOLIC BLOOD PRESSURE: 68 MMHG | WEIGHT: 204.2 LBS | SYSTOLIC BLOOD PRESSURE: 112 MMHG | HEART RATE: 63 BPM | HEIGHT: 74 IN | OXYGEN SATURATION: 98 % | BODY MASS INDEX: 26.21 KG/M2

## 2021-03-29 DIAGNOSIS — I42.0 DILATED CARDIOMYOPATHY (HCC): ICD-10-CM

## 2021-03-29 DIAGNOSIS — Z95.810 PRESENCE OF BIVENTRICULAR AUTOMATIC CARDIOVERTER/DEFIBRILLATOR (AICD): ICD-10-CM

## 2021-03-29 DIAGNOSIS — I47.20 VENTRICULAR TACHYCARDIA (HCC): ICD-10-CM

## 2021-03-29 PROCEDURE — 93284 PRGRMG EVAL IMPLANTABLE DFB: CPT | Performed by: NURSE PRACTITIONER

## 2021-03-29 ASSESSMENT — FIBROSIS 4 INDEX: FIB4 SCORE: 2

## 2021-03-29 NOTE — PROGRESS NOTES
Patient was upgrade to CRT-D in September 2020. He does have FU with Dr. Velázquez in May 2021, with echocardiogram prior. He did notice some lights alerting him on his [employer] monitor.    Device is working normally.  No device therapy.  No mode switching episodes.  Normal sensing and capture of RA, RV and LV leads; stable impedances. Battery longevity is 6.7 years.  No changes are made today.    When logging into [employer], his last transmission was September 2020. I asked him to do a patient activated transmission, and if there is no connection, we will trouble shoot with [employer].    Keep May 2021 FU with TT.    FU in 6 months for next AICD check with me.

## 2021-04-02 ENCOUNTER — TELEPHONE (OUTPATIENT)
Dept: CARDIOLOGY | Facility: MEDICAL CENTER | Age: 81
End: 2021-04-02

## 2021-04-02 NOTE — TELEPHONE ENCOUNTER
Lencho Parker Key: [id number] - PA Case ID: [id number] - Rx #: [id number] help? Call us at [phone]  Outcome  Approvedtoday  CaseId:[id number];Status:Approved;Review Type:Prior Auth;Coverage Start Date:03/03/2021;Coverage End Date:12/31/2099;  Drug  Farxiga 10MG tablets  Form   Electronic PA Form (2017 NCPDP)  Original Claim Info  M6,99

## 2021-05-13 ENCOUNTER — HOSPITAL ENCOUNTER (OUTPATIENT)
Dept: CARDIOLOGY | Facility: MEDICAL CENTER | Age: 81
End: 2021-05-13
Attending: INTERNAL MEDICINE
Payer: MEDICARE

## 2021-05-13 DIAGNOSIS — Z95.810 AICD (AUTOMATIC CARDIOVERTER/DEFIBRILLATOR) PRESENT: ICD-10-CM

## 2021-05-13 DIAGNOSIS — I48.0 PAROXYSMAL ATRIAL FIBRILLATION (HCC): ICD-10-CM

## 2021-05-13 DIAGNOSIS — I47.20 VENTRICULAR TACHYCARDIA (HCC): ICD-10-CM

## 2021-05-13 DIAGNOSIS — I10 HTN (HYPERTENSION), MALIGNANT: ICD-10-CM

## 2021-05-13 DIAGNOSIS — I50.20 ACC/AHA STAGE C SYSTOLIC HEART FAILURE (HCC): ICD-10-CM

## 2021-05-13 DIAGNOSIS — I50.9 HEART FAILURE, NYHA CLASS 2 (HCC): ICD-10-CM

## 2021-05-13 DIAGNOSIS — E78.2 MIXED HYPERLIPIDEMIA: ICD-10-CM

## 2021-05-13 DIAGNOSIS — R06.09 DYSPNEA ON EXERTION: ICD-10-CM

## 2021-05-13 PROCEDURE — 93306 TTE W/DOPPLER COMPLETE: CPT

## 2021-05-14 ENCOUNTER — TELEPHONE (OUTPATIENT)
Dept: CARDIOLOGY | Facility: MEDICAL CENTER | Age: 81
End: 2021-05-14

## 2021-05-14 LAB
LV EJECT FRACT  99904: 20
LV EJECT FRACT MOD 2C 99903: 9.7
LV EJECT FRACT MOD 4C 99902: 39.16
LV EJECT FRACT MOD BP 99901: 25.19

## 2021-05-14 PROCEDURE — 93306 TTE W/DOPPLER COMPLETE: CPT | Mod: 26 | Performed by: INTERNAL MEDICINE

## 2021-05-14 NOTE — RESULT ENCOUNTER NOTE
Dear Kathie,    Can you please let Lencho Parker know that result is not entirely normal and I will see patient as scheduled on May 19th /2021 to discuss?    Thank you,  Francesco.

## 2021-05-17 ENCOUNTER — HOSPITAL ENCOUNTER (OUTPATIENT)
Dept: LAB | Facility: MEDICAL CENTER | Age: 81
End: 2021-05-17
Attending: INTERNAL MEDICINE
Payer: MEDICARE

## 2021-05-17 DIAGNOSIS — Z79.899 HIGH RISK MEDICATION USE: ICD-10-CM

## 2021-05-17 LAB
ANION GAP SERPL CALC-SCNC: 12 MMOL/L (ref 7–16)
BUN SERPL-MCNC: 34 MG/DL (ref 8–22)
CALCIUM SERPL-MCNC: 9.2 MG/DL (ref 8.4–10.2)
CHLORIDE SERPL-SCNC: 107 MMOL/L (ref 96–112)
CO2 SERPL-SCNC: 19 MMOL/L (ref 20–33)
CREAT SERPL-MCNC: 1.72 MG/DL (ref 0.5–1.4)
FASTING STATUS PATIENT QL REPORTED: NORMAL
GLUCOSE SERPL-MCNC: 107 MG/DL (ref 65–99)
POTASSIUM SERPL-SCNC: 4.6 MMOL/L (ref 3.6–5.5)
SODIUM SERPL-SCNC: 138 MMOL/L (ref 135–145)
T4 FREE SERPL-MCNC: 1.39 NG/DL (ref 0.93–1.7)
TSH SERPL DL<=0.005 MIU/L-ACNC: 5.9 UIU/ML (ref 0.38–5.33)

## 2021-05-17 PROCEDURE — 84443 ASSAY THYROID STIM HORMONE: CPT

## 2021-05-17 PROCEDURE — 36415 COLL VENOUS BLD VENIPUNCTURE: CPT

## 2021-05-17 PROCEDURE — 80048 BASIC METABOLIC PNL TOTAL CA: CPT

## 2021-05-17 PROCEDURE — 84439 ASSAY OF FREE THYROXINE: CPT

## 2021-05-19 ENCOUNTER — NON-PROVIDER VISIT (OUTPATIENT)
Dept: CARDIOLOGY | Facility: MEDICAL CENTER | Age: 81
End: 2021-05-19
Payer: MEDICARE

## 2021-05-19 ENCOUNTER — OFFICE VISIT (OUTPATIENT)
Dept: CARDIOLOGY | Facility: MEDICAL CENTER | Age: 81
End: 2021-05-19
Payer: MEDICARE

## 2021-05-19 VITALS
BODY MASS INDEX: 25.67 KG/M2 | DIASTOLIC BLOOD PRESSURE: 88 MMHG | RESPIRATION RATE: 17 BRPM | SYSTOLIC BLOOD PRESSURE: 118 MMHG | HEIGHT: 74 IN | HEART RATE: 91 BPM | OXYGEN SATURATION: 95 % | WEIGHT: 200 LBS

## 2021-05-19 DIAGNOSIS — I47.20 VENTRICULAR TACHYCARDIA (HCC): ICD-10-CM

## 2021-05-19 DIAGNOSIS — I10 ESSENTIAL HYPERTENSION, BENIGN: ICD-10-CM

## 2021-05-19 DIAGNOSIS — E78.2 MIXED HYPERLIPIDEMIA: ICD-10-CM

## 2021-05-19 DIAGNOSIS — I48.0 PAROXYSMAL ATRIAL FIBRILLATION (HCC): ICD-10-CM

## 2021-05-19 DIAGNOSIS — I50.9 HEART FAILURE, NYHA CLASS 2 (HCC): ICD-10-CM

## 2021-05-19 DIAGNOSIS — I10 HTN (HYPERTENSION), MALIGNANT: ICD-10-CM

## 2021-05-19 DIAGNOSIS — Z95.810 PRESENCE OF BIVENTRICULAR AUTOMATIC CARDIOVERTER/DEFIBRILLATOR (AICD): ICD-10-CM

## 2021-05-19 DIAGNOSIS — R06.09 DYSPNEA ON EXERTION: ICD-10-CM

## 2021-05-19 DIAGNOSIS — Z95.810 AICD (AUTOMATIC CARDIOVERTER/DEFIBRILLATOR) PRESENT: ICD-10-CM

## 2021-05-19 DIAGNOSIS — Z79.899 HIGH RISK MEDICATION USE: ICD-10-CM

## 2021-05-19 DIAGNOSIS — I50.20 ACC/AHA STAGE C SYSTOLIC HEART FAILURE (HCC): ICD-10-CM

## 2021-05-19 LAB — EKG IMPRESSION: NORMAL

## 2021-05-19 PROCEDURE — 93284 PRGRMG EVAL IMPLANTABLE DFB: CPT | Performed by: INTERNAL MEDICINE

## 2021-05-19 PROCEDURE — 93000 ELECTROCARDIOGRAM COMPLETE: CPT | Performed by: INTERNAL MEDICINE

## 2021-05-19 PROCEDURE — 99214 OFFICE O/P EST MOD 30 MIN: CPT | Performed by: INTERNAL MEDICINE

## 2021-05-19 RX ORDER — DAPAGLIFLOZIN 10 MG/1
10 TABLET, FILM COATED ORAL DAILY
Qty: 30 TABLET | Refills: 11 | Status: SHIPPED | OUTPATIENT
Start: 2021-05-19 | End: 2021-06-19 | Stop reason: SDUPTHER

## 2021-05-19 ASSESSMENT — ENCOUNTER SYMPTOMS
SENSORY CHANGE: 0
PALPITATIONS: 0
DEPRESSION: 0
ABDOMINAL PAIN: 0
BLURRED VISION: 0
FEVER: 0
DIAPHORESIS: 0
MEMORY LOSS: 0
BRUISES/BLEEDS EASILY: 0
SHORTNESS OF BREATH: 1
HEADACHES: 0
DIZZINESS: 0
MYALGIAS: 0
COUGH: 0
FALLS: 0
DOUBLE VISION: 0

## 2021-05-19 ASSESSMENT — FIBROSIS 4 INDEX: FIB4 SCORE: 2

## 2021-05-19 NOTE — PROGRESS NOTES
Chief Complaint   Patient presents with   • Congestive Heart Failure       Subjective:   Lencho Parker is a 80 y.o. male who presents today for cardiac care and management due to cardiac issues of nonischemic cardiomyopathy diagnosed in 2009, with most recent cholangiogram in 2016 showing nonobstructive coronary to disease, status post dual-chamber ICD placed in 2010, paroxysmal atrial fibrillation status post watchman device in October 2015, prior history of ventricular tachycardia status post ablation in 2012, hypertension, hyperlipidemia, treated AAA, prior history of TIA without residual neurological deficits.    Of note, patient was seen by his cardiologist in [place] before he moved to [facility].  He was initially started on Entresto therapy but developed angioedema.    His most recent transthoracic echocardiogram showed LV function of 15%.  He is status post CardioMEMS implantation for remote monitoring of volume status. Numbers have been good.    Patient is feeling better these days. Does get winded upon walking up inclines or for distance. No symptoms at rest or with daily living activities.    GFR is 39.    Past Medical History:   Diagnosis Date   • Breath shortness     on exertion   • Cardiomyopathy (HCC) 08/2019    Echocardiogram with mildly dilated LV, mild concentric, LVEF 15%. Severely dilated LA. Trace MR, trace AI, mild TR. RVSP 26mmHg.   • Chronic obstructive pulmonary disease (HCC)    • Indian measles    • Heart attack (HCC)     hx 2007   • History of abdominal aortic aneurysm (AAA) 2009    Status post stent   • Hyperlipidemia    • Hypertension    • Mumps    • Pacemaker     AICD   • Paroxysmal atrial fibrillation (HCC)     Status post Watchman procedure in [place].   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    • Sleep apnea    • Snoring    • Stroke (HCC) 2012    TIA   • Tonsillitis    • Ventricular tachycardia (HCC)      Past Surgical History:   Procedure Laterality Date   •  AICD BATTERY CHANGE Left 09/10/2020     Upgrade to [employer]aria MRI Quad CRT-D [id number] implanted by Dr. Barrios.   • AICD BATTERY CHANGE Left 2017    Generator replacement with [employer] Evera MRI XT  [id number] implanted in [place].   • OTHER CARDIAC SURGERY      watchman device   • AAA WITH STENT GRAFT     • OTHER ORTHOPEDIC SURGERY      hip surgery   • OTHER      kidney removed   • OTHER CARDIAC SURGERY      AICD implanted     Family History   Problem Relation Age of Onset   • Cancer Mother    • Cancer Sister      Social History     Socioeconomic History   • Marital status: Single     Spouse name: Not on file   • Number of children: Not on file   • Years of education: Not on file   • Highest education level: Not on file   Occupational History   • Not on file   Tobacco Use   • Smoking status: Former Smoker     Packs/day: 0.50     Years: 15.00     Pack years: 7.50     Types: Cigarettes     Quit date: 2004     Years since quittin.8   • Smokeless tobacco: Never Used   Vaping Use   • Vaping Use: Never used   Substance and Sexual Activity   • Alcohol use: Yes     Alcohol/week: 8.4 oz     Types: 14 Glasses of wine per week     Comment: 2 per day   • Drug use: No   • Sexual activity: Not on file   Other Topics Concern   • Not on file   Social History Narrative   • Not on file     Social Determinants of Health     Financial Resource Strain:    • Difficulty of Paying Living Expenses:    Food Insecurity:    • Worried About Running Out of Food in the Last Year:    • Ran Out of Food in the Last Year:    Transportation Needs:    • Lack of Transportation (Medical):    • Lack of Transportation (Non-Medical):    Physical Activity:    • Days of Exercise per Week:    • Minutes of Exercise per Session:    Stress:    • Feeling of Stress :    Social Connections:    • Frequency of Communication with Friends and Family:    • Frequency of Social Gatherings with Friends and Family:    • Attends L[employer] day  Services:    • Active Member of Clubs or Organizations:    • Attends Club or Organization Meetings:    • Marital Status:    Intimate Partner Violence:    • Fear of Current or Ex-Partner:    • Emotionally Abused:    • Physically Abused:    • Sexually Abused:      Allergies   Allergen Reactions   • Entresto [Sacubitril-Valsartan]      Swollen tongue. Angioedema.     Outpatient Encounter Medications as of 5/19/2021   Medication Sig Dispense Refill   • Dapagliflozin Propanediol (FARXIGA) 10 MG Tab Take 10 mg by mouth every day. 30 tablet 11   • amiodarone (CORDARONE) 200 MG Tab Take 1 tablet by mouth every day. 90 tablet 4   • losartan (COZAAR) 100 MG Tab Take 1 tablet by mouth every day. 90 Each 4   • spironolactone (ALDACTONE) 25 MG Tab Take 0.5 Tabs by mouth every day. 45 Tab 3   • furosemide (LASIX) 20 MG Tab Take 20 mg by mouth every Monday, Wednesday, and Friday.     • vitamin D, Ergocalciferol, (DRISDOL) 00860 units Cap capsule Take 50,000 Units by mouth every 7 days. MONDAY     • aspirin EC (ECOTRIN) 81 MG Tablet Delayed Response Take 81 mg by mouth every day.     • cinacalcet (SENSIPAR) 30 MG Tab Take 30 mg by mouth every day.     • carvedilol (COREG) 25 MG Tab Take 12.5-25 mg by mouth 2 times a day. Pt takes 25MG in AM and 12.5MG HS      • atorvastatin (LIPITOR) 20 MG Tab Take 20 mg by mouth every day.     • Omega-3 Fatty Acids (FISH OIL) 1000 MG Cap capsule Take 1,000 mg by mouth every day.     • multivitamin (THERAGRAN) Tab Take 1 Tab by mouth every day.     • albuterol 108 (90 Base) MCG/ACT Aero Soln inhalation aerosol Inhale 2 Puffs by mouth every 6 hours as needed for Shortness of Breath. 8.5 g 0   • [DISCONTINUED] Dapagliflozin Propanediol (FARXIGA) 10 MG Tab Take 10 mg by mouth every day. 30 tablet 11     No facility-administered encounter medications on file as of 5/19/2021.     Review of Systems   Constitutional: Negative for diaphoresis and fever.   HENT: Negative for nosebleeds.    Eyes: Negative  "for blurred vision and double vision.   Respiratory: Positive for shortness of breath. Negative for cough.    Cardiovascular: Negative for chest pain and palpitations.   Gastrointestinal: Negative for abdominal pain.   Genitourinary: Negative for dysuria and frequency.   Musculoskeletal: Negative for falls and myalgias.   Skin: Negative for rash.   Neurological: Negative for dizziness, sensory change and headaches.   Endo/Heme/Allergies: Does not bruise/bleed easily.   Psychiatric/Behavioral: Negative for depression and memory loss.        Objective:   /88 (BP Location: Left arm, Patient Position: Sitting, BP Cuff Size: Adult)   Pulse 91   Resp 17   Ht 1.88 m (6' 2\")   Wt 90.7 kg (200 lb)   SpO2 95%   BMI 25.68 kg/m²     Physical Exam   Constitutional: He is oriented to person, place, and time. No distress.   HENT:   Head: Normocephalic and atraumatic.   Right Ear: External ear normal.   Left Ear: External ear normal.   Eyes: Right eye exhibits no discharge. Left eye exhibits no discharge.   Neck: No JVD present. No thyromegaly present.   Cardiovascular: Normal rate and regular rhythm.   Ausculation was not performed to minimize the risk of COVID spread during current pandemic. This complies with Medicare policies and guidelines.     Pulmonary/Chest: No respiratory distress.   Abdominal: He exhibits no distension. There is no abdominal tenderness.   Neurological: He is alert and oriented to person, place, and time. No cranial nerve deficit.   Skin: Skin is warm and dry. He is not diaphoretic.   Psychiatric: His behavior is normal.   Nursing note and vitals reviewed.      Assessment:     1. Essential hypertension, benign  EKG   2. ACC/AHA stage C systolic heart failure (HCC)     3. Heart failure, NYHA class 2 (HCC)     4. AICD (automatic cardioverter/defibrillator) present     5. Ventricular tachycardia (HCC)     6. Paroxysmal atrial fibrillation (HCC)     7. HTN (hypertension), malignant     8. Mixed " hyperlipidemia     9. Dyspnea on exertion     10. Presence of biventricular automatic cardioverter/defibrillator (AICD)     11. High risk medication use         Medical Decision Making:  Today's Assessment / Status / Plan:   Non-ischemic Cardiomyopathy LVEF of 15% with already optimized medical therapy:  Chronically illed condition which requires ongoing close monitoring and treatment to improve survival rate along with decreasing risk of clinical decompensation and hospitalization.    Today, based on physical examination findings, patient is euvolemic. No JVD, lungs are clear to auscultation, no pitting edema in bilateral lower extremities, no ascites.     Dry weight is 200 lbs.     Continue coreg 25 mg po bid.   Will continue Losartan to goal of 100 mg once a day. NO Entresto due to anaphylactic shock.    Based on recent data on SGLT2 and heart failure with reduced ejection fraction, patient will be benefited from Farxiga 10 mg p.o. once a day for further reduction in mortality and hospitalization with absolute risk reduction of 4.9%.  Therefore, I will continue patient on Farxiga 10 mg p.o. once a day.  Risks and benefits were explained to patient and patient has agreed to proceed. Will [facility] again with medicare [facility].     Diuretic with Lasix 20 mg thrice weekly now. Patient was instructed to monitor symptoms of lightheadedness or syncope rather than actual numbers.      Aldactone antagonist with Spironolactone 12.5 mg daily.    At this time, s/p CRT upgrade by adding LV lead via coronary sinus. I personally interpreted the EKG which showed QRS of more than 120 ms of LBBB morphology. I do think that patient will be benefited from upgrade of synchronized therapy.    Continue remote monitor with MEMs.    Paroxysmal Atrial fibrillation:  No anticoagulation, already with Watchman device.  No mode switching seen on recent interrogation in 11/2020.    Hypertension:  Optimize control using  cardiomyopathy medical regimen as well.    Hyperlipidemia:  Optimize statin as within guidelines of CAD treatment as above.     I will continue to closely monitor for side effects of patient's high risk medication(s) including liver, renal function and electrolytes.

## 2021-06-19 DIAGNOSIS — I50.20 ACC/AHA STAGE C SYSTOLIC HEART FAILURE (HCC): ICD-10-CM

## 2021-06-21 RX ORDER — DAPAGLIFLOZIN 10 MG/1
10 TABLET, FILM COATED ORAL DAILY
Qty: 90 TABLET | Refills: 3 | Status: SHIPPED | OUTPATIENT
Start: 2021-06-21 | End: 2021-11-02

## 2021-07-09 ENCOUNTER — HOSPITAL ENCOUNTER (OUTPATIENT)
Facility: MEDICAL CENTER | Age: 81
End: 2021-07-10
Attending: EMERGENCY MEDICINE | Admitting: HOSPITALIST
Payer: COMMERCIAL

## 2021-07-09 ENCOUNTER — APPOINTMENT (OUTPATIENT)
Dept: RADIOLOGY | Facility: MEDICAL CENTER | Age: 81
End: 2021-07-09
Attending: EMERGENCY MEDICINE
Payer: COMMERCIAL

## 2021-07-09 DIAGNOSIS — J44.9 CHRONIC OBSTRUCTIVE PULMONARY DISEASE, UNSPECIFIED COPD TYPE (HCC): ICD-10-CM

## 2021-07-09 DIAGNOSIS — Z86.73 HISTORY OF TIA (TRANSIENT ISCHEMIC ATTACK): ICD-10-CM

## 2021-07-09 DIAGNOSIS — I42.0 DILATED CARDIOMYOPATHY (HCC): ICD-10-CM

## 2021-07-09 DIAGNOSIS — Z79.899 HIGH RISK MEDICATION USE: ICD-10-CM

## 2021-07-09 DIAGNOSIS — I48.0 PAROXYSMAL ATRIAL FIBRILLATION (HCC): ICD-10-CM

## 2021-07-09 DIAGNOSIS — N28.9 RENAL DISORDER: ICD-10-CM

## 2021-07-09 DIAGNOSIS — Z86.79 HISTORY OF ABDOMINAL AORTIC ANEURYSM (AAA): ICD-10-CM

## 2021-07-09 DIAGNOSIS — E78.2 MIXED HYPERLIPIDEMIA: ICD-10-CM

## 2021-07-09 DIAGNOSIS — I10 ESSENTIAL HYPERTENSION, BENIGN: ICD-10-CM

## 2021-07-09 DIAGNOSIS — N17.9 AKI (ACUTE KIDNEY INJURY) (HCC): ICD-10-CM

## 2021-07-09 PROBLEM — E87.20 METABOLIC ACIDOSIS: Status: ACTIVE | Noted: 2021-07-09

## 2021-07-09 PROBLEM — E11.9 DM (DIABETES MELLITUS) (HCC): Status: ACTIVE | Noted: 2021-07-09

## 2021-07-09 LAB
ALBUMIN SERPL BCP-MCNC: 4 G/DL (ref 3.2–4.9)
ALBUMIN/GLOB SERPL: 1.5 G/DL
ALP SERPL-CCNC: 72 U/L (ref 30–99)
ALT SERPL-CCNC: 51 U/L (ref 2–50)
ANION GAP SERPL CALC-SCNC: 12 MMOL/L (ref 7–16)
AST SERPL-CCNC: 35 U/L (ref 12–45)
BASOPHILS # BLD AUTO: 0.4 % (ref 0–1.8)
BASOPHILS # BLD: 0.02 K/UL (ref 0–0.12)
BILIRUB SERPL-MCNC: 0.6 MG/DL (ref 0.1–1.5)
BUN SERPL-MCNC: 43 MG/DL (ref 8–22)
CALCIUM SERPL-MCNC: 8.3 MG/DL (ref 8.4–10.2)
CHLORIDE SERPL-SCNC: 106 MMOL/L (ref 96–112)
CO2 SERPL-SCNC: 19 MMOL/L (ref 20–33)
CREAT SERPL-MCNC: 2.53 MG/DL (ref 0.5–1.4)
EKG IMPRESSION: NORMAL
EOSINOPHIL # BLD AUTO: 0.28 K/UL (ref 0–0.51)
EOSINOPHIL NFR BLD: 5.2 % (ref 0–6.9)
ERYTHROCYTE [DISTWIDTH] IN BLOOD BY AUTOMATED COUNT: 51.5 FL (ref 35.9–50)
GLOBULIN SER CALC-MCNC: 2.6 G/DL (ref 1.9–3.5)
GLUCOSE SERPL-MCNC: 122 MG/DL (ref 65–99)
HCT VFR BLD AUTO: 44.7 % (ref 42–52)
HGB BLD-MCNC: 15 G/DL (ref 14–18)
IMM GRANULOCYTES # BLD AUTO: 0.01 K/UL (ref 0–0.11)
IMM GRANULOCYTES NFR BLD AUTO: 0.2 % (ref 0–0.9)
LYMPHOCYTES # BLD AUTO: 1.57 K/UL (ref 1–4.8)
LYMPHOCYTES NFR BLD: 29 % (ref 22–41)
MCH RBC QN AUTO: 34.2 PG (ref 27–33)
MCHC RBC AUTO-ENTMCNC: 33.6 G/DL (ref 33.7–35.3)
MCV RBC AUTO: 102.1 FL (ref 81.4–97.8)
MONOCYTES # BLD AUTO: 0.56 K/UL (ref 0–0.85)
MONOCYTES NFR BLD AUTO: 10.4 % (ref 0–13.4)
NEUTROPHILS # BLD AUTO: 2.97 K/UL (ref 1.82–7.42)
NEUTROPHILS NFR BLD: 54.8 % (ref 44–72)
NRBC # BLD AUTO: 0 K/UL
NRBC BLD-RTO: 0 /100 WBC
PLATELET # BLD AUTO: 152 K/UL (ref 164–446)
PMV BLD AUTO: 11.6 FL (ref 9–12.9)
POTASSIUM SERPL-SCNC: 4.4 MMOL/L (ref 3.6–5.5)
PROT SERPL-MCNC: 6.6 G/DL (ref 6–8.2)
RBC # BLD AUTO: 4.38 M/UL (ref 4.7–6.1)
SODIUM SERPL-SCNC: 137 MMOL/L (ref 135–145)
TROPONIN T SERPL-MCNC: 32 NG/L (ref 6–19)
WBC # BLD AUTO: 5.4 K/UL (ref 4.8–10.8)

## 2021-07-09 PROCEDURE — 700105 HCHG RX REV CODE 258: Performed by: EMERGENCY MEDICINE

## 2021-07-09 PROCEDURE — 82570 ASSAY OF URINE CREATININE: CPT

## 2021-07-09 PROCEDURE — 99220 PR INITIAL OBSERVATION CARE,LEVL III: CPT | Performed by: HOSPITALIST

## 2021-07-09 PROCEDURE — 93005 ELECTROCARDIOGRAM TRACING: CPT | Performed by: EMERGENCY MEDICINE

## 2021-07-09 PROCEDURE — 99285 EMERGENCY DEPT VISIT HI MDM: CPT

## 2021-07-09 PROCEDURE — 84133 ASSAY OF URINE POTASSIUM: CPT

## 2021-07-09 PROCEDURE — 82436 ASSAY OF URINE CHLORIDE: CPT

## 2021-07-09 PROCEDURE — 84300 ASSAY OF URINE SODIUM: CPT

## 2021-07-09 PROCEDURE — 85025 COMPLETE CBC W/AUTO DIFF WBC: CPT

## 2021-07-09 PROCEDURE — 80053 COMPREHEN METABOLIC PANEL: CPT

## 2021-07-09 PROCEDURE — 71045 X-RAY EXAM CHEST 1 VIEW: CPT

## 2021-07-09 PROCEDURE — 84484 ASSAY OF TROPONIN QUANT: CPT

## 2021-07-09 PROCEDURE — G0378 HOSPITAL OBSERVATION PER HR: HCPCS

## 2021-07-09 PROCEDURE — 94660 CPAP INITIATION&MGMT: CPT

## 2021-07-09 PROCEDURE — 84156 ASSAY OF PROTEIN URINE: CPT

## 2021-07-09 PROCEDURE — 94760 N-INVAS EAR/PLS OXIMETRY 1: CPT

## 2021-07-09 PROCEDURE — 82962 GLUCOSE BLOOD TEST: CPT

## 2021-07-09 RX ORDER — CARVEDILOL 25 MG/1
25 TABLET ORAL EVERY MORNING
Status: DISCONTINUED | OUTPATIENT
Start: 2021-07-10 | End: 2021-07-10 | Stop reason: HOSPADM

## 2021-07-09 RX ORDER — CARVEDILOL 6.25 MG/1
6.25 TABLET ORAL 2 TIMES DAILY
Status: DISCONTINUED | OUTPATIENT
Start: 2021-07-09 | End: 2021-07-09

## 2021-07-09 RX ORDER — HEPARIN SODIUM 5000 [USP'U]/ML
5000 INJECTION, SOLUTION INTRAVENOUS; SUBCUTANEOUS EVERY 8 HOURS
Status: DISCONTINUED | OUTPATIENT
Start: 2021-07-09 | End: 2021-07-09

## 2021-07-09 RX ORDER — ALBUTEROL SULFATE 90 UG/1
2 AEROSOL, METERED RESPIRATORY (INHALATION) EVERY 6 HOURS PRN
Status: DISCONTINUED | OUTPATIENT
Start: 2021-07-09 | End: 2021-07-10 | Stop reason: HOSPADM

## 2021-07-09 RX ORDER — POLYETHYLENE GLYCOL 3350 17 G/17G
1 POWDER, FOR SOLUTION ORAL
Status: DISCONTINUED | OUTPATIENT
Start: 2021-07-09 | End: 2021-07-10 | Stop reason: HOSPADM

## 2021-07-09 RX ORDER — ATORVASTATIN CALCIUM 20 MG/1
20 TABLET, FILM COATED ORAL DAILY
Status: DISCONTINUED | OUTPATIENT
Start: 2021-07-10 | End: 2021-07-10 | Stop reason: HOSPADM

## 2021-07-09 RX ORDER — AMOXICILLIN 250 MG
2 CAPSULE ORAL 2 TIMES DAILY
Status: DISCONTINUED | OUTPATIENT
Start: 2021-07-10 | End: 2021-07-10 | Stop reason: HOSPADM

## 2021-07-09 RX ORDER — CINACALCET 30 MG/1
30 TABLET, FILM COATED ORAL DAILY
Status: DISCONTINUED | OUTPATIENT
Start: 2021-07-10 | End: 2021-07-10 | Stop reason: HOSPADM

## 2021-07-09 RX ORDER — BISACODYL 10 MG
10 SUPPOSITORY, RECTAL RECTAL
Status: DISCONTINUED | OUTPATIENT
Start: 2021-07-09 | End: 2021-07-10 | Stop reason: HOSPADM

## 2021-07-09 RX ORDER — DEXTROSE MONOHYDRATE 25 G/50ML
50 INJECTION, SOLUTION INTRAVENOUS
Status: DISCONTINUED | OUTPATIENT
Start: 2021-07-09 | End: 2021-07-10 | Stop reason: HOSPADM

## 2021-07-09 RX ORDER — ONDANSETRON 2 MG/ML
4 INJECTION INTRAMUSCULAR; INTRAVENOUS EVERY 4 HOURS PRN
Status: DISCONTINUED | OUTPATIENT
Start: 2021-07-09 | End: 2021-07-09

## 2021-07-09 RX ORDER — ONDANSETRON 4 MG/1
4 TABLET, ORALLY DISINTEGRATING ORAL EVERY 4 HOURS PRN
Status: DISCONTINUED | OUTPATIENT
Start: 2021-07-09 | End: 2021-07-09

## 2021-07-09 RX ORDER — HEPARIN SODIUM 5000 [USP'U]/ML
5000 INJECTION, SOLUTION INTRAVENOUS; SUBCUTANEOUS EVERY 8 HOURS
Status: DISCONTINUED | OUTPATIENT
Start: 2021-07-10 | End: 2021-07-10 | Stop reason: HOSPADM

## 2021-07-09 RX ORDER — CARVEDILOL 6.25 MG/1
12.5 TABLET ORAL EVERY EVENING
Status: DISCONTINUED | OUTPATIENT
Start: 2021-07-09 | End: 2021-07-10 | Stop reason: HOSPADM

## 2021-07-09 RX ORDER — AMIODARONE HYDROCHLORIDE 200 MG/1
200 TABLET ORAL DAILY
Status: DISCONTINUED | OUTPATIENT
Start: 2021-07-10 | End: 2021-07-10 | Stop reason: HOSPADM

## 2021-07-09 RX ORDER — SODIUM CHLORIDE 9 MG/ML
1000 INJECTION, SOLUTION INTRAVENOUS ONCE
Status: COMPLETED | OUTPATIENT
Start: 2021-07-09 | End: 2021-07-09

## 2021-07-09 RX ADMIN — SODIUM CHLORIDE 1000 ML: 9 INJECTION, SOLUTION INTRAVENOUS at 20:25

## 2021-07-09 ASSESSMENT — ENCOUNTER SYMPTOMS
FLANK PAIN: 0
EYE REDNESS: 0
CHILLS: 0
FOCAL WEAKNESS: 0
SHORTNESS OF BREATH: 0
BRUISES/BLEEDS EASILY: 0
DIZZINESS: 1
FEVER: 0
COUGH: 0
EYE DISCHARGE: 0
STRIDOR: 0
MYALGIAS: 0
ABDOMINAL PAIN: 0
VOMITING: 0
NERVOUS/ANXIOUS: 0

## 2021-07-09 ASSESSMENT — LIFESTYLE VARIABLES
HAVE YOU EVER FELT YOU SHOULD CUT DOWN ON YOUR DRINKING: NO
ALCOHOL_USE: YES
TOTAL SCORE: 0
TOTAL SCORE: 0
ON A TYPICAL DAY WHEN YOU DRINK ALCOHOL HOW MANY DRINKS DO YOU HAVE: 2
HOW MANY TIMES IN THE PAST YEAR HAVE YOU HAD 5 OR MORE DRINKS IN A DAY: 0
EVER HAD A DRINK FIRST THING IN THE MORNING TO STEADY YOUR NERVES TO GET RID OF A HANGOVER: NO
CONSUMPTION TOTAL: NEGATIVE
EVER FELT BAD OR GUILTY ABOUT YOUR DRINKING: NO
TOTAL SCORE: 0
HAVE PEOPLE ANNOYED YOU BY CRITICIZING YOUR DRINKING: NO
AVERAGE NUMBER OF DAYS PER WEEK YOU HAVE A DRINK CONTAINING ALCOHOL: 7

## 2021-07-09 ASSESSMENT — COPD QUESTIONNAIRES
HAVE YOU SMOKED AT LEAST 100 CIGARETTES IN YOUR ENTIRE LIFE: YES
DO YOU EVER COUGH UP ANY MUCUS OR PHLEGM?: NO/ONLY WITH OCCASIONAL COLDS OR INFECTIONS
DURING THE PAST 4 WEEKS HOW MUCH DID YOU FEEL SHORT OF BREATH: NONE/LITTLE OF THE TIME
COPD SCREENING SCORE: 4

## 2021-07-09 ASSESSMENT — PATIENT HEALTH QUESTIONNAIRE - PHQ9
2. FEELING DOWN, DEPRESSED, IRRITABLE, OR HOPELESS: NOT AT ALL
1. LITTLE INTEREST OR PLEASURE IN DOING THINGS: NOT AT ALL
SUM OF ALL RESPONSES TO PHQ9 QUESTIONS 1 AND 2: 0

## 2021-07-09 ASSESSMENT — FIBROSIS 4 INDEX
FIB4 SCORE: 2.51
FIB4 SCORE: 2

## 2021-07-10 ENCOUNTER — PATIENT OUTREACH (OUTPATIENT)
Dept: HEALTH INFORMATION MANAGEMENT | Facility: OTHER | Age: 81
End: 2021-07-10

## 2021-07-10 VITALS
RESPIRATION RATE: 18 BRPM | OXYGEN SATURATION: 99 % | DIASTOLIC BLOOD PRESSURE: 75 MMHG | WEIGHT: 202.82 LBS | HEART RATE: 62 BPM | BODY MASS INDEX: 26.03 KG/M2 | TEMPERATURE: 97.6 F | SYSTOLIC BLOOD PRESSURE: 103 MMHG | HEIGHT: 74 IN

## 2021-07-10 PROBLEM — E87.20 METABOLIC ACIDOSIS: Status: RESOLVED | Noted: 2021-07-09 | Resolved: 2021-07-10

## 2021-07-10 LAB
ALBUMIN SERPL BCP-MCNC: 3.3 G/DL (ref 3.2–4.9)
ALBUMIN/GLOB SERPL: 1.4 G/DL
ALP SERPL-CCNC: 65 U/L (ref 30–99)
ALT SERPL-CCNC: 46 U/L (ref 2–50)
ANION GAP SERPL CALC-SCNC: 11 MMOL/L (ref 7–16)
AST SERPL-CCNC: 28 U/L (ref 12–45)
BASOPHILS # BLD AUTO: 0.4 % (ref 0–1.8)
BASOPHILS # BLD: 0.02 K/UL (ref 0–0.12)
BILIRUB SERPL-MCNC: 0.4 MG/DL (ref 0.1–1.5)
BUN SERPL-MCNC: 40 MG/DL (ref 8–22)
CALCIUM SERPL-MCNC: 7.9 MG/DL (ref 8.4–10.2)
CHLORIDE SERPL-SCNC: 109 MMOL/L (ref 96–112)
CHLORIDE UR-SCNC: 67 MMOL/L
CO2 SERPL-SCNC: 18 MMOL/L (ref 20–33)
CREAT SERPL-MCNC: 2.3 MG/DL (ref 0.5–1.4)
CREAT UR-MCNC: 83.18 MG/DL
EOSINOPHIL # BLD AUTO: 0.37 K/UL (ref 0–0.51)
EOSINOPHIL NFR BLD: 7.7 % (ref 0–6.9)
ERYTHROCYTE [DISTWIDTH] IN BLOOD BY AUTOMATED COUNT: 53.4 FL (ref 35.9–50)
GLOBULIN SER CALC-MCNC: 2.4 G/DL (ref 1.9–3.5)
GLUCOSE BLD-MCNC: 107 MG/DL (ref 65–99)
GLUCOSE BLD-MCNC: 94 MG/DL (ref 65–99)
GLUCOSE SERPL-MCNC: 95 MG/DL (ref 65–99)
HCT VFR BLD AUTO: 42.4 % (ref 42–52)
HGB BLD-MCNC: 13.9 G/DL (ref 14–18)
IMM GRANULOCYTES # BLD AUTO: 0.01 K/UL (ref 0–0.11)
IMM GRANULOCYTES NFR BLD AUTO: 0.2 % (ref 0–0.9)
LYMPHOCYTES # BLD AUTO: 1.96 K/UL (ref 1–4.8)
LYMPHOCYTES NFR BLD: 40.9 % (ref 22–41)
MAGNESIUM SERPL-MCNC: 2.1 MG/DL (ref 1.5–2.5)
MCH RBC QN AUTO: 34.3 PG (ref 27–33)
MCHC RBC AUTO-ENTMCNC: 32.8 G/DL (ref 33.7–35.3)
MCV RBC AUTO: 104.7 FL (ref 81.4–97.8)
MONOCYTES # BLD AUTO: 0.52 K/UL (ref 0–0.85)
MONOCYTES NFR BLD AUTO: 10.9 % (ref 0–13.4)
NEUTROPHILS # BLD AUTO: 1.91 K/UL (ref 1.82–7.42)
NEUTROPHILS NFR BLD: 39.9 % (ref 44–72)
NRBC # BLD AUTO: 0 K/UL
NRBC BLD-RTO: 0 /100 WBC
PLATELET # BLD AUTO: 131 K/UL (ref 164–446)
PMV BLD AUTO: 11.8 FL (ref 9–12.9)
POTASSIUM SERPL-SCNC: 4.3 MMOL/L (ref 3.6–5.5)
POTASSIUM UR-SCNC: 33 MMOL/L
PROT SERPL-MCNC: 5.7 G/DL (ref 6–8.2)
PROT UR-MCNC: 8 MG/DL (ref 0–15)
RBC # BLD AUTO: 4.05 M/UL (ref 4.7–6.1)
SODIUM SERPL-SCNC: 138 MMOL/L (ref 135–145)
SODIUM UR-SCNC: 81 MMOL/L
WBC # BLD AUTO: 4.8 K/UL (ref 4.8–10.8)

## 2021-07-10 PROCEDURE — 94664 DEMO&/EVAL PT USE INHALER: CPT

## 2021-07-10 PROCEDURE — G0378 HOSPITAL OBSERVATION PER HR: HCPCS

## 2021-07-10 PROCEDURE — 85025 COMPLETE CBC W/AUTO DIFF WBC: CPT

## 2021-07-10 PROCEDURE — 96372 THER/PROPH/DIAG INJ SC/IM: CPT

## 2021-07-10 PROCEDURE — 700102 HCHG RX REV CODE 250 W/ 637 OVERRIDE(OP): Performed by: HOSPITALIST

## 2021-07-10 PROCEDURE — 99217 PR OBSERVATION CARE DISCHARGE: CPT | Performed by: INTERNAL MEDICINE

## 2021-07-10 PROCEDURE — 700111 HCHG RX REV CODE 636 W/ 250 OVERRIDE (IP): Performed by: HOSPITALIST

## 2021-07-10 PROCEDURE — 80053 COMPREHEN METABOLIC PANEL: CPT

## 2021-07-10 PROCEDURE — 83735 ASSAY OF MAGNESIUM: CPT

## 2021-07-10 PROCEDURE — 94660 CPAP INITIATION&MGMT: CPT

## 2021-07-10 PROCEDURE — 82962 GLUCOSE BLOOD TEST: CPT

## 2021-07-10 PROCEDURE — A9270 NON-COVERED ITEM OR SERVICE: HCPCS | Performed by: HOSPITALIST

## 2021-07-10 RX ORDER — FUROSEMIDE 20 MG/1
10 TABLET ORAL
Qty: 30 TABLET | Refills: 0 | Status: SHIPPED | OUTPATIENT
Start: 2021-07-12 | End: 2021-08-11

## 2021-07-10 RX ADMIN — ATORVASTATIN CALCIUM 20 MG: 20 TABLET, FILM COATED ORAL at 06:21

## 2021-07-10 RX ADMIN — HEPARIN SODIUM 5000 UNITS: 5000 INJECTION, SOLUTION INTRAVENOUS; SUBCUTANEOUS at 06:20

## 2021-07-10 RX ADMIN — CINACALCET HYDROCHLORIDE 30 MG: 30 TABLET, FILM COATED ORAL at 08:43

## 2021-07-10 RX ADMIN — ASPIRIN 81 MG: 81 TABLET, COATED ORAL at 06:13

## 2021-07-10 RX ADMIN — AMIODARONE HYDROCHLORIDE 200 MG: 200 TABLET ORAL at 06:19

## 2021-07-10 ASSESSMENT — COGNITIVE AND FUNCTIONAL STATUS - GENERAL
MOBILITY SCORE: 21
DAILY ACTIVITIY SCORE: 24
WALKING IN HOSPITAL ROOM: A LITTLE
CLIMB 3 TO 5 STEPS WITH RAILING: A LITTLE
SUGGESTED CMS G CODE MODIFIER DAILY ACTIVITY: CH
SUGGESTED CMS G CODE MODIFIER MOBILITY: CJ
STANDING UP FROM CHAIR USING ARMS: A LITTLE

## 2021-07-10 ASSESSMENT — FIBROSIS 4 INDEX: FIB4 SCORE: 2.51

## 2021-07-10 NOTE — RESPIRATORY CARE
COPD EDUCATION by COPD CLINICAL EDUCATOR  7/10/2021 at 10:47 AM by Dimitri Maddox RRT     Patient reviewed by COPD education team. Patient does not have a history or diagnosis of COPD and is a former smoker.  Therefore, patient does not qualify for the COPD program.    COPD Screen  COPD Risk Screening  Do you have a history of COPD?: No  COPD Population Screener  During the past 4 weeks, how much did you feel short of breath?: None/Little of the time  Do you ever cough up any mucus or phlegm?: No/only with occasional colds or infections  In the past 12 months, you do less than you used to because of your breathing problems: Disagree/unsure  Have you smoked at least 100 cigarettes in your entire life?: Yes  How old are you?: 60+  COPD Screening Score: 4  COPD Coordinator Not Recommended: Yes    COPD Assessment  COPD Clinical Specialists ONLY  COPD Education Initiated: No--Quick Screen, Yes--Short Intervention (Pt doesn't have COPD/PFT 12/3/19 showed FEV1/FVC 83%. Pt uses Albuterol MDI PRN)  Physician Name: Dr Castorena  Pulmonary Follow Up Appointment: 07/13/21  Appt Time: 0830  Pulmonologist Name: Dr Anne Ambriz APRN-Additional follow up PMA 7/19/21 @ 0840  Referrals Initiated: Yes (Non needed @ this time)  Pulmonary Rehab: N/A  Smoking Cessation: N/A  Hospice: N/A  Home Health Care: N/A  [facility] Outreach: N/A  [facility]: N/A  [facility]: N/A  Private In-Home Care Agency: N/A  Is this a COPD exacerbation patient?: No  $ Demo/Eval of SVN's, MDI's and Aerosols: Yes (MDI spacer given for rescue inhaler)    Meds to Beds  Would the patient like to opt in for Bedside Medication Delivery at Discharge?: Yes, interested     MY COPD ACTION PLAN     It is recommended that patients and physicians /healthcare providers complete this action plan together. This plan should be discussed at each physician visit and updated as needed.    The green, yellow and red zones show groups of symptoms of  "COPD. This list of symptoms is not comprehensive, and you may experience other symptoms. In the \"Actions\" column, your healthcare provider has recommended actions for you to take based on your symptoms.    Patient Name: Lencho Parker   YOB: 1940   Last Updated on: 7/10/2021 10:47 AM   Green Zone:  I am doing well today Actions   •  Usual activitiy and exercise level •  Take daily medications   •  Usual amounts of cough and phlegm/mucus •  Use oxygen as prescribed   •  Sleep well at night •  Continue regular exercise/diet plan   •  Appetite is good •  At all times avoid cigarette smoke, inhaled irritants     Daily Medications (these medications are taken every day):   Albuterol (Accuneb, Proair, Proventil, Ventolin) 2 Puffs Every 4 hours PRN     Additional Information:  Use spacer for rescue inhaler    Yellow Zone:  I am having a bad day or a COPD flare Actions   •  More breathless than usual •  Continue daily medications   •  I have less energy for my daily activities •  Use quick relief inhaler as ordered   •  Increased or thicker phlegm/mucus •  Use oxygen as prescribed   •  Using quick relief inhaler/nebulizer more often •  Get plenty of rest   •  Swelling of ankles more than usual •  Use pursed lip breathing   •  More coughing than usual •  At all times avoid cigarette smoke, inhaled irritants   •  I feel like I have a \"chest cold\"   •  Poor sleep and my symptoms woke me up   •  My appetite is not good   •  My medicine is not helping    •  Call provider immediately if symptoms don’t improve     Continue daily medications, add rescue medications:   Albuterol 2 Puffs Every 4 hours       Medications to be used during a flare up, (as Discussed with Provider):           Additional Information:  Call provider immediately if symptoms don't improve    Red Zone:  I need urgent medical care Actions   •  Severe shortness of breath even at rest •  Call 911 or seek medical care immediately   •  Not " able to do any activity because of breathing    •  Fever or shaking chills    •  Feeling confused or very drowsy     •  Chest pains    •  Coughing up blood

## 2021-07-10 NOTE — PROGRESS NOTES
Pt arrived to unit via gurney. Ambulated from gurney to bed, standby assist. Tele monitor applied, vitals taken. Pt assessed. A&O x4. Admit profile and med rec complete. Discussed POC with pt, including decrease in carvedilol dose, BG checks, strict I/O and weights, and vital signs q4hrs . Welcome folder provided and discussed. Communication board filled out. Questions and concerns addressed, verbalized understanding. Fall precautions in place. Pt demonstrates ability to use call light appropriately. Pt left in lowest position. Bed locked and low, bed alarm on.

## 2021-07-10 NOTE — ED PROVIDER NOTES
ED Provider Note  Patient denies any nausea or diaphoresis.   CHIEF COMPLAINT  Chief Complaint   Patient presents with   • Near Syncopal       HPI  Lencho Parker is a 80 y.o. male who presents with chief complaint of near syncopal episode.  Patient reports that he was sitting down watching TV, he got up and immediately felt dizzy like he was going to pass out.  He took his blood pressure and it was in the 70s.  After while sitting on the floor his symptoms improved.  He denies any associated chest pain shortness of breath or palpitations.  Patient has a longstanding history of paroxysmal atrial fibrillation, aortic aneurysm, and cardiomyopathy, he has an indwelling AICD.  He denies any ongoing symptoms.  Patient denies any associated abdominal pain.  Patient takes Lasix 3 times a week, ; he took it today.    REVIEW OF SYSTEMS  ROS    See HPI for further details. All other systems are negative.     PAST MEDICAL HISTORY   has a past medical history of Breath shortness, Cardiomyopathy (HCC) (2019), Chronic obstructive pulmonary disease (HCC), [place] measles, Heart attack (HCC), History of abdominal aortic aneurysm (AAA) (), Hyperlipidemia, Hypertension, Mumps, Pacemaker, Paroxysmal atrial fibrillation (HCC), Renal disorder, Sleep apnea, Snoring, Stroke (HCC) (), Tonsillitis, and Ventricular tachycardia ([facility]).    SOCIAL HISTORY  Social History     Tobacco Use   • Smoking status: Former Smoker     Packs/day: 0.50     Years: 15.00     Pack years: 7.50     Types: Cigarettes     Quit date: 2004     Years since quittin.0   • Smokeless tobacco: Never Used   Vaping Use   • Vaping Use: Never used   Substance and Sexual Activity   • Alcohol use: Yes     Alcohol/week: 8.4 oz     Types: 14 Glasses of wine per week     Comment: 2 per day   • Drug use: No   • Sexual activity: Not on file       SURGICAL HISTORY   has a past surgical history that includes aicd battery change (Left,  01/03/2017); aaa with stent graft (2009); other (2000); other orthopedic surgery (2005); other cardiac surgery (1991); other cardiac surgery (2014); and aicd battery change (Left, 09/10/2020).    CURRENT MEDICATIONS  Home Medications     Reviewed by Urvashi Shell R.N. (Registered Nurse) on 07/09/21 at 1913  Med List Status: Not Addressed   Medication Last Dose Status   albuterol 108 (90 Base) MCG/ACT Aero Soln inhalation aerosol  Active   amiodarone (CORDARONE) 200 MG Tab  Active   aspirin EC (ECOTRIN) 81 MG Tablet Delayed Response  Active   atorvastatin (LIPITOR) 20 MG Tab  Active   carvedilol (COREG) 25 MG Tab  Active   cinacalcet (SENSIPAR) 30 MG Tab  Active   Dapagliflozin Propanediol (FARXIGA) 10 MG Tab  Active   furosemide (LASIX) 20 MG Tab  Active   losartan (COZAAR) 100 MG Tab  Active   multivitamin (THERAGRAN) Tab  Active   Omega-3 Fatty Acids (FISH OIL) 1000 MG Cap capsule  Active   spironolactone (ALDACTONE) 25 MG Tab  Active   vitamin D, Ergocalciferol, (DRISDOL) 79501 units Cap capsule  Active                ALLERGIES  Allergies   Allergen Reactions   • Entresto [Sacubitril-Valsartan]      Swollen tongue. Angioedema.       PHYSICAL EXAM  Vitals:    07/09/21 1912   BP: (!) 97/71   Pulse: 77   Resp: 20   Temp: 37.1 °C (98.7 °F)   SpO2: 95%       Physical Exam  Constitutional:       Appearance: He is well-developed.   HENT:      Head: Normocephalic and atraumatic.   Eyes:      Conjunctiva/sclera: Conjunctivae normal.      Pupils: Pupils are equal, round, and reactive to light.   Cardiovascular:      Rate and Rhythm: Normal rate and regular rhythm.      Heart sounds: No murmur heard.   No friction rub. No gallop.    Pulmonary:      Effort: Pulmonary effort is normal. No respiratory distress.      Breath sounds: Normal breath sounds. No wheezing.   Abdominal:      General: Bowel sounds are normal. There is no distension.      Palpations: Abdomen is soft.      Tenderness: There is no abdominal tenderness.  There is no rebound.   Musculoskeletal:      Cervical back: Normal range of motion and neck supple.   Skin:     General: Skin is warm and dry.   Neurological:      Mental Status: He is alert and oriented to person, place, and time.   Psychiatric:         Behavior: Behavior normal.           DIAGNOSTIC STUDIES / PROCEDURES    EKG  See below    LABS  Results for orders placed or performed during the hospital encounter of 07/09/21   CBC WITH DIFFERENTIAL   Result Value Ref Range    WBC 5.4 4.8 - 10.8 K/uL    RBC 4.38 (L) 4.70 - 6.10 M/uL    Hemoglobin 15.0 14.0 - 18.0 g/dL    Hematocrit 44.7 42.0 - 52.0 %    .1 (H) 81.4 - 97.8 fL    MCH 34.2 (H) 27.0 - 33.0 pg    MCHC 33.6 (L) 33.7 - 35.3 g/dL    RDW 51.5 (H) 35.9 - 50.0 fL    Platelet Count 152 (L) 164 - 446 K/uL    MPV 11.6 9.0 - 12.9 fL    Neutrophils-Polys 54.80 44.00 - 72.00 %    Lymphocytes 29.00 22.00 - 41.00 %    Monocytes 10.40 0.00 - 13.40 %    Eosinophils 5.20 0.00 - 6.90 %    Basophils 0.40 0.00 - 1.80 %    Immature Granulocytes 0.20 0.00 - 0.90 %    Nucleated RBC 0.00 /100 WBC    Neutrophils (Absolute) 2.97 1.82 - 7.42 K/uL    Lymphs (Absolute) 1.57 1.00 - 4.80 K/uL    Monos (Absolute) 0.56 0.00 - 0.85 K/uL    Eos (Absolute) 0.28 0.00 - 0.51 K/uL    Baso (Absolute) 0.02 0.00 - 0.12 K/uL    Immature Granulocytes (abs) 0.01 0.00 - 0.11 K/uL    NRBC (Absolute) 0.00 K/uL   CMP   Result Value Ref Range    Sodium 137 135 - 145 mmol/L    Potassium 4.4 3.6 - 5.5 mmol/L    Chloride 106 96 - 112 mmol/L    Co2 19 (L) 20 - 33 mmol/L    Anion Gap 12.0 7.0 - 16.0    Glucose 122 (H) 65 - 99 mg/dL    Bun 43 (H) 8 - 22 mg/dL    Creatinine 2.53 (H) 0.50 - 1.40 mg/dL    Calcium 8.3 (L) 8.4 - 10.2 mg/dL    AST(SGOT) 35 12 - 45 U/L    ALT(SGPT) 51 (H) 2 - 50 U/L    Alkaline Phosphatase 72 30 - 99 U/L    Total Bilirubin 0.6 0.1 - 1.5 mg/dL    Albumin 4.0 3.2 - 4.9 g/dL    Total Protein 6.6 6.0 - 8.2 g/dL    Globulin 2.6 1.9 - 3.5 g/dL    A-G Ratio 1.5 g/dL   TROPONIN    Result Value Ref Range    Troponin T 32 (H) 6 - 19 ng/L   ESTIMATED GFR   Result Value Ref Range    GFR If African American 30 (A) >60 mL/min/1.73 m 2    GFR If Non African American 25 (A) >60 mL/min/1.73 m 2   EKG   Result Value Ref Range    Report       [facility]    Test Date:  2021  Pt Name:    KYUNG LEONARD               Department: EDS  MRN:        [id number]                      [facility]  Gender:     Male                         Technician: TRIPP  :        [id number]                   Requested By:RANJAN DAVIS  Order #:    [id number]                    Reading MD: Louie Kwong MD    Measurements  Intervals                                Axis  Rate:       71                           P:          92  NV:         226                          QRS:        -83  QRSD:       168                          T:          85  QT:         551  QTc:        599    Interpretive Statements  EKG is atrial ventricular paced rhythm no ST changes from EKG done on 19 May  Electronically Signed On 2021 19:32:08 PDT by Louie Kwong MD           RADIOLOGY  DX-CHEST-PORTABLE (1 VIEW)   Final Result         1. No acute cardiopulmonary abnormalities are identified.              COURSE & MEDICAL DECISION MAKING  Pertinent Labs & Imaging studies reviewed. (See chart for details)    Patient here with chief complaint of near syncope.  This appears most consistent with orthostasis.  Patient without any significant edema, possible dehydration secondary to overdiuresis.  Patient otherwise appears well.  Patient without any associated chest pain or palpitations.  Patient basic labs are notable for RANI.  Will send urine electrolytes.  Will give IV fluids as I suspect possible dehydration, given patient's history of heart failure I am concerned that simply giving IV fluids and sending patient home would be unwise as he would likely be difficult to manage from a fluid standpoint  given his concurrent heart failure.  Of note patient's troponin is mildly elevated, this is likely consistent with his longstanding history of heart failure, patient will be admitted to the hospitalist for trending and correction of his kidney injury.  Patient's AICD has been interrogated, there is no evidence of any recent events.       The patient will return for worsening symptoms and is stable at the time of discharge. The patient verbalizes understanding and will comply.    FINAL IMPRESSION    1. RANI (acute kidney injury) (HCC)    near syncope         Electronically signed by: Varghese Palma M.D., 7/9/2021 7:37 PM

## 2021-07-10 NOTE — ASSESSMENT & PLAN NOTE
Not currently in exacerbation in fact he is very dehydrated.  We will resume home carvedilol at a lower dose when blood pressure improves.  Holding home losartan, spironolactone and Lasix until blood pressure/RANI improves  Daily strict input and output  Daily standing weights.  Daily BMP to watch renal function, electrolytes.  Replace electrolytes as needed.

## 2021-07-10 NOTE — PROGRESS NOTES
Patient discharged to home by car with family escorted by staff. Discharge teaching completed at bedside and patient signature obtained. All questions and concerns addressed. Tele box and PIV removed. Safety measures in place during transport.

## 2021-07-10 NOTE — FLOWSHEET NOTE
07/09/21 2147   Patient History   Pulmonary Diagnosis None   Procedures Relevant to Respiratory Status None   Home O2 No   Nocturnal CPAP No   Home Treatments/Frequency Yes   MDI 1/Frequency Albuterol PRN   Sleep Apnea Screening   Have you had a sleep study? Yes   Have you been diagnosed with sleep apnea? Yes   Do you use a CPAP/BIPAP/AUTOPAP? Yes   S - Have you been told that you SNORE? 1   T - Are you often TIRED during the day? 0   O - Do you know if you stop breathing or has anyone witnessed you stop breathing while you were asleep? (OBSTRUCTION) 1   P - Do you have high blood PRESSURE or on medication to control high blood pressure? 1   B - Is your Body Mass Index greater than 35? (BMI) 0   A - Are you 50 years old or older? (AGE) 1   N - Are you a male with a NECK circumference greater than 17 inches, or a female with a neck circumference greater than 16 inches? 0   G - Are you male? (GENDER) 1   Stop Bang Total Score 5   COPD Risk Screening   Do you have a history of COPD? No   COPD Population Screener   During the past 4 weeks, how much did you feel short of breath? 0   Do you ever cough up any mucus or phlegm? 0   In the past 12 months, you do less than you used to because of your breathing problems 0   Have you smoked at least 100 cigarettes in your entire life? 2   How old are you? 4   COPD Screening Score 4   COPD Coordinator Not Recommended Yes   Protocol Pathways   Protocol Pathways None

## 2021-07-10 NOTE — ASSESSMENT & PLAN NOTE
Not currently in exacerbation.  Oxygen as needed, Respiratory protocol, Bronchodilators, Incentive spirometry

## 2021-07-10 NOTE — PROGRESS NOTES
Assumed report and patient care from Miguelangel RN via bedside report. Patient is resting comfortably in bed with no signs of labored breathing or restlessness. POC discussed. No questions or concerns at this time. Safety measures in place, call light within reach.

## 2021-07-10 NOTE — CARE PLAN
The patient is Stable - Low risk of patient condition declining or worsening    Shift Goals  Clinical Goals: maintain stable vital signs  Patient Goals: rest, ambulate, discharge    Progress made toward(s) clinical / shift goals:  Pt orthostatic blood pressures were negative, Dr. Riddle put in discharge orders. Pt discharging today. Pending outpatient nephrology labs.    Patient is not progressing towards the following goals: N/A    Problem: Knowledge Deficit - Standard  Goal: Patient and family/care givers will demonstrate understanding of plan of care, disease process/condition, diagnostic tests and medications  Outcome: Met     Problem: Fall Risk  Goal: Patient will remain free from falls  Outcome: Met       Pt discharged 7/10

## 2021-07-10 NOTE — PROGRESS NOTES
Telemetry Shift Summary     Rhythm: 100% AV Paced  Rate: 59-62  Measurements: -/0.16/0.50  Ectopy (reported by Monitor Tech): R PVC     Normal Values  Rhythm: Sinus  HR:   Measurements: 0.12-0.20/0.06-0.10/0.30-0.52    PT IS DISCHARGED

## 2021-07-10 NOTE — FLOWSHEET NOTE
07/10/21 0219   Events/Summary/Plan   Events/Summary/Plan Pt removed CPAP - Not in use at this time   No distress or SOB observed.  Will continue to monitor. RN aware.

## 2021-07-10 NOTE — FLOWSHEET NOTE
07/10/21 0219   Events/Summary/Plan   Events/Summary/Plan Pt removed CPAP - Not in use at this time

## 2021-07-10 NOTE — ED NOTES
Medication reconciliation process completed by interviewing patient at bedside.   No antibiotics in the last 14 days.  Allergies have been verified.    Kamilah Mabry, BCPS

## 2021-07-10 NOTE — ASSESSMENT & PLAN NOTE
Secondary to acute kidney injury secondary to severe dehydration/overdiuresis.  Continue to monitor, anticipated improvement with holding diuretics and rehydration

## 2021-07-10 NOTE — DISCHARGE INSTRUCTIONS
Discharge Instructions    Discharged to home by car with relative. Discharged via wheelchair, hospital escort: Yes.  Special equipment needed: Not Applicable    Be sure to schedule a follow-up appointment with your primary care doctor or any specialists as instructed.     Discharge Plan:   Diet Plan: Discussed  Activity Level: Discussed  Confirmed Follow up Appointment: Patient to Call and Schedule Appointment (hospital schedulers called and left voicemail at clinic to call pt back)  Confirmed Symptoms Management: Discussed  Medication Reconciliation Updated: Yes    I understand that a diet low in cholesterol, fat, and sodium is recommended for good health. Unless I have been given specific instructions below for another diet, I accept this instruction as my diet prescription.   Other diet: home diet, lots of water to prevent dehydration    Special Instructions: None    · Is patient discharged on Warfarin / Coumadin?   No     Depression / Suicide Risk    As you are discharged from this [facility] facility, it is important to learn how to keep safe from harming yourself.    Recognize the warning signs:  · Abrupt changes in personality, positive or negative- including increase in energy   · Giving away possessions  · Change in eating patterns- significant weight changes-  positive or negative  · Change in sleeping patterns- unable to sleep or sleeping all the time   · Unwillingness or inability to communicate  · Depression  · Unusual sadness, discouragement and loneliness  · Talk of wanting to die  · Neglect of personal appearance   · Rebelliousness- reckless behavior  · Withdrawal from people/activities they love  · Confusion- inability to concentrate     If you or a loved one observes any of these behaviors or has concerns about self-harm, here's what you can do:  · Talk about it- your feelings and reasons for harming yourself  · Remove any means that you might use to hurt yourself (examples: pills, rope,  extension cords, firearm)  · Get professional help from the community (Mental Health, Substance Abuse, psychological counseling)  · Do not be alone:Call your Safe Contact- someone whom you trust who will be there for you.  · Call your local CRISIS HOTLINE 424-9119 or 397-914-7864  · Call your local Children's Mobile Crisis Response Team [place] (200) 854-9742 or www.[employer]Travel  · Call the toll free National Suicide Prevention Hotlines   · National Suicide Prevention Lifeline 843-492-HOIF (7382)  · [employer] Hope Line Network 800-SUICIDE (559-3612)      Dehydration, Adult    Dehydration is when there is not enough fluid or water in your body. This happens when you lose more fluids than you take in. Dehydration can range from mild to very bad. It should be treated right away to keep it from getting very bad.  Symptoms of mild dehydration may include:  · Thirst.  · Dry lips.  · Slightly dry mouth.  · Dry, warm skin.  · Dizziness.  Symptoms of moderate dehydration may include:  · Very dry mouth.  · Muscle cramps.  · Dark pee (urine). Pee may be the color of tea.  · Your body making less pee.  · Your eyes making fewer tears.  · Heartbeat that is uneven or faster than normal (palpitations).  · Headache.  · Light-headedness, especially when you stand up from sitting.  · Fainting (syncope).  Symptoms of very bad dehydration may include:  · Changes in skin, such as:  ? Cold and clammy skin.  ? Blotchy (mottled) or pale skin.  ? Skin that does not quickly return to normal after being lightly pinched and let go (poor skin turgor).  · Changes in body fluids, such as:  ? Feeling very thirsty.  ? Your eyes making fewer tears.  ? Not sweating when body temperature is high, such as in hot weather.  ? Your body making very little pee.  · Changes in vital signs, such as:  ? Weak pulse.  ? Pulse that is more than 100 beats a minute when you are sitting still.  ? Fast breathing.  ? Low blood pressure.  · Other changes, such  as:  ? Sunken eyes.  ? Cold hands and feet.  ? Confusion.  ? Lack of energy (lethargy).  ? Trouble waking up from sleep.  ? Short-term weight loss.  ? Unconsciousness.  Follow these instructions at home:    · If told by your doctor, drink an ORS:  ? Make an ORS by using instructions on the package.  ? Start by drinking small amounts, about ½ cup (120 mL) every 5-10 minutes.  ? Slowly drink more until you have had the amount that your doctor said to have.  · Drink enough clear fluid to keep your pee clear or pale yellow. If you were told to drink an ORS, finish the ORS first, then start slowly drinking clear fluids. Drink fluids such as:  ? Water. Do not drink only water by itself. Doing that can make the salt (sodium) level in your body get too low (hyponatremia).  ? Ice chips.  ? Fruit juice that you have added water to (diluted).  ? Low-calorie sports drinks.  · Avoid:  ? Alcohol.  ? Drinks that have a lot of sugar. These include high-calorie sports drinks, fruit juice that does not have water added, and soda.  ? Caffeine.  ? Foods that are greasy or have a lot of fat or sugar.  · Take over-the-counter and prescription medicines only as told by your doctor.  · Do not take salt tablets. Doing that can make the salt level in your body get too high (hypernatremia).  · Eat foods that have minerals (electrolytes). Examples include bananas, oranges, potatoes, tomatoes, and spinach.  · Keep all follow-up visits as told by your doctor. This is important.  Contact a doctor if:  · You have belly (abdominal) pain that:  ? Gets worse.  ? Stays in one area (localizes).  · You have a rash.  · You have a stiff neck.  · You get angry or annoyed more easily than normal (irritability).  · You are more sleepy than normal.  · You have a harder time waking up than normal.  · You feel:  ? Weak.  ? Dizzy.  ? Very thirsty.  · You have peed (urinated) only a small amount of very dark pee during 6-8 hours.  Get help right away if:  · You  have symptoms of very bad dehydration.  · You cannot drink fluids without throwing up (vomiting).  · Your symptoms get worse with treatment.  · You have a fever.  · You have a very bad headache.  · You are throwing up or having watery poop (diarrhea) and it:  ? Gets worse.  ? Does not go away.  · You have blood or something green (bile) in your throw-up.  · You have blood in your poop (stool). This may cause poop to look black and tarry.  · You have not peed in 6-8 hours.  · You pass out (faint).  · Your heart rate when you are sitting still is more than 100 beats a minute.  · You have trouble breathing.  This information is not intended to replace advice given to you by your health care provider. Make sure you discuss any questions you have with your health care provider.  Document Released: 10/14/2010 Document Revised: 11/30/2018 Document Reviewed: 02/10/2017  [employer] Patient Education © 2020 [employer]      Acute Kidney Injury, Adult    Acute kidney injury is a sudden worsening of kidney function. The kidneys are organs that have several jobs. They filter the blood to remove waste products and extra fluid. They also maintain a healthy balance of minerals and hormones in the body, which helps control blood pressure and keep bones strong. With this condition, your kidneys do not do their jobs as well as they should.  This condition ranges from mild to severe. Over time it may develop into long-lasting (chronic) kidney disease. Early detection and treatment may prevent acute kidney injury from developing into a chronic condition.  What are the causes?  Common causes of this condition include:  · A problem with blood flow to the kidneys. This may be caused by:  ? Low blood pressure (hypotension) or shock.  ? Blood loss.  ? Heart and blood vessel (cardiovascular) disease.  ? Severe burns.  ? Liver disease.  · Direct damage to the kidneys. This may be caused by:  ? Certain medicines.  ? A kidney  infection.  ? Poisoning.  ? Being around or in contact with toxic substances.  ? A surgical wound.  ? A hard, direct hit to the kidney area.  · A sudden blockage of urine flow. This may be caused by:  ? Cancer.  ? Kidney stones.  ? An enlarged prostate in males.  What are the signs or symptoms?  Symptoms of this condition may not be obvious until the condition becomes severe. Symptoms of this condition can include:  · Tiredness (lethargy), or difficulty staying awake.  · Nausea or vomiting.  · Swelling (edema) of the face, legs, ankles, or feet.  · Problems with urination, such as:  ? Abdominal pain, or pain along the side of your stomach (flank).  ? Decreased urine production.  ? Decrease in the force of urine flow.  · Muscle twitches and cramps, especially in the legs.  · Confusion or trouble concentrating.  · Loss of appetite.  · Fever.  How is this diagnosed?  This condition may be diagnosed with tests, including:  · Blood tests.  · Urine tests.  · Imaging tests.  · A test in which a sample of tissue is removed from the kidneys to be examined under a microscope (kidney biopsy).  How is this treated?  Treatment for this condition depends on the cause and how severe the condition is. In mild cases, treatment may not be needed. The kidneys may heal on their own. In more severe cases, treatment will involve:  · Treating the cause of the kidney injury. This may involve changing any medicines you are taking or adjusting your dosage.  · Fluids. You may need specialized IV fluids to balance your body's needs.  · Having a catheter placed to drain urine and prevent blockages.  · Preventing problems from occurring. This may mean avoiding certain medicines or procedures that can cause further injury to the kidneys.  In some cases treatment may also require:  · A procedure to remove toxic wastes from the body (dialysis or continuous renal replacement therapy - CRRT).  · Surgery. This may be done to repair a torn kidney, or  to remove the blockage from the urinary system.  Follow these instructions at home:  Medicines  · Take over-the-counter and prescription medicines only as told by your health care provider.  · Do not take any new medicines without your health care provider's approval. Many medicines can worsen your kidney damage.  · Do not take any vitamin and mineral supplements without your health care provider's approval. Many nutritional supplements can worsen your kidney damage.  Lifestyle  · If your health care provider prescribed changes to your diet, follow them. You may need to decrease the amount of protein you eat.  · Achieve and maintain a healthy weight. If you need help with this, ask your health care provider.  · Start or continue an exercise plan. Try to exercise at least 30 minutes a day, 5 days a week.  · Do not use any tobacco products, such as cigarettes, chewing tobacco, and e-cigarettes. If you need help quitting, ask your health care provider.  General instructions  · Keep track of your blood pressure. Report changes in your blood pressure as told by your health care provider.  · Stay up to date with immunizations. Ask your health care provider which immunizations you need.  · Keep all follow-up visits as told by your health care provider. This is important.  Where to find more information  · American Association of Kidney Patients: www.aakp.org  · National Kidney Foundation: www.kidney.org  · American Kidney Fund: www.akfinc.org  · Life Options Rehabilitation Program:  ? www.lifeoptions.org  ? www.kidneyschool.org  Contact a health care provider if:  · Your symptoms get worse.  · You develop new symptoms.  Get help right away if:  · You develop symptoms of worsening kidney disease, which include:  ? Headaches.  ? Abnormally dark or light skin.  ? Easy bruising.  ? Frequent hiccups.  ? Chest pain.  ? Shortness of breath.  ? End of menstruation in women.  ? Seizures.  ? Confusion or altered mental  status.  ? Abdominal or back pain.  ? Itchiness.  · You have a fever.  · Your body is producing less urine.  · You have pain or bleeding when you urinate.  Summary  · Acute kidney injury is a sudden worsening of kidney function.  · Acute kidney injury can be caused by problems with blood flow to the kidneys, direct damage to the kidneys, and sudden blockage of urine flow.  · Symptoms of this condition may not be obvious until it becomes severe. Symptoms may include edema, lethargy, confusion, nausea or vomiting, and problems passing urine.  · This condition can usually be diagnosed with blood tests, urine tests, and imaging tests. Sometimes a kidney biopsy is done to diagnose this condition.  · Treatment for this condition often involves treating the underlying cause. It is treated with fluids, medicines, dialysis, diet changes, or surgery.  This information is not intended to replace advice given to you by your health care provider. Make sure you discuss any questions you have with your health care provider.  Document Released: 07/02/2012 Document Revised: 11/30/2018 Document Reviewed: 12/08/2017  [employer] Patient Education © 2020 [employer]

## 2021-07-10 NOTE — PROGRESS NOTES
Telemetry Shift Summary     Rhythm 100% paced  HR Range 59-62  Ectopy  none  Measurements .-/.16/.52              Normal Values  Rhythm SR  HR Range    Measurements 0.12-0.20 / 0.06-0.10  / 0.30-0.52

## 2021-07-10 NOTE — ASSESSMENT & PLAN NOTE
Currently has hypotension, will reduce carvedilol dose with hold parameters.  Holding home losartan, furosemide and Lasix until blood pressure improves

## 2021-07-10 NOTE — ASSESSMENT & PLAN NOTE
Prerenal likely secondary to overdiuresis.  We will hold home diuretics Lasix, spironolactone and ACE inhibitor's.  Gentle hydration with intravenous fluids, watch for volume overload  Avoid nephrotoxins, monitor inputs and outputs  We will check bladder scans

## 2021-07-10 NOTE — DISCHARGE SUMMARY
"Discharge Summary    CHIEF COMPLAINT ON ADMISSION  Chief Complaint   Patient presents with   • Near Syncopal       Reason for Admission  EMS     Admission Date  7/9/2021    CODE STATUS  Prior    HPI & HOSPITAL COURSE  Per notes, \"80 y.o. male with a past medical history of hypertension, dyslipidemia, chronic systolic heart failure, history of ventricular tachycardia s/p AICD, chronic obstructive pulmonary disease who presented 7/9/2021 with lightheadedness and dizziness.  Patient reports that he felt unsteady and had lightheadedness and dizziness when he stood up from the couch.  He felt as if he was going to pass out but did not.  He reports that his blood pressure was in the low 70s systolically.  He denies having chest pain and shortness of breath denies having palpitations fevers chills, lower extremity pain redness or swelling, or having cough.\"    Patient was admitted and monitored overnight for hypotension.  Symptoms were attributed to overdiuresis and not enough fluid intake.  Patient does have history of severe CHF, was not in exacerbation.  Diuretics were held and Lasix was lowered to 10 mg every other day.  Patient symptoms have significantly improved by the following morning.  I did recommend he follow-up within 1 to 2 weeks with primary cardiologist and PCP for monitoring of symptoms.    Therefore, he is discharged in good and stable condition to home with close outpatient follow-up.    The patient recovered much more quickly than anticipated on admission.    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 Barrios.      January 2017: Generator replacement with Medtronic Evera MRI XT  [id number]       implanted in [place].    Ventricular tachycardia (HCC) POA: Yes    ACC/AHA stage C systolic heart failure (HCC) POA: Yes    DM (diabetes mellitus) (HCC) POA: Yes  Resolved Problems:    Metabolic acidosis POA: Yes      FOLLOW UP  Future Appointments   Date Time Provider Department Center   7/13/2021  8:30 AM [facility] None   7/19/2021  8:40 AM EUGENE Arias [facility] None   9/28/2021  8:00 AM RERE Ordonez [facility] None   11/11/2021  9:30 AM Ksenia Velázquez M.D. [facility] None     Brennan Castorena M.D.  [address]  [place] [id number] [phone]    Call in 3 days  [facility] follow up appointment with your primary care physician.           For BMP lab recheck      MEDICATIONS ON DISCHARGE     Medication List      CHANGE how you take these medications      Instructions   furosemide 20 MG Tabs  Start taking on: July 12, 2021  What changed: how much to take  Commonly known as: LASIX   Take 0.5 Tablets by mouth every Monday, Wednesday, and Friday for 30 days.  Dose: 10 mg        CONTINUE taking these medications      Instructions   albuterol 108 (90 Base) MCG/ACT Aers inhalation aerosol   Inhale 2 Puffs by mouth every 6 hours as needed for Shortness of Breath.  Dose: 2 Puff     amiodarone 200 MG Tabs  Commonly known as: Cordarone   Take 1 tablet by mouth every day.  Dose: 200 mg     aspirin EC 81 MG Tbec  Commonly known as: ECOTRIN   Take 81 mg by mouth every day.  Dose: 81 mg     atorvastatin 20 MG Tabs  Commonly known as: LIPITOR   Take 20 mg by mouth every day.  Dose: 20 mg     carvedilol 25 MG Tabs  Commonly known as: COREG   Take 12.5-25 mg by mouth 2 times a day. Pt takes 25MG in AM and 12.5MG HS  Dose: 12.5-25 mg     cinacalcet 30 MG Tabs  Commonly known as: SENSIPAR   Take 30 mg by mouth every day.  Dose: 30 mg     Farxiga  10 MG Tabs  Generic drug: Dapagliflozin Propanediol   Take 10 mg by mouth every day.  Dose: 10 mg     fish oil 1000 MG Caps capsule   Take 1,000 mg by mouth every day.  Dose: 1,000 mg     losartan 100 MG Tabs  Commonly known as: COZAAR   Take 1 tablet by mouth every day.  Dose: 100 mg     multivitamin Tabs   Take 1 Tab by mouth every day.  Dose: 1 tablet     spironolactone 25 MG Tabs  Commonly known as: ALDACTONE   Take 0.5 Tabs by mouth every day.  Dose: 12.5 mg     vitamin D (Ergocalciferol) 1.25 MG (43878 UT) Caps capsule  Commonly known as: DRISDOL   Take 50,000 Units by mouth every 7 days. MONDAY  Dose: 50,000 Units            Allergies  Allergies   Allergen Reactions   • Entresto [Sacubitril-Valsartan]      Swollen tongue. Angioedema.       DIET  No orders of the defined types were placed in this encounter.      ACTIVITY  As tolerated.  Weight bearing as tolerated    CONSULTATIONS  None    PROCEDURES  None    LABORATORY  Lab Results   Component Value Date    SODIUM 138 07/10/2021    POTASSIUM 4.3 07/10/2021    CHLORIDE 109 07/10/2021    CO2 18 (L) 07/10/2021    GLUCOSE 95 07/10/2021    BUN 40 (H) 07/10/2021    CREATININE 2.30 (H) 07/10/2021        Lab Results   Component Value Date    WBC 4.8 07/10/2021    HEMOGLOBIN 13.9 (L) 07/10/2021    HEMATOCRIT 42.4 07/10/2021    PLATELETCT 131 (L) 07/10/2021        Total time of the discharge process exceeds 35 minutes.

## 2021-07-10 NOTE — H&P
Hospital Medicine History & Physical Note    Date of Service  7/9/2021    Primary Care Physician  Brennan Castorena M.D.     Consultants  None    Code Status  Full Code    Chief Complaint  Chief Complaint   Patient presents with   • Near Syncopal     History of Presenting Illness  Lencho Parker is a 80 y.o. male with a past medical history of hypertension, dyslipidemia, chronic systolic heart failure, history of ventricular tachycardia s/p AICD, chronic obstructive pulmonary disease who presented 7/9/2021 with lightheadedness and dizziness.  Patient reports that he felt unsteady and had lightheadedness and dizziness when he stood up from the couch.  He felt as if he was going to pass out but did not.  He reports that his blood pressure was in the low 70s systolically.  He denies having chest pain and shortness of breath denies having palpitations fevers chills, lower extremity pain redness or swelling, or having cough.    I discussed the plan of care with patient.    Review of Systems  Review of Systems   Constitutional: Positive for malaise/fatigue. Negative for chills and fever.   Eyes: Negative for discharge and redness.   Respiratory: Negative for cough, shortness of breath and stridor.    Cardiovascular: Negative for chest pain and leg swelling.   Gastrointestinal: Negative for abdominal pain and vomiting.   Genitourinary: Negative for flank pain.   Musculoskeletal: Negative for myalgias.   Skin: Negative.    Neurological: Positive for dizziness. Negative for focal weakness.        Lightheadedness   Endo/Heme/Allergies: Does not bruise/bleed easily.   Psychiatric/Behavioral: The patient is not nervous/anxious.      Past Medical History   has a past medical history of Breath shortness, Cardiomyopathy (HCC) (08/2019), Chronic obstructive pulmonary disease (HCC), Mexican measles, Heart attack (HCC), History of abdominal aortic aneurysm (AAA) (2009), Hyperlipidemia, Hypertension, Mumps, Pacemaker,  Paroxysmal atrial fibrillation (HCC), Renal disorder, Sleep apnea, Snoring, Stroke (HCC) (2012), Tonsillitis, and Ventricular tachycardia (HCC).    Surgical History   has a past surgical history that includes aicd battery change (Left, 01/03/2017); aaa with stent graft (2009); other (2000); other orthopedic surgery (2005); other cardiac surgery (1991); other cardiac surgery (2014); and aicd battery change (Left, 09/10/2020).     Family History  family history includes Cancer in his mother and sister.     Social History   reports that he quit smoking about 17 years ago. His smoking use included cigarettes. He has a 7.50 pack-year smoking history. He has never used smokeless tobacco. He reports current alcohol use of about 8.4 oz of alcohol per week. He reports that he does not use drugs.    Allergies  Allergies   Allergen Reactions   • Entresto [Sacubitril-Valsartan]      Swollen tongue. Angioedema.     Medications  Prior to Admission Medications   Prescriptions Last Dose Informant Patient Reported? Taking?   Dapagliflozin Propanediol (FARXIGA) 10 MG Tab 7/9/2021 at am  No No   Sig: Take 10 mg by mouth every day.   Omega-3 Fatty Acids (FISH OIL) 1000 MG Cap capsule 7/9/2021 at am Patient Yes No   Sig: Take 1,000 mg by mouth every day.   albuterol 108 (90 Base) MCG/ACT Aero Soln inhalation aerosol prn at prn  No No   Sig: Inhale 2 Puffs by mouth every 6 hours as needed for Shortness of Breath.   amiodarone (CORDARONE) 200 MG Tab 7/9/2021 at am  No No   Sig: Take 1 tablet by mouth every day.   aspirin EC (ECOTRIN) 81 MG Tablet Delayed Response 7/9/2021 at am Patient Yes No   Sig: Take 81 mg by mouth every day.   atorvastatin (LIPITOR) 20 MG Tab 7/9/2021 at am Patient Yes No   Sig: Take 20 mg by mouth every day.   carvedilol (COREG) 25 MG Tab 7/9/2021 at am Patient Yes No   Sig: Take 12.5-25 mg by mouth 2 times a day. Pt takes 25MG in AM and 12.5MG HS    cinacalcet (SENSIPAR) 30 MG Tab 7/9/2021 at am Patient Yes No    Sig: Take 30 mg by mouth every day.   furosemide (LASIX) 20 MG Tab 7/9/2021 at am Patient Yes No   Sig: Take 20 mg by mouth every Monday, Wednesday, and Friday.   losartan (COZAAR) 100 MG Tab 7/9/2021 at am  No No   Sig: Take 1 tablet by mouth every day.   multivitamin (THERAGRAN) Tab 7/9/2021 at am Patient Yes No   Sig: Take 1 Tab by mouth every day.   spironolactone (ALDACTONE) 25 MG Tab 7/9/2021 at am  No No   Sig: Take 0.5 Tabs by mouth every day.   vitamin D, Ergocalciferol, (DRISDOL) 09739 units Cap capsule 7/5/2021 at am Patient Yes No   Sig: Take 50,000 Units by mouth every 7 days. MONDAY      Facility-Administered Medications: None     Physical Exam  Temp:  [36.4 °C (97.5 °F)-37.1 °C (98.7 °F)] 36.4 °C (97.5 °F)  Pulse:  [60-77] 62  Resp:  [17-20] 17  BP: ()/(60-71) 103/64  SpO2:  [93 %-98 %] 98 %    Physical Exam  Constitutional:       General: He is not in acute distress.     Appearance: He is not ill-appearing or diaphoretic.   HENT:      Head: Atraumatic.      Right Ear: External ear normal.      Left Ear: External ear normal.      Nose: No congestion or rhinorrhea.      Mouth/Throat:      Mouth: Mucous membranes are dry.   Eyes:      General: No scleral icterus.        Right eye: No discharge.         Left eye: No discharge.      Pupils: Pupils are equal, round, and reactive to light.   Cardiovascular:      Rate and Rhythm: Normal rate and regular rhythm.   Pulmonary:      Effort: Pulmonary effort is normal.   Abdominal:      General: There is no distension.   Musculoskeletal:      Cervical back: Neck supple. No rigidity. No muscular tenderness.      Right lower leg: No edema.      Left lower leg: No edema.   Skin:     General: Skin is dry.      Capillary Refill: Capillary refill takes 2 to 3 seconds.      Coloration: Skin is pale. Skin is not jaundiced.   Neurological:      Mental Status: He is alert and oriented to person, place, and time.      Coordination: Coordination normal.    Psychiatric:         Mood and Affect: Mood normal.         Behavior: Behavior normal.       Laboratory:  Recent Labs     07/09/21 1922   WBC 5.4   RBC 4.38*   HEMOGLOBIN 15.0   HEMATOCRIT 44.7   .1*   MCH 34.2*   MCHC 33.6*   RDW 51.5*   PLATELETCT 152*   MPV 11.6     Recent Labs     07/09/21 1922   SODIUM 137   POTASSIUM 4.4   CHLORIDE 106   CO2 19*   GLUCOSE 122*   BUN 43*   CREATININE 2.53*   CALCIUM 8.3*     Recent Labs     07/09/21 1922   ALTSGPT 51*   ASTSGOT 35   ALKPHOSPHAT 72   TBILIRUBIN 0.6   GLUCOSE 122*         No results for input(s): NTPROBNP in the last 72 hours.      Recent Labs     07/09/21 1922   TROPONINT 32*     Imaging:  DX-CHEST-PORTABLE (1 VIEW)   Final Result         1. No acute cardiopulmonary abnormalities are identified.        Assessment/Plan:  I anticipate this patient is appropriate for observation status at this time.    * RANI (acute kidney injury) (HCC)- (present on admission)  Assessment & Plan  Prerenal likely secondary to overdiuresis.  We will hold home diuretics Lasix, spironolactone and ACE inhibitor's.  Gentle hydration with intravenous fluids, watch for volume overload  Avoid nephrotoxins, monitor inputs and outputs  We will check bladder scans    ACC/AHA stage C systolic heart failure (HCC)- (present on admission)  Assessment & Plan  Not currently in exacerbation in fact he is very dehydrated.  We will resume home carvedilol at a lower dose when blood pressure improves.  Holding home losartan, spironolactone and Lasix until blood pressure/RANI improves  Daily strict input and output  Daily standing weights.  Daily BMP to watch renal function, electrolytes.  Replace electrolytes as needed.    DM (diabetes mellitus) (HCC)- (present on admission)  Assessment & Plan  Without hyperglycemia  Last glycated hemoglobin was 5.8%  I will start short acting insulin for now  Accu-Checks, hypoglycemia protocol     Metabolic acidosis- (present on admission)  Assessment &  Plan  Secondary to acute kidney injury secondary to severe dehydration/overdiuresis.  Continue to monitor, anticipated improvement with holding diuretics and rehydration    Ventricular tachycardia (HCC)- (present on admission)  Assessment & Plan  S/p AICD placement    Presence of biventricular automatic cardioverter/defibrillator (AICD)- (present on admission)  Assessment & Plan  For history of ventricular tachycardia    COPD (chronic obstructive pulmonary disease) (HCC)- (present on admission)  Assessment & Plan  Not currently in exacerbation.  Oxygen as needed, Respiratory protocol, Bronchodilators, Incentive spirometry    Essential hypertension, benign- (present on admission)  Assessment & Plan  Currently has hypotension, will reduce carvedilol dose with hold parameters.  Holding home losartan, furosemide and Lasix until blood pressure improves    Mixed hyperlipidemia- (present on admission)  Assessment & Plan  Resume home atorvastatin    VTE prophylaxis: SCDs/TEDs and heparin ppx

## 2021-07-10 NOTE — PROGRESS NOTES
4 Eyes Skin Assessment Completed by MAYUR Lozada and MAYUR Means.    Head Incision  Ears WDL  Nose WDL  Mouth WDL  Neck WDL  Breast/Chest WDL  Shoulder Blades WDL  Spine WDL  (R) Arm/Elbow/Hand Bruising  (L) Arm/Elbow/Hand WDL  Abdomen WDL  Groin WDL  Scrotum/Coccyx/Buttocks WDL  (R) Leg WDL  (L) Leg WDL  (R) Heel/Foot/Toe WDL  (L) Heel/Foot/Toe WDL          Devices In Places Tele Box      Interventions In Place Pressure Redistribution Mattress    Possible Skin Injury No    Pictures Uploaded Into Epic N/A  Wound Consult Placed N/A  RN Wound Prevention Protocol Ordered No

## 2021-07-10 NOTE — CARE PLAN
The patient is Stable - Low risk of patient condition declining or worsening    Shift Goals  Clinical Goals: Sleep and not feel dizzy.    Progress made toward(s) clinical / shift goals:  Patient has been sleeping majority of shift except for planned interventions.     Patient is not progressing towards the following goals: N/A

## 2021-07-10 NOTE — ASSESSMENT & PLAN NOTE
Without hyperglycemia  Last glycated hemoglobin was 5.8%  I will start short acting insulin for now  Accu-Checks, hypoglycemia protocol

## 2021-07-10 NOTE — ED TRIAGE NOTES
81 yo male BIB [facility] from home with reports of syncopal episode at home.  [facility] reports patient just got home from dinner and went to get up to go to the bathroom and had a syncopal episode.  Currently patient reports he gets dizzy with movement.  Denies injury

## 2021-07-13 ENCOUNTER — NON-PROVIDER VISIT (OUTPATIENT)
Dept: SLEEP MEDICINE | Facility: MEDICAL CENTER | Age: 81
End: 2021-07-13
Payer: MEDICARE

## 2021-07-13 VITALS — BODY MASS INDEX: 25.67 KG/M2 | WEIGHT: 200 LBS | HEIGHT: 74 IN

## 2021-07-13 DIAGNOSIS — I48.91 ATRIAL FIBRILLATION, UNSPECIFIED TYPE (HCC): ICD-10-CM

## 2021-07-13 PROCEDURE — 94010 BREATHING CAPACITY TEST: CPT | Performed by: INTERNAL MEDICINE

## 2021-07-13 PROCEDURE — 94729 DIFFUSING CAPACITY: CPT | Performed by: INTERNAL MEDICINE

## 2021-07-13 ASSESSMENT — PULMONARY FUNCTION TESTS
FVC_PERCENT_PREDICTED: 86
FEV1_LLN: 2.78
FEV1/FVC_PERCENT_PREDICTED: 103
FEV1/FVC: 77
FVC_LLN: 3.78
FEV1_PREDICTED: 3.33
FEV1_PERCENT_PREDICTED: 90
FEV1: 3
FEV1/FVC: 77
FEV1/FVC_PERCENT_PREDICTED: 74
FEV1/FVC_PREDICTED: 74
FEV1/FVC_PERCENT_LLN: 62
FVC: 3.9
FVC_PREDICTED: 4.53
FEV1/FVC_PERCENT_PREDICTED: 105

## 2021-07-13 ASSESSMENT — FIBROSIS 4 INDEX: FIB4 SCORE: 2.46

## 2021-07-13 NOTE — PROCEDURES
Tech: Jeanette Douglas, RRT, CPFT  Tech notes: Good patient effort & cooperation.  FVC induced bronchospasm.  The results of this test meet the ATS/ERS standards for acceptability & reproducibility.  Test was performed on the [place] Body Plethysmograph-Elite DX system.  Predicted values were GLI-2012 for spirometry, GLI- 2017 for DLCO, ITS for Lung Volumes.  The DLCO was uncorrected for Hgb.    Interpretation:  Normal spirometry.    Normal diffusion capacity (DLCO:92%).

## 2021-07-14 ENCOUNTER — OFFICE VISIT (OUTPATIENT)
Dept: SLEEP MEDICINE | Facility: MEDICAL CENTER | Age: 81
End: 2021-07-14
Payer: MEDICARE

## 2021-07-14 VITALS
OXYGEN SATURATION: 93 % | WEIGHT: 201 LBS | DIASTOLIC BLOOD PRESSURE: 64 MMHG | BODY MASS INDEX: 25.8 KG/M2 | HEIGHT: 74 IN | SYSTOLIC BLOOD PRESSURE: 122 MMHG | HEART RATE: 75 BPM

## 2021-07-14 DIAGNOSIS — G47.31 COMPLEX SLEEP APNEA SYNDROME: ICD-10-CM

## 2021-07-14 DIAGNOSIS — I10 ESSENTIAL HYPERTENSION, BENIGN: ICD-10-CM

## 2021-07-14 DIAGNOSIS — J44.9 CHRONIC OBSTRUCTIVE PULMONARY DISEASE, UNSPECIFIED COPD TYPE (HCC): ICD-10-CM

## 2021-07-14 DIAGNOSIS — I48.91 ATRIAL FIBRILLATION, UNSPECIFIED TYPE (HCC): ICD-10-CM

## 2021-07-14 PROCEDURE — 99213 OFFICE O/P EST LOW 20 MIN: CPT | Performed by: NURSE PRACTITIONER

## 2021-07-14 ASSESSMENT — FIBROSIS 4 INDEX: FIB4 SCORE: 2.46

## 2021-07-14 NOTE — PROGRESS NOTES
Chief Complaint   Patient presents with   • Follow-Up     CSA-Last seen 07/14/2020       HPI:      Mr. Parker is a 81 y/o male patient who is in today for annual CSA f/u. Patient has a complex history with a history of systolic heart failure, AICD, PVD, cardiomyopathy, TIA, hypertension, AAA, chronic kidney disease, PFD, COPD, polycythemia, former smoker.     Patient is followed by the pulmonary clinic for COPD.  Please refer to Dr. Schroeder's office note from Yue 3, 2020 for further details.    Patient has a ResMed BiPAP ST machine.  Compliance report from 6/15/21-7/14/21 was downloaded and reviewed with the patient which showed BiPAP ST IPAP/EPAP 14/8 cmH2O, RR 12 bpm, 93% compliance, 8 hrs 5 min use, AHI of 4.3. He is tolerating the pressure and mask well. He goes to bed 10 pm and wakes up between 6-7 am. He is able to sleep through the night without any issues, and occasionally wakes up to use the restroom. He denies morning headache or snoring.  He stays active by walking his dog daily.  Patient reports he went to the ER on 7/9/2021 due to feeling lightheaded and dizzy.  Patient was started on Farxiga, even though he does not have diabetes per patient, to help with his blood pressure which caused him to have a low blood pressure and dehydration.  He was subsequently taken off Farxiga and has followed up with his cardiologist Dr. Velázquez.  He reports that he is feeling much better since he discontinued Farxiga.    Sleep Study History:   PSG split-night 3/12/2020 indicated severe sleep apnea with an overall AHI 54.5/h and an O2 mauricio of 82%.  Patient met split-night criteria and ultimately did best on BiPAP ST 14/8 cm with a backup rate of 12 breaths/min.  AHI reduced to 0/h with a mean SPO2 91%.     ROS:    Constitutional: Denies fevers, Denies weight changes  Eyes: Denies changes in vision, no eye pain  Ears/Nose/Throat/Mouth: Denies nasal congestion or sore throat   Cardiovascular: Denies chest pain or palpitations    Respiratory: Denies shortness of breath , Denies cough  Gastrointestinal/Hepatic: Denies abdominal pain, nausea, vomiting,   Skin/Breast: Denies rash,   Neurological: Denies headache, confusion,   Psychiatric: denies mood disorder   Sleep: denies snoring       Past Medical History:   Diagnosis Date   • Breath shortness     on exertion   • Cardiomyopathy (HCC) 08/2019    Echocardiogram with mildly dilated LV, mild concentric, LVEF 15%. Severely dilated LA. Trace MR, trace AI, mild TR. RVSP 26mmHg.   • Chronic obstructive pulmonary disease (HCC)    • Lao measles    • Heart attack (HCC)     hx 2007   • History of abdominal aortic aneurysm (AAA) 2009    Status post stent   • Hyperlipidemia    • Hypertension    • Mumps    • Pacemaker     AICD   • Paroxysmal atrial fibrillation (HCC)     Status post Watchman procedure in [place].   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    • Sleep apnea    • Snoring    • Stroke (HCC) 2012    TIA   • Tonsillitis    • Ventricular tachycardia (HCC)        Past Surgical History:   Procedure Laterality Date   • AICD BATTERY CHANGE Left 09/10/2020     Upgrade to [employer] MRI Quad CRT-D [id number] implanted by Dr. Barrios.   • AICD BATTERY CHANGE Left 01/03/2017    Generator replacement with [employer] MRI XT  [id number] implanted in [place].   • OTHER CARDIAC SURGERY  2014    watchman device   • AAA WITH STENT GRAFT  2009   • OTHER ORTHOPEDIC SURGERY  2005    hip surgery   • OTHER  2000    kidney removed   • OTHER CARDIAC SURGERY  1991    AICD implanted       Family History   Problem Relation Age of Onset   • Cancer Mother    • Cancer Sister        Social History     Socioeconomic History   • Marital status: Single     Spouse name: Not on file   • Number of children: Not on file   • Years of education: Not on file   • Highest education level: Not on file   Occupational History   • Not on file   Tobacco Use   • Smoking status: Former Smoker     Packs/day: 0.50     Years: 15.00      Pack years: 7.50     Types: Cigarettes     Quit date: 2004     Years since quittin.0   • Smokeless tobacco: Never Used   Vaping Use   • Vaping Use: Never used   Substance and Sexual Activity   • Alcohol use: Yes     Alcohol/week: 8.4 oz     Types: 14 Glasses of wine per week     Comment: 2 per day   • Drug use: No   • Sexual activity: Not on file   Other Topics Concern   • Not on file   Social History Narrative   • Not on file     Social Determinants of Health     Financial Resource Strain:    • Difficulty of Paying Living Expenses:    Food Insecurity:    • Worried About Running Out of Food in the Last Year:    • Ran Out of Food in the Last Year:    Transportation Needs:    • Lack of Transportation (Medical):    • Lack of Transportation (Non-Medical):    Physical Activity:    • Days of Exercise per Week:    • Minutes of Exercise per Session:    Stress:    • Feeling of Stress :    Social Connections:    • Frequency of Communication with Friends and Family:    • Frequency of Social Gatherings with Friends and Family:    • Attends [employer] Services:    • Active Member of Clubs or Organizations:    • Attends Club or Organization Meetings:    • Marital Status:    Intimate Partner Violence:    • Fear of Current or Ex-Partner:    • Emotionally Abused:    • Physically Abused:    • Sexually Abused:        Allergies as of 2021 - Reviewed 2021   Allergen Reaction Noted   • Entresto [sacubitril-valsartan]  2020        Vitals:  Vitals:    21 0816   BP: 122/64   Pulse: 75   SpO2: 93%       Current medications as of today   Current Outpatient Medications   Medication Sig Dispense Refill   • furosemide (LASIX) 20 MG Tab Take 0.5 Tablets by mouth every Monday, Wednesday, and Friday for 30 days. 30 tablet 0   • Dapagliflozin Propanediol (FARXIGA) 10 MG Tab Take 10 mg by mouth every day. 90 tablet 3   • amiodarone (CORDARONE) 200 MG Tab Take 1 tablet by mouth every day. 90 tablet 4   • losartan  (COZAAR) 100 MG Tab Take 1 tablet by mouth every day. 90 Each 4   • spironolactone (ALDACTONE) 25 MG Tab Take 0.5 Tabs by mouth every day. 45 Tab 3   • vitamin D, Ergocalciferol, (DRISDOL) 44810 units Cap capsule Take 50,000 Units by mouth every 7 days. MONDAY     • aspirin EC (ECOTRIN) 81 MG Tablet Delayed Response Take 81 mg by mouth every day.     • cinacalcet (SENSIPAR) 30 MG Tab Take 30 mg by mouth every day.     • carvedilol (COREG) 25 MG Tab Take 12.5-25 mg by mouth 2 times a day. Pt takes 25MG in AM and 12.5MG HS      • atorvastatin (LIPITOR) 20 MG Tab Take 20 mg by mouth every day.     • Omega-3 Fatty Acids (FISH OIL) 1000 MG Cap capsule Take 1,000 mg by mouth every day.     • multivitamin (THERAGRAN) Tab Take 1 Tab by mouth every day.     • albuterol 108 (90 Base) MCG/ACT Aero Soln inhalation aerosol Inhale 2 Puffs by mouth every 6 hours as needed for Shortness of Breath. 8.5 g 0     No current facility-administered medications for this visit.         Physical Exam: Limited by COVID-19 precautions.  Appearance: Well developed, well nourished, no acute distress  Eyes: PERRL, EOM intact, sclera white, conjunctiva moist  Ears: no lesions or deformities  Hearing: grossly intact  Nose: no lesions or deformities  Respiratory effort: no intercostal retractions or use of accessory muscles  Extremities: no cyanosis or edema  Abdomen: soft   Gait and Station: normal  Digits and nails: no clubbing, cyanosis, petechiae or nodes.  Cranial nerves: grossly intact  Skin: no visible rashes, lesions or ulcers noted  Orientation: Oriented to time, person and place  Mood and affect: mood and affect appropriate, normal interaction with examiner  Judgement: Intact    Assessment:  1. Complex sleep apnea syndrome     2. Atrial fibrillation, unspecified type (HCC)     3. Essential hypertension, benign     4. Chronic obstructive pulmonary disease, unspecified COPD type (HCC)     5. BMI 25.0-25.9,adult           Plan  Discussed the  cardiovascular and neuropsychiatric risks of untreated CSA; including but not limited to: HTN, DM, MI, ASCVD, CVA, CHF, traffic accidents.     1. Compliance report from 6/15/21-7/14/21 was downloaded and reviewed with the patient which showed BiPAP ST IPAP/EPAP 14/8 cmH2O, RR 12 bpm, 93% compliance, 8 hrs 5 min use, AHI of 4.3. Continue BiPAP ST. Patient is compliant and benefiting from BiPAP ST therapy for management of CSA. Advised patient to use the BiPAP ST every night for more than four hours for optimal health benefit.      *DME order ([facility]) for mask (FFM or MOC) and supplies was provided today. Continue to clean mask and supplies weekly with soap and water, and change supplies per insurance guidelines.     2-3. Continue to f/u with cardiology, Dr. Velázquez.  4. Continue to f/u with the pulmonary clinic. Will schedule patient for a f/u OV as he is overdue for yearly f/u.   5. Continue to stay active.   6.  Sleep hygiene discussed. Recommend keeping a set sleep/wake schedule. Logging enough hours of sleep. Limiting/Avoiding naps. No caffeine after noon and no heavy meals in the evening.   7. Follow up with the appropriate healthcare practitioners for all other medical problems.  8. F/u in 1 year for HARPAL, sooner if needed.       PAULA Arias.      This dictation was created using voice recognition software. The accuracy of the dictation is limited to the abilities of the software. I expect there may be some errors of grammar and possibly content.

## 2021-07-19 ENCOUNTER — APPOINTMENT (OUTPATIENT)
Dept: SLEEP MEDICINE | Facility: MEDICAL CENTER | Age: 81
End: 2021-07-19
Payer: MEDICARE

## 2021-07-30 ENCOUNTER — HOSPITAL ENCOUNTER (OUTPATIENT)
Dept: LAB | Facility: MEDICAL CENTER | Age: 81
End: 2021-07-30
Attending: NURSE PRACTITIONER
Payer: COMMERCIAL

## 2021-07-30 LAB
25(OH)D3 SERPL-MCNC: 64 NG/ML (ref 30–100)
ALBUMIN SERPL BCP-MCNC: 3.9 G/DL (ref 3.2–4.9)
ALBUMIN/GLOB SERPL: 1.4 G/DL
ALP SERPL-CCNC: 78 U/L (ref 30–99)
ALT SERPL-CCNC: 54 U/L (ref 2–50)
ANION GAP SERPL CALC-SCNC: 12 MMOL/L (ref 7–16)
APPEARANCE UR: CLEAR
AST SERPL-CCNC: 43 U/L (ref 12–45)
BASOPHILS # BLD AUTO: 0.5 % (ref 0–1.8)
BASOPHILS # BLD: 0.02 K/UL (ref 0–0.12)
BILIRUB SERPL-MCNC: 1 MG/DL (ref 0.1–1.5)
BILIRUB UR QL STRIP.AUTO: NEGATIVE
BUN SERPL-MCNC: 27 MG/DL (ref 8–22)
CALCIUM SERPL-MCNC: 8.9 MG/DL (ref 8.4–10.2)
CHLORIDE SERPL-SCNC: 107 MMOL/L (ref 96–112)
CO2 SERPL-SCNC: 20 MMOL/L (ref 20–33)
COLOR UR: YELLOW
CREAT SERPL-MCNC: 1.67 MG/DL (ref 0.5–1.4)
CREAT UR-MCNC: 63.96 MG/DL
EOSINOPHIL # BLD AUTO: 0.22 K/UL (ref 0–0.51)
EOSINOPHIL NFR BLD: 5.4 % (ref 0–6.9)
ERYTHROCYTE [DISTWIDTH] IN BLOOD BY AUTOMATED COUNT: 54.5 FL (ref 35.9–50)
EST. AVERAGE GLUCOSE BLD GHB EST-MCNC: 128 MG/DL
GLOBULIN SER CALC-MCNC: 2.8 G/DL (ref 1.9–3.5)
GLUCOSE SERPL-MCNC: 95 MG/DL (ref 65–99)
GLUCOSE UR STRIP.AUTO-MCNC: NEGATIVE MG/DL
HBA1C MFR BLD: 6.1 % (ref 4–5.6)
HCT VFR BLD AUTO: 46.8 % (ref 42–52)
HGB BLD-MCNC: 15.5 G/DL (ref 14–18)
IMM GRANULOCYTES # BLD AUTO: 0.01 K/UL (ref 0–0.11)
IMM GRANULOCYTES NFR BLD AUTO: 0.2 % (ref 0–0.9)
KETONES UR STRIP.AUTO-MCNC: NEGATIVE MG/DL
LEUKOCYTE ESTERASE UR QL STRIP.AUTO: NEGATIVE
LYMPHOCYTES # BLD AUTO: 1.37 K/UL (ref 1–4.8)
LYMPHOCYTES NFR BLD: 33.7 % (ref 22–41)
MAGNESIUM SERPL-MCNC: 2 MG/DL (ref 1.5–2.5)
MCH RBC QN AUTO: 34 PG (ref 27–33)
MCHC RBC AUTO-ENTMCNC: 33.1 G/DL (ref 33.7–35.3)
MCV RBC AUTO: 102.6 FL (ref 81.4–97.8)
MICRO URNS: NORMAL
MONOCYTES # BLD AUTO: 0.46 K/UL (ref 0–0.85)
MONOCYTES NFR BLD AUTO: 11.3 % (ref 0–13.4)
NEUTROPHILS # BLD AUTO: 1.99 K/UL (ref 1.82–7.42)
NEUTROPHILS NFR BLD: 48.9 % (ref 44–72)
NITRITE UR QL STRIP.AUTO: NEGATIVE
NRBC # BLD AUTO: 0 K/UL
NRBC BLD-RTO: 0 /100 WBC
PH UR STRIP.AUTO: 6 [PH] (ref 5–8)
PHOSPHATE SERPL-MCNC: 3.4 MG/DL (ref 2.5–4.5)
PLATELET # BLD AUTO: 149 K/UL (ref 164–446)
PMV BLD AUTO: 11.8 FL (ref 9–12.9)
POTASSIUM SERPL-SCNC: 4.9 MMOL/L (ref 3.6–5.5)
PROT SERPL-MCNC: 6.7 G/DL (ref 6–8.2)
PROT UR QL STRIP: NEGATIVE MG/DL
PROT UR-MCNC: 8 MG/DL (ref 0–15)
PROT/CREAT UR: 125 MG/G (ref 15–68)
PTH-INTACT SERPL-MCNC: 113 PG/ML (ref 14–72)
RBC # BLD AUTO: 4.56 M/UL (ref 4.7–6.1)
RBC UR QL AUTO: NEGATIVE
SODIUM SERPL-SCNC: 139 MMOL/L (ref 135–145)
SP GR UR STRIP.AUTO: 1.01
URATE SERPL-MCNC: 5.5 MG/DL (ref 2.5–8.3)
WBC # BLD AUTO: 4.1 K/UL (ref 4.8–10.8)

## 2021-07-30 PROCEDURE — 81003 URINALYSIS AUTO W/O SCOPE: CPT

## 2021-07-30 PROCEDURE — 80053 COMPREHEN METABOLIC PANEL: CPT

## 2021-07-30 PROCEDURE — 36415 COLL VENOUS BLD VENIPUNCTURE: CPT

## 2021-07-30 PROCEDURE — 83735 ASSAY OF MAGNESIUM: CPT

## 2021-07-30 PROCEDURE — 84100 ASSAY OF PHOSPHORUS: CPT

## 2021-07-30 PROCEDURE — 82306 VITAMIN D 25 HYDROXY: CPT

## 2021-07-30 PROCEDURE — 85025 COMPLETE CBC W/AUTO DIFF WBC: CPT

## 2021-07-30 PROCEDURE — 84156 ASSAY OF PROTEIN URINE: CPT

## 2021-07-30 PROCEDURE — 82570 ASSAY OF URINE CREATININE: CPT

## 2021-07-30 PROCEDURE — 83970 ASSAY OF PARATHORMONE: CPT

## 2021-07-30 PROCEDURE — 84550 ASSAY OF BLOOD/URIC ACID: CPT

## 2021-07-30 PROCEDURE — 83036 HEMOGLOBIN GLYCOSYLATED A1C: CPT | Mod: GA

## 2021-09-30 NOTE — FLOWSHEET NOTE
07/09/21 2300   Events/Summary/Plan   Events/Summary/Plan CPAP Start    Vital Signs   Pulse 64   Respiration 18   Pulse Oximetry 96 %   $ Pulse Oximetry (Spot Check) Yes   O2 Alarms Set & Reviewed Yes   Respiratory Assessment   Respiratory Pattern Within Normal Limits   Level of Consciousness Alert   Chest Exam   Work Of Breathing / Effort Within Normal Limits   Breath Sounds   RUL Breath Sounds Clear   RML Breath Sounds Clear   RLL Breath Sounds Clear   CECELIA Breath Sounds Clear   LLL Breath Sounds Clear   Oxygen   O2 (LPM) 0   O2 Delivery Device CPAP   Non-Invasive Ventilation [facility]   Nocturnal CPAP or BIPAP CPAP - [facility]   $ System Evaluation Yes   Settings (If Known) Auto   FiO2 or LPM 0      Reason for Admission:   Hyperkalemia [M37.1]  Complication of vascular dialysis catheter [T82. 9XXA]  ESRD on dialysis ([facility] [address] [N18.6, Z99.2]               RUR Score:                  Resources/supports as identified by patient/family:       Top Challenges facing patient (as identified by patient/family and CM): Finances/Medication cost?     No   Transportation      Mother   Support system or lack thereof? Family   Living arrangements? Lives with mother   Self-care/ADLs/Cognition? Independent         Current Advanced Directive/Advance Care Plan:   yes    Healthcare Decision Maker:   Primary Decision Maker: Idalmis Hendrickson - Parent - [phone]    Secondary Decision Maker: Bess Thorpe - Sister - [phone]    Click here to complete [address] including selection of the Healthcare Decision Maker Relationship (ie \"Primary\")                          Plan for utilizing home health: To be determined                      Likelihood of readmission:   HIGH    Transition of Care Plan:                    Initial assessment completed with patient. Cognitive status of patient: oriented to time, place, person and situation. Face sheet information confirmed:  yes. The patient hs mother Jamila Henry and sister Alton Gilford to participate in his discharge plan and to receive any needed information. This patient lives in a duplex home with mother. Patient is not able to navigate steps as needed. Prior to hospitalization, patient was considered to be independent with ADLs/IADLS : yes . Patient has a current ACP document on file: yes  The mother will be available to transport patient home upon discharge. The patient already has [employer], W/C,medical equipment available in the home. Patient is not currently active with home health. Patient has not stayed in a skilled nursing facility or rehab. Was  stay within last 60 days : no.       This patient is on dialysis :yes    If yes, hemodialysis patient and receives treatment on Tuesday/Thursday/Saturday at [address] time is 1030 am. Pt is transported to/from dialysis by pt's mother. . Currently, the discharge plan is to be determined. Pt has ptot consult    The patient states that he can obtain his medications from the pharmacy, and take his medications as directed. Patient's current insurance is Medicaid. Care Management Interventions  PCP Verified by CM: Yes  Palliative Care Criteria Met (RRAT>21 & CHF Dx)?: No  Mode of Transport at Discharge:  Other (see comment) (family)  Transition of Care Consult (CM Consult): Discharge Planning  Physical Therapy Consult: Yes  Occupational Therapy Consult: Yes  Support Systems: Parent(s), Other Family Member(s)  Confirm Follow Up Transport: Family  Discharge Location  Discharge Placement: Unable to determine at this time        SALEEM Sims RN  Care Management  Pager: [phone]

## 2021-10-29 ENCOUNTER — TELEPHONE (OUTPATIENT)
Dept: SCHEDULING | Facility: IMAGING CENTER | Age: 81
End: 2021-10-29

## 2021-10-29 NOTE — TELEPHONE ENCOUNTER
TT      Pt called and wanted to check on the government assistance letter he had discussed with TT about during his last appt.   Pt wanted to see if he can still get the letter from TT on this.    Pt would like to pick the letter up in the office if possible.    Best contact for pt:   PH:[phone]      Thank you,  Deena AGUILAR

## 2021-11-02 ENCOUNTER — NON-PROVIDER VISIT (OUTPATIENT)
Dept: CARDIOLOGY | Facility: MEDICAL CENTER | Age: 81
End: 2021-11-02
Payer: MEDICARE

## 2021-11-02 VITALS
DIASTOLIC BLOOD PRESSURE: 68 MMHG | SYSTOLIC BLOOD PRESSURE: 108 MMHG | HEIGHT: 74 IN | RESPIRATION RATE: 18 BRPM | WEIGHT: 198 LBS | BODY MASS INDEX: 25.41 KG/M2 | HEART RATE: 84 BPM | OXYGEN SATURATION: 96 %

## 2021-11-02 DIAGNOSIS — Z95.810 PRESENCE OF BIVENTRICULAR AUTOMATIC CARDIOVERTER/DEFIBRILLATOR (AICD): ICD-10-CM

## 2021-11-02 DIAGNOSIS — I47.20 VENTRICULAR TACHYCARDIA (HCC): ICD-10-CM

## 2021-11-02 DIAGNOSIS — I50.20 ACC/AHA STAGE C SYSTOLIC HEART FAILURE (HCC): ICD-10-CM

## 2021-11-02 DIAGNOSIS — I42.0 DILATED CARDIOMYOPATHY (HCC): ICD-10-CM

## 2021-11-02 PROCEDURE — 93284 PRGRMG EVAL IMPLANTABLE DFB: CPT | Performed by: NURSE PRACTITIONER

## 2021-11-02 ASSESSMENT — FIBROSIS 4 INDEX: FIB4 SCORE: 3.1

## 2021-11-02 NOTE — TELEPHONE ENCOUNTER
See [email or web address] message. Pt has appt w/ TT on 11/11/21, advised that he discuss it further w/ TT then.

## 2021-11-11 ENCOUNTER — OFFICE VISIT (OUTPATIENT)
Dept: CARDIOLOGY | Facility: MEDICAL CENTER | Age: 81
End: 2021-11-11
Payer: MEDICARE

## 2021-11-11 VITALS
BODY MASS INDEX: 25.03 KG/M2 | DIASTOLIC BLOOD PRESSURE: 72 MMHG | SYSTOLIC BLOOD PRESSURE: 102 MMHG | HEART RATE: 85 BPM | OXYGEN SATURATION: 96 % | WEIGHT: 195 LBS | HEIGHT: 74 IN

## 2021-11-11 DIAGNOSIS — Z95.810 PRESENCE OF BIVENTRICULAR AUTOMATIC CARDIOVERTER/DEFIBRILLATOR (AICD): ICD-10-CM

## 2021-11-11 DIAGNOSIS — R06.09 DYSPNEA ON EXERTION: ICD-10-CM

## 2021-11-11 DIAGNOSIS — I10 HTN (HYPERTENSION), MALIGNANT: ICD-10-CM

## 2021-11-11 DIAGNOSIS — I47.20 VENTRICULAR TACHYCARDIA (HCC): ICD-10-CM

## 2021-11-11 DIAGNOSIS — I48.0 PAROXYSMAL ATRIAL FIBRILLATION (HCC): ICD-10-CM

## 2021-11-11 DIAGNOSIS — I50.9 HEART FAILURE, NYHA CLASS 2 (HCC): ICD-10-CM

## 2021-11-11 DIAGNOSIS — I50.20 ACC/AHA STAGE C SYSTOLIC HEART FAILURE (HCC): ICD-10-CM

## 2021-11-11 DIAGNOSIS — E78.2 MIXED HYPERLIPIDEMIA: ICD-10-CM

## 2021-11-11 DIAGNOSIS — Z95.810 AICD (AUTOMATIC CARDIOVERTER/DEFIBRILLATOR) PRESENT: ICD-10-CM

## 2021-11-11 LAB — EKG IMPRESSION: NORMAL

## 2021-11-11 PROCEDURE — 99214 OFFICE O/P EST MOD 30 MIN: CPT | Performed by: INTERNAL MEDICINE

## 2021-11-11 PROCEDURE — 93000 ELECTROCARDIOGRAM COMPLETE: CPT | Performed by: INTERNAL MEDICINE

## 2021-11-11 RX ORDER — TAMSULOSIN HYDROCHLORIDE 0.4 MG/1
0.8 CAPSULE ORAL DAILY
COMMUNITY

## 2021-11-11 RX ORDER — ALLOPURINOL 100 MG/1
100 TABLET ORAL DAILY
COMMUNITY
End: 2022-05-31

## 2021-11-11 RX ORDER — FUROSEMIDE 20 MG/1
20 TABLET ORAL
Qty: 40 TABLET | Refills: 3 | Status: SHIPPED | OUTPATIENT
Start: 2021-11-11 | End: 2022-08-30

## 2021-11-11 RX ORDER — FUROSEMIDE 20 MG/1
20 TABLET ORAL 2 TIMES DAILY
COMMUNITY
End: 2021-11-11 | Stop reason: SDUPTHER

## 2021-11-11 ASSESSMENT — ENCOUNTER SYMPTOMS
DIAPHORESIS: 0
BLURRED VISION: 0
SENSORY CHANGE: 0
MEMORY LOSS: 0
FALLS: 0
HEADACHES: 0
DIZZINESS: 0
ABDOMINAL PAIN: 0
PALPITATIONS: 0
FEVER: 0
DEPRESSION: 0
SHORTNESS OF BREATH: 1
BRUISES/BLEEDS EASILY: 0
COUGH: 0
DOUBLE VISION: 0
MYALGIAS: 0

## 2021-11-11 ASSESSMENT — FIBROSIS 4 INDEX: FIB4 SCORE: 3.1

## 2021-11-11 NOTE — PROGRESS NOTES
Chief Complaint   Patient presents with   • HTN (Controlled)   • Congestive Heart Failure   • Hyperlipidemia   • Atrial Fibrillation       Subjective:   Lencho Parker is a 81 y.o. male who presents today for cardiac care and management due to cardiac issues of nonischemic cardiomyopathy diagnosed in 2009, with most recent cholangiogram in 2016 showing nonobstructive coronary to disease, status post dual-chamber ICD placed in 2010, paroxysmal atrial fibrillation status post watchman device in October 2015, prior history of ventricular tachycardia status post ablation in 2012, hypertension, hyperlipidemia, treated AAA, prior history of TIA without residual neurological deficits.    Of note, patient was seen by his cardiologist in [place] before he moved to [facility].  He was initially started on Entresto therapy but developed angioedema.    His most recent transthoracic echocardiogram showed LV function of 20%.  He is status post CardioMEMS implantation for remote monitoring of volume status. Numbers have been good.    Patient is feeling better these days. Does get winded upon walking up inclines or for distance. No symptoms at rest or with daily living activities.    GFR is 40.    Became dehydrated with Farxiga and was in hospital for 4 weeks.      Past Medical History:   Diagnosis Date   • Breath shortness     on exertion   • Cardiomyopathy (HCC) 05/2021    Echocardiogram with moderately dilated LV, mild concentric LVH, LVEF 20%. Global hypokinesis. Normal RA and RV. Severely dilated LA. Mild MR, mild TR. RVSP 34mmHg.   • Chronic obstructive pulmonary disease (HCC)    • [place] measles    • Heart attack (HCC)     hx 2007   • History of abdominal aortic aneurysm (AAA) 2009    Status post stent   • Hyperlipidemia    • Hypertension    • Mumps    • Pacemaker     AICD   • Paroxysmal atrial fibrillation (HCC)     Status post Watchman procedure in [place].   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     • Sleep apnea    • Snoring    • Stroke (HCC) 2012    TIA   • Tonsillitis    • Ventricular tachycardia (HCC)      Past Surgical History:   Procedure Laterality Date   • AICD BATTERY CHANGE Left 09/10/2020     Upgrade to Medtronic Claria MRI Quad CRT-D [id number] implanted by Dr. Barrios.   • AICD BATTERY CHANGE Left 2017    Generator replacement with Medtronic Evera MRI XT  [id number] implanted in [place].   • OTHER CARDIAC SURGERY      watchman device   • AAA WITH STENT GRAFT     • OTHER ORTHOPEDIC SURGERY      hip surgery   • OTHER      kidney removed   • OTHER CARDIAC SURGERY      AICD implanted     Family History   Problem Relation Age of Onset   • Cancer Mother    • Cancer Sister      Social History     Socioeconomic History   • Marital status: Single     Spouse name: Not on file   • Number of children: Not on file   • Years of education: Not on file   • Highest education level: Not on file   Occupational History   • Not on file   Tobacco Use   • Smoking status: Former Smoker     Packs/day: 0.50     Years: 15.00     Pack years: 7.50     Types: Cigarettes     Quit date: 2004     Years since quittin.3   • Smokeless tobacco: Former User   Vaping Use   • Vaping Use: Never used   Substance and Sexual Activity   • Alcohol use: Yes     Alcohol/week: 8.4 oz     Types: 14 Glasses of wine per week     Comment: 2 per day   • Drug use: No   • Sexual activity: Not on file   Other Topics Concern   • Not on file   Social History Narrative   • Not on file     Social Determinants of Health     Financial Resource Strain:    • Difficulty of Paying Living Expenses: Not on file   Food Insecurity:    • Worried About Running Out of Food in the Last Year: Not on file   • Ran Out of Food in the Last Year: Not on file   Transportation Needs:    • Lack of Transportation (Medical): Not on file   • Lack of Transportation (Non-Medical): Not on file   Physical Activity:    • Days of Exercise per Week: Not on file   •  Minutes of Exercise per Session: Not on file   Stress:    • Feeling of Stress : Not on file   Social Connections:    • Frequency of Communication with Friends and Family: Not on file   • Frequency of Social Gatherings with Friends and Family: Not on file   • Attends Sabianism Services: Not on file   • Active Member of Clubs or Organizations: Not on file   • Attends Club or Organization Meetings: Not on file   • Marital Status: Not on file   Intimate Partner Violence:    • Fear of Current or Ex-Partner: Not on file   • Emotionally Abused: Not on file   • Physically Abused: Not on file   • Sexually Abused: Not on file   Housing Stability:    • Unable to Pay for Housing in the Last Year: Not on file   • Number of Places Lived in the Last Year: Not on file   • Unstable Housing in the Last Year: Not on file     Allergies   Allergen Reactions   • Entresto [Sacubitril-Valsartan]      Swollen tongue. Angioedema.     Outpatient Encounter Medications as of 11/11/2021   Medication Sig Dispense Refill   • allopurinol (ZYLOPRIM) 100 MG Tab Take 100 mg by mouth every day.     • tamsulosin (FLOMAX) 0.4 MG capsule Take 0.4 mg by mouth 1/2 hour after breakfast.     • furosemide (LASIX) 20 MG Tab Take 20 mg by mouth 2 times a day.     • amiodarone (CORDARONE) 200 MG Tab Take 1 tablet by mouth every day. 90 tablet 4   • losartan (COZAAR) 100 MG Tab Take 1 tablet by mouth every day. 90 Each 4   • spironolactone (ALDACTONE) 25 MG Tab Take 0.5 Tabs by mouth every day. 45 Tab 3   • vitamin D, Ergocalciferol, (DRISDOL) 65217 units Cap capsule Take 50,000 Units by mouth every 7 days. MONDAY     • aspirin EC (ECOTRIN) 81 MG Tablet Delayed Response Take 81 mg by mouth every day.     • cinacalcet (SENSIPAR) 30 MG Tab Take 30 mg by mouth every day.     • carvedilol (COREG) 25 MG Tab Take 12.5-25 mg by mouth 2 times a day. Pt takes 25MG in AM and 12.5MG HS      • atorvastatin (LIPITOR) 20 MG Tab Take 20 mg by mouth every day.     • Omega-3 Fatty  "Acids (FISH OIL) 1000 MG Cap capsule Take 1,000 mg by mouth every day.     • multivitamin (THERAGRAN) Tab Take 1 Tab by mouth every day.     • albuterol 108 (90 Base) MCG/ACT Aero Soln inhalation aerosol Inhale 2 Puffs by mouth every 6 hours as needed for Shortness of Breath. 8.5 g 0     No facility-administered encounter medications on file as of 11/11/2021.     Review of Systems   Constitutional: Negative for diaphoresis and fever.   HENT: Negative for nosebleeds.    Eyes: Negative for blurred vision and double vision.   Respiratory: Positive for shortness of breath. Negative for cough.    Cardiovascular: Negative for chest pain and palpitations.   Gastrointestinal: Negative for abdominal pain.   Genitourinary: Negative for dysuria and frequency.   Musculoskeletal: Negative for falls and myalgias.   Skin: Negative for rash.   Neurological: Negative for dizziness, sensory change and headaches.   Endo/Heme/Allergies: Does not bruise/bleed easily.   Psychiatric/Behavioral: Negative for depression and memory loss.        Objective:   /72 (BP Location: Right arm, Patient Position: Sitting, BP Cuff Size: Adult)   Pulse 85   Ht 1.88 m (6' 2\")   Wt 88.5 kg (195 lb)   SpO2 96%   BMI 25.04 kg/m²     Physical Exam  Vitals and nursing note reviewed.   Constitutional:       General: He is not in acute distress.     Appearance: He is not diaphoretic.   HENT:      Head: Normocephalic and atraumatic.      Right Ear: External ear normal.      Left Ear: External ear normal.   Eyes:      General:         Right eye: No discharge.         Left eye: No discharge.   Neck:      Thyroid: No thyromegaly.      Vascular: No JVD.   Cardiovascular:      Rate and Rhythm: Normal rate and regular rhythm.      Comments: Ausculation was not performed to minimize the risk of COVID spread during current pandemic. This complies with Medicare policies and guidelines.    Pulmonary:      Effort: No respiratory distress.   Abdominal:      " General: There is no distension.      Tenderness: There is no abdominal tenderness.   Skin:     General: Skin is warm and dry.   Neurological:      Mental Status: He is alert and oriented to person, place, and time.      Cranial Nerves: No cranial nerve deficit.   Psychiatric:         Behavior: Behavior normal.         Assessment:     1. ACC/AHA stage C systolic heart failure (HCC)     2. Heart failure, NYHA class 2 (HCC)     3. Paroxysmal atrial fibrillation (HCC)  EKG   4. AICD (automatic cardioverter/defibrillator) present     5. HTN (hypertension), malignant     6. Mixed hyperlipidemia     7. Presence of biventricular automatic cardioverter/defibrillator (AICD)     8. Ventricular tachycardia (HCC)     9. Dyspnea on exertion         Medical Decision Making:  Today's Assessment / Status / Plan:   Non-ischemic Cardiomyopathy LVEF of 15% with already optimized medical therapy:  Chronically illed condition which requires ongoing close monitoring and treatment to improve survival rate along with decreasing risk of clinical decompensation and hospitalization.    Today, based on physical examination findings, patient is euvolemic. No JVD, lungs are clear to auscultation, no pitting edema in bilateral lower extremities, no ascites.     Dry weight is 195 lbs.     Continue coreg 25 mg po bid.   Will continue Losartan to goal of 100 mg once a day. NO Entresto due to anaphylactic shock.    Will hold off on SGLT2 therapy for now due to prior side effect. Consider Jardiance next visit.     Diuretic with Lasix 20 mg changed to twice weekly now. Patient was instructed to monitor symptoms of lightheadedness or syncope rather than actual numbers.      Aldactone antagonist with Spironolactone 12.5 mg daily.    At this time, s/p CRT upgrade by adding LV lead via coronary sinus. I personally interpreted the EKG which showed QRS of more than 120 ms of LBBB morphology. I do think that patient will be benefited from upgrade of  synchronized therapy.    Continue remote monitor with MEMs.    Paroxysmal Atrial fibrillation:  No anticoagulation, already with Watchman device.  No mode switching seen on recent interrogation in 11/2020.    Hypertension:  Optimize control using cardiomyopathy medical regimen as well.    Hyperlipidemia:  Optimize statin as within guidelines of CAD treatment as above.     I will continue to closely monitor for side effects of patient's high risk medication(s) including liver, renal function and electrolytes.

## 2021-12-09 ENCOUNTER — HOSPITAL ENCOUNTER (OUTPATIENT)
Dept: LAB | Facility: MEDICAL CENTER | Age: 81
End: 2021-12-09
Attending: NURSE PRACTITIONER
Payer: COMMERCIAL

## 2021-12-09 LAB
ALBUMIN SERPL BCP-MCNC: 3.8 G/DL (ref 3.2–4.9)
APPEARANCE UR: CLEAR
BASOPHILS # BLD AUTO: 0.4 % (ref 0–1.8)
BASOPHILS # BLD: 0.02 K/UL (ref 0–0.12)
BILIRUB UR QL STRIP.AUTO: NEGATIVE
BUN SERPL-MCNC: 32 MG/DL (ref 8–22)
CALCIUM SERPL-MCNC: 8.9 MG/DL (ref 8.4–10.2)
CHLORIDE SERPL-SCNC: 108 MMOL/L (ref 96–112)
CO2 SERPL-SCNC: 20 MMOL/L (ref 20–33)
COLOR UR: YELLOW
CREAT SERPL-MCNC: 1.75 MG/DL (ref 0.5–1.4)
CREAT UR-MCNC: 114.7 MG/DL
CREAT UR-MCNC: 118.39 MG/DL
EOSINOPHIL # BLD AUTO: 0.28 K/UL (ref 0–0.51)
EOSINOPHIL NFR BLD: 6.2 % (ref 0–6.9)
ERYTHROCYTE [DISTWIDTH] IN BLOOD BY AUTOMATED COUNT: 52.5 FL (ref 35.9–50)
GLUCOSE SERPL-MCNC: 119 MG/DL (ref 65–99)
GLUCOSE UR STRIP.AUTO-MCNC: NEGATIVE MG/DL
HCT VFR BLD AUTO: 46.6 % (ref 42–52)
HGB BLD-MCNC: 15.3 G/DL (ref 14–18)
IMM GRANULOCYTES # BLD AUTO: 0.01 K/UL (ref 0–0.11)
IMM GRANULOCYTES NFR BLD AUTO: 0.2 % (ref 0–0.9)
KETONES UR STRIP.AUTO-MCNC: NEGATIVE MG/DL
LEUKOCYTE ESTERASE UR QL STRIP.AUTO: NEGATIVE
LYMPHOCYTES # BLD AUTO: 1.49 K/UL (ref 1–4.8)
LYMPHOCYTES NFR BLD: 33 % (ref 22–41)
MCH RBC QN AUTO: 33.6 PG (ref 27–33)
MCHC RBC AUTO-ENTMCNC: 32.8 G/DL (ref 33.7–35.3)
MCV RBC AUTO: 102.4 FL (ref 81.4–97.8)
MICRO URNS: NORMAL
MICROALBUMIN UR-MCNC: 6.9 MG/DL
MICROALBUMIN/CREAT UR: 60 MG/G (ref 0–30)
MONOCYTES # BLD AUTO: 0.54 K/UL (ref 0–0.85)
MONOCYTES NFR BLD AUTO: 12 % (ref 0–13.4)
NEUTROPHILS # BLD AUTO: 2.17 K/UL (ref 1.82–7.42)
NEUTROPHILS NFR BLD: 48.2 % (ref 44–72)
NITRITE UR QL STRIP.AUTO: NEGATIVE
NRBC # BLD AUTO: 0 K/UL
NRBC BLD-RTO: 0 /100 WBC
PH UR STRIP.AUTO: 5.5 [PH] (ref 5–8)
PHOSPHATE SERPL-MCNC: 2.9 MG/DL (ref 2.5–4.5)
PLATELET # BLD AUTO: 140 K/UL (ref 164–446)
PMV BLD AUTO: 11.8 FL (ref 9–12.9)
POTASSIUM SERPL-SCNC: 4.6 MMOL/L (ref 3.6–5.5)
PROT UR QL STRIP: NEGATIVE MG/DL
PROT UR-MCNC: 18 MG/DL (ref 0–15)
PROT/CREAT UR: 152 MG/G (ref 15–68)
PTH-INTACT SERPL-MCNC: 93.5 PG/ML (ref 14–72)
RBC # BLD AUTO: 4.55 M/UL (ref 4.7–6.1)
RBC UR QL AUTO: NEGATIVE
SODIUM SERPL-SCNC: 139 MMOL/L (ref 135–145)
SP GR UR STRIP.AUTO: 1.02
WBC # BLD AUTO: 4.5 K/UL (ref 4.8–10.8)

## 2021-12-09 PROCEDURE — 83970 ASSAY OF PARATHORMONE: CPT

## 2021-12-09 PROCEDURE — 36415 COLL VENOUS BLD VENIPUNCTURE: CPT

## 2021-12-09 PROCEDURE — 82306 VITAMIN D 25 HYDROXY: CPT

## 2021-12-09 PROCEDURE — 80069 RENAL FUNCTION PANEL: CPT

## 2021-12-09 PROCEDURE — 82570 ASSAY OF URINE CREATININE: CPT

## 2021-12-09 PROCEDURE — 81003 URINALYSIS AUTO W/O SCOPE: CPT

## 2021-12-09 PROCEDURE — 84156 ASSAY OF PROTEIN URINE: CPT

## 2021-12-09 PROCEDURE — 82043 UR ALBUMIN QUANTITATIVE: CPT

## 2021-12-09 PROCEDURE — 85025 COMPLETE CBC W/AUTO DIFF WBC: CPT

## 2021-12-11 LAB — 25(OH)D3 SERPL-MCNC: 53 NG/ML (ref 30–80)

## 2021-12-30 ENCOUNTER — TELEPHONE (OUTPATIENT)
Dept: CARDIOLOGY | Facility: MEDICAL CENTER | Age: 81
End: 2021-12-30

## 2021-12-30 NOTE — TELEPHONE ENCOUNTER
----- Message from Naga Elliott sent at 12/30/2021  9:09 AM PST -----  Regarding: questions of transmission error  Lencho De Leon called and wanted to see if Radha Miranda could help resolve an isue he is having with a transmission error. Please give him a call ASAP.    Thank you!

## 2021-12-30 NOTE — TELEPHONE ENCOUNTER
Called pt back and gave him [employer] number to assist in getting him reconnected.

## 2022-03-28 ENCOUNTER — HOSPITAL ENCOUNTER (OUTPATIENT)
Dept: LAB | Facility: MEDICAL CENTER | Age: 82
End: 2022-03-28
Attending: INTERNAL MEDICINE
Payer: COMMERCIAL

## 2022-03-28 LAB
ALBUMIN SERPL BCP-MCNC: 4.3 G/DL (ref 3.2–4.9)
APPEARANCE UR: CLEAR
BASOPHILS # BLD AUTO: 0.4 % (ref 0–1.8)
BASOPHILS # BLD: 0.02 K/UL (ref 0–0.12)
BILIRUB UR QL STRIP.AUTO: NEGATIVE
BUN SERPL-MCNC: 28 MG/DL (ref 8–22)
CALCIUM SERPL-MCNC: 9.3 MG/DL (ref 8.4–10.2)
CHLORIDE SERPL-SCNC: 104 MMOL/L (ref 96–112)
CO2 SERPL-SCNC: 21 MMOL/L (ref 20–33)
COLOR UR: YELLOW
CREAT SERPL-MCNC: 1.85 MG/DL (ref 0.5–1.4)
CREAT UR-MCNC: 58.01 MG/DL
EOSINOPHIL # BLD AUTO: 0.17 K/UL (ref 0–0.51)
EOSINOPHIL NFR BLD: 3.6 % (ref 0–6.9)
ERYTHROCYTE [DISTWIDTH] IN BLOOD BY AUTOMATED COUNT: 50.4 FL (ref 35.9–50)
EST. AVERAGE GLUCOSE BLD GHB EST-MCNC: 131 MG/DL
GFR SERPLBLD CREATININE-BSD FMLA CKD-EPI: 36 ML/MIN/1.73 M 2
GLUCOSE SERPL-MCNC: 107 MG/DL (ref 65–99)
GLUCOSE UR STRIP.AUTO-MCNC: NEGATIVE MG/DL
HBA1C MFR BLD: 6.2 % (ref 4–5.6)
HCT VFR BLD AUTO: 49.8 % (ref 42–52)
HGB BLD-MCNC: 16.3 G/DL (ref 14–18)
IMM GRANULOCYTES # BLD AUTO: 0.02 K/UL (ref 0–0.11)
IMM GRANULOCYTES NFR BLD AUTO: 0.4 % (ref 0–0.9)
KETONES UR STRIP.AUTO-MCNC: NEGATIVE MG/DL
LEUKOCYTE ESTERASE UR QL STRIP.AUTO: NEGATIVE
LYMPHOCYTES # BLD AUTO: 1.47 K/UL (ref 1–4.8)
LYMPHOCYTES NFR BLD: 31 % (ref 22–41)
MAGNESIUM SERPL-MCNC: 2 MG/DL (ref 1.5–2.5)
MCH RBC QN AUTO: 33.6 PG (ref 27–33)
MCHC RBC AUTO-ENTMCNC: 32.7 G/DL (ref 33.7–35.3)
MCV RBC AUTO: 102.7 FL (ref 81.4–97.8)
MICRO URNS: NORMAL
MONOCYTES # BLD AUTO: 0.55 K/UL (ref 0–0.85)
MONOCYTES NFR BLD AUTO: 11.6 % (ref 0–13.4)
NEUTROPHILS # BLD AUTO: 2.51 K/UL (ref 1.82–7.42)
NEUTROPHILS NFR BLD: 53 % (ref 44–72)
NITRITE UR QL STRIP.AUTO: NEGATIVE
NRBC # BLD AUTO: 0 K/UL
NRBC BLD-RTO: 0 /100 WBC
PH UR STRIP.AUTO: 5 [PH] (ref 5–8)
PHOSPHATE SERPL-MCNC: 4.1 MG/DL (ref 2.5–4.5)
PLATELET # BLD AUTO: 140 K/UL (ref 164–446)
PMV BLD AUTO: 11.3 FL (ref 9–12.9)
POTASSIUM SERPL-SCNC: 5.4 MMOL/L (ref 3.6–5.5)
PROT UR QL STRIP: NEGATIVE MG/DL
PROT UR-MCNC: 8 MG/DL (ref 0–15)
PROT/CREAT UR: 138 MG/G (ref 15–68)
RBC # BLD AUTO: 4.85 M/UL (ref 4.7–6.1)
RBC UR QL AUTO: NEGATIVE
SODIUM SERPL-SCNC: 139 MMOL/L (ref 135–145)
SP GR UR STRIP.AUTO: 1.01
URATE SERPL-MCNC: 8.4 MG/DL (ref 2.5–8.3)
WBC # BLD AUTO: 4.7 K/UL (ref 4.8–10.8)

## 2022-03-28 PROCEDURE — 82570 ASSAY OF URINE CREATININE: CPT

## 2022-03-28 PROCEDURE — 81003 URINALYSIS AUTO W/O SCOPE: CPT

## 2022-03-28 PROCEDURE — 85025 COMPLETE CBC W/AUTO DIFF WBC: CPT

## 2022-03-28 PROCEDURE — 83036 HEMOGLOBIN GLYCOSYLATED A1C: CPT | Mod: GA

## 2022-03-28 PROCEDURE — 84550 ASSAY OF BLOOD/URIC ACID: CPT

## 2022-03-28 PROCEDURE — 84156 ASSAY OF PROTEIN URINE: CPT

## 2022-03-28 PROCEDURE — 80069 RENAL FUNCTION PANEL: CPT

## 2022-03-28 PROCEDURE — 83735 ASSAY OF MAGNESIUM: CPT

## 2022-03-28 PROCEDURE — 36415 COLL VENOUS BLD VENIPUNCTURE: CPT

## 2022-04-01 ENCOUNTER — NON-PROVIDER VISIT (OUTPATIENT)
Dept: CARDIOLOGY | Facility: MEDICAL CENTER | Age: 82
End: 2022-04-01
Payer: MEDICARE

## 2022-04-01 DIAGNOSIS — I50.20 ACC/AHA STAGE C SYSTOLIC HEART FAILURE (HCC): ICD-10-CM

## 2022-04-01 PROCEDURE — 93264 REM MNTR WRLS P-ART PRS SNR: CPT | Performed by: INTERNAL MEDICINE

## 2022-04-06 NOTE — PROGRESS NOTES
CardioMEMS Pulmonary Artery Pressure Monitoring  S: Monitoring pulmonary artery pressures with the CardioMEMS remote monitor implant for chronic heart failure monitoring- Monitoring for March 1-31, 2022  B: s/p sensor implant 02/04/2020 Patient PA Diastolic threshold: 8-20 mmHg  A: Assessment: Patient remained within their designated threshold throughout the month without intervention  R: Recommendation: Continue current monitoring protocol twice weekly and adjust diuretics PRN.

## 2022-05-31 ENCOUNTER — OFFICE VISIT (OUTPATIENT)
Dept: CARDIOLOGY | Facility: MEDICAL CENTER | Age: 82
End: 2022-05-31
Payer: MEDICARE

## 2022-05-31 ENCOUNTER — NON-PROVIDER VISIT (OUTPATIENT)
Dept: CARDIOLOGY | Facility: MEDICAL CENTER | Age: 82
End: 2022-05-31
Payer: MEDICARE

## 2022-05-31 VITALS
HEIGHT: 74 IN | BODY MASS INDEX: 24.38 KG/M2 | SYSTOLIC BLOOD PRESSURE: 114 MMHG | OXYGEN SATURATION: 97 % | RESPIRATION RATE: 14 BRPM | DIASTOLIC BLOOD PRESSURE: 68 MMHG | WEIGHT: 190 LBS | HEART RATE: 74 BPM

## 2022-05-31 VITALS
OXYGEN SATURATION: 97 % | HEIGHT: 74 IN | SYSTOLIC BLOOD PRESSURE: 114 MMHG | HEART RATE: 74 BPM | DIASTOLIC BLOOD PRESSURE: 68 MMHG | WEIGHT: 190 LBS | BODY MASS INDEX: 24.38 KG/M2 | RESPIRATION RATE: 14 BRPM

## 2022-05-31 DIAGNOSIS — Z95.810 PRESENCE OF BIVENTRICULAR AUTOMATIC CARDIOVERTER/DEFIBRILLATOR (AICD): ICD-10-CM

## 2022-05-31 DIAGNOSIS — I50.9 HEART FAILURE, NYHA CLASS 2 (HCC): ICD-10-CM

## 2022-05-31 DIAGNOSIS — I50.20 ACC/AHA STAGE C SYSTOLIC HEART FAILURE (HCC): ICD-10-CM

## 2022-05-31 DIAGNOSIS — I42.0 DILATED CARDIOMYOPATHY (HCC): ICD-10-CM

## 2022-05-31 DIAGNOSIS — I10 HTN (HYPERTENSION), MALIGNANT: ICD-10-CM

## 2022-05-31 DIAGNOSIS — E78.2 MIXED HYPERLIPIDEMIA: ICD-10-CM

## 2022-05-31 DIAGNOSIS — Z79.899 HIGH RISK MEDICATION USE: ICD-10-CM

## 2022-05-31 DIAGNOSIS — R06.09 DYSPNEA ON EXERTION: ICD-10-CM

## 2022-05-31 DIAGNOSIS — I47.20 VENTRICULAR TACHYCARDIA (HCC): ICD-10-CM

## 2022-05-31 DIAGNOSIS — I48.0 PAROXYSMAL ATRIAL FIBRILLATION (HCC): ICD-10-CM

## 2022-05-31 PROCEDURE — 93284 PRGRMG EVAL IMPLANTABLE DFB: CPT | Performed by: NURSE PRACTITIONER

## 2022-05-31 PROCEDURE — 99215 OFFICE O/P EST HI 40 MIN: CPT | Performed by: INTERNAL MEDICINE

## 2022-05-31 RX ORDER — EMPAGLIFLOZIN 10 MG/1
10 TABLET, FILM COATED ORAL DAILY
Qty: 90 TABLET | Refills: 3 | Status: SHIPPED | OUTPATIENT
Start: 2022-05-31 | End: 2022-12-09 | Stop reason: SDUPTHER

## 2022-05-31 ASSESSMENT — ENCOUNTER SYMPTOMS
DEPRESSION: 0
DIZZINESS: 0
MEMORY LOSS: 0
BRUISES/BLEEDS EASILY: 0
SENSORY CHANGE: 0
BLURRED VISION: 0
FALLS: 0
DOUBLE VISION: 0
COUGH: 0
PALPITATIONS: 0
MYALGIAS: 0
FEVER: 0
DIAPHORESIS: 0
HEADACHES: 0
SHORTNESS OF BREATH: 1
ABDOMINAL PAIN: 0

## 2022-05-31 ASSESSMENT — FIBROSIS 4 INDEX
FIB4 SCORE: 3.3
FIB4 SCORE: 3.3

## 2022-05-31 NOTE — PROGRESS NOTES
Device is working normally.  No device therapy.  1 mode switching episode lasting 3 minutes on 5/23/2022.  Normal sensing and capture of RA, RV and LV leads; stable impedances. Battery longevity is 4.8 years.  No changes are made today.    He does see Dr. Velázquez today too.    Follow-up in 6 months for next AICD check with me.

## 2022-05-31 NOTE — PROGRESS NOTES
Chief Complaint   Patient presents with   • CHF (Systolic)     F/V Dx: ACC/AHA stage C systolic heart failure (HCC)       Subjective:   Lencho Parker is a 81 y.o. male who presents today for cardiac care and management due to cardiac issues of nonischemic cardiomyopathy diagnosed in 2009, with most recent cholangiogram in 2016 showing nonobstructive coronary to disease, status post dual-chamber ICD placed in 2010, paroxysmal atrial fibrillation status post watchman device in October 2015, prior history of ventricular tachycardia status post ablation in 2012, hypertension, hyperlipidemia, treated AAA, prior history of TIA without residual neurological deficits.    Of note, patient was seen by his cardiologist in [place] before he moved to [facility].  He was initially started on Entresto therapy but developed angioedema.    His most recent transthoracic echocardiogram showed LV function of 20%.  He is status post CardioMEMS implantation for remote monitoring of volume status. Numbers have been good.    Patient is feeling better these days. Does get winded upon walking up inclines or for distance. No symptoms at rest or with daily living activities.    GFR is 36.    Became dehydrated with Farxiga and was in hospital.      Past Medical History:   Diagnosis Date   • Breath shortness     on exertion   • Cardiomyopathy (HCC) 05/2021    Echocardiogram with moderately dilated LV, mild concentric LVH, LVEF 20%. Global hypokinesis. Normal RA and RV. Severely dilated LA. Mild MR, mild TR. RVSP 34mmHg.   • Chronic obstructive pulmonary disease (HCC)    • Malag[place] measles    • Heart attack (HCC)     hx 2007   • History of abdominal aortic aneurysm (AAA) 2009    Status post stent   • Hyperlipidemia    • Hypertension    • Mumps    • Pacemaker     AICD   • Paroxysmal atrial fibrillation (HCC)     Status post Watchman procedure in [place].   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    • Sleep apnea    • Snoring     • Stroke (HCC) 2012    TIA   • Tonsillitis    • Ventricular tachycardia (HCC)      Past Surgical History:   Procedure Laterality Date   • AICD BATTERY CHANGE Left 09/10/2020     Upgrade to Medtronic Claria MRI Quad CRT-D [id number] implanted by Dr. Barrios.   • AICD BATTERY CHANGE Left 2017    Generator replacement with Medtronic Evera MRI XT  [id number] implanted in [place].   • OTHER CARDIAC SURGERY      watchman device   • AAA WITH STENT GRAFT     • OTHER ORTHOPEDIC SURGERY      hip surgery   • OTHER      kidney removed   • OTHER CARDIAC SURGERY      AICD implanted     Family History   Problem Relation Age of Onset   • Cancer Mother    • Cancer Sister      Social History     Socioeconomic History   • Marital status: Single     Spouse name: Not on file   • Number of children: Not on file   • Years of education: Not on file   • Highest education level: Not on file   Occupational History   • Not on file   Tobacco Use   • Smoking status: Former Smoker     Packs/day: 0.50     Years: 15.00     Pack years: 7.50     Types: Cigarettes     Quit date: 2004     Years since quittin.8   • Smokeless tobacco: Former User   Vaping Use   • Vaping Use: Never used   Substance and Sexual Activity   • Alcohol use: Yes     Alcohol/week: 8.4 oz     Types: 14 Glasses of wine per week     Comment: 2 per day   • Drug use: No   • Sexual activity: Not on file   Other Topics Concern   • Not on file   Social History Narrative   • Not on file     Social Determinants of Health     Financial Resource Strain: Not on file   Food Insecurity: Not on file   Transportation Needs: Not on file   Physical Activity: Not on file   Stress: Not on file   Social Connections: Not on file   Intimate Partner Violence: Not on file   Housing Stability: Not on file     Allergies   Allergen Reactions   • Entresto [Sacubitril-Valsartan]      Swollen tongue. Angioedema.     Outpatient Encounter Medications as of 2022   Medication Sig  Dispense Refill   • tamsulosin (FLOMAX) 0.4 MG capsule Take 0.4 mg by mouth 1/2 hour after breakfast.     • furosemide (LASIX) 20 MG Tab Take 1 Tablet by mouth every 72 hours. 40 Tablet 3   • amiodarone (CORDARONE) 200 MG Tab Take 1 tablet by mouth every day. 90 tablet 4   • losartan (COZAAR) 100 MG Tab Take 1 tablet by mouth every day. 90 Each 4   • spironolactone (ALDACTONE) 25 MG Tab Take 0.5 Tabs by mouth every day. 45 Tab 3   • vitamin D, Ergocalciferol, (DRISDOL) 43782 units Cap capsule Take 50,000 Units by mouth every 7 days. MONDAY     • aspirin EC (ECOTRIN) 81 MG Tablet Delayed Response Take 81 mg by mouth every day.     • cinacalcet (SENSIPAR) 30 MG Tab Take 30 mg by mouth every day.     • carvedilol (COREG) 25 MG Tab Take 12.5-25 mg by mouth 2 times a day. Pt takes 25MG in AM and 12.5MG HS      • atorvastatin (LIPITOR) 20 MG Tab Take 20 mg by mouth every day.     • Omega-3 Fatty Acids (FISH OIL) 1000 MG Cap capsule Take 1,000 mg by mouth every day.     • multivitamin (THERAGRAN) Tab Take 1 Tab by mouth every day.     • albuterol 108 (90 Base) MCG/ACT Aero Soln inhalation aerosol Inhale 2 Puffs by mouth every 6 hours as needed for Shortness of Breath. 8.5 g 0   • [DISCONTINUED] allopurinol (ZYLOPRIM) 100 MG Tab Take 100 mg by mouth every day.       No facility-administered encounter medications on file as of 5/31/2022.     Review of Systems   Constitutional: Negative for diaphoresis and fever.   HENT: Negative for nosebleeds.    Eyes: Negative for blurred vision and double vision.   Respiratory: Positive for shortness of breath. Negative for cough.    Cardiovascular: Negative for chest pain and palpitations.   Gastrointestinal: Negative for abdominal pain.   Genitourinary: Negative for dysuria and frequency.   Musculoskeletal: Negative for falls and myalgias.   Skin: Negative for rash.   Neurological: Negative for dizziness, sensory change and headaches.   Endo/Heme/Allergies: Does not bruise/bleed easily.  "  Psychiatric/Behavioral: Negative for depression and memory loss.        Objective:   /68 (BP Location: Left arm, Patient Position: Sitting, BP Cuff Size: Adult)   Pulse 74   Resp 14   Ht 1.88 m (6' 2\")   Wt 86.2 kg (190 lb)   SpO2 97%   BMI 24.39 kg/m²     Physical Exam  Vitals and nursing note reviewed.   Constitutional:       General: He is not in acute distress.     Appearance: He is not diaphoretic.   HENT:      Head: Normocephalic and atraumatic.      Right Ear: External ear normal.      Left Ear: External ear normal.   Eyes:      General:         Right eye: No discharge.         Left eye: No discharge.   Neck:      Thyroid: No thyromegaly.      Vascular: No JVD.   Cardiovascular:      Rate and Rhythm: Normal rate and regular rhythm.      Comments: Ausculation was not performed to minimize the risk of COVID spread during current pandemic. This complies with Medicare policies and guidelines.    Pulmonary:      Effort: No respiratory distress.   Abdominal:      General: There is no distension.      Tenderness: There is no abdominal tenderness.   Skin:     General: Skin is warm and dry.   Neurological:      Mental Status: He is alert and oriented to person, place, and time.      Cranial Nerves: No cranial nerve deficit.   Psychiatric:         Behavior: Behavior normal.         Assessment:     1. ACC/AHA stage C systolic heart failure (HCC)     2. Heart failure, NYHA class 2 (HCC)     3. Dilated cardiomyopathy (HCC)     4. Presence of biventricular automatic cardioverter/defibrillator (AICD)     5. Paroxysmal atrial fibrillation (HCC)     6. HTN (hypertension), malignant     7. Mixed hyperlipidemia     8. Dyspnea on exertion         Medical Decision Making:  Today's Assessment / Status / Plan:   Non-ischemic Cardiomyopathy LVEF of 15% with already optimized medical therapy:  Chronically illed condition which requires ongoing close monitoring and treatment to improve survival rate along with decreasing " risk of clinical decompensation and hospitalization.    Today, based on physical examination findings, patient is euvolemic. No JVD, lungs are clear to auscultation, no pitting edema in bilateral lower extremities, no ascites.     Dry weight is 195 lbs.     Continue coreg 25 mg po bid.   Will continue Losartan 100 mg once a day. NO Entresto due to anaphylactic shock.    Based on recent data on SGLT2 and heart failure with reduced ejection fraction, patient will be benefited from Jardiance 10 mg p.o. once a day for further reduction in mortality and hospitalization with absolute risk reduction of 5.2%.  Therefore, I will start patient on Jardiance 10 mg p.o. once a day.  Risks and benefits were explained to patient and patient has agreed to proceed.     Diuretic with Lasix 20 mg changed to twice weekly now. Patient was instructed to monitor symptoms of lightheadedness or syncope rather than actual numbers.      Aldactone antagonist with Spironolactone 12.5 mg daily.    At this time, s/p CRT upgrade by adding LV lead via coronary sinus.    Continue remote monitor with MEMs.    Paroxysmal Atrial fibrillation:  No anticoagulation, already with Watchman device.  No significnat mode switching seen on recent interrogation in 05/2022.    Hypertension:  Optimize control using cardiomyopathy medical regimen as well.    Hyperlipidemia:  Optimize statin as within guidelines of CAD treatment as above.     I will continue to closely monitor for side effects of patient's high risk medication(s) including liver, renal function and electrolytes.

## 2022-07-07 NOTE — PROGRESS NOTES
[facility] Follow-up Visit    Date of Visit: 7/8/2022     CC: Follow-up for HARPAL management      HPI:  Lencho Parker is a very pleasant 81 y.o. year old male former smoker (7.5 pack-years, quit in 2004), with a PMHx of central sleep apnea, systolic heart failure, AICD, PVD, cardiomyopathy, TIA, hypertension, AAA, chronic kidney disease, PFD, COPD, polycythemia who presented to the [facility] for a regular follow up. Last seen in the office on 7/14/2021 with NORMA Arias.    Today he states that his BiPAP machine has been working well for him.  He denies any morning headaches, daytime/driving drowsy, issues falling asleep, snoring, gasping, or any symptoms of RLS, narcolepsy or any nightmares, sleep walking or acting out of dreams.  He is currently going to bed at 10 PM and waking up at 6 AM.  He wakes up occasionally 1-2 times a night to use the restroom, but does not have any issues falling back asleep.  He does take a nap a day for about an hour.    DME provider:  [facility] [employer]  Device:  [place] air curve 10  Settings: BiPAP IPAP 14 CWP, EPAP 8 CWP, RR 12 bpm.  When: 2020  Mask:  Fullface  Aerophagia: No   Snoring: No   Dry mouth: No   Leak: No   Skin irritation: No   Chin strap: Yes    Cleaning regimen: Once a week with soap and water    Compliance:  Compliance data reviewed showing 97% usage > 4hours in last 30 days. Average AHI 6.1 events/hour.  IPAP 14 CWP, EPAP 8 CWP, RR 12 bpm.  Patient continues to use and benefit from machine.      Sleep History:  PSG split-night 3/12/2020 indicated severe sleep apnea with an overall AHI 54.5/h and an O2 mauricio of 82%.  Patient met split-night criteria and ultimately did best on BiPAP ST 14/8 cm with a backup rate of 12 breaths/min.  AHI reduced to 0/h with a mean SPO2 91%.     Patient Active Problem List    Diagnosis Date Noted   • Complex sleep apnea syndrome 07/08/2022   • RANI (acute kidney injury) (HCC) 07/09/2021   • DM (diabetes  mellitus) (formerly [facility]) 07/09/2021   • Former smoker 02/13/2020   • Need for influenza vaccination 02/13/2020   • ACC/AHA stage C systolic heart failure (formerly [facility]) 02/04/2020   • Left ventricular systolic dysfunction, NYHA class 3 02/04/2020   • Research study patient 01/27/2020   • [facility] Research-Cardiology Billing 01/27/2020   • PVD (peripheral vascular disease) (formerly [facility]) 12/26/2019   • Cardiomyopathy (formerly [facility]) 10/28/2019   • Essential hypertension, benign 10/28/2019   • Mixed hyperlipidemia 10/28/2019   • Paroxysmal atrial fibrillation (formerly [facility]) 10/28/2019   • COPD (chronic obstructive pulmonary disease) (formerly [facility]) 10/28/2019   • Renal disorder 10/28/2019   • High risk medication use 10/28/2019   • History of TIA (transient ischemic attack) 10/28/2019   • History of abdominal aortic aneurysm (AAA) 10/28/2019   • Presence of biventricular automatic cardioverter/defibrillator (AICD) 10/28/2019   • Ventricular tachycardia (formerly [facility]) 10/28/2019     Past Medical History:   Diagnosis Date   • Breath shortness     on exertion   • Cardiomyopathy (formerly [facility]) 05/2021    Echocardiogram with moderately dilated LV, mild concentric LVH, LVEF 20%. Global hypokinesis. Normal RA and RV. Severely dilated LA. Mild MR, mild TR. RVSP 34mmHg.   • Chronic obstructive pulmonary disease (HCC)    • [place] measles    • Heart attack (HCC)     hx 2007   • History of abdominal aortic aneurysm (AAA) 2009    Status post stent   • Hyperlipidemia    • Hypertension    • Mumps    • Pacemaker     AICD   • Paroxysmal atrial fibrillation (HCC)     Status post Watchman procedure in [place].   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    • Sleep apnea    • Snoring    • Stroke (formerly [facility]) 2012    TIA   • Tonsillitis    • Ventricular tachycardia (HCC)       Past Surgical History:   Procedure Laterality Date   • AICD BATTERY CHANGE Left 09/10/2020     Upgrade to [employer] Claria MRI Quad CRT-D [id number] implanted by Dr. Barrios.   • AICD BATTERY CHANGE Left 01/03/2017    Generator replacement with  [employer] Evera MRI XT  [id number] implanted in [place].   • OTHER CARDIAC SURGERY      watchman device   • AAA WITH STENT GRAFT     • OTHER ORTHOPEDIC SURGERY      hip surgery   • OTHER      kidney removed   • OTHER CARDIAC SURGERY      AICD implanted     Family History   Problem Relation Age of Onset   • Cancer Mother    • Cancer Sister      Social History     Socioeconomic History   • Marital status: Single     Spouse name: Not on file   • Number of children: Not on file   • Years of education: Not on file   • Highest education level: Not on file   Occupational History   • Not on file   Tobacco Use   • Smoking status: Former Smoker     Packs/day: 0.50     Years: 15.00     Pack years: 7.50     Types: Cigarettes     Quit date: 2004     Years since quittin.0   • Smokeless tobacco: Former User   Vaping Use   • Vaping Use: Never used   Substance and Sexual Activity   • Alcohol use: Yes     Alcohol/week: 8.4 oz     Types: 14 Glasses of wine per week     Comment: 2 per day   • Drug use: No   • Sexual activity: Not on file   Other Topics Concern   • Not on file   Social History Narrative   • Not on file     Social Determinants of Health     Financial Resource Strain: Not on file   Food Insecurity: Not on file   Transportation Needs: Not on file   Physical Activity: Not on file   Stress: Not on file   Social Connections: Not on file   Intimate Partner Violence: Not on file   Housing Stability: Not on file     Current Outpatient Medications   Medication Sig Dispense Refill   • Empagliflozin (JARDIANCE) 10 MG Tab Take 1 Tablet by mouth every day. 90 Tablet 3   • tamsulosin (FLOMAX) 0.4 MG capsule Take 0.4 mg by mouth 1/2 hour after breakfast.     • furosemide (LASIX) 20 MG Tab Take 1 Tablet by mouth every 72 hours. 40 Tablet 3   • amiodarone (CORDARONE) 200 MG Tab Take 1 tablet by mouth every day. 90 tablet 4   • losartan (COZAAR) 100 MG Tab Take 1 tablet by mouth every day. 90 Each 4   •  "spironolactone (ALDACTONE) 25 MG Tab Take 0.5 Tabs by mouth every day. 45 Tab 3   • vitamin D, Ergocalciferol, (DRISDOL) 57869 units Cap capsule Take 50,000 Units by mouth every 7 days. MONDAY     • aspirin EC (ECOTRIN) 81 MG Tablet Delayed Response Take 81 mg by mouth every day.     • cinacalcet (SENSIPAR) 30 MG Tab Take 30 mg by mouth every day.     • carvedilol (COREG) 25 MG Tab Take 12.5-25 mg by mouth 2 times a day. Pt takes 25MG in AM and 12.5MG HS      • atorvastatin (LIPITOR) 20 MG Tab Take 20 mg by mouth every day.     • Omega-3 Fatty Acids (FISH OIL) 1000 MG Cap capsule Take 1,000 mg by mouth every day.     • multivitamin (THERAGRAN) Tab Take 1 Tab by mouth every day.     • albuterol 108 (90 Base) MCG/ACT Aero Soln inhalation aerosol Inhale 2 Puffs by mouth every 6 hours as needed for Shortness of Breath. 8.5 g 0     No current facility-administered medications for this visit.      ALLERGIES: Entresto [sacubitril-valsartan]    ROS:  Constitutional: Denies fever, chills, sweats,  weight loss, fatigue  Cardiovascular: Denies chest pain, tightness, palpitations, swelling in legs/feet  Respiratory: Denies shortness of breath, cough, sputum, wheezing, painful breathing   Sleep: per HPI  Gastrointestinal: Denies  difficulty swallowing, nausea, abdominal pain, diarrhea, constipation, heartburn.  Musculoskeletal: Denies painful joints, sore muscles,       PHYSICAL EXAM:  /70 (BP Location: Left arm, Patient Position: Sitting, BP Cuff Size: Adult)   Pulse 86   Ht 1.88 m (6' 2\")   Wt 87.1 kg (192 lb)   SpO2 97%   BMI 24.65 kg/m²   Appearance: Well-nourished, well-developed, no acute distress  Eyes:  No scleral icterus , EOMI  ENMT: No redness of the oropharynx  Lung auscultation:  No wheezes rhonchi rubs or rales  Cardiac: No murmurs, rubs, or gallops; regular rhythm, normal rate; no edema  Musculoskeletal:  Grossly normal; gait and station normal; digits and nails normal  Skin:  No rashes, petechiae, " cyanosis  Neurologic: without focal signs; oriented to person, time, place, and purpose; judgement intact  Psychiatric:  No depression, anxiety, agitation  Mallampati score: Class IV    Assessment and Plan:    The medical record was reviewed.    Diagnostic and titration nocturnal polysomnogram's, home sleep apnea tests, continuous nocturnal oximetry results, multiple sleep latency tests, and compliance reports reviewed.    Problem List Items Addressed This Visit     Complex sleep apnea syndrome - Primary     Sleep Apnea:    The pathophysiology of sleep anea and the increased risk of cardiovascular morbidity from untreated sleep apnea is discussed in detail with the patient.  She is urged to avoid supine sleep, weight gain and alcoholic beverages since all of these can worsen sleep apnea. She is cautioned against drowsy driving. If She feels sleepy while driving, She must pull over for a break/nap, rather than persist on the road, in the interest of She own safety and that of others on the road.    Compliance download was reviewed and discussed with the patient.     - Order placed for mask and supplies   - Order placed for a chip replacement, due to the patient was placing it  - Compliance was reinforced  - Advised patient to reach out via [employer] if any questions or concerns should arise.   - Equipment replacement schedule:  Mask cushion every month  Mask every 6 months  Head gear every 6 months  Tubing every 3 months  Ultra-fine filters 2 times per month  Foam filter every 6 months  Humidifier chamber every 6 months  Chin strap every 6 months    Has been advised to continue the current BiPAP IPAP 14 CWP, EPAP 8 CWP, RR 12 bpm, clean equipment frequently, and get new mask and supplies as allowed by insurance and DME. Recommend an earlier appointment, if significant treatment barriers develop.           Relevant Orders    DME Mask and Supplies    DME Other        Have advised the patient to follow up with the  appropriate healthcare practitioners for all other medical problems and issues.    Return in about 1 year (around 7/8/2023), or if symptoms worsen or fail to improve.      Please note portions of this record was created using voice recognition software. I have made every reasonable attempt to correct obvious errors, but I expect that there are errors of grammar and possibly content I did not discover before finalizing the note.    Time spent in record review prior to patient arrival, reviewing results, and in face-to-face encounter totaled 20 min.  __________  NORMA Robb  Pulmonary & Sleep Medicine  [facility]

## 2022-07-08 ENCOUNTER — OFFICE VISIT (OUTPATIENT)
Dept: SLEEP MEDICINE | Facility: MEDICAL CENTER | Age: 82
End: 2022-07-08
Payer: MEDICARE

## 2022-07-08 VITALS
DIASTOLIC BLOOD PRESSURE: 70 MMHG | SYSTOLIC BLOOD PRESSURE: 126 MMHG | BODY MASS INDEX: 24.64 KG/M2 | OXYGEN SATURATION: 97 % | HEIGHT: 74 IN | HEART RATE: 86 BPM | WEIGHT: 192 LBS

## 2022-07-08 DIAGNOSIS — G47.31 COMPLEX SLEEP APNEA SYNDROME: Primary | ICD-10-CM

## 2022-07-08 PROBLEM — G47.39 COMPLEX SLEEP APNEA SYNDROME: Status: ACTIVE | Noted: 2022-07-08

## 2022-07-08 PROCEDURE — 99213 OFFICE O/P EST LOW 20 MIN: CPT

## 2022-07-08 ASSESSMENT — FIBROSIS 4 INDEX: FIB4 SCORE: 3.3

## 2022-07-08 NOTE — PATIENT INSTRUCTIONS
Equipment replacement schedule:  Mask cushion every month  Nasal pillows 2 times per month  Mask every 6 months  Head gear every 6 months  Tubing every 3 months  Ultra-fine filters 2 times per month  Foam filter every 6 months  Humidifier chamber every 6 months  Chin strap every 6 months

## 2022-07-08 NOTE — ASSESSMENT & PLAN NOTE
Sleep Apnea:    The pathophysiology of sleep anea and the increased risk of cardiovascular morbidity from untreated sleep apnea is discussed in detail with the patient.  She is urged to avoid supine sleep, weight gain and alcoholic beverages since all of these can worsen sleep apnea. She is cautioned against drowsy driving. If She feels sleepy while driving, She must pull over for a break/nap, rather than persist on the road, in the interest of She own safety and that of others on the road.    Compliance download was reviewed and discussed with the patient.     - Order placed for mask and supplies   - Order placed for a chip replacement, due to the patient was placing it  - Compliance was reinforced  - Advised patient to reach out via [employer] if any questions or concerns should arise.   - Equipment replacement schedule:  Mask cushion every month  Mask every 6 months  Head gear every 6 months  Tubing every 3 months  Ultra-fine filters 2 times per month  Foam filter every 6 months  Humidifier chamber every 6 months  Chin strap every 6 months    Has been advised to continue the current BiPAP IPAP 14 CWP, EPAP 8 CWP, RR 12 bpm, clean equipment frequently, and get new mask and supplies as allowed by insurance and DME. Recommend an earlier appointment, if significant treatment barriers develop.

## 2022-07-28 ENCOUNTER — HOSPITAL ENCOUNTER (OUTPATIENT)
Dept: LAB | Facility: MEDICAL CENTER | Age: 82
End: 2022-07-28
Attending: INTERNAL MEDICINE
Payer: MEDICARE

## 2022-07-28 LAB
25(OH)D3 SERPL-MCNC: 80 NG/ML (ref 30–100)
ALBUMIN SERPL BCP-MCNC: 3.9 G/DL (ref 3.2–4.9)
APPEARANCE UR: CLEAR
BASOPHILS # BLD AUTO: 0.2 % (ref 0–1.8)
BASOPHILS # BLD: 0.01 K/UL (ref 0–0.12)
BILIRUB UR QL STRIP.AUTO: NEGATIVE
BUN SERPL-MCNC: 31 MG/DL (ref 8–22)
CALCIUM SERPL-MCNC: 8.3 MG/DL (ref 8.4–10.2)
CHLORIDE SERPL-SCNC: 105 MMOL/L (ref 96–112)
CO2 SERPL-SCNC: 20 MMOL/L (ref 20–33)
COLOR UR: YELLOW
CREAT SERPL-MCNC: 1.74 MG/DL (ref 0.5–1.4)
CREAT UR-MCNC: 95.19 MG/DL
EOSINOPHIL # BLD AUTO: 0.18 K/UL (ref 0–0.51)
EOSINOPHIL NFR BLD: 4.3 % (ref 0–6.9)
ERYTHROCYTE [DISTWIDTH] IN BLOOD BY AUTOMATED COUNT: 54.7 FL (ref 35.9–50)
GFR SERPLBLD CREATININE-BSD FMLA CKD-EPI: 39 ML/MIN/1.73 M 2
GLUCOSE SERPL-MCNC: 102 MG/DL (ref 65–99)
GLUCOSE UR STRIP.AUTO-MCNC: NEGATIVE MG/DL
HCT VFR BLD AUTO: 47 % (ref 42–52)
HGB BLD-MCNC: 15.5 G/DL (ref 14–18)
IMM GRANULOCYTES # BLD AUTO: 0.01 K/UL (ref 0–0.11)
IMM GRANULOCYTES NFR BLD AUTO: 0.2 % (ref 0–0.9)
KETONES UR STRIP.AUTO-MCNC: NEGATIVE MG/DL
LEUKOCYTE ESTERASE UR QL STRIP.AUTO: NEGATIVE
LYMPHOCYTES # BLD AUTO: 1.25 K/UL (ref 1–4.8)
LYMPHOCYTES NFR BLD: 29.8 % (ref 22–41)
MAGNESIUM SERPL-MCNC: 2 MG/DL (ref 1.5–2.5)
MCH RBC QN AUTO: 34.6 PG (ref 27–33)
MCHC RBC AUTO-ENTMCNC: 33 G/DL (ref 33.7–35.3)
MCV RBC AUTO: 104.9 FL (ref 81.4–97.8)
MICRO URNS: NORMAL
MONOCYTES # BLD AUTO: 0.44 K/UL (ref 0–0.85)
MONOCYTES NFR BLD AUTO: 10.5 % (ref 0–13.4)
NEUTROPHILS # BLD AUTO: 2.3 K/UL (ref 1.82–7.42)
NEUTROPHILS NFR BLD: 55 % (ref 44–72)
NITRITE UR QL STRIP.AUTO: NEGATIVE
NRBC # BLD AUTO: 0 K/UL
NRBC BLD-RTO: 0 /100 WBC
PH UR STRIP.AUTO: 6 [PH] (ref 5–8)
PHOSPHATE SERPL-MCNC: 3.4 MG/DL (ref 2.5–4.5)
PLATELET # BLD AUTO: 146 K/UL (ref 164–446)
PMV BLD AUTO: 11.2 FL (ref 9–12.9)
POTASSIUM SERPL-SCNC: 4.8 MMOL/L (ref 3.6–5.5)
PROT UR QL STRIP: NEGATIVE MG/DL
PROT UR-MCNC: 15 MG/DL (ref 0–15)
PROT/CREAT UR: 158 MG/G (ref 15–68)
PTH-INTACT SERPL-MCNC: 98.3 PG/ML (ref 14–72)
RBC # BLD AUTO: 4.48 M/UL (ref 4.7–6.1)
RBC UR QL AUTO: NEGATIVE
SODIUM SERPL-SCNC: 137 MMOL/L (ref 135–145)
SP GR UR STRIP.AUTO: 1.01
URATE SERPL-MCNC: 9.4 MG/DL (ref 2.5–8.3)
WBC # BLD AUTO: 4.2 K/UL (ref 4.8–10.8)

## 2022-07-28 PROCEDURE — 83735 ASSAY OF MAGNESIUM: CPT

## 2022-07-28 PROCEDURE — 85025 COMPLETE CBC W/AUTO DIFF WBC: CPT

## 2022-07-28 PROCEDURE — 36415 COLL VENOUS BLD VENIPUNCTURE: CPT

## 2022-07-28 PROCEDURE — 82306 VITAMIN D 25 HYDROXY: CPT

## 2022-07-28 PROCEDURE — 83970 ASSAY OF PARATHORMONE: CPT

## 2022-07-28 PROCEDURE — 81003 URINALYSIS AUTO W/O SCOPE: CPT

## 2022-07-28 PROCEDURE — 84156 ASSAY OF PROTEIN URINE: CPT

## 2022-07-28 PROCEDURE — 80069 RENAL FUNCTION PANEL: CPT

## 2022-07-28 PROCEDURE — 82570 ASSAY OF URINE CREATININE: CPT

## 2022-07-28 PROCEDURE — 84550 ASSAY OF BLOOD/URIC ACID: CPT

## 2022-08-06 ENCOUNTER — NON-PROVIDER VISIT (OUTPATIENT)
Dept: CARDIOLOGY | Facility: MEDICAL CENTER | Age: 82
End: 2022-08-06
Payer: MEDICARE

## 2022-08-06 PROCEDURE — 93295 DEV INTERROG REMOTE 1/2/MLT: CPT | Performed by: INTERNAL MEDICINE

## 2022-08-08 NOTE — CARDIAC REMOTE MONITOR - SCAN
Device transmission reviewed. Device demonstrated appropriate function.       Electronically Signed by: Tolu Barrios M.D.    8/14/2022  10:57 PM

## 2022-08-29 ENCOUNTER — NON-PROVIDER VISIT (OUTPATIENT)
Dept: CARDIOLOGY | Facility: MEDICAL CENTER | Age: 82
End: 2022-08-29
Payer: MEDICARE

## 2022-08-29 DIAGNOSIS — I50.20 ACC/AHA STAGE C SYSTOLIC HEART FAILURE (HCC): ICD-10-CM

## 2022-08-29 PROCEDURE — 93264 REM MNTR WRLS P-ART PRS SNR: CPT | Performed by: INTERNAL MEDICINE

## 2022-08-30 ENCOUNTER — HOSPITAL ENCOUNTER (EMERGENCY)
Facility: MEDICAL CENTER | Age: 82
End: 2022-08-30
Attending: EMERGENCY MEDICINE
Payer: MEDICARE

## 2022-08-30 ENCOUNTER — APPOINTMENT (OUTPATIENT)
Dept: RADIOLOGY | Facility: MEDICAL CENTER | Age: 82
End: 2022-08-30
Attending: EMERGENCY MEDICINE
Payer: MEDICARE

## 2022-08-30 VITALS
DIASTOLIC BLOOD PRESSURE: 85 MMHG | RESPIRATION RATE: 18 BRPM | TEMPERATURE: 97.4 F | WEIGHT: 191.14 LBS | OXYGEN SATURATION: 98 % | SYSTOLIC BLOOD PRESSURE: 107 MMHG | HEART RATE: 111 BPM | BODY MASS INDEX: 24.53 KG/M2 | HEIGHT: 74 IN

## 2022-08-30 DIAGNOSIS — R68.89 FEELING UNWELL: ICD-10-CM

## 2022-08-30 DIAGNOSIS — R00.0 TACHYCARDIA: ICD-10-CM

## 2022-08-30 LAB
ALBUMIN SERPL BCP-MCNC: 3.9 G/DL (ref 3.2–4.9)
ALBUMIN/GLOB SERPL: 1.6 G/DL
ALP SERPL-CCNC: 75 U/L (ref 30–99)
ALT SERPL-CCNC: 68 U/L (ref 2–50)
ANION GAP SERPL CALC-SCNC: 11 MMOL/L (ref 7–16)
APPEARANCE UR: CLEAR
AST SERPL-CCNC: 46 U/L (ref 12–45)
BASOPHILS # BLD AUTO: 0.3 % (ref 0–1.8)
BASOPHILS # BLD: 0.01 K/UL (ref 0–0.12)
BILIRUB SERPL-MCNC: 0.7 MG/DL (ref 0.1–1.5)
BILIRUB UR QL STRIP.AUTO: NEGATIVE
BUN SERPL-MCNC: 36 MG/DL (ref 8–22)
CALCIUM SERPL-MCNC: 9 MG/DL (ref 8.4–10.2)
CHLORIDE SERPL-SCNC: 109 MMOL/L (ref 96–112)
CO2 SERPL-SCNC: 21 MMOL/L (ref 20–33)
COLOR UR: YELLOW
CREAT SERPL-MCNC: 1.89 MG/DL (ref 0.5–1.4)
EKG IMPRESSION: NORMAL
EOSINOPHIL # BLD AUTO: 0.11 K/UL (ref 0–0.51)
EOSINOPHIL NFR BLD: 3.2 % (ref 0–6.9)
ERYTHROCYTE [DISTWIDTH] IN BLOOD BY AUTOMATED COUNT: 49.8 FL (ref 35.9–50)
GFR SERPLBLD CREATININE-BSD FMLA CKD-EPI: 35 ML/MIN/1.73 M 2
GLOBULIN SER CALC-MCNC: 2.5 G/DL (ref 1.9–3.5)
GLUCOSE SERPL-MCNC: 149 MG/DL (ref 65–99)
GLUCOSE UR STRIP.AUTO-MCNC: NEGATIVE MG/DL
HCT VFR BLD AUTO: 48.7 % (ref 42–52)
HGB BLD-MCNC: 16.3 G/DL (ref 14–18)
IMM GRANULOCYTES # BLD AUTO: 0.01 K/UL (ref 0–0.11)
IMM GRANULOCYTES NFR BLD AUTO: 0.3 % (ref 0–0.9)
KETONES UR STRIP.AUTO-MCNC: ABNORMAL MG/DL
LEUKOCYTE ESTERASE UR QL STRIP.AUTO: NEGATIVE
LYMPHOCYTES # BLD AUTO: 1.13 K/UL (ref 1–4.8)
LYMPHOCYTES NFR BLD: 32.7 % (ref 22–41)
MAGNESIUM SERPL-MCNC: 2.2 MG/DL (ref 1.5–2.5)
MCH RBC QN AUTO: 34.5 PG (ref 27–33)
MCHC RBC AUTO-ENTMCNC: 33.5 G/DL (ref 33.7–35.3)
MCV RBC AUTO: 103.2 FL (ref 81.4–97.8)
MICRO URNS: ABNORMAL
MONOCYTES # BLD AUTO: 0.45 K/UL (ref 0–0.85)
MONOCYTES NFR BLD AUTO: 13 % (ref 0–13.4)
NEUTROPHILS # BLD AUTO: 1.75 K/UL (ref 1.82–7.42)
NEUTROPHILS NFR BLD: 50.5 % (ref 44–72)
NITRITE UR QL STRIP.AUTO: NEGATIVE
NRBC # BLD AUTO: 0 K/UL
NRBC BLD-RTO: 0 /100 WBC
PH UR STRIP.AUTO: 6 [PH] (ref 5–8)
PLATELET # BLD AUTO: 142 K/UL (ref 164–446)
PMV BLD AUTO: 11.8 FL (ref 9–12.9)
POTASSIUM SERPL-SCNC: 5.2 MMOL/L (ref 3.6–5.5)
PROT SERPL-MCNC: 6.4 G/DL (ref 6–8.2)
PROT UR QL STRIP: NEGATIVE MG/DL
RBC # BLD AUTO: 4.72 M/UL (ref 4.7–6.1)
RBC UR QL AUTO: NEGATIVE
SODIUM SERPL-SCNC: 141 MMOL/L (ref 135–145)
SP GR UR STRIP.AUTO: 1.02
WBC # BLD AUTO: 3.5 K/UL (ref 4.8–10.8)

## 2022-08-30 PROCEDURE — 93005 ELECTROCARDIOGRAM TRACING: CPT

## 2022-08-30 PROCEDURE — 36415 COLL VENOUS BLD VENIPUNCTURE: CPT

## 2022-08-30 PROCEDURE — 81003 URINALYSIS AUTO W/O SCOPE: CPT

## 2022-08-30 PROCEDURE — 71045 X-RAY EXAM CHEST 1 VIEW: CPT

## 2022-08-30 PROCEDURE — 99284 EMERGENCY DEPT VISIT MOD MDM: CPT

## 2022-08-30 PROCEDURE — C9803 HOPD COVID-19 SPEC COLLECT: HCPCS | Performed by: EMERGENCY MEDICINE

## 2022-08-30 PROCEDURE — 80053 COMPREHEN METABOLIC PANEL: CPT

## 2022-08-30 PROCEDURE — 85025 COMPLETE CBC W/AUTO DIFF WBC: CPT

## 2022-08-30 PROCEDURE — 0240U HCHG SARS-COV-2 COVID-19 NFCT DS RESP RNA 3 TRGT MIC: CPT

## 2022-08-30 PROCEDURE — 93005 ELECTROCARDIOGRAM TRACING: CPT | Performed by: EMERGENCY MEDICINE

## 2022-08-30 PROCEDURE — 700105 HCHG RX REV CODE 258: Performed by: EMERGENCY MEDICINE

## 2022-08-30 PROCEDURE — 83735 ASSAY OF MAGNESIUM: CPT

## 2022-08-30 RX ORDER — VITAMIN B COMPLEX
1000 TABLET ORAL DAILY
Status: SHIPPED | COMMUNITY
End: 2023-04-28

## 2022-08-30 RX ORDER — FUROSEMIDE 20 MG/1
20 TABLET ORAL SEE ADMIN INSTRUCTIONS
Status: SHIPPED | COMMUNITY
End: 2023-02-16

## 2022-08-30 RX ORDER — SODIUM CHLORIDE 9 MG/ML
500 INJECTION, SOLUTION INTRAVENOUS ONCE
Status: COMPLETED | OUTPATIENT
Start: 2022-08-30 | End: 2022-08-30

## 2022-08-30 RX ADMIN — SODIUM CHLORIDE 500 ML: 9 INJECTION, SOLUTION INTRAVENOUS at 17:15

## 2022-08-30 ASSESSMENT — FIBROSIS 4 INDEX: FIB4 SCORE: 3.21

## 2022-08-30 NOTE — ED TRIAGE NOTES
Patient has dual pacemaker and defibrillator. Heart rate in low 100s today. He denies any pain or sob but wondering if his pacemaker is working. Patient to triage room for EKG.

## 2022-08-30 NOTE — ED PROVIDER NOTES
ED Provider Note    CHIEF COMPLAINT  Chief Complaint   Patient presents with    Tachycardia     He has a pacemaker and defibrillator and today heart rate above 100 with dual pacer.     HPI  Patient is a 82-year-old male with a past medical history of CHF/nonischemic cardiomyopathy, last EF of 20%, paroxysmal A. fib status post watchman device in 2015 and status post dual-chamber ICD placed in 2010.  History of V. tach status post ablation in 2012, hypertension, dyslipidemia, AAA and TIA who presents emergency room for concerns regarding elevated heart rate.  Patient follows with Dr. Velázquez of cardiology.    He presents to the emergency room after feeling unlike himself while at home resting in his bed for several hours.  Eventually with this nonspecific uneasiness he checked his device on his smart phone and noticed that his heart rate has been elevated into the low 100s during this period of time.  He notes his pacemaker is set for 61 he describes no recent illness, chest pain or shortness of breath, no urinary symptoms, diarrheal illness or viral syndromes.  He has not had problems with the pacemaker previously and has been told that the remote updates have been showing no acute events.    PPE Note: I personally donned full PPE for all patient encounters during this visit, including being clean-shaven with an N95 respirator mask, gloves, and goggles.     REVIEW OF SYSTEMS  See [facility] for further details. All other systems are negative.     PAST MEDICAL HISTORY   has a past medical history of Breath shortness, Cardiomyopathy (HCC) (05/2021), Chronic obstructive pulmonary disease (HCC), [place] measles, Heart attack (HCC), History of abdominal aortic aneurysm (AAA) (2009), Hyperlipidemia, Hypertension, Mumps, Pacemaker, Paroxysmal atrial fibrillation (HCC), Renal disorder, Sleep apnea, Snoring, Stroke (HCC) (2012), Tonsillitis, and Ventricular tachycardia ([facility]).    SOCIAL HISTORY  Social History     Tobacco Use    Smoking  status: Former     Packs/day: 0.50     Years: 15.00     Pack years: 7.50     Types: Cigarettes     Quit date: 2004     Years since quittin.1    Smokeless tobacco: Former   Vaping Use    Vaping Use: Never used   Substance and Sexual Activity    Alcohol use: Yes     Alcohol/week: 8.4 oz     Types: 14 Glasses of wine per week     Comment: 2 per day    Drug use: Yes     Comment: marijuana occ    Sexual activity: Not on file       SURGICAL HISTORY   has a past surgical history that includes aicd battery change (Left, 2017); aaa with stent graft (); other (); other orthopedic surgery (); other cardiac surgery (); other cardiac surgery (); and aicd battery change (Left, 09/10/2020).    CURRENT MEDICATIONS  Home Medications       Reviewed by Alfonso Singh (Pharmacy Tech) on 22 at 1711  Med List Status: Complete     Medication Last Dose Status   albuterol 108 (90 Base) MCG/ACT Aero Soln inhalation aerosol 2022 Active   amiodarone (CORDARONE) 200 MG Tab 2022 Active   aspirin EC (ECOTRIN) 81 MG Tablet Delayed Response 2022 Active   atorvastatin (LIPITOR) 20 MG Tab 2022 Active   carvedilol (COREG) 25 MG Tab 2022 Active   cinacalcet (SENSIPAR) 30 MG Tab 2022 Active   Empagliflozin (JARDIANCE) 10 MG Tab 2022 Active   furosemide (LASIX) 20 MG Tab 2022 Active   losartan (COZAAR) 100 MG Tab 2022 Active   multivitamin (THERAGRAN) Tab 2022 Active   Omega-3 Fatty Acids (FISH OIL) 1000 MG Cap capsule 2022 Active   spironolactone (ALDACTONE) 25 MG Tab 2022 Active   tamsulosin (FLOMAX) 0.4 MG capsule 2022 Active   vitamin D3 (CHOLECALCIFEROL) 1000 Unit (25 mcg) Tab 2022 Active                    ALLERGIES  Allergies   Allergen Reactions    Entresto [Sacubitril-Valsartan]      Swollen tongue. Angioedema.       PHYSICAL EXAM  VITAL SIGNS: /85   Pulse (!) 111   Temp 36.3 °C (97.4 °F) (Temporal)   Resp 18   Ht  "1.88 m (6' 2\")   Wt 86.7 kg (191 lb 2.2 oz)   SpO2 98%   BMI 24.54 kg/m²   Pulse ox interpretation: I interpret this pulse ox as normal.  Genl: M sitting in chair comfortably, speaking clearly, appears in no acute distress   Head: NC/AT   ENT: Mucous membranes dry, posterior pharynx clear, uvula midline, nares patent bilaterally   Eyes: Normal sclera, pupils equal round reactive to light  Neck: Supple, FROM, no LAD appreciated   Pulmonary: Lungs are clear to auscultation bilaterally  Chest: No TTP  CV:  tachycardia, no murmur appreciated, pulses 2+ in both upper and lower extremities,  Abdomen: soft, NT/ND; no rebound/guarding, no masses palpated, no HSM  : no CVA or suprapubic tenderness  Musculoskeletal: Pain free ROM of the neck. Moving upper and lower extremities and spontaneous in coordinated fashion  Neuro: A&Ox4 (person, place, time, situation), speech fluent, gait steady, no focal deficits appreciated, No cerebellar signs. Sensation is grossly intact in the distal upper and lower extremities.  5/5 strength in  and dorsiflexion/plantar flexion of the ankles  Psych: Patient has an appropriate affect and behavior  Skin: No rash or lesions.  No pallor or jaundice.  No cyanosis.  Warm and dry.     DIAGNOSTIC STUDIES / PROCEDURES    EKG  Results for orders placed or performed during the hospital encounter of 22   EKG   Result Value Ref Range    Report       [facility] Emergency Dept.    Test Date:  2022  Pt Name:    KYUNG LEONARD               Department: [facility]  MRN:        [id number]                      Room:  Gender:     Male                         Technician: ERNA  :        [id number]                   Requested By:ER TRIAGE PROTOCOL  Order #:    [id number]                    Reading MD: Dex Ruiz MD    Measurements  Intervals                                Axis  Rate:       105                          P:          0  SD:                                      QRS:  "       -78  QRSD:       206                          T:          108  QT:         468  QTc:        619    Interpretive Statements  Atrial-ventricular dual-paced rhythm, Rate of 105, limited interpretation due  to  dual pacing present.  Tachycardic rhythm compared to previous dated  11/11/2021.  Electronically Signed On 8- 17:06:38 PDT by Dex Ruiz MD       LABS  Labs Reviewed   CBC WITH DIFFERENTIAL - Abnormal; Notable for the following components:       Result Value    WBC 3.5 (*)     .2 (*)     MCH 34.5 (*)     MCHC 33.5 (*)     Platelet Count 142 (*)     Neutrophils (Absolute) 1.75 (*)     All other components within normal limits   COMP METABOLIC PANEL - Abnormal; Notable for the following components:    Glucose 149 (*)     Bun 36 (*)     Creatinine 1.89 (*)     AST(SGOT) 46 (*)     ALT(SGPT) 68 (*)     All other components within normal limits   URINALYSIS - Abnormal; Notable for the following components:    Ketones Trace (*)     All other components within normal limits   ESTIMATED GFR - Abnormal; Notable for the following components:    GFR (CKD-EPI) 35 (*)     All other components within normal limits   MAGNESIUM   COV-2 AND FLU A/B BY PCR (ROCHE/TF)     RADIOLOGY  DX-CHEST-PORTABLE (1 VIEW)   Final Result      1.  Stable cardiomegaly and AICD. Lead position appears stable.   2.  No acute abnormality.        COURSE & MEDICAL DECISION MAKING  Pertinent Labs & Imaging studies reviewed. (See chart for details)    DDX:Pacemaker malfunction/failure to sense/capture, electrolyte derangement, arhythmia, kidney injury, anemia    MDM  Initial evaluation at 1652:  Seen and evaluated for symptoms as described above.  The patient is hemodynamically stable, has a tachycardic rate that is paced and pacemaker interpretation is collected and sent off to [employer].  Lab work for the above differential was considered and diagnostic work-up initiated.    1715: Received call from [employer] and [employer] who states  that the patient has been in continued BiV pacing with no acute events.  Device is set to BiV paced between 60 and 115.    Patient appears somewhat clinically dehydrated, does not have any other constitutional complaints or hemodynamic instability.  The patient has slight creatinine elevation however upon review this is not grossly abnormal from his previous baseline.  He has a slight leukopenia with MCV elevation but normal H&H.  Slight decrease in platelets.  This could be reflective of early viral syndrome and nonemergent COVID test is obtained.  With a normal electrolyte panel, no acute RANI compared to his prior labs and no signs of acute infectious etiology on chest x-ray urinalysis I did call the cardiologist Dr. Segura.  We had discussion regarding his current findings and at this time with a pacemaker interpretation that shows no acute events and very minimal overall symptomology no exertional chest discomfort patient has appropriate for an outpatient follow-up at this time.  He was orally rehydrated and received IV hydration.  He does feel substantially improved and understands the current findings and the need for follow-up in the cardiology clinic upon discharge.  He also understands that his COVID test is currently pending where patient will need to follow-up within the next 6 to 12 hours for these results.  If the patient does develop any exertional chest discomfort, any shortness of breath, any fevers or localizing pain I would recommend reassessment here in the emergency department.  If he has any defibrillator firing or syncopal events I would also recommend return to the emergency department.  Questions are addressed and they are discharged home in stable condition.    HYDRATION: Based on the patient's presentation of Dehydration and Tachycardia the patient was given IV fluids. IV Hydration was used because oral hydration was not adequate alone. Upon recheck following hydration, the patient was  improved.    FINAL IMPRESSION  Visit Diagnoses     ICD-10-CM   1. Tachycardia  R00.0   2. Feeling unwell  R68.89     Electronically signed by: Joseph Kowalski M.D., 8/30/2022 4:52 PM

## 2022-08-31 LAB
FLUAV RNA SPEC QL NAA+PROBE: NEGATIVE
FLUBV RNA SPEC QL NAA+PROBE: NEGATIVE
SARS-COV-2 RNA RESP QL NAA+PROBE: NOTDETECTED
SPECIMEN SOURCE: NORMAL

## 2022-08-31 NOTE — ED NOTES
Pt notified of need for urine sample. Unable to provide at this time. Supplies provided at bedside.

## 2022-09-01 ENCOUNTER — OFFICE VISIT (OUTPATIENT)
Dept: CARDIOLOGY | Facility: MEDICAL CENTER | Age: 82
End: 2022-09-01
Payer: MEDICARE

## 2022-09-01 ENCOUNTER — HOSPITAL ENCOUNTER (OUTPATIENT)
Dept: CARDIOLOGY | Facility: MEDICAL CENTER | Age: 82
End: 2022-09-01
Attending: INTERNAL MEDICINE
Payer: MEDICARE

## 2022-09-01 VITALS
SYSTOLIC BLOOD PRESSURE: 120 MMHG | RESPIRATION RATE: 16 BRPM | HEIGHT: 74 IN | HEART RATE: 89 BPM | BODY MASS INDEX: 25.03 KG/M2 | DIASTOLIC BLOOD PRESSURE: 80 MMHG | OXYGEN SATURATION: 97 % | WEIGHT: 195 LBS

## 2022-09-01 DIAGNOSIS — Z86.79 HISTORY OF ABDOMINAL AORTIC ANEURYSM (AAA): ICD-10-CM

## 2022-09-01 DIAGNOSIS — I42.0 DILATED CARDIOMYOPATHY (HCC): ICD-10-CM

## 2022-09-01 DIAGNOSIS — E78.2 MIXED HYPERLIPIDEMIA: ICD-10-CM

## 2022-09-01 DIAGNOSIS — N17.9 AKI (ACUTE KIDNEY INJURY) (HCC): ICD-10-CM

## 2022-09-01 DIAGNOSIS — I47.20 VENTRICULAR TACHYCARDIA (HCC): ICD-10-CM

## 2022-09-01 DIAGNOSIS — I50.20 ACC/AHA STAGE C SYSTOLIC HEART FAILURE (HCC): ICD-10-CM

## 2022-09-01 DIAGNOSIS — I50.9 HEART FAILURE, NYHA CLASS 2 (HCC): ICD-10-CM

## 2022-09-01 DIAGNOSIS — I73.9 PVD (PERIPHERAL VASCULAR DISEASE) (HCC): ICD-10-CM

## 2022-09-01 DIAGNOSIS — I51.89 LEFT VENTRICULAR SYSTOLIC DYSFUNCTION, NYHA CLASS 3: ICD-10-CM

## 2022-09-01 DIAGNOSIS — Z95.810 PRESENCE OF BIVENTRICULAR AUTOMATIC CARDIOVERTER/DEFIBRILLATOR (AICD): ICD-10-CM

## 2022-09-01 DIAGNOSIS — J44.9 CHRONIC OBSTRUCTIVE PULMONARY DISEASE, UNSPECIFIED COPD TYPE (HCC): ICD-10-CM

## 2022-09-01 DIAGNOSIS — Z79.899 HIGH RISK MEDICATION USE: ICD-10-CM

## 2022-09-01 DIAGNOSIS — Z86.73 HISTORY OF TIA (TRANSIENT ISCHEMIC ATTACK): ICD-10-CM

## 2022-09-01 DIAGNOSIS — I48.0 PAROXYSMAL ATRIAL FIBRILLATION (HCC): ICD-10-CM

## 2022-09-01 DIAGNOSIS — I10 ESSENTIAL HYPERTENSION, BENIGN: ICD-10-CM

## 2022-09-01 PROCEDURE — 93306 TTE W/DOPPLER COMPLETE: CPT

## 2022-09-01 PROCEDURE — 99215 OFFICE O/P EST HI 40 MIN: CPT | Performed by: INTERNAL MEDICINE

## 2022-09-01 ASSESSMENT — ENCOUNTER SYMPTOMS
ORTHOPNEA: 0
SORE THROAT: 0
LOSS OF CONSCIOUSNESS: 0
WHEEZING: 0
MUSCULOSKELETAL NEGATIVE: 1
CARDIOVASCULAR NEGATIVE: 1
GASTROINTESTINAL NEGATIVE: 1
SHORTNESS OF BREATH: 0
BRUISES/BLEEDS EASILY: 0
COUGH: 0
PALPITATIONS: 0
WEAKNESS: 0
FEVER: 0
STRIDOR: 0
DIZZINESS: 0
PND: 0
NEUROLOGICAL NEGATIVE: 1
HEMOPTYSIS: 0
CONSTITUTIONAL NEGATIVE: 1
CHILLS: 0
CLAUDICATION: 0
RESPIRATORY NEGATIVE: 1
SPUTUM PRODUCTION: 0
EYES NEGATIVE: 1

## 2022-09-01 ASSESSMENT — FIBROSIS 4 INDEX: FIB4 SCORE: 3.14

## 2022-09-01 NOTE — PROGRESS NOTES
Chief Complaint   Patient presents with    Congestive Heart Failure     F/v dx: ACC/AHA stage C systolic heart failure (HCC)    Hypertension    Atrial Fibrillation       Subjective     Lencho Parker is a 82 y.o. male who presents today as an urgent follow-up for an ER visit for tachycardia.  He is a healthy 82-year-old male with a past medical history of heart failure with reduced ejection fraction.  He is on 3 different diuretics.  He noticed his heart rate started to increase.  He was given fluids back which reduce his heart rate.  He does not necessarily keep up with his fluids.  He does take the Lasix 3 days a week.  His dry weight is about 1 85-1 90.    Past Medical History:   Diagnosis Date    Breath shortness     on exertion    Cardiomyopathy (HCC) 05/2021    Echocardiogram with moderately dilated LV, mild concentric LVH, LVEF 20%. Global hypokinesis. Normal RA and RV. Severely dilated LA. Mild MR, mild TR. RVSP 34mmHg.    Chronic obstructive pulmonary disease (HCC)     [place] measles     Heart attack (HCC)     hx 2007    History of abdominal aortic aneurysm (AAA) 2009    Status post stent    Hyperlipidemia     Hypertension     Mumps     Pacemaker     AICD    Paroxysmal atrial fibrillation (HCC)     Status post Watchman procedure in [place].    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     Sleep apnea     Snoring     Stroke (HCC) 2012    TIA    Tonsillitis     Ventricular tachycardia (HCC)      Past Surgical History:   Procedure Laterality Date    AICD BATTERY CHANGE Left 09/10/2020     Upgrade to [employer] MRI Quad CRT-D [id number] implanted by Dr. Barrios.    AICD BATTERY CHANGE Left 01/03/2017    Generator replacement with Medtronic Evera MRI XT  [id number] implanted in [place].    OTHER CARDIAC SURGERY  2014    watchman device    AAA WITH STENT GRAFT  2009    OTHER ORTHOPEDIC SURGERY  2005    hip surgery    OTHER  2000    kidney removed    OTHER CARDIAC SURGERY  1991    AICD implanted     Family  History   Problem Relation Age of Onset    Cancer Mother     Cancer Sister      Social History     Socioeconomic History    Marital status: Single     Spouse name: Not on file    Number of children: Not on file    Years of education: Not on file    Highest education level: Not on file   Occupational History    Not on file   Tobacco Use    Smoking status: Former     Packs/day: 0.50     Years: 15.00     Pack years: 7.50     Types: Cigarettes     Quit date: 2004     Years since quittin.1    Smokeless tobacco: Former   Vaping Use    Vaping Use: Never used   Substance and Sexual Activity    Alcohol use: Yes     Alcohol/week: 8.4 oz     Types: 14 Glasses of wine per week     Comment: 2 per day    Drug use: Yes     Comment: marijuana occ    Sexual activity: Not on file   Other Topics Concern    Not on file   Social History Narrative    Not on file     Social Determinants of Health     Financial Resource Strain: Not on file   Food Insecurity: Not on file   Transportation Needs: Not on file   Physical Activity: Not on file   Stress: Not on file   Social Connections: Not on file   Intimate Partner Violence: Not on file   Housing Stability: Not on file     Allergies   Allergen Reactions    Entresto [Sacubitril-Valsartan]      Swollen tongue. Angioedema.     Outpatient Encounter Medications as of 2022   Medication Sig Dispense Refill    furosemide (LASIX) 20 MG Tab Take 20 mg by mouth see administration instructions. Pt takes on Mon, Wed, and Friday      vitamin D3 (CHOLECALCIFEROL) 1000 Unit (25 mcg) Tab Take 1,000 Units by mouth every day.      Empagliflozin (JARDIANCE) 10 MG Tab Take 1 Tablet by mouth every day. 90 Tablet 3    tamsulosin (FLOMAX) 0.4 MG capsule Take 0.4 mg by mouth 1/2 hour after breakfast.      amiodarone (CORDARONE) 200 MG Tab Take 1 tablet by mouth every day. 90 tablet 4    losartan (COZAAR) 100 MG Tab Take 1 tablet by mouth every day. 90 Each 4    spironolactone (ALDACTONE) 25 MG Tab Take  "0.5 Tabs by mouth every day. 45 Tab 3    aspirin EC (ECOTRIN) 81 MG Tablet Delayed Response Take 81 mg by mouth every evening.      cinacalcet (SENSIPAR) 30 MG Tab Take 30 mg by mouth every day.      carvedilol (COREG) 25 MG Tab Take 12.5-25 mg by mouth 2 times a day. Pt takes 25MG in AM and 12.5MG HS       atorvastatin (LIPITOR) 20 MG Tab Take 20 mg by mouth every evening.      Omega-3 Fatty Acids (FISH OIL) 1000 MG Cap capsule Take 1,000 mg by mouth every day.      multivitamin (THERAGRAN) Tab Take 1 Tab by mouth every day.      albuterol 108 (90 Base) MCG/ACT Aero Soln inhalation aerosol Inhale 2 Puffs by mouth every 6 hours as needed for Shortness of Breath. 8.5 g 0     No facility-administered encounter medications on file as of 9/1/2022.     Review of Systems   Constitutional: Negative.  Negative for chills, fever and malaise/fatigue.   HENT: Negative.  Negative for sore throat.    Eyes: Negative.    Respiratory: Negative.  Negative for cough, hemoptysis, sputum production, shortness of breath, wheezing and stridor.    Cardiovascular: Negative.  Negative for chest pain, palpitations, orthopnea, claudication, leg swelling and PND.   Gastrointestinal: Negative.    Genitourinary: Negative.    Musculoskeletal: Negative.    Skin: Negative.    Neurological: Negative.  Negative for dizziness, loss of consciousness and weakness.   Endo/Heme/Allergies: Negative.  Does not bruise/bleed easily.   All other systems reviewed and are negative.           Objective     /80 (BP Location: Left arm, Patient Position: Sitting, BP Cuff Size: Adult)   Pulse 89   Resp 16   Ht 1.88 m (6' 2\")   Wt 88.5 kg (195 lb)   SpO2 97%   BMI 25.04 kg/m²     Physical Exam  Vitals and nursing note reviewed.   Constitutional:       General: He is not in acute distress.     Appearance: He is well-developed. He is not diaphoretic.   HENT:      Head: Normocephalic and atraumatic.      Right Ear: External ear normal.      Left Ear: External " ear normal.      Nose: Nose normal.      Mouth/Throat:      Pharynx: No oropharyngeal exudate.   Eyes:      General: No scleral icterus.        Right eye: No discharge.         Left eye: No discharge.      Conjunctiva/sclera: Conjunctivae normal.      Pupils: Pupils are equal, round, and reactive to light.   Neck:      Vascular: No JVD.   Cardiovascular:      Rate and Rhythm: Normal rate and regular rhythm.      Heart sounds: No murmur heard.    No friction rub. No gallop.   Pulmonary:      Effort: Pulmonary effort is normal. No respiratory distress.      Breath sounds: No stridor. No wheezing or rales.   Chest:      Chest wall: No tenderness.   Abdominal:      General: There is no distension.      Palpations: Abdomen is soft.      Tenderness: There is no guarding.   Musculoskeletal:         General: No tenderness or deformity. Normal range of motion.      Cervical back: Neck supple.   Skin:     General: Skin is warm and dry.      Coloration: Skin is not pale.      Findings: No erythema or rash.   Neurological:      Mental Status: He is alert.      Cranial Nerves: No cranial nerve deficit.      Motor: No abnormal muscle tone.      Coordination: Coordination normal.      Deep Tendon Reflexes: Reflexes are normal and symmetric. Reflexes normal.   Psychiatric:         Behavior: Behavior normal.         Thought Content: Thought content normal.         Judgment: Judgment normal.          Echocardiogram: Dated 5/14/2021 personally reviewed inter myself showing an EF of 20% no severe valvular disease.    ECG: Dated 8/30/2022 personally viewed inter myself showing an AV paced rhythm.    Lab Results   Component Value Date/Time    CHOLSTRLTOT 187 12/11/2020 12:18 PM    LDL 86 12/11/2020 12:18 PM    HDL 64 12/11/2020 12:18 PM    TRIGLYCERIDE 186 (H) 12/11/2020 12:18 PM       Lab Results   Component Value Date/Time    SODIUM 141 08/30/2022 05:09 PM    POTASSIUM 5.2 08/30/2022 05:09 PM    CHLORIDE 109 08/30/2022 05:09 PM    CO2  21 08/30/2022 05:09 PM    GLUCOSE 149 (H) 08/30/2022 05:09 PM    BUN 36 (H) 08/30/2022 05:09 PM    CREATININE 1.89 (H) 08/30/2022 05:09 PM     Lab Results   Component Value Date/Time    ALKPHOSPHAT 75 08/30/2022 05:09 PM    ASTSGOT 46 (H) 08/30/2022 05:09 PM    ALTSGPT 68 (H) 08/30/2022 05:09 PM    TBILIRUBIN 0.7 08/30/2022 05:09 PM          Assessment & Plan     1. Chronic obstructive pulmonary disease, unspecified COPD type ([facility])        2. Ventricular tachycardia (HCC)        3. 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 Paroxysmal atrial fibrillation (HCC)        5. Mixed hyperlipidemia        6. Essential hypertension, benign        7. Dilated cardiomyopathy ([facility])        8. High risk medication use        9. History of TIA (transient ischemic attack)        10. History of abdominal aortic aneurysm (AAA)        11. PVD (peripheral vascular disease) ([facility])        12. ACC/AHA stage C systolic heart failure ([facility])        13. [facility]        14. Left ventricular systolic dysfunction, NYHA class 3        15. RANI (acute kidney injury) ([facility])            Medical Decision Making: Today's Assessment/Status/Plan:        82-year-old male with a history of dilated cardiomyopathy as well as single kidney from donated with son with symptoms that sound consistent with dehydration.  I will have him stop his Lasix as needed he.  He will stay on the spironolactone as well as the Jardiance.  He will try and stay better fluid hydrated with a higher weight.  He will follow-up with us.  I reviewed his notes from his ER visit.  He had no questions.

## 2022-09-02 ENCOUNTER — TELEPHONE (OUTPATIENT)
Dept: CARDIOLOGY | Facility: MEDICAL CENTER | Age: 82
End: 2022-09-02
Payer: MEDICARE

## 2022-09-02 LAB
LV EJECT FRACT  99904: 10
LV EJECT FRACT MOD 2C 99903: 6.09
LV EJECT FRACT MOD 4C 99902: 20.04
LV EJECT FRACT MOD BP 99901: 10.89

## 2022-09-02 PROCEDURE — 93306 TTE W/DOPPLER COMPLETE: CPT | Mod: 26 | Performed by: INTERNAL MEDICINE

## 2022-09-02 NOTE — TELEPHONE ENCOUNTER
Patient transmitted 09/02/22    27 - Atrial episodes since 08/30/2022      Longest lasting AFL episode (V-Rate: 66 bpm) on 09/02/2022 at 2:43 am lasting 2 hours 28 minutes. No EGM available for this episode.    Pt has watchman.    To be scanned into media.

## 2022-09-05 NOTE — PROGRESS NOTES
CardioMEMS Pulmonary Artery Pressure Monitoring  S: Monitoring pulmonary artery pressures with the CardioMEMS remote monitor implant for chronic heart failure monitoring- Monitoring for July 30th- August 29th  B: s/p sensor implant 02/04/2020 Patient PA Diastolic threshold: 8-20 mmHg  A: Assessment: Patient remained within threshold without interventions.  R: Recommendation: CardioMEMS Pulmonary Artery Pressure Monitoring  S: Monitoring pulmonary artery pressures with the CardioMEMS remote monitor implant for chronic heart failure monitoring- Monitoring for July 30th- August 29th  B: s/p sensor implant 01/07/2021 Patient PA Diastolic threshold: 17-23 mmHg  A: Assessment: Patient had multiple one elevated pressures that came down without medication changes.  R: Recommendation: Continue current management as currently advised.  *These results have been copied from a disparate system, [facility].          *These results have been copied from a disparate system, [facility].

## 2022-09-06 ENCOUNTER — APPOINTMENT (OUTPATIENT)
Dept: RADIOLOGY | Facility: MEDICAL CENTER | Age: 82
DRG: 065 | End: 2022-09-06
Attending: EMERGENCY MEDICINE
Payer: COMMERCIAL

## 2022-09-06 ENCOUNTER — HOSPITAL ENCOUNTER (INPATIENT)
Facility: MEDICAL CENTER | Age: 82
LOS: 1 days | DRG: 065 | End: 2022-09-07
Attending: EMERGENCY MEDICINE | Admitting: HOSPITALIST
Payer: COMMERCIAL

## 2022-09-06 DIAGNOSIS — I69.393 ATAXIA DUE TO RECENT CEREBROVASCULAR ACCIDENT (CVA): ICD-10-CM

## 2022-09-06 DIAGNOSIS — I63.9 CEREBROVASCULAR ACCIDENT (CVA), UNSPECIFIED MECHANISM (HCC): ICD-10-CM

## 2022-09-06 PROBLEM — N18.30 STAGE 3 CHRONIC KIDNEY DISEASE: Status: ACTIVE | Noted: 2022-09-06

## 2022-09-06 LAB
ALBUMIN SERPL BCP-MCNC: 4 G/DL (ref 3.2–4.9)
ALBUMIN/GLOB SERPL: 1.5 G/DL
ALP SERPL-CCNC: 75 U/L (ref 30–99)
ALT SERPL-CCNC: 37 U/L (ref 2–50)
ANION GAP SERPL CALC-SCNC: 14 MMOL/L (ref 7–16)
APTT PPP: 28.6 SEC (ref 24.7–36)
AST SERPL-CCNC: 26 U/L (ref 12–45)
BASOPHILS # BLD AUTO: 0.2 % (ref 0–1.8)
BASOPHILS # BLD: 0.01 K/UL (ref 0–0.12)
BILIRUB SERPL-MCNC: 1 MG/DL (ref 0.1–1.5)
BUN SERPL-MCNC: 27 MG/DL (ref 8–22)
CALCIUM SERPL-MCNC: 9.1 MG/DL (ref 8.4–10.2)
CHLORIDE SERPL-SCNC: 108 MMOL/L (ref 96–112)
CO2 SERPL-SCNC: 16 MMOL/L (ref 20–33)
CREAT SERPL-MCNC: 1.73 MG/DL (ref 0.5–1.4)
D DIMER PPP IA.FEU-MCNC: 4.01 UG/ML (FEU) (ref 0–0.5)
EKG IMPRESSION: NORMAL
EOSINOPHIL # BLD AUTO: 0.02 K/UL (ref 0–0.51)
EOSINOPHIL NFR BLD: 0.4 % (ref 0–6.9)
ERYTHROCYTE [DISTWIDTH] IN BLOOD BY AUTOMATED COUNT: 49.1 FL (ref 35.9–50)
EST. AVERAGE GLUCOSE BLD GHB EST-MCNC: 126 MG/DL
FLUAV RNA SPEC QL NAA+PROBE: NEGATIVE
FLUBV RNA SPEC QL NAA+PROBE: NEGATIVE
GFR SERPLBLD CREATININE-BSD FMLA CKD-EPI: 39 ML/MIN/1.73 M 2
GLOBULIN SER CALC-MCNC: 2.7 G/DL (ref 1.9–3.5)
GLUCOSE BLD STRIP.AUTO-MCNC: 118 MG/DL (ref 65–99)
GLUCOSE BLD STRIP.AUTO-MCNC: 141 MG/DL (ref 65–99)
GLUCOSE SERPL-MCNC: 144 MG/DL (ref 65–99)
HBA1C MFR BLD: 6 % (ref 4–5.6)
HCT VFR BLD AUTO: 45.7 % (ref 42–52)
HGB BLD-MCNC: 15.4 G/DL (ref 14–18)
IMM GRANULOCYTES # BLD AUTO: 0.02 K/UL (ref 0–0.11)
IMM GRANULOCYTES NFR BLD AUTO: 0.4 % (ref 0–0.9)
INR PPP: 1.07 (ref 0.87–1.13)
LYMPHOCYTES # BLD AUTO: 0.61 K/UL (ref 1–4.8)
LYMPHOCYTES NFR BLD: 11.4 % (ref 22–41)
MCH RBC QN AUTO: 34.5 PG (ref 27–33)
MCHC RBC AUTO-ENTMCNC: 33.7 G/DL (ref 33.7–35.3)
MCV RBC AUTO: 102.2 FL (ref 81.4–97.8)
MONOCYTES # BLD AUTO: 0.35 K/UL (ref 0–0.85)
MONOCYTES NFR BLD AUTO: 6.6 % (ref 0–13.4)
NEUTROPHILS # BLD AUTO: 4.33 K/UL (ref 1.82–7.42)
NEUTROPHILS NFR BLD: 81 % (ref 44–72)
NRBC # BLD AUTO: 0 K/UL
NRBC BLD-RTO: 0 /100 WBC
NT-PROBNP SERPL IA-MCNC: 7636 PG/ML (ref 0–125)
PLATELET # BLD AUTO: 143 K/UL (ref 164–446)
PMV BLD AUTO: 11.4 FL (ref 9–12.9)
POTASSIUM SERPL-SCNC: 4.7 MMOL/L (ref 3.6–5.5)
PROT SERPL-MCNC: 6.7 G/DL (ref 6–8.2)
PROTHROMBIN TIME: 13.5 SEC (ref 12–14.6)
RBC # BLD AUTO: 4.47 M/UL (ref 4.7–6.1)
RSV RNA SPEC QL NAA+PROBE: NEGATIVE
SARS-COV-2 RNA RESP QL NAA+PROBE: NOTDETECTED
SODIUM SERPL-SCNC: 138 MMOL/L (ref 135–145)
SPECIMEN SOURCE: NORMAL
TROPONIN T SERPL-MCNC: 29 NG/L (ref 6–19)
TROPONIN T SERPL-MCNC: 29 NG/L (ref 6–19)
WBC # BLD AUTO: 5.3 K/UL (ref 4.8–10.8)

## 2022-09-06 PROCEDURE — 93005 ELECTROCARDIOGRAM TRACING: CPT | Performed by: EMERGENCY MEDICINE

## 2022-09-06 PROCEDURE — 700102 HCHG RX REV CODE 250 W/ 637 OVERRIDE(OP): Performed by: HOSPITALIST

## 2022-09-06 PROCEDURE — 93005 ELECTROCARDIOGRAM TRACING: CPT

## 2022-09-06 PROCEDURE — 83880 ASSAY OF NATRIURETIC PEPTIDE: CPT

## 2022-09-06 PROCEDURE — 36415 COLL VENOUS BLD VENIPUNCTURE: CPT

## 2022-09-06 PROCEDURE — 85025 COMPLETE CBC W/AUTO DIFF WBC: CPT

## 2022-09-06 PROCEDURE — 99223 1ST HOSP IP/OBS HIGH 75: CPT | Performed by: HOSPITALIST

## 2022-09-06 PROCEDURE — 85730 THROMBOPLASTIN TIME PARTIAL: CPT

## 2022-09-06 PROCEDURE — C9803 HOPD COVID-19 SPEC COLLECT: HCPCS | Performed by: EMERGENCY MEDICINE

## 2022-09-06 PROCEDURE — 99285 EMERGENCY DEPT VISIT HI MDM: CPT

## 2022-09-06 PROCEDURE — 71045 X-RAY EXAM CHEST 1 VIEW: CPT

## 2022-09-06 PROCEDURE — 70450 CT HEAD/BRAIN W/O DYE: CPT

## 2022-09-06 PROCEDURE — 770020 HCHG ROOM/CARE - TELE (206)

## 2022-09-06 PROCEDURE — 94760 N-INVAS EAR/PLS OXIMETRY 1: CPT

## 2022-09-06 PROCEDURE — 83036 HEMOGLOBIN GLYCOSYLATED A1C: CPT

## 2022-09-06 PROCEDURE — 700111 HCHG RX REV CODE 636 W/ 250 OVERRIDE (IP): Performed by: HOSPITALIST

## 2022-09-06 PROCEDURE — A9270 NON-COVERED ITEM OR SERVICE: HCPCS | Performed by: HOSPITALIST

## 2022-09-06 PROCEDURE — 85379 FIBRIN DEGRADATION QUANT: CPT

## 2022-09-06 PROCEDURE — 82962 GLUCOSE BLOOD TEST: CPT | Mod: 91

## 2022-09-06 PROCEDURE — 85610 PROTHROMBIN TIME: CPT

## 2022-09-06 PROCEDURE — 84484 ASSAY OF TROPONIN QUANT: CPT

## 2022-09-06 PROCEDURE — 80053 COMPREHEN METABOLIC PANEL: CPT

## 2022-09-06 PROCEDURE — 0241U HCHG SARS-COV-2 COVID-19 NFCT DS RESP RNA 4 TRGT MIC: CPT

## 2022-09-06 RX ORDER — VITAMIN B COMPLEX
1000 TABLET ORAL DAILY
Status: DISCONTINUED | OUTPATIENT
Start: 2022-09-06 | End: 2022-09-07 | Stop reason: HOSPADM

## 2022-09-06 RX ORDER — SPIRONOLACTONE 25 MG/1
12.5 TABLET ORAL DAILY
Status: DISCONTINUED | OUTPATIENT
Start: 2022-09-06 | End: 2022-09-07 | Stop reason: HOSPADM

## 2022-09-06 RX ORDER — ALBUTEROL SULFATE 90 UG/1
2 AEROSOL, METERED RESPIRATORY (INHALATION) EVERY 6 HOURS PRN
Status: DISCONTINUED | OUTPATIENT
Start: 2022-09-06 | End: 2022-09-07 | Stop reason: HOSPADM

## 2022-09-06 RX ORDER — CARVEDILOL 25 MG/1
25 TABLET ORAL EVERY MORNING
Status: DISCONTINUED | OUTPATIENT
Start: 2022-09-06 | End: 2022-09-07 | Stop reason: HOSPADM

## 2022-09-06 RX ORDER — BISACODYL 10 MG
10 SUPPOSITORY, RECTAL RECTAL
Status: DISCONTINUED | OUTPATIENT
Start: 2022-09-06 | End: 2022-09-07 | Stop reason: HOSPADM

## 2022-09-06 RX ORDER — CARVEDILOL 6.25 MG/1
12.5 TABLET ORAL EVERY EVENING
Status: DISCONTINUED | OUTPATIENT
Start: 2022-09-06 | End: 2022-09-07 | Stop reason: HOSPADM

## 2022-09-06 RX ORDER — HYDROMORPHONE HYDROCHLORIDE 1 MG/ML
.5-1 INJECTION, SOLUTION INTRAMUSCULAR; INTRAVENOUS; SUBCUTANEOUS
Status: DISPENSED | OUTPATIENT
Start: 2022-09-06 | End: 2022-09-07

## 2022-09-06 RX ORDER — CINACALCET 30 MG/1
30 TABLET, FILM COATED ORAL DAILY
Status: DISCONTINUED | OUTPATIENT
Start: 2022-09-06 | End: 2022-09-07 | Stop reason: HOSPADM

## 2022-09-06 RX ORDER — ACETAMINOPHEN 325 MG/1
650 TABLET ORAL EVERY 4 HOURS PRN
Status: DISCONTINUED | OUTPATIENT
Start: 2022-09-06 | End: 2022-09-07 | Stop reason: HOSPADM

## 2022-09-06 RX ORDER — AMOXICILLIN 250 MG
2 CAPSULE ORAL 2 TIMES DAILY
Status: DISCONTINUED | OUTPATIENT
Start: 2022-09-06 | End: 2022-09-07 | Stop reason: HOSPADM

## 2022-09-06 RX ORDER — AMIODARONE HYDROCHLORIDE 200 MG/1
200 TABLET ORAL DAILY
Status: DISCONTINUED | OUTPATIENT
Start: 2022-09-06 | End: 2022-09-07 | Stop reason: HOSPADM

## 2022-09-06 RX ORDER — FUROSEMIDE 10 MG/ML
40 INJECTION INTRAMUSCULAR; INTRAVENOUS DAILY
Status: DISCONTINUED | OUTPATIENT
Start: 2022-09-06 | End: 2022-09-07 | Stop reason: HOSPADM

## 2022-09-06 RX ORDER — LOSARTAN POTASSIUM 25 MG/1
100 TABLET ORAL EVERY EVENING
Status: DISCONTINUED | OUTPATIENT
Start: 2022-09-06 | End: 2022-09-07 | Stop reason: HOSPADM

## 2022-09-06 RX ORDER — ACETAMINOPHEN 325 MG/1
650 TABLET ORAL EVERY 4 HOURS PRN
Status: DISCONTINUED | OUTPATIENT
Start: 2022-09-06 | End: 2022-09-06

## 2022-09-06 RX ORDER — POLYETHYLENE GLYCOL 3350 17 G/17G
1 POWDER, FOR SOLUTION ORAL
Status: DISCONTINUED | OUTPATIENT
Start: 2022-09-06 | End: 2022-09-07 | Stop reason: HOSPADM

## 2022-09-06 RX ORDER — ATORVASTATIN CALCIUM 40 MG/1
40 TABLET, FILM COATED ORAL EVERY EVENING
Status: DISCONTINUED | OUTPATIENT
Start: 2022-09-06 | End: 2022-09-07 | Stop reason: HOSPADM

## 2022-09-06 RX ORDER — OXYCODONE HYDROCHLORIDE 5 MG/1
5 TABLET ORAL EVERY 6 HOURS PRN
Status: DISCONTINUED | OUTPATIENT
Start: 2022-09-06 | End: 2022-09-07 | Stop reason: HOSPADM

## 2022-09-06 RX ORDER — TAMSULOSIN HYDROCHLORIDE 0.4 MG/1
0.4 CAPSULE ORAL
Status: DISCONTINUED | OUTPATIENT
Start: 2022-09-06 | End: 2022-09-07 | Stop reason: HOSPADM

## 2022-09-06 RX ORDER — CARVEDILOL 6.25 MG/1
12.5-25 TABLET ORAL 2 TIMES DAILY
Status: DISCONTINUED | OUTPATIENT
Start: 2022-09-06 | End: 2022-09-06

## 2022-09-06 RX ADMIN — CARVEDILOL 25 MG: 25 TABLET, FILM COATED ORAL at 16:19

## 2022-09-06 RX ADMIN — HYDROMORPHONE HYDROCHLORIDE 1 MG: 1 INJECTION, SOLUTION INTRAMUSCULAR; INTRAVENOUS; SUBCUTANEOUS at 20:19

## 2022-09-06 RX ADMIN — Medication 1000 UNITS: at 16:18

## 2022-09-06 RX ADMIN — CINACALCET HYDROCHLORIDE 30 MG: 30 TABLET, FILM COATED ORAL at 16:19

## 2022-09-06 RX ADMIN — ALBUTEROL SULFATE 2 PUFF: 90 AEROSOL, METERED RESPIRATORY (INHALATION) at 18:45

## 2022-09-06 RX ADMIN — LOSARTAN POTASSIUM 100 MG: 25 TABLET, FILM COATED ORAL at 17:06

## 2022-09-06 RX ADMIN — TAMSULOSIN HYDROCHLORIDE 0.4 MG: 0.4 CAPSULE ORAL at 16:18

## 2022-09-06 RX ADMIN — ATORVASTATIN CALCIUM 40 MG: 40 TABLET, FILM COATED ORAL at 17:06

## 2022-09-06 RX ADMIN — OXYCODONE HYDROCHLORIDE 5 MG: 5 TABLET ORAL at 16:19

## 2022-09-06 RX ADMIN — APIXABAN 2.5 MG: 5 TABLET, FILM COATED ORAL at 17:06

## 2022-09-06 RX ADMIN — AMIODARONE HYDROCHLORIDE 200 MG: 200 TABLET ORAL at 16:19

## 2022-09-06 RX ADMIN — SPIRONOLACTONE 12.5 MG: 25 TABLET ORAL at 16:18

## 2022-09-06 RX ADMIN — FUROSEMIDE 40 MG: 10 INJECTION, SOLUTION INTRAMUSCULAR; INTRAVENOUS at 16:21

## 2022-09-06 ASSESSMENT — ENCOUNTER SYMPTOMS
HEMOPTYSIS: 0
ORTHOPNEA: 1
SPUTUM PRODUCTION: 0
SHORTNESS OF BREATH: 1
FEVER: 0
COUGH: 1

## 2022-09-06 ASSESSMENT — LIFESTYLE VARIABLES
TOTAL SCORE: 0
AVERAGE NUMBER OF DAYS PER WEEK YOU HAVE A DRINK CONTAINING ALCOHOL: 7
ON A TYPICAL DAY WHEN YOU DRINK ALCOHOL HOW MANY DRINKS DO YOU HAVE: 2
EVER HAD A DRINK FIRST THING IN THE MORNING TO STEADY YOUR NERVES TO GET RID OF A HANGOVER: NO
TOTAL SCORE: 0
HOW MANY TIMES IN THE PAST YEAR HAVE YOU HAD 5 OR MORE DRINKS IN A DAY: 0
ALCOHOL_USE: YES
CONSUMPTION TOTAL: NEGATIVE
EVER FELT BAD OR GUILTY ABOUT YOUR DRINKING: NO
HAVE YOU EVER FELT YOU SHOULD CUT DOWN ON YOUR DRINKING: NO
HAVE PEOPLE ANNOYED YOU BY CRITICIZING YOUR DRINKING: NO
TOTAL SCORE: 0

## 2022-09-06 ASSESSMENT — COGNITIVE AND FUNCTIONAL STATUS - GENERAL
CLIMB 3 TO 5 STEPS WITH RAILING: A LITTLE
DAILY ACTIVITIY SCORE: 24
SUGGESTED CMS G CODE MODIFIER DAILY ACTIVITY: CH
WALKING IN HOSPITAL ROOM: A LITTLE
SUGGESTED CMS G CODE MODIFIER MOBILITY: CJ
MOBILITY SCORE: 22

## 2022-09-06 ASSESSMENT — PAIN DESCRIPTION - PAIN TYPE
TYPE: ACUTE PAIN

## 2022-09-06 ASSESSMENT — FIBROSIS 4 INDEX
FIB4 SCORE: 3.14
FIB4 SCORE: 2.39

## 2022-09-06 ASSESSMENT — PATIENT HEALTH QUESTIONNAIRE - PHQ9
SUM OF ALL RESPONSES TO PHQ9 QUESTIONS 1 AND 2: 0
1. LITTLE INTEREST OR PLEASURE IN DOING THINGS: NOT AT ALL
2. FEELING DOWN, DEPRESSED, IRRITABLE, OR HOPELESS: NOT AT ALL

## 2022-09-06 NOTE — ASSESSMENT & PLAN NOTE
CT consistent with subacute embolic strokes.  ED physician discussed with Dr. Muniz from neurology who reviewed CT and recommended initiation of anticoagulation with Eliquis 2.5 mg twice daily.  I reviewed with the patient RBA of anticoagulation with Eliquis and is in agreement.  Patient has AICD in place reports he cannot have MRIs discussed with neurology and recommendation is to proceed with carotid duplex and hold off on CTA given his CKD  Echocardiogram done on 9/1/2020 revealed EF of 10%  PT OT  Physiatry consult

## 2022-09-06 NOTE — ED TRIAGE NOTES
Lencho Parker  82 y.o.  Chief Complaint   Patient presents with    Weakness    Shortness of Breath    Headache     Pt BIB [facility] from home.  Pt reports he woke up with the above symptoms, but then during triage pt reports he has been taking tylenol t/o the night for the headache and that he was seen in ER last week for dehydration and has a COVID test pending.  PIV placed PTA.  EKG done on arrival to the ER.

## 2022-09-06 NOTE — H&P
Hospital Medicine History & Physical Note    Date of Service  9/6/2022    Primary Care Physician  Brennan Castorena M.D.    Consultants    Code Status  Full Code    Chief Complaint  Chief Complaint   Patient presents with    Weakness    Shortness of Breath    Headache       History of Presenting Illness  Lencho Pakrer is a 82 y.o. male who presented 9/6/2022 with history of CHF EF 10% paroxysmal atrial fibrillation presented to the emergency department for evaluation of unsteady gait and headache.  Patient reports that over the past couple of days he noticed that his balance was off today he started to have a headache that he describes as dull and moderate mostly in the posterior skull area.  No nausea no vomiting no hematemesis.  He has noted mild increase in dyspnea with activity.  He has not noted any focal weakness or numbness.  He has a nonproductive cough.  Patient reports that he is not on anticoagulation secondary to GI bleed on Pradaxa.  He has a remote history of stroke.    I discussed the plan of care with patient and ED physician .    Review of Systems  Review of Systems   Constitutional:  Negative for fever.   Respiratory:  Positive for cough and shortness of breath. Negative for hemoptysis and sputum production.    Cardiovascular:  Positive for orthopnea and leg swelling.   All other systems reviewed and are negative.    Past Medical History   has a past medical history of Breath shortness, Cardiomyopathy (HCC) (05/2021), Chronic obstructive pulmonary disease (HCC), [place] measles, Heart attack (HCC), History of abdominal aortic aneurysm (AAA) (2009), Hyperlipidemia, Hypertension, Mumps, Pacemaker, Paroxysmal atrial fibrillation (HCC), Renal disorder, Sleep apnea, Snoring, Stroke (HCC) (2012), Tonsillitis, and Ventricular tachycardia (HCC).    Surgical History   has a past surgical history that includes aicd battery change (Left, 01/03/2017); aaa with stent graft (2009); other (2000); other  orthopedic surgery (2005); other cardiac surgery (1991); other cardiac surgery (2014); and aicd battery change (Left, 09/10/2020).     Family History  family history includes Cancer in his mother and sister.        Social History   reports that he quit smoking about 18 years ago. His smoking use included cigarettes. He has a 7.50 pack-year smoking history. He has quit using smokeless tobacco. He reports current alcohol use of about 8.4 oz per week. He reports current drug use.    Allergies  Allergies   Allergen Reactions    Entresto [Sacubitril-Valsartan]      Swollen tongue. Angioedema.       Medications  Prior to Admission Medications   Prescriptions Last Dose Informant Patient Reported? Taking?   Empagliflozin (JARDIANCE) 10 MG Tab 9/5/2022 at 0800 Patient No No   Sig: Take 1 Tablet by mouth every day.   Omega-3 Fatty Acids (FISH OIL) 1000 MG Cap capsule 9/5/2022 at 1830 Patient Yes No   Sig: Take 1,000 mg by mouth every evening.   albuterol 108 (90 Base) MCG/ACT Aero Soln inhalation aerosol 9/6/2022 at 0600 Patient No No   Sig: Inhale 2 Puffs by mouth every 6 hours as needed for Shortness of Breath.   amiodarone (CORDARONE) 200 MG Tab 9/5/2022 at 0800 Patient No No   Sig: Take 1 tablet by mouth every day.   aspirin EC (ECOTRIN) 81 MG Tablet Delayed Response 9/5/2022 at 1830 Patient Yes No   Sig: Take 81 mg by mouth every evening.   atorvastatin (LIPITOR) 20 MG Tab 9/5/2022 at 1830 Patient Yes No   Sig: Take 20 mg by mouth every evening.   carvedilol (COREG) 25 MG Tab 9/5/2022 at 1830 Patient Yes No   Sig: Take 12.5-25 mg by mouth 2 times a day. Pt takes 25MG in AM and 12.5MG HS    cinacalcet (SENSIPAR) 30 MG Tab 9/5/2022 at 0800 Patient Yes No   Sig: Take 30 mg by mouth every day.   furosemide (LASIX) 20 MG Tab 9/5/2022 at 0800 Patient Yes No   Sig: Take 20 mg by mouth see administration instructions. Pt takes on Mon, Wed, and Friday   losartan (COZAAR) 100 MG Tab 9/5/2022 at 1830 Patient No No   Sig: Take 1  tablet by mouth every day.   Patient taking differently: Take 100 mg by mouth every evening.   multivitamin (THERAGRAN) Tab 9/5/2022 at 0800 Patient Yes No   Sig: Take 1 Tab by mouth every day.   spironolactone (ALDACTONE) 25 MG Tab 9/5/2022 at 0800 Patient No No   Sig: Take 0.5 Tabs by mouth every day.   tamsulosin (FLOMAX) 0.4 MG capsule 9/5/2022 at 0800 Patient Yes No   Sig: Take 0.4 mg by mouth 1/2 hour after breakfast.   vitamin D3 (CHOLECALCIFEROL) 1000 Unit (25 mcg) Tab 9/5/2022 at 0800 Patient Yes No   Sig: Take 1,000 Units by mouth every day.      Facility-Administered Medications: None       Physical Exam  Temp:  [35.9 °C (96.6 °F)-36.8 °C (98.2 °F)] 36.8 °C (98.2 °F)  Pulse:  [60-72] 62  Resp:  [11-27] 21  BP: (119-132)/(88-93) 119/88  SpO2:  [88 %-95 %] 94 %  Blood Pressure : 119/88   Temperature: 36.8 °C (98.2 °F)   Pulse: 62   Respiration: (!) 21   Pulse Oximetry: 94 %       Physical Exam    Laboratory:  Recent Labs     09/06/22  1220   WBC 5.3   RBC 4.47*   HEMOGLOBIN 15.4   HEMATOCRIT 45.7   .2*   MCH 34.5*   MCHC 33.7   RDW 49.1   PLATELETCT 143*   MPV 11.4     Recent Labs     09/06/22  1220   SODIUM 138   POTASSIUM 4.7   CHLORIDE 108   CO2 16*   GLUCOSE 144*   BUN 27*   CREATININE 1.73*   CALCIUM 9.1     Recent Labs     09/06/22  1220   ALTSGPT 37   ASTSGOT 26   ALKPHOSPHAT 75   TBILIRUBIN 1.0   GLUCOSE 144*     Recent Labs     09/06/22  1220   APTT 28.6   INR 1.07     Recent Labs     09/06/22  1220   NTPROBNP 7636*         Recent Labs     09/06/22  1220   TROPONINT 29*       Imaging:  DX-CHEST-PORTABLE (1 VIEW)   Final Result      1.  Cardiomegaly with interstitial prominence.      2.  Left-sided AICD.      CT-HEAD W/O   Final Result      1.  Probable subacute infarct involving the LEFT inferior cerebellum.   2.  Multiple chronic infarcts involving the LEFT frontal, LEFT parietal, LEFT posterior temporal and RIGHT occipital regions.   3.  Diffuse atrophy and white matter microvascular  ischemic changes.   4.  No acute intracranial hemorrhage.      These findings were discussed with KYUNG STEIN on 9/6/2022 1:26 PM.         US-CAROTID DOPPLER BILAT    (Results Pending)         Assessment/Plan:  Justification for Admission Status  I anticipate this patient will require at least two midnights for appropriate medical management, necessitating inpatient admission because  stroke    Patient will need a Telemetry bed on NEUROLOGY service .  The need is secondary to stroke.    * Ataxia due to recent cerebrovascular accident (CVA)- (present on admission)  Assessment & Plan  CT consistent with subacute embolic strokes.  ED physician discussed with Dr. Muniz from neurology who reviewed CT and recommended initiation of anticoagulation with Eliquis 2.5 mg twice daily.  I reviewed with the patient RBA of anticoagulation with Eliquis and is in agreement.  Patient has AICD in place reports he cannot have MRIs discussed with neurology and recommendation is to proceed with carotid duplex and hold off on CTA given his CKD  Echocardiogram done on 9/1/2020 revealed EF of 10%  PT OT  Physiatry consult    Stage 3 chronic kidney disease (HCC)  Assessment & Plan  Monitor renal function with diuresis    DM (diabetes mellitus) (HCC)- (present on admission)  Assessment & Plan  Insulin sliding scale monitor CBGs  Check hemoglobin A1c    Presence of biventricular automatic cardioverter/defibrillator (AICD)- (present on admission)  Assessment & Plan  Patient reports his device is not compatible with MRI    Paroxysmal atrial fibrillation (HCC)- (present on admission)  Assessment & Plan  Continue amiodarone  Start anticoagulation with Eliquis RBA reviewed with patient he is in agreement with anticoagulation    Cardiomyopathy (HCC)- (present on admission)  Assessment & Plan  Appears mildly volume overloaded  We will switch his Lasix to IV and monitor intake and output  Continue medical therapy with carvedilol losartan  Aldactone      VTE prophylaxis: therapeutic anticoagulation with eliquis

## 2022-09-06 NOTE — ED PROVIDER NOTES
ED Provider Note    CHIEF COMPLAINT  Chief Complaint   Patient presents with    Weakness    Shortness of Breath    Headache       [facility]  Lencho Parker is a 82 y.o. male who presents with a past medical history significant for cardiomyopathy, coronary artery disease, high cholesterol, hypertension, paroxysmal atrial fibrillation, pacemaker, he presented 1 week ago with tachycardia and that time was found to be mildly dehydrated.  He tested negative for COVID at that time.  Today he woke up with severe left posterior headache, generalized weakness, and difficulty walking with ataxia falling to the side when he tries to walk.  He said the ataxia started last night at 1 AM.  He denies any focal numbness or weakness of his arms or legs.  His weakness is general.  He has had a small amount of cough.  He said he woke up diaphoretic as well.  He denies chest pain.    REVIEW OF SYSTEMS  See HPI for further details. All other systems are negative.     PAST MEDICAL HISTORY   has a past medical history of Breath shortness, Cardiomyopathy (HCC) (2021), Chronic obstructive pulmonary disease (HCC), [place] measles, Heart attack (HCC), History of abdominal aortic aneurysm (AAA) (), Hyperlipidemia, Hypertension, Mumps, Pacemaker, Paroxysmal atrial fibrillation (HCC), Renal disorder, Sleep apnea, Snoring, Stroke (HCC) (), Tonsillitis, and Ventricular tachycardia ([facility]).    SOCIAL HISTORY  Social History     Tobacco Use    Smoking status: Former     Packs/day: 0.50     Years: 15.00     Pack years: 7.50     Types: Cigarettes     Quit date: 2004     Years since quittin.1    Smokeless tobacco: Former   Vaping Use    Vaping Use: Never used   Substance and Sexual Activity    Alcohol use: Yes     Alcohol/week: 8.4 oz     Types: 14 Glasses of wine per week     Comment: 2 per day    Drug use: Yes     Comment: marijuana occ    Sexual activity: Not on file       SURGICAL HISTORY   has a past surgical history that  "includes aicd battery change (Left, 01/03/2017); aaa with stent graft (2009); other (2000); other orthopedic surgery (2005); other cardiac surgery (1991); other cardiac surgery (2014); and aicd battery change (Left, 09/10/2020).    CURRENT MEDICATIONS  Home Medications       Reviewed by Alfonso Singh (Pharmacy Tech) on 09/06/22 at 1235  Med List Status: Complete     Medication Last Dose Status   albuterol 108 (90 Base) MCG/ACT Aero Soln inhalation aerosol 9/6/2022 Active   amiodarone (CORDARONE) 200 MG Tab 9/5/2022 Active   aspirin EC (ECOTRIN) 81 MG Tablet Delayed Response 9/5/2022 Active   atorvastatin (LIPITOR) 20 MG Tab 9/5/2022 Active   carvedilol (COREG) 25 MG Tab 9/5/2022 Active   cinacalcet (SENSIPAR) 30 MG Tab 9/5/2022 Active   Empagliflozin (JARDIANCE) 10 MG Tab 9/5/2022 Active   furosemide (LASIX) 20 MG Tab 9/5/2022 Active   losartan (COZAAR) 100 MG Tab 9/5/2022 Active   multivitamin (THERAGRAN) Tab 9/5/2022 Active   Omega-3 Fatty Acids (FISH OIL) 1000 MG Cap capsule 9/5/2022 Active   spironolactone (ALDACTONE) 25 MG Tab 9/5/2022 Active   tamsulosin (FLOMAX) 0.4 MG capsule 9/5/2022 Active   vitamin D3 (CHOLECALCIFEROL) 1000 Unit (25 mcg) Tab 9/5/2022 Active                    ALLERGIES  Allergies   Allergen Reactions    Entresto [Sacubitril-Valsartan]      Swollen tongue. Angioedema.       FAMILY HISTORY  No pertinent family history    PHYSICAL EXAM  VITAL SIGNS: BP (!) 132/93   Pulse 60   Temp 35.9 °C (96.6 °F) (Temporal)   Resp (!) 24   Ht 1.88 m (6' 2\")   Wt 85.7 kg (189 lb)   SpO2 95%   BMI 24.27 kg/m²  @IVETH[[id number]::@   Pulse ox interpretation: I interpret this pulse ox as normal.  Constitutional: Alert.  HENT: No signs of trauma, Bilateral external ears normal, Nose normal.   Eyes: Pupils are equal and reactive, Conjunctiva normal, Non-icteric.   Neck: Normal range of motion, No tenderness, Supple, No stridor.   Lymphatic: No lymphadenopathy noted.   Cardiovascular: Regular rate and " rhythm, no murmurs.   Thorax & Lungs: Normal breath sounds, No respiratory distress, No wheezing, No chest tenderness.   Abdomen: Bowel sounds normal, Soft, No tenderness, No masses, No pulsatile masses. No peritoneal signs.  Skin: Warm, Dry, No erythema, No rash.   Back: No bony tenderness, No CVA tenderness.   Extremities: Intact distal pulses, No edema, No tenderness, No cyanosis.  Musculoskeletal: Good range of motion in all major joints. No tenderness to palpation or major deformities noted.   Neurologic: Alert , Normal motor function, Normal sensory function, No focal deficits noted.   Psychiatric: Affect normal, Judgment normal, Mood normal.       DIAGNOSTIC STUDIES / PROCEDURES    EKG  Atrial-ventricular dual-paced rhythm rate 63  Prolonged TX interval 224  Prolonged QRS duration 171  Axis LAD -71 degrees  Nonspecific ST-T wave changes  Compared to ECG 08/30/2022 16:29:55  No significant changes  My impression of this EKG, pacemaker rhythm, does not meet STEMI criteria at this time    LABS  Labs Reviewed   CBC WITH DIFFERENTIAL - Abnormal; Notable for the following components:       Result Value    RBC 4.47 (*)     .2 (*)     MCH 34.5 (*)     Platelet Count 143 (*)     Neutrophils-Polys 81.00 (*)     Lymphocytes 11.40 (*)     Lymphs (Absolute) 0.61 (*)     All other components within normal limits    Narrative:     Biotin intake of greater than 5 mg per day may interfere with  troponin levels, causing false low values.  Indicate which anticoagulants the patient is on:->UNKNOWN  1. Age less then 50  2. Heart reate less then 100  3. 02 sat > 94%  4. No prior history of DVT or PE  5. No recent trauma or surgery (2 weeks)  6. No hemoptisis.  7. No  or HRP  8. No un-lateral leg swelling.   COMP METABOLIC PANEL - Abnormal; Notable for the following components:    Co2 16 (*)     Glucose 144 (*)     Bun 27 (*)     Creatinine 1.73 (*)     All other components within normal limits    Narrative:     Biotin  intake of greater than 5 mg per day may interfere with  troponin levels, causing false low values.  Indicate which anticoagulants the patient is on:->UNKNOWN  1. Age less then 50  2. Heart reate less then 100  3. 02 sat > 94%  4. No prior history of DVT or PE  5. No recent trauma or surgery (2 weeks)  6. No hemoptisis.  7. No  or HRP  8. No un-lateral leg swelling.   PROBRAIN NATRIURETIC PEPTIDE, NT - Abnormal; Notable for the following components:    NT-proBNP 7636 (*)     All other components within normal limits    Narrative:     Biotin intake of greater than 5 mg per day may interfere with  troponin levels, causing false low values.  Indicate which anticoagulants the patient is on:->UNKNOWN  1. Age less then 50  2. Heart reate less then 100  3. 02 sat > 94%  4. No prior history of DVT or PE  5. No recent trauma or surgery (2 weeks)  6. No hemoptisis.  7. No  or HRP  8. No un-lateral leg swelling.   TROPONIN - Abnormal; Notable for the following components:    Troponin T 29 (*)     All other components within normal limits    Narrative:     Biotin intake of greater than 5 mg per day may interfere with  troponin levels, causing false low values.  Indicate which anticoagulants the patient is on:->UNKNOWN  1. Age less then 50  2. Heart reate less then 100  3. 02 sat > 94%  4. No prior history of DVT or PE  5. No recent trauma or surgery (2 weeks)  6. No hemoptisis.  7. No  or HRP  8. No un-lateral leg swelling.   D-DIMER - Abnormal; Notable for the following components:    D-Dimer Screen 4.01 (*)     All other components within normal limits    Narrative:     Biotin intake of greater than 5 mg per day may interfere with  troponin levels, causing false low values.  Indicate which anticoagulants the patient is on:->UNKNOWN  1. Age less then 50  2. Heart reate less then 100  3. 02 sat > 94%  4. No prior history of DVT or PE  5. No recent trauma or surgery (2 weeks)  6. No hemoptisis.  7. No  or HRP  8. No  un-lateral leg swelling.   ESTIMATED GFR - Abnormal; Notable for the following components:    GFR (CKD-EPI) 39 (*)     All other components within normal limits    Narrative:     Biotin intake of greater than 5 mg per day may interfere with  troponin levels, causing false low values.  Indicate which anticoagulants the patient is on:->UNKNOWN  1. Age less then 50  2. Heart reate less then 100  3. 02 sat > 94%  4. No prior history of DVT or PE  5. No recent trauma or surgery (2 weeks)  6. No hemoptisis.  7. No  or HRP  8. No un-lateral leg swelling.   PROTHROMBIN TIME    Narrative:     Biotin intake of greater than 5 mg per day may interfere with  troponin levels, causing false low values.  Indicate which anticoagulants the patient is on:->UNKNOWN  1. Age less then 50  2. Heart reate less then 100  3. 02 sat > 94%  4. No prior history of DVT or PE  5. No recent trauma or surgery (2 weeks)  6. No hemoptisis.  7. No  or HRP  8. No un-lateral leg swelling.   APTT    Narrative:     Biotin intake of greater than 5 mg per day may interfere with  troponin levels, causing false low values.  Indicate which anticoagulants the patient is on:->UNKNOWN  1. Age less then 50  2. Heart reate less then 100  3. 02 sat > 94%  4. No prior history of DVT or PE  5. No recent trauma or surgery (2 weeks)  6. No hemoptisis.  7. No  or HRP  8. No un-lateral leg swelling.   COV-2, FLU A/B, AND RSV BY PCR ([employer])         RADIOLOGY  DX-CHEST-PORTABLE (1 VIEW)   Final Result      1.  Cardiomegaly with interstitial prominence.      2.  Left-sided AICD.      CT-HEAD W/O   Final Result      1.  Probable subacute infarct involving the LEFT inferior cerebellum.   2.  Multiple chronic infarcts involving the LEFT frontal, LEFT parietal, LEFT posterior temporal and RIGHT occipital regions.   3.  Diffuse atrophy and white matter microvascular ischemic changes.   4.  No acute intracranial hemorrhage.      These findings were discussed with KYUNG  ILDEFONSO BARRAGAN on 9/6/2022 1:26 PM.                 COURSE & MEDICAL DECISION MAKING  Pertinent Labs & Imaging studies reviewed. (See chart for details)    The patient presents with diaphoresis, left posterior headache, ataxia.  I have ordered a COVID test, blood work, chest x-ray, head CT.    The patient is not a candidate for IV alteplase for stroke because his symptoms are very vague and do not clearly represent stroke.    The patient's COVID test is negative, his BUN and creatinine are chronically elevated, today 27 and 1.7.  His BNP 7600, his troponin 29.  The patient's white blood count is normal.  The patient CT head shows multiple chronic infarcts involving the left frontal, left parietal, left posterior temporal and right occipital regions.  He has a probable subacute infarct involving the left inferior cerebellum.    2:14 PM Neurology paged    2:23 PM I spoke with Dr. Muniz of neurology, patient will stay at [facility] because there would be no different treatment at [facility].  He would like us to start anticoagulation at 2.5 mg twice daily of Eliquis.  He would like the hospitalist to perform a Doppler to look at his carotids.  The patient will benefit from anticoagulation from both a low EF of 10% and A. fib standpoint.      I spoke with Dr. Yang the hospitalist who will assess the patient for hospitalization.  The patient is in fair condition at this time.    The total critical care time on this patient is 40 minutes, resuscitating patient, speaking with admitting physician, and deciphering test results. This 40 minutes is exclusive of separately billable procedures.      FINAL IMPRESSION  1. Cerebrovascular accident (CVA), unspecified mechanism (HCC)          Total critical care time 40 minutes as outlined above      Electronically signed by: Lencho Barragan M.D., 9/6/2022 12:30 PM

## 2022-09-06 NOTE — PROGRESS NOTES
4 Eyes Skin Assessment Completed by Corinne, RN and Candida RN.    Head WDL  Ears WDL  Nose WDL  Mouth WDL  Neck WDL  Breast/Chest Scar, pacemaker site  Shoulder Blades WDL  Spine WDL  (R) Arm/Elbow/Hand WDL  (L) Arm/Elbow/Hand WDL  Abdomen WDL  Groin WDL  Scrotum/Coccyx/Buttocks WDL  (R) Leg WDL  (L) Leg WDL  (R) Heel/Foot/Toe WDL  (L) Heel/Foot/Toe WDL          Devices In Places Tele Box, Blood Pressure Cuff, and Pulse Ox      Interventions In Place Pressure Redistribution Mattress    Possible Skin Injury No    Pictures Uploaded Into Epic N/A  Wound Consult Placed N/A  RN Wound Prevention Protocol Ordered No

## 2022-09-06 NOTE — PROGRESS NOTES
Received report from ED RN. Pt arrived to unit at 1529 via gurney escorted by RN. Assessment complete. VSS. No signs of distress noted at this time. Tele monitor in place. Monitor room notified. Pt c/o pain 8/10, paged MD via Voalte regarding PRN pain medication orders. Fall precautions and appropriate signs in place. Pt oriented to unit routine, call light/phone system within reach. Personal belongings within reach. Pt educated regarding fall precautions. Bed alarm is on. Pt denies any further needs at this time.

## 2022-09-06 NOTE — ASSESSMENT & PLAN NOTE
Continue amiodarone  Start anticoagulation with Eliquis RBA reviewed with patient he is in agreement with anticoagulation

## 2022-09-06 NOTE — ASSESSMENT & PLAN NOTE
Appears mildly volume overloaded  We will switch his Lasix to IV and monitor intake and output  Continue medical therapy with carvedilol losartan Aldactone

## 2022-09-07 ENCOUNTER — PHARMACY VISIT (OUTPATIENT)
Dept: PHARMACY | Facility: MEDICAL CENTER | Age: 82
End: 2022-09-07
Payer: COMMERCIAL

## 2022-09-07 ENCOUNTER — APPOINTMENT (OUTPATIENT)
Dept: RADIOLOGY | Facility: MEDICAL CENTER | Age: 82
DRG: 065 | End: 2022-09-07
Attending: HOSPITALIST
Payer: COMMERCIAL

## 2022-09-07 VITALS
HEART RATE: 60 BPM | BODY MASS INDEX: 24.84 KG/M2 | RESPIRATION RATE: 18 BRPM | HEIGHT: 74 IN | TEMPERATURE: 97.8 F | OXYGEN SATURATION: 95 % | WEIGHT: 193.56 LBS | DIASTOLIC BLOOD PRESSURE: 78 MMHG | SYSTOLIC BLOOD PRESSURE: 116 MMHG

## 2022-09-07 LAB
ANION GAP SERPL CALC-SCNC: 15 MMOL/L (ref 7–16)
BUN SERPL-MCNC: 27 MG/DL (ref 8–22)
CALCIUM SERPL-MCNC: 9 MG/DL (ref 8.4–10.2)
CHLORIDE SERPL-SCNC: 105 MMOL/L (ref 96–112)
CHOLEST SERPL-MCNC: 159 MG/DL (ref 100–199)
CO2 SERPL-SCNC: 17 MMOL/L (ref 20–33)
CREAT SERPL-MCNC: 1.62 MG/DL (ref 0.5–1.4)
ERYTHROCYTE [DISTWIDTH] IN BLOOD BY AUTOMATED COUNT: 49.7 FL (ref 35.9–50)
GFR SERPLBLD CREATININE-BSD FMLA CKD-EPI: 43 ML/MIN/1.73 M 2
GLUCOSE BLD STRIP.AUTO-MCNC: 122 MG/DL (ref 65–99)
GLUCOSE BLD STRIP.AUTO-MCNC: 160 MG/DL (ref 65–99)
GLUCOSE SERPL-MCNC: 131 MG/DL (ref 65–99)
HCT VFR BLD AUTO: 45.5 % (ref 42–52)
HDLC SERPL-MCNC: 53 MG/DL
HGB BLD-MCNC: 15.4 G/DL (ref 14–18)
LDLC SERPL CALC-MCNC: 83 MG/DL
MCH RBC QN AUTO: 34.7 PG (ref 27–33)
MCHC RBC AUTO-ENTMCNC: 33.8 G/DL (ref 33.7–35.3)
MCV RBC AUTO: 102.5 FL (ref 81.4–97.8)
NT-PROBNP SERPL IA-MCNC: ABNORMAL PG/ML (ref 0–125)
PLATELET # BLD AUTO: 133 K/UL (ref 164–446)
PMV BLD AUTO: 11.8 FL (ref 9–12.9)
POTASSIUM SERPL-SCNC: 4.3 MMOL/L (ref 3.6–5.5)
RBC # BLD AUTO: 4.44 M/UL (ref 4.7–6.1)
SODIUM SERPL-SCNC: 137 MMOL/L (ref 135–145)
TRIGL SERPL-MCNC: 114 MG/DL (ref 0–149)
WBC # BLD AUTO: 6.9 K/UL (ref 4.8–10.8)

## 2022-09-07 PROCEDURE — RXMED WILLOW AMBULATORY MEDICATION CHARGE: Performed by: STUDENT IN AN ORGANIZED HEALTH CARE EDUCATION/TRAINING PROGRAM

## 2022-09-07 PROCEDURE — A9270 NON-COVERED ITEM OR SERVICE: HCPCS | Performed by: HOSPITALIST

## 2022-09-07 PROCEDURE — A9270 NON-COVERED ITEM OR SERVICE: HCPCS | Performed by: STUDENT IN AN ORGANIZED HEALTH CARE EDUCATION/TRAINING PROGRAM

## 2022-09-07 PROCEDURE — 82962 GLUCOSE BLOOD TEST: CPT

## 2022-09-07 PROCEDURE — 80061 LIPID PANEL: CPT

## 2022-09-07 PROCEDURE — 80048 BASIC METABOLIC PNL TOTAL CA: CPT

## 2022-09-07 PROCEDURE — 99239 HOSP IP/OBS DSCHRG MGMT >30: CPT | Performed by: STUDENT IN AN ORGANIZED HEALTH CARE EDUCATION/TRAINING PROGRAM

## 2022-09-07 PROCEDURE — 700111 HCHG RX REV CODE 636 W/ 250 OVERRIDE (IP): Performed by: HOSPITALIST

## 2022-09-07 PROCEDURE — 700102 HCHG RX REV CODE 250 W/ 637 OVERRIDE(OP): Performed by: HOSPITALIST

## 2022-09-07 PROCEDURE — 93880 EXTRACRANIAL BILAT STUDY: CPT

## 2022-09-07 PROCEDURE — 85027 COMPLETE CBC AUTOMATED: CPT

## 2022-09-07 PROCEDURE — 93880 EXTRACRANIAL BILAT STUDY: CPT | Mod: 26 | Performed by: INTERNAL MEDICINE

## 2022-09-07 PROCEDURE — 83880 ASSAY OF NATRIURETIC PEPTIDE: CPT

## 2022-09-07 PROCEDURE — 700102 HCHG RX REV CODE 250 W/ 637 OVERRIDE(OP): Performed by: STUDENT IN AN ORGANIZED HEALTH CARE EDUCATION/TRAINING PROGRAM

## 2022-09-07 PROCEDURE — 94760 N-INVAS EAR/PLS OXIMETRY 1: CPT

## 2022-09-07 PROCEDURE — 36415 COLL VENOUS BLD VENIPUNCTURE: CPT

## 2022-09-07 PROCEDURE — 97162 PT EVAL MOD COMPLEX 30 MIN: CPT

## 2022-09-07 PROCEDURE — 97165 OT EVAL LOW COMPLEX 30 MIN: CPT

## 2022-09-07 PROCEDURE — 92610 EVALUATE SWALLOWING FUNCTION: CPT

## 2022-09-07 RX ORDER — DAPAGLIFLOZIN 10 MG/1
10 TABLET, FILM COATED ORAL DAILY
Status: DISCONTINUED | OUTPATIENT
Start: 2022-09-07 | End: 2022-09-07 | Stop reason: HOSPADM

## 2022-09-07 RX ADMIN — FUROSEMIDE 40 MG: 10 INJECTION, SOLUTION INTRAMUSCULAR; INTRAVENOUS at 06:10

## 2022-09-07 RX ADMIN — Medication 1000 UNITS: at 06:10

## 2022-09-07 RX ADMIN — CINACALCET HYDROCHLORIDE 30 MG: 30 TABLET, FILM COATED ORAL at 06:10

## 2022-09-07 RX ADMIN — APIXABAN 2.5 MG: 5 TABLET, FILM COATED ORAL at 06:11

## 2022-09-07 RX ADMIN — DAPAGLIFLOZIN 10 MG: 10 TABLET, FILM COATED ORAL at 14:15

## 2022-09-07 RX ADMIN — INSULIN HUMAN 2 UNITS: 100 INJECTION, SOLUTION PARENTERAL at 10:45

## 2022-09-07 RX ADMIN — SENNOSIDES AND DOCUSATE SODIUM 2 TABLET: 50; 8.6 TABLET ORAL at 06:10

## 2022-09-07 RX ADMIN — AMIODARONE HYDROCHLORIDE 200 MG: 200 TABLET ORAL at 06:11

## 2022-09-07 RX ADMIN — CARVEDILOL 25 MG: 25 TABLET, FILM COATED ORAL at 06:11

## 2022-09-07 RX ADMIN — TAMSULOSIN HYDROCHLORIDE 0.4 MG: 0.4 CAPSULE ORAL at 08:44

## 2022-09-07 RX ADMIN — SPIRONOLACTONE 12.5 MG: 25 TABLET ORAL at 06:11

## 2022-09-07 ASSESSMENT — CHA2DS2 SCORE
PRIOR STROKE OR TIA OR THROMBOEMBOLISM: YES
CHF OR LEFT VENTRICULAR DYSFUNCTION: YES
DIABETES: YES
VASCULAR DISEASE: YES
AGE 65 TO 74: NO
CHA2DS2 VASC SCORE: 8
AGE 75 OR GREATER: YES
HYPERTENSION: YES
SEX: MALE

## 2022-09-07 ASSESSMENT — COGNITIVE AND FUNCTIONAL STATUS - GENERAL
MOVING FROM LYING ON BACK TO SITTING ON SIDE OF FLAT BED: A LITTLE
SUGGESTED CMS G CODE MODIFIER MOBILITY: CJ
WALKING IN HOSPITAL ROOM: A LITTLE
SUGGESTED CMS G CODE MODIFIER DAILY ACTIVITY: CI
MOBILITY SCORE: 20
HELP NEEDED FOR BATHING: A LITTLE
DAILY ACTIVITIY SCORE: 23
CLIMB 3 TO 5 STEPS WITH RAILING: A LITTLE
STANDING UP FROM CHAIR USING ARMS: A LITTLE

## 2022-09-07 ASSESSMENT — GAIT ASSESSMENTS
ASSISTIVE DEVICE: FRONT WHEEL WALKER
GAIT LEVEL OF ASSIST: CONTACT GUARD ASSIST
DISTANCE (FEET): 150

## 2022-09-07 ASSESSMENT — ACTIVITIES OF DAILY LIVING (ADL): TOILETING: INDEPENDENT

## 2022-09-07 ASSESSMENT — PAIN DESCRIPTION - PAIN TYPE: TYPE: ACUTE PAIN

## 2022-09-07 NOTE — CARE PLAN
The patient is Stable - Low risk of patient condition declining or worsening    Shift Goals  Clinical Goals: Monitor on tele, neuro checks, NIH q shift  Patient Goals: rest comfortably, eat, drink water    Progress made toward(s) clinical / shift goals:    Problem: Pain - Standard  Goal: Alleviation of pain or a reduction in pain to the patient’s comfort goal  Outcome: Progressing  Note: PRN pain medications being ordered     Problem: Knowledge Deficit - Stroke Education  Goal: Patient's knowledge of stroke and risk factors will improve  Outcome: Progressing     Problem: Neuro Status  Goal: Neuro status will remain stable or improve  Outcome: Progressing  Flowsheets (Taken 9/6/2022 1816)  Level of Consciousness: Alert  Note: Q4 neuro checks, NIH q shift per active orders.     Problem: Hemodynamic Monitoring  Goal: Patient's hemodynamics, fluid balance and neurologic status will be stable or improve  Outcome: Progressing     Problem: Urinary Elimination  Goal: Establish and maintain regular urinary output  Outcome: Progressing  Note: Urinal at bedside for I/O monitoring.        Patient is not progressing towards the following goals: NA

## 2022-09-07 NOTE — RESPIRATORY CARE
"   COPD EDUCATION by COPD CLINICAL EDUCATOR  9/7/2022 at 7:56 AM by Zahira Leblanc, RRT     Patient reviewed by COPD education team. Patient does not have a history or diagnosis of COPD and is a former smoker.  Therefore, patient does not qualify for the COPD program.     COPD Screen       COPD Assessment  COPD Clinical Specialists ONLY  COPD Education Initiated: No--Quick Screen (Per PFT 7/13/21 PT FEV1/FVC 77% Normal, quit smoking 2004, takes Albuterol as needed, admit hx of CHF EF 10% paroxysmal atrial fibrillation presented to the emergency department for evaluation of unsteady gait and headache, BNP 10,968)  Is this a COPD exacerbation patient?: No    PFT Results    No results found for: PFT - 7/13/2021 Normal    Meds to Beds  Would the patient like to opt in for Bedside Medication Delivery at Discharge?: Yes, interested     MY COPD ACTION PLAN     It is recommended that patients and physicians /healthcare providers complete this action plan together. This plan should be discussed at each physician visit and updated as needed.    The green, yellow and red zones show groups of symptoms of COPD. This list of symptoms is not comprehensive, and you may experience other symptoms. In the \"Actions\" column, your healthcare provider has recommended actions for you to take based on your symptoms.    Patient Name: Lencho Parker   YOB: 1940   Last Updated on: 9/7/2022  7:56 AM   Green Zone:  I am doing well today Actions     Usual activitiy and exercise level   Take daily medications     Usual amounts of cough and phlegm/mucus   Use oxygen as prescribed     Sleep well at night   Continue regular exercise/diet plan     Appetite is good   At all times avoid cigarette smoke, inhaled irritants     Daily Medications (these medications are taken every day):   Albuterol (Accuneb, Proair, Proventil, Ventolin) 2 Puffs Every 4 hours PRN     Additional Information:  Use spacer for rescue inhaler    Yellow " "Zone:  I am having a bad day or a COPD flare Actions     More breathless than usual   Continue daily medications     I have less energy for my daily activities   Use quick relief inhaler as ordered     Increased or thicker phlegm/mucus   Use oxygen as prescribed     Using quick relief inhaler/nebulizer more often   Get plenty of rest     Swelling of ankles more than usual   Use pursed lip breathing     More coughing than usual   At all times avoid cigarette smoke, inhaled irritants     I feel like I have a \"chest cold\"     Poor sleep and my symptoms woke me up     My appetite is not good     My medicine is not helping      Call provider immediately if symptoms don’t improve     Continue daily medications, add rescue medications:   Albuterol 2 Puffs Every 4 hours       Medications to be used during a flare up, (as Discussed with Provider):           Additional Information:  Call provider immediately if symptoms don't improve    Red Zone:  I need urgent medical care Actions     Severe shortness of breath even at rest   Call 911 or seek medical care immediately     Not able to do any activity because of breathing      Fever or shaking chills      Feeling confused or very drowsy       Chest pains      Coughing up blood                  "

## 2022-09-07 NOTE — THERAPY
"Physical Therapy   Initial Evaluation     Patient Name: Lencho Parker  Age:  82 y.o., Sex:  male  Medical Record #: [id number]  Today's Date: 9/7/2022     Precautions  Precautions: Fall Risk    Assessment  Patient is 82 y.o. male with a diagnosis of subacute infarct involving the LEFT inferior cerebellum per CT. Pt presented to ED with HA and c/o feeling off balance and dizzy for a few days. PMH of of CVA, CHF with 10% EF, Afib, COPD.  Pt reports HA has subsided but continues to c/o feeling \"lightheaded and a little dizzy\" when ambulating. Pt with mild balance deficits, lists/leans to the R occasionally but no LOB noted, gait is improved with FWW. Strength, sensation, coordination is WFL bilaterally.  Pt is agreeable to use FWW at home and is interested in HHPT.  Recommend continued acute PT to progress balance and gait prior to DC home.     Plan    Recommend Physical Therapy 5 times per week until therapy goals are met for the following treatments:  Gait Training, Neuro Re-Education / Balance, Stair Training, Therapeutic Activities, and Therapeutic Exercises    DC Equipment Recommendations: Front-Wheel Walker  Discharge Recommendations: Recommend home health for continued physical therapy services        09/07/22 1006   Prior Living Situation   Housing / Facility 1 Story House   Steps Into Home 0   Steps In Home 0   Equipment Owned Single Point Cane;Tub / Shower Seat;Grab Bar(s) In Tub / Shower;Hand Held Shower   Lives with - Patient's Self Care Capacity Significant Other   Comments Pts so.o is currently receiving HH and therefore is not able to assist pt at home   Prior Level of Functional Mobility   Bed Mobility Independent   Transfer Status Independent   Ambulation Independent   Assistive Devices Used None   Stairs Independent   Cognition    Level of Consciousness Alert   Comments Oriented x4   Passive ROM Lower Body   Passive ROM Lower Body Not Tested   Active ROM Lower Body    Active ROM Lower Body  WDL "   Strength Lower Body   Lower Body Strength  WDL   Sensation Lower Body   Lower Extremity Sensation   WDL   Lower Body Muscle Tone   Lower Body Muscle Tone  WDL   Coordination Lower Body    Coordination Lower Body  WDL   Heel To Shin Right   (WNL)   Heel To Shin Left   (WNL)   Balance Assessment   Sitting Balance (Static) Fair +   Sitting Balance (Dynamic) Fair -  (LOB backward when sitting EOB while PT performing lower body strength testing)   Standing Balance (Static) Fair   Standing Balance (Dynamic) Fair -   Weight Shift Sitting Good   Weight Shift Standing Fair   Comments improved balance with FWW   Gait Analysis   Gait Level Of Assist Contact Guard Assist   Assistive Device Front Wheel Walker   Distance (Feet) 150   # of Times Distance was Traveled 1   Deviation   (lists/leans to the R at times, manual cues for straight path gait)   Comments CGA without an AD 50 ft   Bed Mobility    Supine to Sit Independent   Sit to Supine Independent   Functional Mobility   Sit to Stand Supervised   Bed, Chair, Wheelchair Transfer Standby Assist   Activity Tolerance   Standing mild c/o feeling lightheaded   Short Term Goals    Short Term Goal # 1 Pt will be able to perform sit <> stand and transfer Indep in 6 visits.   Short Term Goal # 2 Pt will be able to ambulate 150 ft Indep in 6 visits

## 2022-09-07 NOTE — DISCHARGE PLANNING
[facility] Transitional Care Coordination    Referral from: Dr. Gege Hallman    Insurance Provider on Facesheet: [facility]    Potential Rehab Diagnosis: CVA    Chart review indicates patient may have on going medical management and may have therapy needs to possibly meet inpatient rehab facility criteria with the goal of returning to community.    D/C support will need to be verified: S.O.    Physiatry consultation pended per protocol.  Carotid duplex/TX pending.      Thank you for the referral.

## 2022-09-07 NOTE — PROGRESS NOTES
Bedside report received from MAYUR Gaona. Assumed pt care. Pt is AOx4. NIH completed, no deficits noted. Denies any pain or other distress at this time. Discussed POC including frequent neuro checks/NIH scale, fall risk/precautions. Pt verbalized understanding. Hourly rounding in place. Fall precautions in place and call light within reach.      Pt received to room 27. Pt c/o of intermittent chest pain radiating to left shoulder/back accompanied by palpitations & dyspnea. Endorses these "episodes" happen 2-3 times per day for the past week, both at rest and on exertion. Pt has no medical hx, denies taking medication. Pt states she had this "episode" when she first came to ED but does not have any pain or dyspnea at this time. Respirations even & unlabored, lung sounds clear, heart sounds normal, abd is soft, non-distended. Denies chest pain, dyspnea, N/V/D, chills fever, weakness, dizziness, palpitations at this time. Denies ETOH & smoking. Pt is A&Ox4, ambulates independently. Family at bedside.

## 2022-09-07 NOTE — DISCHARGE PLANNING
Case Management Discharge Planning    Admission Date: 9/6/2022  GMLOS: 2.9  ALOS: 1    6-Clicks ADL Score: 23  6-Clicks Mobility Score: 20      Anticipated Discharge Dispo: Discharge Disposition: D/T to home under HHA care in anticipation of covered skilled care (06)    DME Needed: Yes    DME Ordered: Yes    Action(s) Taken: Choice obtained, Referral(s) sent, and DME Face to Face     Escalations Completed: None    Medically Clear: Yes    Next Steps: RNCM dropped off home health choice form with patient. Patient stated his wife is currently on service with an agency but he was not sure as to which one. Patient will need DME-FWW. Choice obtained with [facility]. Patient will also be discharged home with [facility]. RNCM called pharmacy: $38 copay. Medication to be delivered between 7080-1745. Once choice is selected for home health, will fax to Jo PICKETT and follow up as needed.     Barriers to Discharge: Medical clearance, DME, and Outpatient referrals pending    Is the patient up for discharge tomorrow: patient is likely to discharge today.     1354 Choice obtained for [facility]. Choice form for home health and DME faxed to [facility]. Varghese james RN notified to get FWW from [facility].     1433 Received call from [facility] at [facility]. They do not accept VA insurance. Jo PICKETT notified to send to [facility]. Will notify patient.     1442 Meds to beds delivered Eliquis and was given to patient.

## 2022-09-07 NOTE — FACE TO FACE
Face to Face Supporting Documentation - Home Health    The encounter with this patient was in whole or in part the primary reason for home health admission.    Date of encounter:   Patient:                    MRN:                       YOB: 2020  Lencho Parker  [id number]  1940     Home health to see patient for:  Skilled Nursing care for assessment, interventions & education, Physical Therapy evaluation and treatment, and Occupational therapy evaluation and treatment    Skilled need for:  New Onset Medical Diagnosis stroke    Skilled nursing interventions to include:  Comment: Assessment, intervention and education.    Homebound status evidenced by:  Need the aid of supportive devices such as crutches, canes, wheelchairs or walkers or Needs the assistance of another person in order to leave the home. Leaving home requires a considerable and taxing effort. There is a normal inability to leave the home.    Community Physician to provide follow up care: Brennan Castorena M.D.     Optional Interventions? No      I certify the face to face encounter for this home health care referral meets the CMS requirements and the encounter/clinical assessment with the patient was, in whole, or in part, for the medical condition(s) listed above, which is the primary reason for home health care. Based on my clinical findings: the service(s) are medically necessary, support the need for home health care, and the homebound criteria are met.  I certify that this patient has had a face to face encounter by myself.  Varghese Schreiber D.O. - MELLYI: [phone]

## 2022-09-07 NOTE — THERAPY
"Speech Language Pathology   Clinical Swallow Evaluation     Patient Name: Lencho Parker  AGE:  82 y.o., SEX:  male  Medical Record #: [id number]  Today's Date: 9/7/2022     Precautions  Precautions: Swallow Precautions ( See Comments)    HPI:  81 y/o admitted on 9/6 for weakness/SOB/headache. Not seen by SLP before at [facility].    CT of head \"Probable subacute infarct involving the LEFT inferior cerebellum, Multiple chronic infarcts involving the LEFT frontal, LEFT parietal, LEFT posterior temporal and RIGHT occipital regions, No acute intracranial hemorrhage.\"    Dx chest \"Cardiomegaly with interstitial prominence.\"    PMHx:  CHF EF 10%, COPD, heart attack, HTN, hyperlipidemia, stroke      Level of Consciousness: Alert  Affect/Behavior: Appropriate, Cooperative  Follows Directives: Yes  Hearing: Functional hearing  Vision: Functional vision      Prior Living Situation & Level of Function:  Pt is independent at home and lives with significant other. He denies any hx of dysphagia or changes in swallow function since admit. He denied any changes in speech or changes in cognition.      Oral Mechanism Evaluation  Facial Symmetry: Equal  Facial Sensation: Equal  Labial Observations: WFL  Lingual Observations: R lingual deviation (slight)  Dentition: Some missing dentition  Comments:      Voice  Quality: WFL  Resonance: WFL  Intensity: Appropriate  Comments:      Current Method of Nutrition   Oral diet (regular/thin liquids)         Assessment  Positioning: Keene's (60-90 degrees)  Bolus Administration: Patient  Oxygen Requirements: Room Air  Factor(s) Affecting Performance: None    Swallowing Trials  Thin Liquid (TN0): WFL (cup sips and consecutive cup sips)  Soft & Bite-Sized (SB6): WFL  Regular (RG7): WFL    Comments:  No overt s/sx of aspiration with minimal trials of pt's regular/thin liquid breakfast. Mastication and swallow trigger timely and vocal quality clear following the swallow. He independently fed self " "with no issue. He denied globus sensation or change in swallow function. He declined further trials 2/2 not feeling hungry.       Clinical Impressions  Pt is presenting with a functional oropharyngeal swallow with minimal PO trials.       Recommendations  1.  Continue regular/thin liquid diet  2.  Instrumentation: None indicated at this time  3.  Swallowing Instructions & Precautions:   Supervision: Independent  Positioning: Fully upright and midline during oral intake  Medication: Whole with liquid  Strategies: None  Oral Care: BID        Plan    Recommend Speech Therapy 3 times per week until therapy goals are met for the following treatments:  Dysphagia Training and Patient / Family / Caregiver Education.    Discharge Recommendations: Anticipate that the patient will have no further speech therapy needs after discharge from the hospital       Objective       09/07/22 0805   Initial Contact Note    Initial Contact Note  Order Received and Verified, Speech Therapy Evaluation in Progress with Full Report to Follow.   Precautions   Precautions Swallow Precautions ( See Comments)   Vitals   O2 (LPM) 0   O2 Delivery Device None - Room Air   Pain 0 - 10 Group   Therapist Pain Assessment Post Activity Pain Same as Prior to Activity;Nurse Notified;0   Prior Living Situation   Housing / Facility 1 Story House   Lives with - Patient's Self Care Capacity Significant Other   Prior Level Of Function   Communication Within Functional Limits   Swallow Within Functional Limits   Dentition Intact   Dentures None   Hearing Within Functional Limits for Evaluation   Vision Within Functional Limits for Evaluation   Patient's Primary Language English   Occupation (Pre-Hospital Vocational) Not Employed   Patient / Family Goals   Patient / Family Goal #1 \"I haven't had trouble swallowing\"   Short Term Goals   Short Term Goal # 1 Pt will consume a regular/thin liquid diet with no overt s/sx of aspiration   Education Group   Education " Provided Dysphagia   Dysphagia Patient Response Patient;Acceptance;Explanation;Verbal Demonstration;Reinforcement Needed   Problem List   Problem List Dysphagia   Anticipated Discharge Needs   Discharge Recommendations Anticipate that the patient will have no further speech therapy needs after discharge from the hospital   Therapy Recommendations Upon DC Dysphagia Training;Community Re-Integration;Patient / Family / Caregiver Education   Interdisciplinary Plan of Care Collaboration   IDT Collaboration with  Nursing   Patient Position at End of Therapy Seated;In Bed;Call Light within Reach;Tray Table within Reach;Phone within Reach

## 2022-09-07 NOTE — CARE PLAN
The patient is Stable - Low risk of patient condition declining or worsening    Shift Goals  Clinical Goals: NIH, q4 neuros, no neurological changes  Patient Goals: rest    Progress made toward(s) clinical / shift goals:  NIH and neuro checks completed, no apparent deficits noted. PT/OT/SLP evals completed, walker recommended.     Patient is not progressing towards the following goals: n/a

## 2022-09-07 NOTE — PROGRESS NOTES
Telemetry Shift Summary     Rhythm SB/ SR PACED  HR Range 59-61  Ectopy R PVC  Measurements  0.20/ 0.20/ 0.54  Per strip printed 0400     Normal Values  Rhythm SR  HR Range    Measurements 0.12-0.20 / 0.06-0.10  / 0.30-0.52

## 2022-09-07 NOTE — FACE TO FACE
Face to Face Note  -  Durable Medical Equipment    Varghese Schreiber D.O. - NPI: [id number]  I certify that this patient is under my care and that they had a durable medical equipment(DME)face to face encounter by myself that meets the physician DME face-to-face encounter requirements with this patient on:    Date of encounter:   Patient:                    MRN:                       YOB: 2020  Lencho Parker  [id number]  1940     The encounter with the patient was in whole, or in part, for the following medical condition, which is the primary reason for durable medical equipment:  CVA    I certify that, based on my findings, the following durable medical equipment is medically necessary:    Walkers.    My Clinical findings support the need for the above equipment due to:  Abnormal Gait

## 2022-09-07 NOTE — PROGRESS NOTES
Received bedside report from RN Corinne, pt care assumed. VSS, pt assessment complete. Pt A&Ox4, pt c/o 8/10 10 H/A pain at this time. POC discussed with pt and verbalizes no questions. Pt denies any additional needs at this time. Bed locked and in lowest position Pt educated on fall risk and verbalized understanding, call light within reach, hourly rounding initiated.     NIHSS: 0   Q 4 neuro checks per MD orders.

## 2022-09-07 NOTE — DISCHARGE PLANNING
Care Transition Team Assessment    RNCM completed assessment with information obtained from chart review. No ACP documents on file. Patient has significant other, Karla ([phone]) listed as emergency contact. Patient presented with stroke like symptoms and was treated for such. Patient is still pending carotid duplex. PT/OT is currently recommending home health for additional therapy needs. Discharge disposition pending medical clearance. RNCM will continue to follow for discharge planning needs.     Information Source  Orientation Level: Oriented X4  Information Given By:  (EMR)  Who is responsible for making decisions for patient? : Patient    Readmission Evaluation  Is this a readmission?: No    Elopement Risk  Legal Hold: No  Ambulatory or Self Mobile in Wheelchair: Yes  Disoriented: No  Psychiatric Symptoms: None  History of Wandering: No  Elopement this Admit: No  Vocalizing Wanting to Leave: No  Displays Behaviors, Body Language Wanting to Leave: No-Not at Risk for Elopement  Elopement Risk: Not at Risk for Elopement    Interdisciplinary Discharge Planning  Lives with - Patient's Self Care Capacity: Significant Other  Patient or legal guardian wants to designate a caregiver: No  Support Systems: Family Member(s), Friends / Neighbors, Spouse / Significant Other  Housing / Facility: [address]  Prior Services: Housekeeping / Homemaker Services  Durable Medical Equipment: Not Applicable    Discharge Preparedness  What is your plan after discharge?: Uncertain - pending medical team collaboration  What are your discharge supports?: Other (comment) (significant other)  Prior Functional Level: Ambulatory, Independent with Activities of Daily Living    Functional Assesment  Prior Functional Level: Ambulatory, Independent with Activities of Daily Living    Finances  Financial Barriers to Discharge: No  Prescription Coverage: Yes    Vision / Hearing Impairment  Vision Impairment : No  Hearing Impairment :  Yes  Hearing Impairment: Both Ears, Hearing Device(s) Available  Does Pt Need Special Equipment for the Hearing Impaired?: No         Advance Directive  Advance Directive?:  (No ACP documents on file)    Domestic Abuse  Have you ever been the victim of abuse or violence?: No  Physical Abuse or Sexual Abuse: No  Verbal Abuse or Emotional Abuse: No  Possible Abuse/Neglect Reported to:: Not Applicable              Anticipated Discharge Information  Discharge Disposition: D/T to home under HHA care in anticipation of covered skilled care (06)

## 2022-09-07 NOTE — PROGRESS NOTES
"Pt reports up to bathroom this AM, became dizzy and \"bumped\" against the door causing small skin tear to right forearm. Wound approximated, covered with biotain. Pt educated on fall risk and falls precautions including new strip alarm and instructed pt to call before attempting to get out of bed.   "

## 2022-09-07 NOTE — CARE PLAN
The patient is Stable - Low risk of patient condition declining or worsening    Shift Goals  Clinical Goals: NIHSS, Q 4 neuro checks, manage H/A  Patient Goals: rest    Progress made toward(s) clinical / shift goals:      Problem: Knowledge Deficit - Stroke Education  Goal: Patient's knowledge of stroke and risk factors will improve  Outcome: Progressing  Note: Pt updated on POC, NIHSS, Q4 neuro exams, and tracking I/Os     Problem: Pain - Standard  Goal: Alleviation of pain or a reduction in pain to the patient’s comfort goal  Outcome: Progressing  Note: RN spoke to NOC MD to get PRN analgesics for severe pain 7-10/ 10 ordered.     Problem: Optimal Care of the Stroke Patient  Goal: Optimal acute care for the stroke patient  Outcome: Progressing     Problem: Psychosocial - Patient Condition  Goal: Patient's ability to verbalize feelings about condition will improve  Outcome: Progressing     Problem: Discharge Planning - Stroke  Goal: Ensure Stroke Core Measures are met prior to discharge  Outcome: Progressing     Problem: Neuro Status  Goal: Neuro status will remain stable or improve  Outcome: Progressing  Note: Pt showing no residual effects of CVA     Problem: Risk for Aspiration  Goal: Patient's risk for aspiration will be absent or decrease  Outcome: Progressing  Note: Pt is showing no difficulty with swallowing     Problem: Urinary Elimination  Goal: Establish and maintain regular urinary output  Outcome: Progressing     Problem: Self Care  Goal: Patient will have the ability to perform ADLs independently or with assistance (bathe, groom, dress, toilet and feed)  Outcome: Progressing       Patient is not progressing towards the following goals:

## 2022-09-07 NOTE — DISCHARGE PLANNING
Received Choice form at 1353  Agency/Facility Name: [facility]  Referral sent per Choice form @ 5700     Agency/Facility Name: [id number]  Spoke To: [facility]  Outcome: 1421    Per RN PIYUSH WALKER faxed HH referral to [facility].

## 2022-09-07 NOTE — PROGRESS NOTES
Monitor Summary:    Rhythm:  Paced   Rate Range:  60-62  Ectopy: No ectopy    Measurements:  -/0.16/-

## 2022-09-07 NOTE — THERAPY
"Occupational Therapy   Initial Evaluation     Patient Name: Lencho Parker  Age:  82 y.o., Sex:  male  Medical Record #: [id number]  Today's Date: 9/7/2022     Precautions: Fall Risk    Assessment  Patient is 82 y.o. male admitted with HA,weakness, SOB. Diagnosis of probable subacute infarct Left inferior cerebellum. No acute intracranial hemorrhage. Multiple chronic infarcts involving L frontal, L parietal, L posterior temporal, R occipital regions. Hx of stroke, HTN, CAD,CHF, Afib. Pt is A&Ox4, pleasant and cooperative; motivated for OOB activity. Shows good BUE strength. BUE FMC,AROM,sensation- WFL. SBA with functional mobility in room using FWW (not using AD prior). ADL transfers with SBA,FWW. Toileting,grooming, dressing with Spv/SBA. Vision-intact. C/o mild dizziness and feeling \"a bit wobbly\". No c/o HA. Lives with SO Karla; very active prior. Reviewed home safety during ADL's. No further acute OT needs.               Plan  Recommend Occupational Therapy for Evaluation only     DC Equipment Recommendations: None  Discharge Recommendations: Anticipate that the patient will have no further occupational therapy needs after discharge from the hospital      09/07/22 0920   Prior Living Situation   Prior Services Housekeeping / Homemaker Services   Housing / Facility 1 Story House   Steps Into Home 0   Steps In Home 0   Bathroom Set up Walk In Shower;Bathtub / Shower Combination;Shower Chair;Grab Bars   Equipment Owned Single Point Cane;Front-Wheel Walker;Tub / Shower Seat;Grab Bar(s) In Tub / Shower;Grab Bar(s) By Toilet;Hand Held Shower  (high toilet)   Lives with - Patient's Self Care Capacity Significant Other   Comments Lives with Karla who had appendicitis recently; HH visiting her. Pt active prior- walks Scottie dog,goes to gym,uses recumbent bike and bowflex at home.   Prior Level of ADL Function   Self Feeding Independent   Grooming / Hygiene Independent   Bathing Independent   Dressing Independent "   Toileting Independent   Prior Level of IADL Function   Medication Management Independent   Laundry Independent   Kitchen Mobility Independent   Finances Independent   Home Management Independent   Shopping Independent   Prior Level Of Mobility Independent Without Device in Home   Driving / Transportation Driving Independent   Occupation (Pre-Hospital Vocational) Retired Due To Age   Leisure Interests Pets;Hobbies;Exercise   Precautions   Precautions Fall Risk   Vitals   O2 Delivery Device None - Room Air   Pain 0 - 10 Group   Therapist Pain Assessment Nurse Notified;0   Cognition    Cognition / Consciousness WDL   Level of Consciousness Alert   Comments pleasant and cooperative   Passive ROM Upper Body   Passive ROM Upper Body WDL   Active ROM Upper Body   Active ROM Upper Body  WDL   Dominant Hand Right   Strength Upper Body   Upper Body Strength  WDL   Comments equal strength bilaterally   Sensation Upper Body   Upper Extremity Sensation  WDL   Upper Body Muscle Tone   Upper Body Muscle Tone  WDL   Neurological Concerns   Neurological Concerns Yes   Coordination Upper Body   Coordination WDL   Comments demonstrates good FMC during grooming,dressing   Balance Assessment   Sitting Balance (Static) Good   Sitting Balance (Dynamic) Good   Standing Balance (Static) Fair   Standing Balance (Dynamic) Fair -   Weight Shift Sitting Good   Weight Shift Standing Fair   Comments fww   Bed Mobility    Supine to Sit Independent   Sit to Supine Independent   Scooting Independent   ADL Assessment   Eating Supervision   Grooming Supervision;Standing   Upper Body Dressing Independent   Lower Body Dressing Supervision   Toileting Supervision   How much help from another person does the patient currently need...   Putting on and taking off regular lower body clothing? 4   Bathing (including washing, rinsing, and drying)? 3   Toileting, which includes using a toilet, bedpan, or urinal? 4   Putting on and taking off regular upper  body clothing? 4   Taking care of personal grooming such as brushing teeth? 4   Eating meals? 4   6 Clicks Daily Activity Score 23   mRS Prior to admission   Prior to admission mRS 0   Modified Sharples (mRS)   Modified Sharples Score 2   Functional Mobility   Sit to Stand Supervised   Bed, Chair, Wheelchair Transfer Supervised   Toilet Transfers Standby Assist   Transfer Method Stand Step   Mobility eob>br>sink>bed   Visual Perception   Visual Perception  WDL   Comments reports no blurred/double vision   Activity Tolerance   Sitting Edge of Bed 15   Standing 8   Education Group   Education Provided Home Safety;Activities of Daily Living;Stroke   Role of Occupational Therapist Patient Response Patient;Acceptance;Explanation;Verbal Demonstration   Home Safety Patient Response Patient;Acceptance;Explanation;Verbal Demonstration   Stroke Patient Response Patient;Acceptance;Verbal Demonstration;Explanation   ADL Patient Response Patient;Verbal Demonstration;Action Demonstration;Explanation;Acceptance   Problem List   Problem List Decreased Functional Mobility   Anticipated Discharge Equipment and Recommendations   DC Equipment Recommendations None   Discharge Recommendations Anticipate that the patient will have no further occupational therapy needs after discharge from the hospital   Interdisciplinary Plan of Care Collaboration   IDT Collaboration with  Nursing;Speech Therapist   Patient Position at End of Therapy In Bed;Bed Alarm On;Call Light within Reach;Tray Table within Reach;Phone within Reach   Collaboration Comments aware of visit. Spv/SBA with ADL's,functional mobility in room with fww

## 2022-09-07 NOTE — PROGRESS NOTES
Tele strip printed at 1224     Rhythm: 100% AV-Paced   HR: 60  Measurements: -/0.18/-        Telemetry Shift Summary     Rhythm: 100% AV-Paced  HR Range: 59-70's  Ectopy: Rare PVC/Couplets     Telemetry monitoring strips placed in pt's chart.

## 2022-09-07 NOTE — DISCHARGE PLANNING
Limited therapy need for IRF level of care. Anticipate home with OP support when medically cleared May benefit from outpatient physiatry follow up with Dr. Alta Ervin. No inpatient physiatry consult ordered per protocol.

## 2022-09-08 NOTE — DISCHARGE SUMMARY
Discharge Summary    CHIEF COMPLAINT ON ADMISSION  Chief Complaint   Patient presents with    Weakness    Shortness of Breath    Headache       Reason for Admission  EMS     Admission Date  9/6/2022    CODE STATUS  Prior    HPI & HOSPITAL COURSE  Lencho Parker is a 82 y.o. male who presented 9/6/2022 with history of CHF EF 10% paroxysmal atrial fibrillation presented to the emergency department for evaluation of unsteady gait and headache.  Patient reports that over the past couple of days he noticed that his balance was off today he started to have a headache that he describes as dull and moderate mostly in the posterior skull area.  No nausea no vomiting no hematemesis.  He has noted mild increase in dyspnea with activity.  He has not noted any focal weakness or numbness.  He has a nonproductive cough.  Patient reports that he is not on anticoagulation secondary to GI bleed on Pradaxa.  He has a remote history of stroke.    Neurology was consulted due to the subacute CVA noted.  They recommended the patient to restart Eliquis on discharge.  He will follow-up with his cardiologist for anticoagulation.  Referral to the stroke clinic placed.  Doppler ultrasound performed was normal.    Therefore, he is discharged in good and stable condition to home with organized home healthcare and close outpatient follow-up.    The patient recovered much more quickly than anticipated on admission.    Discharge Date  9/7/2022    FOLLOW UP ITEMS POST DISCHARGE  [id number] with neurology and take Eliquis as prescribed.    DISCHARGE DIAGNOSES  Principal Problem:    Ataxia due to recent cerebrovascular accident (CVA) POA: Yes  Active Problems:    Cardiomyopathy (HCC) POA: Yes      Overview: May 2021: Echocardiogram with moderately dilated LV, mild concentric LVH,       LVEF 20%. Global hypokinesis. Normal RA and RV. Severely dilated LA. Mild       MR, mild TR. RVSP 34mmHg.      August 2019: Echocardiogram with mildly  dilated LV, mild concentric, LVEF       15%. Severely dilated LA. Trace MR, trace AI, mild TR. RVSP 26mmHg.    Paroxysmal atrial fibrillation (HCC) POA: Yes      Overview: Status post Watchman procedure in [place]. Now off of anticoagulation.    Presence of biventricular automatic cardioverter/defibrillator (AICD) POA: Yes      Overview: September 2020: Upgrade to Medtronic Vyterisia MRI Quad CRT-D [id number]       implanted by Dr. Barrios.      January 2017: Generator replacement with Medtronic Evera MRI XT  [id number]       implanted in [place].    DM (diabetes mellitus) (HCC) POA: Yes    Stage 3 chronic kidney disease (HCC) POA: Unknown  Resolved Problems:    * No resolved hospital problems. *      FOLLOW UP  Future Appointments   Date Time Provider Department Center   12/5/2022  8:00 AM RERE Ordonez [facility] None   12/9/2022  9:00 AM Ksenia Velázquez M.D. [facility] None     Brennan Castorena M.D.  [address] [id number] [phone]    Call in 1 week(s)        MEDICATIONS ON DISCHARGE     Medication List        START taking these medications        Instructions   Eliquis 2.5mg Tabs  Generic drug: apixaban   Take 1 Tablet by mouth 2 times a day.  Dose: 2.5 mg            CHANGE how you take these medications        Instructions   losartan 100 MG Tabs  What changed: when to take this  Commonly known as: COZAAR   Take 1 tablet by mouth every day.  Dose: 100 mg            CONTINUE taking these medications        Instructions   albuterol 108 (90 Base) MCG/ACT Aers inhalation aerosol   Inhale 2 Puffs by mouth every 6 hours as needed for Shortness of Breath.  Dose: 2 Puff     amiodarone 200 MG Tabs  Commonly known as: Cordarone   Take 1 tablet by mouth every day.  Dose: 200 mg     atorvastatin 20 MG Tabs  Commonly known as: LIPITOR   Take 20 mg by mouth every evening.  Dose: 20 mg     carvedilol 25 MG Tabs  Commonly known as: COREG   Take 12.5-25 mg by mouth 2 times a day. Pt takes 25MG in AM and 12.5MG HS  Dose: 12.5-25 mg      cinacalcet 30 MG Tabs  Commonly known as: SENSIPAR   Take 30 mg by mouth every day.  Dose: 30 mg     fish oil 1000 MG Caps capsule   Take 1,000 mg by mouth every evening.  Dose: 1,000 mg     furosemide 20 MG Tabs  Commonly known as: LASIX   Take 20 mg by mouth see administration instructions. Pt takes on Mon, Wed, and Friday  Dose: 20 mg     Jardiance 10 MG Tabs  Generic drug: Empagliflozin   Take 1 Tablet by mouth every day.  Dose: 10 mg     multivitamin Tabs   Take 1 Tab by mouth every day.  Dose: 1 Tablet     spironolactone 25 MG Tabs  Commonly known as: ALDACTONE   Take 0.5 Tabs by mouth every day.  Dose: 12.5 mg     tamsulosin 0.4 MG capsule  Commonly known as: FLOMAX   Take 0.4 mg by mouth 1/2 hour after breakfast.  Dose: 0.4 mg     vitamin D3 1000 Unit (25 mcg) Tabs  Commonly known as: cholecalciferol   Take 1,000 Units by mouth every day.  Dose: 1,000 Units            STOP taking these medications      aspirin EC 81 MG Tbec  Commonly known as: ECOTRIN              Allergies  Allergies   Allergen Reactions    Entresto [Sacubitril-Valsartan]      Swollen tongue. Angioedema.       DIET  No orders of the defined types were placed in this encounter.      ACTIVITY  As tolerated.  Weight bearing as tolerated    CONSULTATIONS  Neurology-Dr. Muniz    PROCEDURES  none    LABORATORY  Lab Results   Component Value Date    SODIUM 137 09/07/2022    POTASSIUM 4.3 09/07/2022    CHLORIDE 105 09/07/2022    CO2 17 (L) 09/07/2022    GLUCOSE 131 (H) 09/07/2022    BUN 27 (H) 09/07/2022    CREATININE 1.62 (H) 09/07/2022        Lab Results   Component Value Date    WBC 6.9 09/07/2022    HEMOGLOBIN 15.4 09/07/2022    HEMATOCRIT 45.5 09/07/2022    PLATELETCT 133 (L) 09/07/2022        Total time of the discharge process exceeds 34 minutes.

## 2022-09-09 NOTE — DOCUMENTATION QUERY
[facility]                                                                       Query Response Note      PATIENT:               KYUNG LEONARD  ACCT #:                  [id number]  MRN:                     [id number]  :                      1940  ADMIT DATE:       2022 12:08 PM  DISCH DATE:        2022 4:00 PM  RESPONDING  PROVIDER #:        [id number]           QUERY TEXT:    Congestive Heart Failure is documented in the Medical Record.   Please document the type and acuity (includes probable or suspected).    The patient's Clinical Indicators include:  - Findings:  DC summary:   82 y.o. male who presented 2022 with history of CHF EF 10%     Overview: May 2021: Echocardiogram with moderately dilated LV, mild concentric LVH,      LVEF 20%. Global hypokinesis. Normal RA and RV. Severely dilated LA. Mild      MR, mild TR. RVSP 34mmHg.      2019: Echocardiogram with mildly dilated LV, mild concentric, LVEF      15%. Severely dilated LA. Trace MR, trace AI, mild TR. RVSP 26mmHg.  echocardiogram:  The left ventricular ejection fraction is visually estimated to be 10 %.  Global hypokinesis. Grade III diastolic dysfunction (restrictive pattern).  Mild mitral regurgitation.  Right ventricular systolic pressure is estimated to be 45 mmHg.    CXR:  1.  Cardiomegaly with interstitial prominence.  2.  Left-sided AICD.    - NTproBNP : 7636,  :  28940    - Treatments:  CXR, lasix     - Risk factors:  paroxysmal AFIB, CKD stage 3, AICD, HTN, cardiomyopathy    Thank You,  Christina Wright RN  Clinical Documentation   [email or web address]  Connect via [email or web address]  Options provided:   -- Acute Systolic heart failure   -- Chronic Systolic heart failure   -- Acute on Chronic Systolic heart failure   -- Acute Systolic and Diastolic heart failure   -- Chronic Systolic and Diastolic heart failure   -- Acute on Chronic  Systolic and diastolic heart failure   -- Other explanation, Please specify other explanation   -- Unable to determine      Query created by: Christina Wright on 9/9/2022 2:35 PM    RESPONSE TEXT:    Chronic Systolic heart failure          Electronically signed by:  RANJAN YO MD 9/9/2022 2:39 PM

## 2022-09-28 ENCOUNTER — NON-PROVIDER VISIT (OUTPATIENT)
Dept: CARDIOLOGY | Facility: MEDICAL CENTER | Age: 82
End: 2022-09-28
Payer: MEDICARE

## 2022-09-28 DIAGNOSIS — I50.20 ACC/AHA STAGE C SYSTOLIC HEART FAILURE (HCC): ICD-10-CM

## 2022-09-28 PROCEDURE — 93264 REM MNTR WRLS P-ART PRS SNR: CPT | Performed by: INTERNAL MEDICINE

## 2022-09-30 ENCOUNTER — OFFICE VISIT (OUTPATIENT)
Dept: CARDIOLOGY | Facility: MEDICAL CENTER | Age: 82
End: 2022-09-30
Payer: MEDICARE

## 2022-09-30 VITALS
WEIGHT: 191.4 LBS | RESPIRATION RATE: 14 BRPM | HEIGHT: 74 IN | BODY MASS INDEX: 24.56 KG/M2 | OXYGEN SATURATION: 97 % | DIASTOLIC BLOOD PRESSURE: 78 MMHG | HEART RATE: 74 BPM | SYSTOLIC BLOOD PRESSURE: 118 MMHG

## 2022-09-30 DIAGNOSIS — I48.0 PAROXYSMAL ATRIAL FIBRILLATION (HCC): ICD-10-CM

## 2022-09-30 DIAGNOSIS — Z86.79 HISTORY OF ABDOMINAL AORTIC ANEURYSM (AAA): ICD-10-CM

## 2022-09-30 DIAGNOSIS — I10 ESSENTIAL HYPERTENSION, BENIGN: ICD-10-CM

## 2022-09-30 DIAGNOSIS — I50.20 ACC/AHA STAGE C SYSTOLIC HEART FAILURE (HCC): ICD-10-CM

## 2022-09-30 DIAGNOSIS — I47.20 VENTRICULAR TACHYCARDIA (HCC): ICD-10-CM

## 2022-09-30 DIAGNOSIS — Z95.810 PRESENCE OF BIVENTRICULAR AUTOMATIC CARDIOVERTER/DEFIBRILLATOR (AICD): ICD-10-CM

## 2022-09-30 DIAGNOSIS — G47.31 COMPLEX SLEEP APNEA SYNDROME: ICD-10-CM

## 2022-09-30 PROCEDURE — 99214 OFFICE O/P EST MOD 30 MIN: CPT | Performed by: NURSE PRACTITIONER

## 2022-09-30 ASSESSMENT — ENCOUNTER SYMPTOMS
DIZZINESS: 0
SHORTNESS OF BREATH: 0
LOSS OF CONSCIOUSNESS: 0
FALLS: 0
DOUBLE VISION: 0
PALPITATIONS: 0
WEAKNESS: 0
HEADACHES: 0
ORTHOPNEA: 0
BLURRED VISION: 0
WHEEZING: 0
COUGH: 0
FOCAL WEAKNESS: 0

## 2022-09-30 ASSESSMENT — FIBROSIS 4 INDEX: FIB4 SCORE: 2.57

## 2022-09-30 NOTE — PROGRESS NOTES
Chief Complaint   Patient presents with    Hyperlipidemia     F/V Dx: Mixed hyperlipidemia      Ventricular Tachycardia    Atrial Fibrillation     F/V Dx: Paroxysmal atrial fibrillation (HCC)       Subjective     Lencho Parker is a 82 y.o. male who presents today for hospital follow-up post CVA    He is normally followed by Dr. Velázquez in our clinic, last seen September 1, 2022.  History of nonischemic cardiomyopathy, dual-chamber AICD placed in 2010, paroxysmal atrial fibrillation status post watchman device in October 2015, history of ventricular tachycardia status post ablation 2012, hypertension, hyperlipidemia, treated AAA, and prior TIA.    Hospitalized 9/6/2022 with new onset of ataxia and headache.  CT head showed multiple chronic infarcts involving the left frontal, left parietal, left posterior temporal and right occipital region.  Probably subacute infarct involving the left inferior cerebellum.  Unable to get an MRI due to an AICD.placed on eliquis 2.5mg BID.     HFrEF: denies any chest pain, sob, or dizziness     CVA: ataxia resolved within days. No residual noted.    PAF s/p watchman: He has not started Eliquis, due to insurance authorization.  He is currently on amiodarone 200 mg daily.  Patient stated that he has been taking it since 1980s.    AICD.  Device interrogation scheduled for an December    Hypertension: Stable    Past Medical History:   Diagnosis Date    Breath shortness     on exertion    Cardiomyopathy (HCC) 05/2021    Echocardiogram with moderately dilated LV, mild concentric LVH, LVEF 20%. Global hypokinesis. Normal RA and RV. Severely dilated LA. Mild MR, mild TR. RVSP 34mmHg.    Chronic obstructive pulmonary disease (HCC)     Indian measles     Heart attack (HCC)     hx 2007    History of abdominal aortic aneurysm (AAA) 2009    Status post stent    Hyperlipidemia     Hypertension     Mumps     Pacemaker     AICD    Paroxysmal atrial fibrillation (HCC)     Status post Watchman  procedure in [place].    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     Sleep apnea     Snoring     Stroke (HCC) 2012    TIA    Tonsillitis     Ventricular tachycardia (HCC)      Past Surgical History:   Procedure Laterality Date    AICD BATTERY CHANGE Left 09/10/2020     Upgrade to Medtronic Claria MRI Quad CRT-D [id number] implanted by Dr. Barrios.    AICD BATTERY CHANGE Left 2017    Generator replacement with Medtronic Evera MRI XT  [id number] implanted in [place].    OTHER CARDIAC SURGERY      watchman device    AAA WITH STENT GRAFT      OTHER ORTHOPEDIC SURGERY      hip surgery    OTHER  2000    kidney removed    OTHER CARDIAC SURGERY      AICD implanted     Family History   Problem Relation Age of Onset    Cancer Mother     Cancer Sister      Social History     Socioeconomic History    Marital status: Single     Spouse name: Not on file    Number of children: Not on file    Years of education: Not on file    Highest education level: Not on file   Occupational History    Not on file   Tobacco Use    Smoking status: Former     Packs/day: 0.50     Years: 15.00     Pack years: 7.50     Types: Cigarettes     Quit date: 2004     Years since quittin.2    Smokeless tobacco: Former   Vaping Use    Vaping Use: Never used   Substance and Sexual Activity    Alcohol use: Yes     Alcohol/week: 8.4 oz     Types: 14 Glasses of wine per week     Comment: 2 per day    Drug use: Yes     Types: Inhaled     Comment: marijuana occ    Sexual activity: Not on file   Other Topics Concern    Not on file   Social History Narrative    Not on file     Social Determinants of Health     Financial Resource Strain: Not on file   Food Insecurity: Not on file   Transportation Needs: Not on file   Physical Activity: Not on file   Stress: Not on file   Social Connections: Not on file   Intimate Partner Violence: Not on file   Housing Stability: Not on file     Allergies   Allergen Reactions    Entresto [Sacubitril-Valsartan]       Swollen tongue. Angioedema.     Outpatient Encounter Medications as of 9/30/2022   Medication Sig Dispense Refill    apixaban (ELIQUIS) 2.5mg Tab Take 1 Tablet by mouth 2 times a day. 60 Tablet 1    furosemide (LASIX) 20 MG Tab Take 20 mg by mouth see administration instructions. Pt takes on Mon, Wed, and Friday      vitamin D3 (CHOLECALCIFEROL) 1000 Unit (25 mcg) Tab Take 1,000 Units by mouth every day.      Empagliflozin (JARDIANCE) 10 MG Tab Take 1 Tablet by mouth every day. 90 Tablet 3    tamsulosin (FLOMAX) 0.4 MG capsule Take 0.4 mg by mouth 1/2 hour after breakfast. Pt states he is taking 2 daily      amiodarone (CORDARONE) 200 MG Tab Take 1 tablet by mouth every day. 90 tablet 4    losartan (COZAAR) 100 MG Tab Take 1 tablet by mouth every day. 90 Each 4    spironolactone (ALDACTONE) 25 MG Tab Take 0.5 Tabs by mouth every day. 45 Tab 3    cinacalcet (SENSIPAR) 30 MG Tab Take 30 mg by mouth every day.      carvedilol (COREG) 25 MG Tab Take 12.5-25 mg by mouth 2 times a day. Pt takes 25MG in AM and 12.5MG HS       atorvastatin (LIPITOR) 20 MG Tab Take 20 mg by mouth every evening.      Omega-3 Fatty Acids (FISH OIL) 1000 MG Cap capsule Take 1,000 mg by mouth every evening.      multivitamin (THERAGRAN) Tab Take 1 Tab by mouth every day.      albuterol 108 (90 Base) MCG/ACT Aero Soln inhalation aerosol Inhale 2 Puffs by mouth every 6 hours as needed for Shortness of Breath. 8.5 g 0     No facility-administered encounter medications on file as of 9/30/2022.     Review of Systems   Constitutional:  Negative for malaise/fatigue.   Eyes:  Negative for blurred vision and double vision.   Respiratory:  Negative for cough, shortness of breath and wheezing.    Cardiovascular:  Negative for chest pain, palpitations, orthopnea and leg swelling.   Musculoskeletal:  Negative for falls.   Neurological:  Negative for dizziness, focal weakness, loss of consciousness, weakness and headaches.   All other systems reviewed and  "are negative.           Objective     /78 (BP Location: Left arm, Patient Position: Sitting, BP Cuff Size: Adult)   Pulse 74   Resp 14   Ht 1.88 m (6' 2\")   Wt 86.8 kg (191 lb 6.4 oz)   SpO2 97%   BMI 24.57 kg/m²     Physical Exam  Constitutional:       General: He is not in acute distress.     Appearance: He is well-developed. He is not diaphoretic.   HENT:      Head: Normocephalic and atraumatic.   Eyes:      Pupils: Pupils are equal, round, and reactive to light.   Neck:      Vascular: No JVD.   Cardiovascular:      Rate and Rhythm: Normal rate and regular rhythm.      Heart sounds: Normal heart sounds.   Pulmonary:      Effort: Pulmonary effort is normal.      Breath sounds: Normal breath sounds.   Abdominal:      General: Bowel sounds are normal. There is no distension.      Palpations: Abdomen is soft.   Skin:     General: Skin is warm and dry.   Neurological:      Mental Status: He is alert and oriented to person, place, and time.   Psychiatric:         Behavior: Behavior normal.         Thought Content: Thought content normal.         Judgment: Judgment normal.          Echocardiography Laboratory  9/1/2022  Compared to the prior study on   05/13/2021, there has been slight   reduction in left ventricular systolic function.   Severely reduced left ventricular systolic function. The left   ventricular ejection fraction is visually estimated to be 10 %.  Global   hypokinesis. Grade III diastolic dysfunction (restrictive pattern).  Mild mitral regurgitation.  Right ventricular systolic pressure is estimated to be 45 mmHg.  Severely dilated left atrium.  Pacer/ICD wire seen in the right ventricle.         BILATERAL CAROTID ARTERIES:  9/7/2022   Very mild smooth plaque of the bifurcation extending into the internal    carotid artery. <50% internal carotid artery stenosis.    The bilateral subclavian and vertebral artery waveforms are antegrade and    normal in character and velocity.     CT head "   9/6/2022  1.  Probable subacute infarct involving the LEFT inferior cerebellum.  2.  Multiple chronic infarcts involving the LEFT frontal, LEFT parietal, LEFT posterior temporal and RIGHT occipital regions.  3.  Diffuse atrophy and white matter microvascular ischemic changes.  4.  No acute intracranial hemorrhage.     Lab Results   Component Value Date/Time    CHOLSTRLTOT 159 09/07/2022 01:29 AM    LDL 83 09/07/2022 01:29 AM    HDL 53 09/07/2022 01:29 AM    TRIGLYCERIDE 114 09/07/2022 01:29 AM       Lab Results   Component Value Date/Time    SODIUM 137 09/07/2022 01:29 AM    POTASSIUM 4.3 09/07/2022 01:29 AM    CHLORIDE 105 09/07/2022 01:29 AM    CO2 17 (L) 09/07/2022 01:29 AM    GLUCOSE 131 (H) 09/07/2022 01:29 AM    BUN 27 (H) 09/07/2022 01:29 AM    CREATININE 1.62 (H) 09/07/2022 01:29 AM     Lab Results   Component Value Date/Time    ALKPHOSPHAT 75 09/06/2022 12:20 PM    ASTSGOT 26 09/06/2022 12:20 PM    ALTSGPT 37 09/06/2022 12:20 PM    TBILIRUBIN 1.0 09/06/2022 12:20 PM          Assessment & Plan     1. ACC/AHA stage C systolic heart failure (HCC)        2. Complex sleep apnea syndrome        3. Essential hypertension, benign        4. History of abdominal aortic aneurysm (AAA)        5. Paroxysmal atrial fibrillation (HCC)        6. Presence of biventricular automatic cardioverter/defibrillator (AICD)        7. Ventricular tachycardia (HCC)            Medical Decision Making: Today's Assessment/Status/Plan:          HFrEF:   HFrEF, Stage C, Class 3, LVEF 10%  -Heart failure due to nonischemic cardiomyopathy   -Discussed Heart failure trajectory and prognosis with patient. Will continue to optimize medical therapy as tolerated. Advanced HF treatment, need for remote monitoring consideration at every visit.   -ACE-I/ARB/ARNI: losartan 100mg qd, not able to tolerate ARNI due to angioedema   -Evidence Based Beta-blocker: coreg 25mg BID   -Aldosterone Antagonist: aldactone 12.5mg qd   -Diuretic: furosemide 20mg  as needed   -SGLT2 inhibitor: jardiance 10mg qd    2. Complex sleep apnea  - on cpap     3. Paroxysmal atrial fibrillation s/p ablation and watchman device :  - pulse is regular   - continue bb therapy and amiodarone 200mg qd     4. CVA:  - no residual   - continue eliquis 2.5mg BID   - continue atorvastatin 20mg qd    5.  Hypertension:  - stable     6. Ventricular tachycardia  - BiAICD in place     Follow up in 3 months and device interrogation, sooner as needed.

## 2022-10-08 NOTE — PROGRESS NOTES
CardioMEMS Pulmonary Artery Pressure Monitoring  S: Monitoring pulmonary artery pressures with the CardioMEMS remote monitor implant for chronic heart failure monitoring- Monitoring for August 29th - September 28th 2022  B: s/p sensor implant 02/04/2020 Patient PA Diastolic threshold: 8-20 mmHg  A: Assessment: Patient remained within threshold during billing period.   R: Recommendation: I personally interpreted the data and proceed with plan of care as discussed.  *These results have been copied from a disparate system, [facility].

## 2022-11-05 ENCOUNTER — NON-PROVIDER VISIT (OUTPATIENT)
Dept: CARDIOLOGY | Facility: MEDICAL CENTER | Age: 82
End: 2022-11-05
Payer: MEDICARE

## 2022-11-05 PROCEDURE — 93295 DEV INTERROG REMOTE 1/2/MLT: CPT | Performed by: INTERNAL MEDICINE

## 2022-11-07 NOTE — CARDIAC REMOTE MONITOR - SCAN
Device transmission reviewed. Device demonstrated appropriate function.       Electronically Signed by: Rupert Jay M.D.    11/8/2022  12:56 PM

## 2022-11-10 ENCOUNTER — PATIENT MESSAGE (OUTPATIENT)
Dept: HEALTH INFORMATION MANAGEMENT | Facility: OTHER | Age: 82
End: 2022-11-10

## 2022-12-08 ENCOUNTER — HOSPITAL ENCOUNTER (OUTPATIENT)
Dept: LAB | Facility: MEDICAL CENTER | Age: 82
End: 2022-12-08
Payer: COMMERCIAL

## 2022-12-08 LAB
25(OH)D3 SERPL-MCNC: 65 NG/ML (ref 30–100)
ALBUMIN SERPL BCP-MCNC: 4.1 G/DL (ref 3.2–4.9)
ALBUMIN/GLOB SERPL: 1.5 G/DL
ALP SERPL-CCNC: 67 U/L (ref 30–99)
ALT SERPL-CCNC: 42 U/L (ref 2–50)
ANION GAP SERPL CALC-SCNC: 11 MMOL/L (ref 7–16)
APPEARANCE UR: CLEAR
AST SERPL-CCNC: 33 U/L (ref 12–45)
BASOPHILS # BLD AUTO: 0.5 % (ref 0–1.8)
BASOPHILS # BLD: 0.02 K/UL (ref 0–0.12)
BILIRUB SERPL-MCNC: 0.9 MG/DL (ref 0.1–1.5)
BILIRUB UR QL STRIP.AUTO: NEGATIVE
BUN SERPL-MCNC: 33 MG/DL (ref 8–22)
CALCIUM SERPL-MCNC: 9 MG/DL (ref 8.4–10.2)
CHLORIDE SERPL-SCNC: 105 MMOL/L (ref 96–112)
CHOLEST SERPL-MCNC: 188 MG/DL (ref 100–199)
CO2 SERPL-SCNC: 22 MMOL/L (ref 20–33)
COLOR UR: YELLOW
CREAT SERPL-MCNC: 2.2 MG/DL (ref 0.5–1.4)
CREAT UR-MCNC: 95.76 MG/DL
CREAT UR-MCNC: 95.92 MG/DL
EOSINOPHIL # BLD AUTO: 0.21 K/UL (ref 0–0.51)
EOSINOPHIL NFR BLD: 5.1 % (ref 0–6.9)
ERYTHROCYTE [DISTWIDTH] IN BLOOD BY AUTOMATED COUNT: 56.3 FL (ref 35.9–50)
EST. AVERAGE GLUCOSE BLD GHB EST-MCNC: 137 MG/DL
FASTING STATUS PATIENT QL REPORTED: NORMAL
FERRITIN SERPL-MCNC: 163 NG/ML (ref 22–322)
GFR SERPLBLD CREATININE-BSD FMLA CKD-EPI: 29 ML/MIN/1.73 M 2
GLOBULIN SER CALC-MCNC: 2.7 G/DL (ref 1.9–3.5)
GLUCOSE SERPL-MCNC: 110 MG/DL (ref 65–99)
GLUCOSE UR STRIP.AUTO-MCNC: 500 MG/DL
HBA1C MFR BLD: 6.4 % (ref 4–5.6)
HCT VFR BLD AUTO: 48.8 % (ref 42–52)
HDLC SERPL-MCNC: 64 MG/DL
HGB BLD-MCNC: 16 G/DL (ref 14–18)
IMM GRANULOCYTES # BLD AUTO: 0 K/UL (ref 0–0.11)
IMM GRANULOCYTES NFR BLD AUTO: 0 % (ref 0–0.9)
IRON SATN MFR SERPL: 46 % (ref 15–55)
IRON SERPL-MCNC: 145 UG/DL (ref 50–180)
KETONES UR STRIP.AUTO-MCNC: NEGATIVE MG/DL
LDLC SERPL CALC-MCNC: 92 MG/DL
LEUKOCYTE ESTERASE UR QL STRIP.AUTO: NEGATIVE
LYMPHOCYTES # BLD AUTO: 1.45 K/UL (ref 1–4.8)
LYMPHOCYTES NFR BLD: 35.5 % (ref 22–41)
MAGNESIUM SERPL-MCNC: 2.2 MG/DL (ref 1.5–2.5)
MCH RBC QN AUTO: 34 PG (ref 27–33)
MCHC RBC AUTO-ENTMCNC: 32.8 G/DL (ref 33.7–35.3)
MCV RBC AUTO: 103.6 FL (ref 81.4–97.8)
MICRO URNS: ABNORMAL
MICROALBUMIN UR-MCNC: 2.6 MG/DL
MICROALBUMIN/CREAT UR: 27 MG/G (ref 0–30)
MONOCYTES # BLD AUTO: 0.57 K/UL (ref 0–0.85)
MONOCYTES NFR BLD AUTO: 13.9 % (ref 0–13.4)
NEUTROPHILS # BLD AUTO: 1.84 K/UL (ref 1.82–7.42)
NEUTROPHILS NFR BLD: 45 % (ref 44–72)
NITRITE UR QL STRIP.AUTO: NEGATIVE
NRBC # BLD AUTO: 0 K/UL
NRBC BLD-RTO: 0 /100 WBC
PH UR STRIP.AUTO: 6 [PH] (ref 5–8)
PHOSPHATE SERPL-MCNC: 3.9 MG/DL (ref 2.5–4.5)
PLATELET # BLD AUTO: 141 K/UL (ref 164–446)
PMV BLD AUTO: 11.4 FL (ref 9–12.9)
POTASSIUM SERPL-SCNC: 4.9 MMOL/L (ref 3.6–5.5)
PROT SERPL-MCNC: 6.8 G/DL (ref 6–8.2)
PROT UR QL STRIP: NEGATIVE MG/DL
PROT UR-MCNC: 10 MG/DL (ref 0–15)
PROT/CREAT UR: 104 MG/G (ref 15–68)
PTH-INTACT SERPL-MCNC: 113 PG/ML (ref 14–72)
RBC # BLD AUTO: 4.71 M/UL (ref 4.7–6.1)
RBC UR QL AUTO: NEGATIVE
SODIUM SERPL-SCNC: 138 MMOL/L (ref 135–145)
SP GR UR STRIP.AUTO: 1.01
TIBC SERPL-MCNC: 318 UG/DL (ref 250–450)
TRIGL SERPL-MCNC: 161 MG/DL (ref 0–149)
UIBC SERPL-MCNC: 173 UG/DL (ref 110–370)
URATE SERPL-MCNC: 8.2 MG/DL (ref 2.5–8.3)
WBC # BLD AUTO: 4.1 K/UL (ref 4.8–10.8)

## 2022-12-08 PROCEDURE — 83540 ASSAY OF IRON: CPT

## 2022-12-08 PROCEDURE — 83735 ASSAY OF MAGNESIUM: CPT

## 2022-12-08 PROCEDURE — 36415 COLL VENOUS BLD VENIPUNCTURE: CPT

## 2022-12-08 PROCEDURE — 80053 COMPREHEN METABOLIC PANEL: CPT

## 2022-12-08 PROCEDURE — 85025 COMPLETE CBC W/AUTO DIFF WBC: CPT

## 2022-12-08 PROCEDURE — 81003 URINALYSIS AUTO W/O SCOPE: CPT

## 2022-12-08 PROCEDURE — 83036 HEMOGLOBIN GLYCOSYLATED A1C: CPT | Mod: GA

## 2022-12-08 PROCEDURE — 82728 ASSAY OF FERRITIN: CPT

## 2022-12-08 PROCEDURE — 80061 LIPID PANEL: CPT

## 2022-12-08 PROCEDURE — 84550 ASSAY OF BLOOD/URIC ACID: CPT

## 2022-12-08 PROCEDURE — 82570 ASSAY OF URINE CREATININE: CPT | Mod: 91

## 2022-12-08 PROCEDURE — 84156 ASSAY OF PROTEIN URINE: CPT

## 2022-12-08 PROCEDURE — 83550 IRON BINDING TEST: CPT

## 2022-12-08 PROCEDURE — 83970 ASSAY OF PARATHORMONE: CPT

## 2022-12-08 PROCEDURE — 84100 ASSAY OF PHOSPHORUS: CPT

## 2022-12-08 PROCEDURE — 82043 UR ALBUMIN QUANTITATIVE: CPT

## 2022-12-08 PROCEDURE — 82306 VITAMIN D 25 HYDROXY: CPT

## 2022-12-09 ENCOUNTER — OFFICE VISIT (OUTPATIENT)
Dept: CARDIOLOGY | Facility: MEDICAL CENTER | Age: 82
End: 2022-12-09
Payer: MEDICARE

## 2022-12-09 VITALS
WEIGHT: 188 LBS | HEART RATE: 82 BPM | OXYGEN SATURATION: 97 % | SYSTOLIC BLOOD PRESSURE: 104 MMHG | DIASTOLIC BLOOD PRESSURE: 78 MMHG | BODY MASS INDEX: 24.13 KG/M2 | RESPIRATION RATE: 16 BRPM | HEIGHT: 74 IN

## 2022-12-09 DIAGNOSIS — D68.69 HYPERCOAGULABILITY DUE TO ATRIAL FIBRILLATION (HCC): ICD-10-CM

## 2022-12-09 DIAGNOSIS — Z86.73 HISTORY OF STROKE: ICD-10-CM

## 2022-12-09 DIAGNOSIS — I47.20 VENTRICULAR TACHYCARDIA (HCC): ICD-10-CM

## 2022-12-09 DIAGNOSIS — I48.0 PAROXYSMAL ATRIAL FIBRILLATION (HCC): ICD-10-CM

## 2022-12-09 DIAGNOSIS — I51.89 LEFT VENTRICULAR SYSTOLIC DYSFUNCTION, NYHA CLASS 3: ICD-10-CM

## 2022-12-09 DIAGNOSIS — E78.2 MIXED HYPERLIPIDEMIA: ICD-10-CM

## 2022-12-09 DIAGNOSIS — G47.31 COMPLEX SLEEP APNEA SYNDROME: ICD-10-CM

## 2022-12-09 DIAGNOSIS — I50.20 ACC/AHA STAGE C SYSTOLIC HEART FAILURE (HCC): ICD-10-CM

## 2022-12-09 DIAGNOSIS — Z79.899 HIGH RISK MEDICATION USE: ICD-10-CM

## 2022-12-09 DIAGNOSIS — I48.91 HYPERCOAGULABILITY DUE TO ATRIAL FIBRILLATION (HCC): ICD-10-CM

## 2022-12-09 DIAGNOSIS — J44.9 CHRONIC OBSTRUCTIVE PULMONARY DISEASE, UNSPECIFIED COPD TYPE (HCC): ICD-10-CM

## 2022-12-09 DIAGNOSIS — Z95.810 PRESENCE OF BIVENTRICULAR AUTOMATIC CARDIOVERTER/DEFIBRILLATOR (AICD): ICD-10-CM

## 2022-12-09 DIAGNOSIS — R06.09 DYSPNEA ON EXERTION: ICD-10-CM

## 2022-12-09 LAB — EKG IMPRESSION: NORMAL

## 2022-12-09 PROCEDURE — 99215 OFFICE O/P EST HI 40 MIN: CPT | Performed by: INTERNAL MEDICINE

## 2022-12-09 PROCEDURE — 93000 ELECTROCARDIOGRAM COMPLETE: CPT | Performed by: INTERNAL MEDICINE

## 2022-12-09 RX ORDER — VERICIGUAT 2.5 MG/1
2.5 TABLET, FILM COATED ORAL DAILY
Qty: 90 TABLET | Refills: 3 | Status: SHIPPED | OUTPATIENT
Start: 2022-12-09 | End: 2023-04-25

## 2022-12-09 RX ORDER — ROSUVASTATIN CALCIUM 40 MG/1
40 TABLET, COATED ORAL DAILY
Qty: 90 TABLET | Refills: 3 | Status: SHIPPED | OUTPATIENT
Start: 2022-12-09 | End: 2023-04-28

## 2022-12-09 RX ORDER — AMIODARONE HYDROCHLORIDE 200 MG/1
200 TABLET ORAL DAILY
Qty: 90 TABLET | Refills: 4 | Status: SHIPPED | OUTPATIENT
Start: 2022-12-09 | End: 2023-08-15 | Stop reason: SDUPTHER

## 2022-12-09 RX ORDER — EMPAGLIFLOZIN 10 MG/1
10 TABLET, FILM COATED ORAL DAILY
Qty: 90 TABLET | Refills: 3 | Status: SHIPPED | OUTPATIENT
Start: 2022-12-09 | End: 2023-07-16

## 2022-12-09 RX ORDER — SPIRONOLACTONE 25 MG/1
12.5 TABLET ORAL DAILY
Qty: 45 TABLET | Refills: 3 | Status: SHIPPED | OUTPATIENT
Start: 2022-12-09 | End: 2023-08-15 | Stop reason: SDUPTHER

## 2022-12-09 RX ORDER — CARVEDILOL 25 MG/1
12.5-25 TABLET ORAL 2 TIMES DAILY
Qty: 180 TABLET | Refills: 3 | Status: SHIPPED | OUTPATIENT
Start: 2022-12-09 | End: 2023-04-28

## 2022-12-09 RX ORDER — LOSARTAN POTASSIUM 100 MG/1
100 TABLET ORAL DAILY
Qty: 90 EACH | Refills: 4 | Status: SHIPPED | OUTPATIENT
Start: 2022-12-09 | End: 2023-08-15 | Stop reason: SDUPTHER

## 2022-12-09 RX ORDER — VERICIGUAT 2.5 MG/1
2.5 TABLET, FILM COATED ORAL DAILY
Qty: 30 TABLET | Refills: 11 | Status: SHIPPED | OUTPATIENT
Start: 2022-12-09 | End: 2022-12-09 | Stop reason: SDUPTHER

## 2022-12-09 ASSESSMENT — ENCOUNTER SYMPTOMS
DIAPHORESIS: 0
MEMORY LOSS: 0
FALLS: 0
DOUBLE VISION: 0
HEADACHES: 0
COUGH: 0
FEVER: 0
SENSORY CHANGE: 0
ABDOMINAL PAIN: 0
MYALGIAS: 0
BLURRED VISION: 0
DIZZINESS: 0
PALPITATIONS: 0
SHORTNESS OF BREATH: 1
DEPRESSION: 0
BRUISES/BLEEDS EASILY: 0

## 2022-12-09 ASSESSMENT — FIBROSIS 4 INDEX: FIB4 SCORE: 2.89

## 2022-12-09 NOTE — PROGRESS NOTES
Chief Complaint   Patient presents with    CHF (Systolic)     F/V Dx: ACC/AHA stage C systolic heart failure (HCC)    Atrial Fibrillation     F/V Dx: Paroxysmal atrial fibrillation (HCC)      Ventricular Tachycardia       Subjective:   Lencho Parker is an 82 y.o. male who presents today for cardiac care and management due to cardiac issues of nonischemic cardiomyopathy diagnosed in 2009, with most recent cholangiogram in 2016 showing nonobstructive coronary to disease, status post dual-chamber ICD placed in 2010, paroxysmal atrial fibrillation status post watchman device in October 2015, prior history of ventricular tachycardia status post ablation in 2012, hypertension, hyperlipidemia, treated AAA, prior history of TIA without residual neurological deficits.    Of note, patient was seen by his cardiologist in [place] before he moved to [facility].  He was initially started on Entresto therapy but developed angioedema.    His most recent transthoracic echocardiogram showed LV function of 10%.  He is status post CardioMEMS implantation for remote monitoring of volume status. Numbers have been good.    LVEF of 10% with global hypokinesis. No significant valvular disease. I have independently interpreted and reviewed echocardiogram's actual images. 09/2022.    I have independently interpreted and reviewed blood tests results with patient in clinic which shows normal LDL level 92, elevated triglycerides 161, renal and liver function. GFR is 29.    Became dehydrated with Farxiga and was in hospital. Now on Jardiance.    09/2022 Had stroke even with Watchman in place. Now on Eliquis 2.5 mg bid.    Now walks with a cane. Patient still gets winded with daily living activities and exertion. No symptoms at rest.      Past Medical History:   Diagnosis Date    Breath shortness     on exertion    Cardiomyopathy (HCC) 05/2021    Echocardiogram with moderately dilated LV, mild concentric LVH, LVEF 20%. Global  hypokinesis. Normal RA and RV. Severely dilated LA. Mild MR, mild TR. RVSP 34mmHg.    Chronic obstructive pulmonary disease (HCC)     [place] measles     Heart attack (HCC)     hx 2007    History of abdominal aortic aneurysm (AAA) 2009    Status post stent    Hyperlipidemia     Hypertension     Mumps     Pacemaker     AICD    Paroxysmal atrial fibrillation (HCC)     Status post Watchman procedure in [place].    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     Sleep apnea     Snoring     Stroke (HCC)     TIA    Tonsillitis     Ventricular tachycardia      Past Surgical History:   Procedure Laterality Date    AICD BATTERY CHANGE Left 09/10/2020     Upgrade to [employer] MRI Quad CRT-D [id number] implanted by Dr. Barrios.    AICD BATTERY CHANGE Left 2017    Generator replacement with [employer] MRI XT  [id number] implanted in [place].    OTHER CARDIAC SURGERY      watchman device    AAA WITH STENT GRAFT  2009    OTHER ORTHOPEDIC SURGERY      hip surgery    OTHER  2000    kidney removed    OTHER CARDIAC SURGERY      AICD implanted     Family History   Problem Relation Age of Onset    Cancer Mother     Cancer Sister      Social History     Socioeconomic History    Marital status: Single     Spouse name: Not on file    Number of children: Not on file    Years of education: Not on file    Highest education level: Not on file   Occupational History    Not on file   Tobacco Use    Smoking status: Former     Packs/day: 0.50     Years: 15.00     Pack years: 7.50     Types: Cigarettes     Quit date: 2004     Years since quittin.4    Smokeless tobacco: Former   Vaping Use    Vaping Use: Never used   Substance and Sexual Activity    Alcohol use: Yes     Alcohol/week: 8.4 oz     Types: 14 Glasses of wine per week     Comment: 2 per day    Drug use: Not Currently     Types: Inhaled     Comment: marijuana occ    Sexual activity: Not on file   Other Topics Concern    Not on file   Social History Narrative     Not on file     Social Determinants of Health     Financial Resource Strain: Not on file   Food Insecurity: Not on file   Transportation Needs: Not on file   Physical Activity: Not on file   Stress: Not on file   Social Connections: Not on file   Intimate Partner Violence: Not on file   Housing Stability: Not on file     Allergies   Allergen Reactions    Entresto [Sacubitril-Valsartan]      Swollen tongue. Angioedema.     Outpatient Encounter Medications as of 12/9/2022   Medication Sig Dispense Refill    apixaban (ELIQUIS) 2.5mg Tab Take 1 Tablet by mouth 2 times a day. 60 Tablet 1    furosemide (LASIX) 20 MG Tab Take 20 mg by mouth see administration instructions. Pt takes on Mon, Wed, and Friday      vitamin D3 (CHOLECALCIFEROL) 1000 Unit (25 mcg) Tab Take 1,000 Units by mouth every day.      Empagliflozin (JARDIANCE) 10 MG Tab Take 1 Tablet by mouth every day. 90 Tablet 3    tamsulosin (FLOMAX) 0.4 MG capsule Take 0.8 mg by mouth 1/2 hour after breakfast. Pt states he is taking 2 daily      amiodarone (CORDARONE) 200 MG Tab Take 1 tablet by mouth every day. 90 tablet 4    losartan (COZAAR) 100 MG Tab Take 1 tablet by mouth every day. 90 Each 4    spironolactone (ALDACTONE) 25 MG Tab Take 0.5 Tabs by mouth every day. 45 Tab 3    cinacalcet (SENSIPAR) 30 MG Tab Take 30 mg by mouth every day.      carvedilol (COREG) 25 MG Tab Take 12.5-25 mg by mouth 2 times a day. Pt takes 25MG in AM and 12.5MG HS       atorvastatin (LIPITOR) 20 MG Tab Take 20 mg by mouth every evening.      Omega-3 Fatty Acids (FISH OIL) 1000 MG Cap capsule Take 1,000 mg by mouth every evening.      multivitamin (THERAGRAN) Tab Take 1 Tab by mouth every day.      albuterol 108 (90 Base) MCG/ACT Aero Soln inhalation aerosol Inhale 2 Puffs by mouth every 6 hours as needed for Shortness of Breath. 8.5 g 0     No facility-administered encounter medications on file as of 12/9/2022.     Review of Systems   Constitutional:  Negative for diaphoresis  "and fever.   HENT:  Negative for nosebleeds.    Eyes:  Negative for blurred vision and double vision.   Respiratory:  Positive for shortness of breath. Negative for cough.    Cardiovascular:  Negative for chest pain and palpitations.   Gastrointestinal:  Negative for abdominal pain.   Genitourinary:  Negative for dysuria and frequency.   Musculoskeletal:  Negative for falls and myalgias.   Skin:  Negative for rash.   Neurological:  Negative for dizziness, sensory change and headaches.   Endo/Heme/Allergies:  Does not bruise/bleed easily.   Psychiatric/Behavioral:  Negative for depression and memory loss.       Objective:   /78 (BP Location: Left arm, Patient Position: Sitting, BP Cuff Size: Adult)   Pulse 82   Resp 16   Ht 1.88 m (6' 2\")   Wt 85.3 kg (188 lb)   SpO2 97%   BMI 24.14 kg/m²     Physical Exam  Vitals and nursing note reviewed.   Constitutional:       General: He is not in acute distress.     Appearance: He is not diaphoretic.   HENT:      Head: Normocephalic and atraumatic.      Right Ear: External ear normal.      Left Ear: External ear normal.   Eyes:      General:         Right eye: No discharge.         Left eye: No discharge.   Neck:      Thyroid: No thyromegaly.      Vascular: No JVD.   Cardiovascular:      Rate and Rhythm: Normal rate and regular rhythm.      Comments: Ausculation was not performed to minimize the risk of COVID spread during current pandemic. This complies with Medicare policies and guidelines.    Pulmonary:      Effort: No respiratory distress.   Abdominal:      General: There is no distension.      Tenderness: There is no abdominal tenderness.   Skin:     General: Skin is warm and dry.   Neurological:      Mental Status: He is alert and oriented to person, place, and time.      Cranial Nerves: No cranial nerve deficit.   Psychiatric:         Behavior: Behavior normal.       Assessment:     1. ACC/AHA stage C systolic heart failure (HCC)        2. Left ventricular " systolic dysfunction, NYHA class 3        3. Paroxysmal atrial fibrillation (HCC)  EKG      4. Ventricular tachycardia        5. Complex sleep apnea syndrome        6. Presence of biventricular automatic cardioverter/defibrillator (AICD)        7. Chronic obstructive pulmonary disease, unspecified COPD type (HCC)        8. Mixed hyperlipidemia        9. History of stroke        10. Dyspnea on exertion        11. High risk medication use        12. Hypercoagulability due to atrial fibrillation (HCC)            Medical Decision Making:  Today's Assessment / Status / Plan:   Non-ischemic Cardiomyopathy LVEF of 15% with already optimized medical therapy:  Chronically illed condition which requires ongoing close monitoring and treatment to improve survival rate along with decreasing risk of clinical decompensation and hospitalization.    Today, based on physical examination findings, patient is euvolemic. No JVD, lungs are clear to auscultation, no pitting edema in bilateral lower extremities, no ascites.     Dry weight is 188 lbs.     Continue coreg 25 mg po bid.     Will continue Losartan 100 mg once a day. NO Entresto due to anaphylactic shock.    Based on recent data on SGLT2 and heart failure with reduced ejection fraction, patient will be benefited from Jardiance 10 mg p.o. once a day for further reduction in mortality and hospitalization with absolute risk reduction of 5.2%.  Therefore, I will start patient on Jardiance 10 mg p.o. once a day.  Risks and benefits were explained to patient and patient has agreed to proceed.     Diuretic with Lasix 20 mg changed to twice weekly now. Patient was instructed to monitor symptoms of lightheadedness or syncope rather than actual numbers.      Aldactone antagonist with Spironolactone 12.5 mg daily.    At this time, s/p CRT upgrade by adding LV lead via coronary sinus.    Continue remote monitor with MEMs.    Based on recent data on Vericiguat's FDA approval and indication  for symptomatic HF patients with recent hospitalization (within the last 6 months) and LVEF of less than 45%, at this time, I will start patient on Verquvo 2.5 mg once a day and titrate up to goal of 10 mg once a day via protocol for potential benefits of cardiovascular risk reduction in mortality and heart failure hospitalization.    At this time, I discussed with patient the possibility of LVAD in the event of worsening clinical status. He is not too enthusiastic about it. He will think about it.    Paroxysmal Atrial fibrillation:  Now on anticoagulation, already with Watchman device but still had breakthrough with stroke in 09/2022..  No significnat mode switching seen on recent interrogation in 05/2022.    Hypertension:  Optimize control using cardiomyopathy medical regimen as well.    Hyperlipidemia:  Optimize statin as within guidelines of CAD treatment as above.     I will continue to closely monitor for side effects of patient's high risk medication(s) including liver, renal function and electrolytes.

## 2022-12-15 ENCOUNTER — TELEPHONE (OUTPATIENT)
Dept: CARDIOLOGY | Facility: MEDICAL CENTER | Age: 82
End: 2022-12-15
Payer: MEDICARE

## 2022-12-15 NOTE — TELEPHONE ENCOUNTER
PA APPROVED    Lencho Parker Key: [id number] - PA Case ID: [id number] - Rx #: [id number] help? Call us at [phone]  Outcome  Approvedtoday  CaseId:[id number];Status:Approved;Review Type:Prior Auth;Coverage Start Date:11/15/2022;Coverage End Date:12/31/2099;  Drug  Verquvo 2.5MG tablets  Form   Electronic PA Form (2017 NCPDP)  Original Claim Info  75 CALL HELP DESK

## 2023-01-06 ENCOUNTER — APPOINTMENT (OUTPATIENT)
Dept: RADIOLOGY | Facility: MEDICAL CENTER | Age: 83
End: 2023-01-06
Attending: EMERGENCY MEDICINE
Payer: COMMERCIAL

## 2023-01-06 ENCOUNTER — HOSPITAL ENCOUNTER (EMERGENCY)
Facility: MEDICAL CENTER | Age: 83
End: 2023-01-06
Attending: EMERGENCY MEDICINE
Payer: COMMERCIAL

## 2023-01-06 VITALS
BODY MASS INDEX: 24.52 KG/M2 | SYSTOLIC BLOOD PRESSURE: 118 MMHG | HEIGHT: 74 IN | RESPIRATION RATE: 18 BRPM | TEMPERATURE: 97.2 F | DIASTOLIC BLOOD PRESSURE: 74 MMHG | WEIGHT: 191.1 LBS | OXYGEN SATURATION: 96 % | HEART RATE: 80 BPM

## 2023-01-06 DIAGNOSIS — R31.9 URINARY TRACT INFECTION WITH HEMATURIA, SITE UNSPECIFIED: ICD-10-CM

## 2023-01-06 DIAGNOSIS — N18.9 CHRONIC KIDNEY DISEASE, UNSPECIFIED CKD STAGE: ICD-10-CM

## 2023-01-06 DIAGNOSIS — N39.0 URINARY TRACT INFECTION WITH HEMATURIA, SITE UNSPECIFIED: ICD-10-CM

## 2023-01-06 DIAGNOSIS — R31.9 HEMATURIA, UNSPECIFIED TYPE: ICD-10-CM

## 2023-01-06 LAB
ALBUMIN SERPL BCP-MCNC: 4 G/DL (ref 3.2–4.9)
ALBUMIN/GLOB SERPL: 1.6 G/DL
ALP SERPL-CCNC: 70 U/L (ref 30–99)
ALT SERPL-CCNC: 41 U/L (ref 2–50)
ANION GAP SERPL CALC-SCNC: 12 MMOL/L (ref 7–16)
APPEARANCE UR: ABNORMAL
AST SERPL-CCNC: 34 U/L (ref 12–45)
BACTERIA #/AREA URNS HPF: ABNORMAL /HPF
BASOPHILS # BLD AUTO: 0.2 % (ref 0–1.8)
BASOPHILS # BLD: 0.01 K/UL (ref 0–0.12)
BILIRUB SERPL-MCNC: 0.8 MG/DL (ref 0.1–1.5)
BILIRUB UR QL STRIP.AUTO: NEGATIVE
BUN SERPL-MCNC: 35 MG/DL (ref 8–22)
C3 SERPL-MCNC: 97.3 MG/DL (ref 87–200)
C4 SERPL-MCNC: 29.6 MG/DL (ref 19–52)
CALCIUM ALBUM COR SERPL-MCNC: 8.9 MG/DL (ref 8.5–10.5)
CALCIUM SERPL-MCNC: 8.9 MG/DL (ref 8.4–10.2)
CHLORIDE SERPL-SCNC: 104 MMOL/L (ref 96–112)
CO2 SERPL-SCNC: 22 MMOL/L (ref 20–33)
COLOR UR: YELLOW
CREAT SERPL-MCNC: 2.24 MG/DL (ref 0.5–1.4)
EOSINOPHIL # BLD AUTO: 0.15 K/UL (ref 0–0.51)
EOSINOPHIL NFR BLD: 3 % (ref 0–6.9)
EPI CELLS #/AREA URNS HPF: ABNORMAL /HPF
ERYTHROCYTE [DISTWIDTH] IN BLOOD BY AUTOMATED COUNT: 53.5 FL (ref 35.9–50)
GFR SERPLBLD CREATININE-BSD FMLA CKD-EPI: 29 ML/MIN/1.73 M 2
GLOBULIN SER CALC-MCNC: 2.5 G/DL (ref 1.9–3.5)
GLUCOSE SERPL-MCNC: 111 MG/DL (ref 65–99)
GLUCOSE UR STRIP.AUTO-MCNC: 500 MG/DL
HCT VFR BLD AUTO: 49.7 % (ref 42–52)
HGB BLD-MCNC: 16.6 G/DL (ref 14–18)
IMM GRANULOCYTES # BLD AUTO: 0 K/UL (ref 0–0.11)
IMM GRANULOCYTES NFR BLD AUTO: 0 % (ref 0–0.9)
INR PPP: 1.11 (ref 0.87–1.13)
KETONES UR STRIP.AUTO-MCNC: ABNORMAL MG/DL
LEUKOCYTE ESTERASE UR QL STRIP.AUTO: ABNORMAL
LYMPHOCYTES # BLD AUTO: 1.25 K/UL (ref 1–4.8)
LYMPHOCYTES NFR BLD: 25 % (ref 22–41)
MCH RBC QN AUTO: 34.5 PG (ref 27–33)
MCHC RBC AUTO-ENTMCNC: 33.4 G/DL (ref 33.7–35.3)
MCV RBC AUTO: 103.3 FL (ref 81.4–97.8)
MICRO URNS: ABNORMAL
MONOCYTES # BLD AUTO: 0.53 K/UL (ref 0–0.85)
MONOCYTES NFR BLD AUTO: 10.6 % (ref 0–13.4)
NEUTROPHILS # BLD AUTO: 3.07 K/UL (ref 1.82–7.42)
NEUTROPHILS NFR BLD: 61.2 % (ref 44–72)
NITRITE UR QL STRIP.AUTO: POSITIVE
NRBC # BLD AUTO: 0 K/UL
NRBC BLD-RTO: 0 /100 WBC
PH UR STRIP.AUTO: 6 [PH] (ref 5–8)
PLATELET # BLD AUTO: 151 K/UL (ref 164–446)
PMV BLD AUTO: 11.1 FL (ref 9–12.9)
POTASSIUM SERPL-SCNC: 5.9 MMOL/L (ref 3.6–5.5)
PROT SERPL-MCNC: 6.5 G/DL (ref 6–8.2)
PROT UR QL STRIP: NEGATIVE MG/DL
PROTHROMBIN TIME: 14.1 SEC (ref 12–14.6)
RBC # BLD AUTO: 4.81 M/UL (ref 4.7–6.1)
RBC # URNS HPF: ABNORMAL /HPF
RBC UR QL AUTO: ABNORMAL
SODIUM SERPL-SCNC: 138 MMOL/L (ref 135–145)
SP GR UR STRIP.AUTO: 1.01
WBC # BLD AUTO: 5 K/UL (ref 4.8–10.8)
WBC #/AREA URNS HPF: ABNORMAL /HPF

## 2023-01-06 PROCEDURE — 84165 PROTEIN E-PHORESIS SERUM: CPT

## 2023-01-06 PROCEDURE — 86038 ANTINUCLEAR ANTIBODIES: CPT

## 2023-01-06 PROCEDURE — 83516 IMMUNOASSAY NONANTIBODY: CPT

## 2023-01-06 PROCEDURE — 81001 URINALYSIS AUTO W/SCOPE: CPT

## 2023-01-06 PROCEDURE — 36415 COLL VENOUS BLD VENIPUNCTURE: CPT

## 2023-01-06 PROCEDURE — 85025 COMPLETE CBC W/AUTO DIFF WBC: CPT

## 2023-01-06 PROCEDURE — 74176 CT ABD & PELVIS W/O CONTRAST: CPT

## 2023-01-06 PROCEDURE — 99284 EMERGENCY DEPT VISIT MOD MDM: CPT

## 2023-01-06 PROCEDURE — 86334 IMMUNOFIX E-PHORESIS SERUM: CPT

## 2023-01-06 PROCEDURE — 86160 COMPLEMENT ANTIGEN: CPT

## 2023-01-06 PROCEDURE — 84155 ASSAY OF PROTEIN SERUM: CPT

## 2023-01-06 PROCEDURE — 85610 PROTHROMBIN TIME: CPT

## 2023-01-06 PROCEDURE — 80053 COMPREHEN METABOLIC PANEL: CPT

## 2023-01-06 RX ORDER — CEFDINIR 300 MG/1
300 CAPSULE ORAL 2 TIMES DAILY
Qty: 20 CAPSULE | Refills: 0 | Status: SHIPPED | OUTPATIENT
Start: 2023-01-06 | End: 2023-04-25

## 2023-01-06 ASSESSMENT — FIBROSIS 4 INDEX: FIB4 SCORE: 2.89

## 2023-01-06 NOTE — DISCHARGE INSTRUCTIONS
You have a urinary tract infection, this is likely causing your bleeding however it is difficult to say definitively.  We we will start you on antibiotics.  I would like you to follow-up closely with urology for possible cystoscopy.  Furthermore your kidney function continues to decline from your prior visits.  It is not severe but it is becoming worse.  It is importantly follow-up with your primary care physician and nephrology for this.  I discussed her case with our nephrology attending, he will have his office reach out with an appointment

## 2023-01-06 NOTE — ED PROVIDER NOTES
ED Provider Note    CHIEF COMPLAINT  Chief Complaint   Patient presents with    Blood in Urine     4 days of blood in his urine, denies frequency or pain.         HPI/ROS      Lencho Parker is a 82 y.o. male who presents with chief complaint of hematuria.  Patient reports minimal blood in his urine.  He reports that few days ago, it resolved but has returned today.  He denies any dysuria urgency or frequency.  Denies any associated flank pain or abdominal pain.  He denies any significant strenuous activity.  Patient has a history of atrial fibrillation and is on Eliquis for this.  Patient also has a history of a abdominal aortic aneurysm.  Patient denies any associated dizziness, or bleeding elsewhere.  He denies any easy bruising.  Patient denies any testicular pain.    PAST MEDICAL HISTORY   has a past medical history of Breath shortness, Cardiomyopathy (HCC) (2021), Chronic obstructive pulmonary disease (HCC), [place] measles, Heart attack (HCC), History of abdominal aortic aneurysm (AAA) (), Hyperlipidemia, Hypertension, Mumps, Pacemaker, Paroxysmal atrial fibrillation (HCC), Renal disorder, Sleep apnea, Snoring, Stroke (HCC) (), Tonsillitis, and Ventricular tachycardia.    SURGICAL HISTORY   has a past surgical history that includes aicd battery change (Left, 2017); aaa with stent graft (); other (); other orthopedic surgery (); other cardiac surgery (); other cardiac surgery (); and aicd battery change (Left, 09/10/2020).    FAMILY HISTORY  Family History   Problem Relation Age of Onset    Cancer Mother     Cancer Sister        SOCIAL HISTORY  Social History     Tobacco Use    Smoking status: Former     Packs/day: 0.50     Years: 15.00     Pack years: 7.50     Types: Cigarettes     Quit date: 2004     Years since quittin.5    Smokeless tobacco: Former   Vaping Use    Vaping Use: Never used   Substance and Sexual Activity    Alcohol use: Yes      "Alcohol/week: 8.4 oz     Types: 14 Glasses of wine per week     Comment: 2 per day    Drug use: Not Currently     Types: Inhaled     Comment: marijuana occ    Sexual activity: Not on file       CURRENT MEDICATIONS  Home Medications       Reviewed by Jyoti Chance R.N. (Registered Nurse) on 01/06/23 at 1034  Med List Status: Not Addressed     Medication Last Dose Status   albuterol 108 (90 Base) MCG/ACT Aero Soln inhalation aerosol  Active   amiodarone (CORDARONE) 200 MG Tab  Active   apixaban (ELIQUIS) 2.5mg Tab  Active   carvedilol (COREG) 25 MG Tab  Active   cinacalcet (SENSIPAR) 30 MG Tab  Active   Empagliflozin (JARDIANCE) 10 MG Tab  Active   furosemide (LASIX) 20 MG Tab  Active   losartan (COZAAR) 100 MG Tab  Active   multivitamin (THERAGRAN) Tab  Active   Omega-3 Fatty Acids (FISH OIL) 1000 MG Cap capsule  Active   rosuvastatin (CRESTOR) 40 MG tablet  Active   spironolactone (ALDACTONE) 25 MG Tab  Active   tamsulosin (FLOMAX) 0.4 MG capsule  Active   Vericiguat (VERQUVO) 2.5 MG Tab  Active   vitamin D3 (CHOLECALCIFEROL) 1000 Unit (25 mcg) Tab  Active                    ALLERGIES  Allergies   Allergen Reactions    Entresto [Sacubitril-Valsartan]      Swollen tongue. Angioedema.    Crestor [Rosuvastatin]        PHYSICAL EXAM  VITAL SIGNS: /75   Pulse 84   Temp 35.9 °C (96.7 °F) (Temporal)   Resp 16   Ht 1.88 m (6' 2\")   Wt 86.7 kg (191 lb 1.6 oz)   SpO2 95%   BMI 24.54 kg/m²    Physical Exam  Constitutional:       Appearance: Normal appearance.   HENT:      Head: Normocephalic.   Cardiovascular:      Rate and Rhythm: Normal rate and regular rhythm.   Abdominal:      General: Abdomen is flat. There is no distension.      Palpations: Abdomen is soft. There is no mass.      Tenderness: There is no abdominal tenderness. There is no right CVA tenderness or left CVA tenderness.      Hernia: No hernia is present.   Neurological:      Mental Status: He is alert.         DIAGNOSTIC STUDIES / " PROCEDURES      LABS  Results for orders placed or performed during the hospital encounter of 01/06/23   CBC WITH DIFFERENTIAL   Result Value Ref Range    WBC 5.0 4.8 - 10.8 K/uL    RBC 4.81 4.70 - 6.10 M/uL    Hemoglobin 16.6 14.0 - 18.0 g/dL    Hematocrit 49.7 42.0 - 52.0 %    .3 (H) 81.4 - 97.8 fL    MCH 34.5 (H) 27.0 - 33.0 pg    MCHC 33.4 (L) 33.7 - 35.3 g/dL    RDW 53.5 (H) 35.9 - 50.0 fL    Platelet Count 151 (L) 164 - 446 K/uL    MPV 11.1 9.0 - 12.9 fL    Neutrophils-Polys 61.20 44.00 - 72.00 %    Lymphocytes 25.00 22.00 - 41.00 %    Monocytes 10.60 0.00 - 13.40 %    Eosinophils 3.00 0.00 - 6.90 %    Basophils 0.20 0.00 - 1.80 %    Immature Granulocytes 0.00 0.00 - 0.90 %    Nucleated RBC 0.00 /100 WBC    Neutrophils (Absolute) 3.07 1.82 - 7.42 K/uL    Lymphs (Absolute) 1.25 1.00 - 4.80 K/uL    Monos (Absolute) 0.53 0.00 - 0.85 K/uL    Eos (Absolute) 0.15 0.00 - 0.51 K/uL    Baso (Absolute) 0.01 0.00 - 0.12 K/uL    Immature Granulocytes (abs) 0.00 0.00 - 0.11 K/uL    NRBC (Absolute) 0.00 K/uL   CMP   Result Value Ref Range    Sodium 138 135 - 145 mmol/L    Potassium 5.9 (H) 3.6 - 5.5 mmol/L    Chloride 104 96 - 112 mmol/L    Co2 22 20 - 33 mmol/L    Anion Gap 12.0 7.0 - 16.0    Glucose 111 (H) 65 - 99 mg/dL    Bun 35 (H) 8 - 22 mg/dL    Creatinine 2.24 (H) 0.50 - 1.40 mg/dL    Calcium 8.9 8.4 - 10.2 mg/dL    AST(SGOT) 34 12 - 45 U/L    ALT(SGPT) 41 2 - 50 U/L    Alkaline Phosphatase 70 30 - 99 U/L    Total Bilirubin 0.8 0.1 - 1.5 mg/dL    Albumin 4.0 3.2 - 4.9 g/dL    Total Protein 6.5 6.0 - 8.2 g/dL    Globulin 2.5 1.9 - 3.5 g/dL    A-G Ratio 1.6 g/dL   URINALYSIS    Specimen: Urine   Result Value Ref Range    Color Yellow     Character Hazy (A)     Specific Gravity 1.015 <1.035    Ph 6.0 5.0 - 8.0    Glucose 500 (A) Negative mg/dL    Ketones Trace (A) Negative mg/dL    Protein Negative Negative mg/dL    Bilirubin Negative Negative    Nitrite Positive (A) Negative    Leukocyte Esterase Trace (A)  Negative    Occult Blood Large (A) Negative    Micro Urine Req Microscopic    URINE MICROSCOPIC (W/UA)   Result Value Ref Range    WBC 10-20 (A) /hpf    RBC 10-20 (A) /hpf    Bacteria Many (A) None /hpf    Epithelial Cells Few Few /hpf   CORRECTED CALCIUM   Result Value Ref Range    Correct Calcium 8.9 8.5 - 10.5 mg/dL   ESTIMATED GFR   Result Value Ref Range    GFR (CKD-EPI) 29 (A) >60 mL/min/1.73 m 2         RADIOLOGY  I have independently interpreted the diagnostic imaging associated with this visit and am waiting the final reading from the radiologist.   CT-RENAL COLIC EVALUATION(A/P W/O)   Final Result         1. No urinary tract calculus identified. No renal collecting system dilatation. Status post right nephrectomy.      2. Dilatation of the mid abdominal aorta, measuring up to 4.4 cm. Aortobiiliac stent graft. Evaluation of the vasculature is limited due to lack of contrast.      3. Enlarged prostate.      4. Colonic diverticulosis.            COURSE & MEDICAL DECISION MAKING    ED Observation Status? No; Patient does not meet criteria for ED Observation.     INITIAL ASSESSMENT AND PLAN  Care Narrative:       Patient here with hematuria.  Certainly given patient's history of hemorrhagic cystitis this is a possibility.  We will check basic labs including urinalysis to assess.  Patient is hemodynamically stable, he does not have any associate abdominal pain, bleeding is relatively mild, my suspicion of a aorto vesicular fistula is incredibly low.  Patient otherwise appears very well.  He does not appear septic.  Patient certainly could have a kidney stone though I believe this is unlikely as well.  Patient does not appear to have an overly distended bladder to suggest obstructive process.  Patient urinalysis reveals hematuria, and bacteriuria.  He is nitrite positive.  Certainly hemorrhagic cystitis is possible as a cause of patient's symptoms.  Patient will be sent home with cefdinir.  However given  patient's age will check CT to evaluate for possible mass or abnormality otherwise, furthermore will ensure patient does not have any severe hydronephrosis to suggest proximal obstructive process.  Patient CT fails to reveal any acute abnormalities, he has a known aortic aneurysm.  Patient case discussed with nephrology regarding his worsening CKD, multiple labs were ordered with the direction of nephrology to evaluate for possible vasculitis and glomerulonephritis.  There is a been sent.  Patient will follow-up closely with his nephrologist for ongoing care.  He will also follow-up with urology for possible cystoscopy if symptoms fail to resolve    ADDITIONAL PROBLEM LIST AND DISPOSITION      I have discussed management of the patient with the following physicians and JARED's: Nephrology      Escalation of care considered, and ultimately not performed: Admission was deferred given nephrology is comfortable with patient following up as an outpatient with close follow-up.  I discussed this with patient, and he is also comfortable with this.        FINAL DIAGNOSIS  1. Hematuria, unspecified type    2. Urinary tract infection with hematuria, site unspecified    3. Chronic kidney disease, unspecified CKD stage

## 2023-01-06 NOTE — ED TRIAGE NOTES
Chief Complaint   Patient presents with    Blood in Urine     4 days of blood in his urine, denies frequency or pain.

## 2023-01-06 NOTE — ED NOTES
1200 All results back, chart up for MD for re evaluation. poc update given to pt. No further questions at this time. Further orders and dispo pending   1215 new orders received , poc explained to pt

## 2023-01-06 NOTE — ED NOTES
D/c pt home, rx given . Pt aware of f/u instructions , with nephrology and urology .  aware to return for any changes or concerns. No further questions upon d/c home from ed

## 2023-01-09 LAB
ALBUMIN SERPL ELPH-MCNC: 3.97 G/DL (ref 3.75–5.01)
ALPHA1 GLOB SERPL ELPH-MCNC: 0.24 G/DL (ref 0.19–0.46)
ALPHA2 GLOB SERPL ELPH-MCNC: 0.73 G/DL (ref 0.48–1.05)
B-GLOBULIN SERPL ELPH-MCNC: 0.67 G/DL (ref 0.48–1.1)
GAMMA GLOB SERPL ELPH-MCNC: 0.98 G/DL (ref 0.62–1.51)
INTERPRETATION SERPL IFE-IMP: NORMAL
INTERPRETATION SERPL IFE-IMP: NORMAL
MONOCLONAL PROTEIN NL11656: NORMAL G/DL
MYELOPEROXIDASE AB SER-ACNC: 0 AU/ML (ref 0–19)
NUCLEAR IGG SER QL IA: NORMAL
PATHOLOGY STUDY: NORMAL
PROT SERPL-MCNC: 6.6 G/DL (ref 6.3–8.2)
PROTEINASE3 AB SER-ACNC: 0 AU/ML (ref 0–19)

## 2023-02-04 ENCOUNTER — NON-PROVIDER VISIT (OUTPATIENT)
Dept: CARDIOLOGY | Facility: MEDICAL CENTER | Age: 83
End: 2023-02-04
Payer: MEDICARE

## 2023-02-04 PROCEDURE — 93295 DEV INTERROG REMOTE 1/2/MLT: CPT | Performed by: INTERNAL MEDICINE

## 2023-02-06 NOTE — CARDIAC REMOTE MONITOR - SCAN
Device transmission reviewed. Device demonstrated appropriate function.       Electronically Signed by: Tolu Barrios M.D.    2/11/2023  1:30 PM

## 2023-02-16 RX ORDER — FUROSEMIDE 20 MG/1
TABLET ORAL
Qty: 30 TABLET | Refills: 3 | Status: SHIPPED | OUTPATIENT
Start: 2023-02-16 | End: 2023-04-28

## 2023-02-21 ENCOUNTER — HOSPITAL ENCOUNTER (OUTPATIENT)
Dept: LAB | Facility: MEDICAL CENTER | Age: 83
End: 2023-02-21
Attending: STUDENT IN AN ORGANIZED HEALTH CARE EDUCATION/TRAINING PROGRAM
Payer: MEDICARE

## 2023-02-21 LAB
APPEARANCE UR: CLEAR
BILIRUB UR QL STRIP.AUTO: NEGATIVE
COLOR UR: YELLOW
CREAT UR-MCNC: 64.14 MG/DL
CREAT UR-MCNC: 66.08 MG/DL
GLUCOSE UR STRIP.AUTO-MCNC: 250 MG/DL
KETONES UR STRIP.AUTO-MCNC: NEGATIVE MG/DL
LEUKOCYTE ESTERASE UR QL STRIP.AUTO: NEGATIVE
MICRO URNS: ABNORMAL
MICROALBUMIN UR-MCNC: 2.2 MG/DL
MICROALBUMIN/CREAT UR: 33 MG/G (ref 0–30)
NITRITE UR QL STRIP.AUTO: NEGATIVE
PH UR STRIP.AUTO: 6 [PH] (ref 5–8)
PROT UR QL STRIP: NEGATIVE MG/DL
PROT UR-MCNC: 8 MG/DL (ref 0–15)
PROT/CREAT UR: 125 MG/G (ref 15–68)
RBC UR QL AUTO: NEGATIVE
SP GR UR STRIP.AUTO: 1.01

## 2023-02-21 PROCEDURE — 82043 UR ALBUMIN QUANTITATIVE: CPT

## 2023-02-21 PROCEDURE — 82570 ASSAY OF URINE CREATININE: CPT

## 2023-02-21 PROCEDURE — 84156 ASSAY OF PROTEIN URINE: CPT

## 2023-02-21 PROCEDURE — 81003 URINALYSIS AUTO W/O SCOPE: CPT

## 2023-04-10 ENCOUNTER — HOSPITAL ENCOUNTER (EMERGENCY)
Facility: MEDICAL CENTER | Age: 83
End: 2023-04-10
Payer: MEDICARE

## 2023-04-10 VITALS
HEIGHT: 74 IN | HEART RATE: 78 BPM | WEIGHT: 184.97 LBS | BODY MASS INDEX: 23.74 KG/M2 | RESPIRATION RATE: 16 BRPM | SYSTOLIC BLOOD PRESSURE: 106 MMHG | DIASTOLIC BLOOD PRESSURE: 83 MMHG | OXYGEN SATURATION: 95 % | TEMPERATURE: 98 F

## 2023-04-10 PROCEDURE — 302449 STATCHG TRIAGE ONLY (STATISTIC)

## 2023-04-10 ASSESSMENT — FIBROSIS 4 INDEX: FIB4 SCORE: 2.81

## 2023-04-10 NOTE — ED TRIAGE NOTES
Pt comes in by himself  c/o L L leg numbness and weakness that started around 9:30 this morning  this is new  Hx of TIA several months ago  pt ambulating w/ cane(uses all the time)

## 2023-04-25 ENCOUNTER — OFFICE VISIT (OUTPATIENT)
Dept: CARDIOLOGY | Facility: MEDICAL CENTER | Age: 83
End: 2023-04-25
Payer: MEDICARE

## 2023-04-25 VITALS
DIASTOLIC BLOOD PRESSURE: 64 MMHG | HEIGHT: 74 IN | BODY MASS INDEX: 23.87 KG/M2 | HEART RATE: 67 BPM | RESPIRATION RATE: 12 BRPM | OXYGEN SATURATION: 97 % | SYSTOLIC BLOOD PRESSURE: 98 MMHG | WEIGHT: 186 LBS

## 2023-04-25 DIAGNOSIS — I51.89 LEFT VENTRICULAR SYSTOLIC DYSFUNCTION, NYHA CLASS 3: ICD-10-CM

## 2023-04-25 DIAGNOSIS — I47.20 VENTRICULAR TACHYCARDIA (HCC): ICD-10-CM

## 2023-04-25 DIAGNOSIS — J44.9 CHRONIC OBSTRUCTIVE PULMONARY DISEASE, UNSPECIFIED COPD TYPE (HCC): ICD-10-CM

## 2023-04-25 DIAGNOSIS — G47.31 COMPLEX SLEEP APNEA SYNDROME: ICD-10-CM

## 2023-04-25 DIAGNOSIS — I73.9 PVD (PERIPHERAL VASCULAR DISEASE) (HCC): ICD-10-CM

## 2023-04-25 DIAGNOSIS — Z95.810 PRESENCE OF BIVENTRICULAR AUTOMATIC CARDIOVERTER/DEFIBRILLATOR (AICD): ICD-10-CM

## 2023-04-25 DIAGNOSIS — I48.0 PAROXYSMAL ATRIAL FIBRILLATION (HCC): ICD-10-CM

## 2023-04-25 DIAGNOSIS — I10 HTN (HYPERTENSION), MALIGNANT: ICD-10-CM

## 2023-04-25 DIAGNOSIS — E78.2 MIXED HYPERLIPIDEMIA: ICD-10-CM

## 2023-04-25 DIAGNOSIS — Z79.899 HIGH RISK MEDICATION USE: ICD-10-CM

## 2023-04-25 DIAGNOSIS — R06.09 DYSPNEA ON EXERTION: ICD-10-CM

## 2023-04-25 DIAGNOSIS — Z86.73 HISTORY OF STROKE: ICD-10-CM

## 2023-04-25 DIAGNOSIS — I50.20 ACC/AHA STAGE C SYSTOLIC HEART FAILURE (HCC): ICD-10-CM

## 2023-04-25 PROCEDURE — 99214 OFFICE O/P EST MOD 30 MIN: CPT | Performed by: INTERNAL MEDICINE

## 2023-04-25 RX ORDER — CHLORAL HYDRATE 500 MG
CAPSULE ORAL
COMMUNITY
End: 2023-04-25

## 2023-04-25 RX ORDER — VERICIGUAT 5 MG/1
5 TABLET, FILM COATED ORAL DAILY
Qty: 30 TABLET | Refills: 11 | Status: SHIPPED | OUTPATIENT
Start: 2023-04-25 | End: 2023-08-15

## 2023-04-25 RX ORDER — ATORVASTATIN CALCIUM 20 MG/1
TABLET, FILM COATED ORAL
COMMUNITY
End: 2023-04-28

## 2023-04-25 RX ORDER — FUROSEMIDE 20 MG/1
20 TABLET ORAL
COMMUNITY
End: 2023-04-25

## 2023-04-25 RX ORDER — CHOLECALCIFEROL (VITAMIN D3) 1250 MCG
CAPSULE ORAL
COMMUNITY
End: 2023-04-28

## 2023-04-25 ASSESSMENT — ENCOUNTER SYMPTOMS
BRUISES/BLEEDS EASILY: 0
DEPRESSION: 0
DIAPHORESIS: 0
DIZZINESS: 0
HEADACHES: 0
MEMORY LOSS: 0
PALPITATIONS: 0
MYALGIAS: 0
FALLS: 0
FEVER: 0
SENSORY CHANGE: 0
SHORTNESS OF BREATH: 1
DOUBLE VISION: 0
ABDOMINAL PAIN: 0
COUGH: 0
BLURRED VISION: 0

## 2023-04-25 ASSESSMENT — FIBROSIS 4 INDEX: FIB4 SCORE: 2.81

## 2023-04-25 NOTE — PROGRESS NOTES
Chief Complaint   Patient presents with    CHF (Systolic)     FV Dx: ACC/AHA stage C systolic heart failure       Subjective:   Lencho Parker is an 82 y.o. male who presents today for cardiac care and management due to cardiac issues of nonischemic cardiomyopathy diagnosed in 2009, with most recent cholangiogram in 2016 showing nonobstructive coronary to disease, status post dual-chamber ICD placed in 2010, paroxysmal atrial fibrillation status post watchman device in October 2015, prior history of ventricular tachycardia status post ablation in 2012, hypertension, hyperlipidemia, treated AAA, prior history of TIA without residual neurological deficits.    Of note, patient was seen by his cardiologist in [place] before he moved to [facility].  He was initially started on Entresto therapy but developed angioedema.    His most recent transthoracic echocardiogram showed LV function of 10%.  He is status post CardioMEMS implantation for remote monitoring of volume status. Numbers have been good.    LVEF of 10% with global hypokinesis. No significant valvular disease. I have independently interpreted and reviewed echocardiogram's actual images. 09/2022.    I have independently interpreted and reviewed blood tests results with patient in clinic which shows normal LDL level 92, elevated triglycerides 161, renal and liver function. GFR is 29.    Became dehydrated with Farxiga and was in hospital. Now on Jardiance.    09/2022 Had stroke even with Watchman in place. Now on Eliquis 2.5 mg bid.    Now walks with a cane. Patient still gets winded with daily living activities and exertion. No symptoms at rest.      Past Medical History:   Diagnosis Date    Breath shortness     on exertion    Cardiomyopathy (HCC) 05/2021    Echocardiogram with moderately dilated LV, mild concentric LVH, LVEF 20%. Global hypokinesis. Normal RA and RV. Severely dilated LA. Mild MR, mild TR. RVSP 34mmHg.    Chronic obstructive  pulmonary disease (HCC)     Thai measles     Heart attack (HCC)     hx 2007    History of abdominal aortic aneurysm (AAA) 2009    Status post stent    Hyperlipidemia     Hypertension     Mumps     Pacemaker     AICD    Paroxysmal atrial fibrillation (HCC)     Status post Watchman procedure in [place].    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     Sleep apnea     Snoring     Stroke (HCC) 2012    TIA    Tonsillitis     Ventricular tachycardia (HCC)      Past Surgical History:   Procedure Laterality Date    AICD BATTERY CHANGE Left 09/10/2020     Upgrade to [employer] MRI Quad CRT-D [id number] implanted by Dr. Barrios.    AICD BATTERY CHANGE Left 2017    Generator replacement with Medtronic Evera MRI XT  [id number] implanted in [place].    OTHER CARDIAC SURGERY      watchman device    AAA WITH STENT GRAFT  2009    OTHER ORTHOPEDIC SURGERY      hip surgery    OTHER  2000    kidney removed    OTHER CARDIAC SURGERY      AICD implanted     Family History   Problem Relation Age of Onset    Cancer Mother     Cancer Sister      Social History     Socioeconomic History    Marital status: Single     Spouse name: Not on file    Number of children: Not on file    Years of education: Not on file    Highest education level: Not on file   Occupational History    Not on file   Tobacco Use    Smoking status: Former     Packs/day: 0.50     Years: 15.00     Pack years: 7.50     Types: Cigarettes     Quit date: 2004     Years since quittin.8    Smokeless tobacco: Former   Vaping Use    Vaping Use: Never used   Substance and Sexual Activity    Alcohol use: Yes     Alcohol/week: 8.4 oz     Types: 14 Glasses of wine per week     Comment: 2 per day    Drug use: Not Currently     Types: Inhaled     Comment: marijuana occ    Sexual activity: Not on file   Other Topics Concern    Not on file   Social History Narrative    Not on file     Social Determinants of Health     Financial Resource Strain: Not on file   Food  Insecurity: Not on file   Transportation Needs: Not on file   Physical Activity: Not on file   Stress: Not on file   Social Connections: Not on file   Intimate Partner Violence: Not on file   Housing Stability: Not on file     Allergies   Allergen Reactions    Entresto [Sacubitril-Valsartan]      Swollen tongue. Angioedema.    Crestor [Rosuvastatin]      Outpatient Encounter Medications as of 4/25/2023   Medication Sig Dispense Refill    Cholecalciferol (VITAMIN D3) 1.25 MG (42703 UT) Cap Vitamin D3   1000iu 1/day      Multiple Vitamins-Minerals (MULTIVITAMIN ADULT EXTRA C PO) multivitamin   MULTIVITAMIN 1/day      atorvastatin (LIPITOR) 20 MG Tab atorvastatin 20 mg tablet   20mg 1/day      furosemide (LASIX) 20 MG Tab Take 1 tab by mouth twice a week. 30 Tablet 3    Vericiguat (VERQUVO) 2.5 MG Tab Take 2.5 mg by mouth every day. 90 Tablet 3    spironolactone (ALDACTONE) 25 MG Tab Take 0.5 Tablets by mouth every day. 45 Tablet 3    losartan (COZAAR) 100 MG Tab Take 1 Tablet by mouth every day. 90 Each 4    Empagliflozin (JARDIANCE) 10 MG Tab Take 1 Tablet by mouth every day. 90 Tablet 3    carvedilol (COREG) 25 MG Tab Take 0.5-1 Tablets by mouth 2 times a day. Pt takes 25MG in AM and 12.5MG  Tablet 3    amiodarone (CORDARONE) 200 MG Tab Take 1 Tablet by mouth every day. 90 Tablet 4    rosuvastatin (CRESTOR) 40 MG tablet Take 1 Tablet by mouth every day. 90 Tablet 3    apixaban (ELIQUIS) 2.5mg Tab Take 1 Tablet by mouth 2 times a day. 60 Tablet 1    vitamin D3 (CHOLECALCIFEROL) 1000 Unit (25 mcg) Tab Take 1,000 Units by mouth every day.      tamsulosin (FLOMAX) 0.4 MG capsule Take 0.8 mg by mouth 1/2 hour after breakfast. Pt states he is taking 2 daily      cinacalcet (SENSIPAR) 30 MG Tab Take 30 mg by mouth every day.      Omega-3 Fatty Acids (FISH OIL) 1000 MG Cap capsule Take 1,000 mg by mouth every evening.      multivitamin (THERAGRAN) Tab Take 1 Tab by mouth every day.      albuterol 108 (90 Base) MCG/ACT  "Aero Soln inhalation aerosol Inhale 2 Puffs by mouth every 6 hours as needed for Shortness of Breath. 8.5 g 0    Omega-3 Fatty Acids (FISH OIL) 1000 MG Cap capsule Fish Oil   FISH OIL 1/day (Patient not taking: Reported on 4/25/2023)      furosemide (LASIX) 20 MG Tab 20 mg. (Patient not taking: Reported on 4/25/2023)      cefdinir (OMNICEF) 300 MG Cap Take 1 Capsule by mouth 2 times a day. (Patient not taking: Reported on 4/25/2023) 20 Capsule 0     No facility-administered encounter medications on file as of 4/25/2023.     Review of Systems   Constitutional:  Negative for diaphoresis and fever.   HENT:  Negative for nosebleeds.    Eyes:  Negative for blurred vision and double vision.   Respiratory:  Positive for shortness of breath. Negative for cough.    Cardiovascular:  Negative for chest pain and palpitations.   Gastrointestinal:  Negative for abdominal pain.   Genitourinary:  Negative for dysuria and frequency.   Musculoskeletal:  Negative for falls and myalgias.   Skin:  Negative for rash.   Neurological:  Negative for dizziness, sensory change and headaches.   Endo/Heme/Allergies:  Does not bruise/bleed easily.   Psychiatric/Behavioral:  Negative for depression and memory loss.       Objective:   BP 98/64 (BP Location: Left arm, Patient Position: Sitting, BP Cuff Size: Adult)   Pulse 67   Resp 12   Ht 1.88 m (6' 2\")   Wt 84.4 kg (186 lb)   SpO2 97%   BMI 23.88 kg/m²     Physical Exam  Vitals and nursing note reviewed.   Constitutional:       General: He is not in acute distress.     Appearance: He is not diaphoretic.   HENT:      Head: Normocephalic and atraumatic.      Right Ear: External ear normal.      Left Ear: External ear normal.   Eyes:      General:         Right eye: No discharge.         Left eye: No discharge.   Neck:      Thyroid: No thyromegaly.      Vascular: No JVD.   Cardiovascular:      Rate and Rhythm: Normal rate and regular rhythm.      Comments: Ausculation was not performed to " minimize the risk of COVID spread during current pandemic. This complies with Medicare policies and guidelines.    Pulmonary:      Effort: No respiratory distress.   Abdominal:      General: There is no distension.      Tenderness: There is no abdominal tenderness.   Skin:     General: Skin is warm and dry.   Neurological:      Mental Status: He is alert and oriented to person, place, and time.      Cranial Nerves: No cranial nerve deficit.   Psychiatric:         Behavior: Behavior normal.       Assessment:     1. ACC/AHA stage C systolic heart failure (HCC)        2. Left ventricular systolic dysfunction, NYHA class 3        3. 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 Mixed hyperlipidemia        5. Paroxysmal atrial fibrillation (HCC)        6. Ventricular tachycardia (HCC)        7. Complex sleep apnea syndrome        8. Chronic obstructive pulmonary disease, unspecified COPD type (HCC)        9. History of stroke        10. Dyspnea on exertion        11. High risk medication use        12. PVD (peripheral vascular disease) (HCC)        13. HTN (hypertension), malignant            Medical Decision Making:  Today's Assessment / Status / Plan:   Non-ischemic Cardiomyopathy LVEF of 15% with already optimized medical therapy:  Chronically illed condition which requires ongoing close monitoring and treatment to improve survival rate along with decreasing risk of clinical decompensation and hospitalization.    Today, based on physical examination findings, patient is euvolemic. No JVD, lungs are clear to auscultation, no pitting edema in bilateral lower extremities, no ascites.     Dry weight is 186 lbs.     Continue coreg 25 mg po bid.     Will continue Losartan 100 mg once a day. NO Entresto due to anaphylactic shock.    Based on recent data on SGLT2 and heart failure with reduced ejection fraction, patient will be benefited from Jardiance 10 mg p.o. once a day for further  reduction in mortality and hospitalization with absolute risk reduction of 5.2%.  Therefore, I will start patient on Jardiance 10 mg p.o. once a day.  Risks and benefits were explained to patient and patient has agreed to proceed.     Diuretic with Lasix 20 mg changed to as needed.  Patient was instructed to monitor symptoms of lightheadedness or syncope rather than actual numbers.      Aldactone antagonist with Spironolactone 12.5 mg daily.    At this time, s/p CRT upgrade by adding LV lead via coronary sinus.    Continue remote monitor with MEMs.    Based on recent data on Vericiguat's FDA approval and indication for symptomatic HF patients with recent hospitalization (within the last 6 months) and LVEF of less than 45%, at this time, I will increase Verquvo to 5 mg once a day and titrate up to goal of 10 mg once a day via protocol for potential benefits of cardiovascular risk reduction in mortality and heart failure hospitalization.    At this time, I discussed with patient the possibility of LVAD in the event of worsening clinical status. He is not too enthusiastic about it. He will think about it.    Paroxysmal Atrial fibrillation:  Now on anticoagulation, already with Watchman device but still had breakthrough with stroke in 09/2022.  No significnat mode switching seen on recent interrogation in 05/2022.    Hypertension:  Optimize control using cardiomyopathy medical regimen as well.    Hyperlipidemia:  Optimize statin as within guidelines of CAD treatment as above.     I will continue to closely monitor for side effects of patient's high risk medication(s) including liver, renal function and electrolytes.

## 2023-04-27 ENCOUNTER — OFFICE VISIT (OUTPATIENT)
Dept: SLEEP MEDICINE | Facility: MEDICAL CENTER | Age: 83
End: 2023-04-27
Attending: NURSE PRACTITIONER
Payer: MEDICARE

## 2023-04-27 VITALS
RESPIRATION RATE: 16 BRPM | HEART RATE: 70 BPM | DIASTOLIC BLOOD PRESSURE: 62 MMHG | OXYGEN SATURATION: 96 % | BODY MASS INDEX: 23.76 KG/M2 | WEIGHT: 185.1 LBS | SYSTOLIC BLOOD PRESSURE: 108 MMHG | HEIGHT: 74 IN

## 2023-04-27 DIAGNOSIS — G47.31 COMPLEX SLEEP APNEA SYNDROME: ICD-10-CM

## 2023-04-27 PROCEDURE — 99212 OFFICE O/P EST SF 10 MIN: CPT | Performed by: NURSE PRACTITIONER

## 2023-04-27 PROCEDURE — 99213 OFFICE O/P EST LOW 20 MIN: CPT | Performed by: NURSE PRACTITIONER

## 2023-04-27 RX ORDER — VERICIGUAT 2.5 MG/1
TABLET, FILM COATED ORAL
COMMUNITY
Start: 2023-04-26 | End: 2023-04-28

## 2023-04-27 ASSESSMENT — FIBROSIS 4 INDEX: FIB4 SCORE: 2.81

## 2023-04-27 ASSESSMENT — PATIENT HEALTH QUESTIONNAIRE - PHQ9: CLINICAL INTERPRETATION OF PHQ2 SCORE: 0

## 2023-04-27 NOTE — PROGRESS NOTES
Chief Complaint   Patient presents with    Apnea       HPI:  Lencho Parker is a 82 y.o. year old male here today for follow-up on CSA.  Last seen 7/8/2022 by NORMA Robb for pulmonary clinic.  He is a former smoker with a 7.5-pack-year history quit in 2004.  Past medical history includes central sleep apnea, systolic heart failure, AICD, PVD, cardiomyopathy, TIA, hypertension, AAA, chronic kidney disease, PFD, COPD, polycythemia who presented to the [facility] for a regular follow up.    Patient is using a fullface mask and denies any difficulty with mask fit or pressures.  He uses his BiPAP for 8 to 10 hours nightly.  He denies any difficulty falling asleep or staying asleep.  Compliance was unavailable, therefore we reviewed data on my air hi.  Data for the past month shows nightly use for an average time of 9 hours with AHI less than 5 consecutively.  No evidence of mask leak.    Sleep History:  PSG split-night 3/12/2020 indicated severe sleep apnea with an overall AHI 54.5/h and an O2 mauricio of 82%.  Patient met split-night criteria and ultimately did best on BiPAP ST 14/8 cm with a backup rate of 12 breaths/min.  AHI reduced to 0/h with a mean SPO2 91%.     ROS: As per HPI and otherwise negative if not stated.    Past Medical History:   Diagnosis Date    Breath shortness     on exertion    Cardiomyopathy (HCC) 05/2021    Echocardiogram with moderately dilated LV, mild concentric LVH, LVEF 20%. Global hypokinesis. Normal RA and RV. Severely dilated LA. Mild MR, mild TR. RVSP 34mmHg.    Chronic obstructive pulmonary disease (HCC)     [place] measles     Heart attack (HCC)     hx 2007    History of abdominal aortic aneurysm (AAA) 2009    Status post stent    Hyperlipidemia     Hypertension     Mumps     Pacemaker     AICD    Paroxysmal atrial fibrillation (HCC)     Status post Watchman procedure in [place].    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     Sleep apnea     Snoring     Stroke  "(HCC) 2012    TIA    Tonsillitis     Ventricular tachycardia (HCC)        Past Surgical History:   Procedure Laterality Date    AICD BATTERY CHANGE Left 09/10/2020     Upgrade to Medtronic Claria MRI Quad CRT-D [id number] implanted by Dr. Barrios.    AICD BATTERY CHANGE Left 01/03/2017    Generator replacement with Medtronic Evera MRI XT  [id number] implanted in [place].    OTHER CARDIAC SURGERY  2014    watchman device    AAA WITH STENT GRAFT  2009    OTHER ORTHOPEDIC SURGERY  2005    hip surgery    OTHER  2000    kidney removed    OTHER CARDIAC SURGERY  1991    AICD implanted       Family History   Problem Relation Age of Onset    Cancer Mother     Cancer Sister        Allergies as of 04/27/2023 - Reviewed 04/27/2023   Allergen Reaction Noted    Entresto [sacubitril-valsartan]  06/01/2020    Crestor [rosuvastatin]  01/06/2023        Vitals:  /62 (BP Location: Left arm, Patient Position: Sitting, BP Cuff Size: Adult)   Pulse 70   Resp 16   Ht 1.88 m (6' 2\")   Wt 84 kg (185 lb 1.6 oz)   SpO2 96%     Current medications as of today   Current Outpatient Medications   Medication Sig Dispense Refill    VERQUVO 2.5 MG Tab       Cholecalciferol (VITAMIN D3) 1.25 MG (94547 UT) Cap Vitamin D3   1000iu 1/day      Multiple Vitamins-Minerals (MULTIVITAMIN ADULT EXTRA C PO) multivitamin   MULTIVITAMIN 1/day      atorvastatin (LIPITOR) 20 MG Tab atorvastatin 20 mg tablet   20mg 1/day      Vericiguat (VERQUVO) 5 MG Tab Take 5 mg by mouth every day. 30 Tablet 11    furosemide (LASIX) 20 MG Tab Take 1 tab by mouth twice a week. 30 Tablet 3    spironolactone (ALDACTONE) 25 MG Tab Take 0.5 Tablets by mouth every day. 45 Tablet 3    losartan (COZAAR) 100 MG Tab Take 1 Tablet by mouth every day. 90 Each 4    Empagliflozin (JARDIANCE) 10 MG Tab Take 1 Tablet by mouth every day. 90 Tablet 3    carvedilol (COREG) 25 MG Tab Take 0.5-1 Tablets by mouth 2 times a day. Pt takes 25MG in AM and 12.5MG  Tablet 3    amiodarone (CORDARONE) " 200 MG Tab Take 1 Tablet by mouth every day. 90 Tablet 4    rosuvastatin (CRESTOR) 40 MG tablet Take 1 Tablet by mouth every day. 90 Tablet 3    apixaban (ELIQUIS) 2.5mg Tab Take 1 Tablet by mouth 2 times a day. 60 Tablet 1    vitamin D3 (CHOLECALCIFEROL) 1000 Unit (25 mcg) Tab Take 1,000 Units by mouth every day.      tamsulosin (FLOMAX) 0.4 MG capsule Take 0.8 mg by mouth 1/2 hour after breakfast. Pt states he is taking 2 daily      cinacalcet (SENSIPAR) 30 MG Tab Take 30 mg by mouth every day.      Omega-3 Fatty Acids (FISH OIL) 1000 MG Cap capsule Take 1,000 mg by mouth every evening.      multivitamin (THERAGRAN) Tab Take 1 Tab by mouth every day.      albuterol 108 (90 Base) MCG/ACT Aero Soln inhalation aerosol Inhale 2 Puffs by mouth every 6 hours as needed for Shortness of Breath. 8.5 g 0     No current facility-administered medications for this visit.         Physical Exam:   Gen:           Alert and oriented, No apparent distress. Mood and affect appropriate, normal interaction with examiner.  Eyes:          PERRL, EOM intact, sclere white, conjunctive moist.  Ears:          Not examined.   Hearing:     Grossly intact.  Nose:          Normal, no lesions or deformities.  Dentition:    Good dentition.  Oropharynx:   Tongue normal, posterior pharynx without erythema or exudate.  Neck:        Supple, trachea midline, no masses.  Respiratory Effort: No intercostal retractions or use of accessory muscles.   Lung Auscultation:      Clear to auscultation bilaterally; no rales, rhonchi or wheezing.  CV:            Regular rate and rhythm. No murmurs, rubs or gallops.  Abd:           Not examined.   Lymphadenopathy: Not examined.  Gait and Station: Normal.  Digits and Nails: No clubbing, cyanosis, petechiae, or nodes.   Cranial Nerves: II-XII grossly intact.  Skin:        No rashes, lesions or ulcers noted.               Ext:           No cyanosis or edema.      Assessment:  1. Complex sleep apnea syndrome  DME Mask  and Supplies          Plan:  Patient is using and benefiting from BiPAP ST therapy.  Compliance shows adequate use and control of HARPAL.  Order placed for mask and supplies which will be good for 1 year.  Advise annual follow-up, but can be seen sooner if needed.    Please note that this dictation was created using voice recognition software. I have made every reasonable attempt to correct obvious errors, but it is possible there are errors of grammar and possibly content that I did not discover before finalizing the note.

## 2023-04-28 ENCOUNTER — APPOINTMENT (OUTPATIENT)
Dept: RADIOLOGY | Facility: MEDICAL CENTER | Age: 83
End: 2023-04-28
Attending: EMERGENCY MEDICINE
Payer: MEDICARE

## 2023-04-28 ENCOUNTER — HOSPITAL ENCOUNTER (EMERGENCY)
Facility: MEDICAL CENTER | Age: 83
End: 2023-04-28
Attending: EMERGENCY MEDICINE
Payer: MEDICARE

## 2023-04-28 VITALS
OXYGEN SATURATION: 95 % | TEMPERATURE: 97.3 F | WEIGHT: 185.19 LBS | BODY MASS INDEX: 23.77 KG/M2 | HEIGHT: 74 IN | HEART RATE: 77 BPM | DIASTOLIC BLOOD PRESSURE: 81 MMHG | RESPIRATION RATE: 16 BRPM | SYSTOLIC BLOOD PRESSURE: 106 MMHG

## 2023-04-28 DIAGNOSIS — M79.89 FOOT SWELLING: ICD-10-CM

## 2023-04-28 DIAGNOSIS — R20.2 PARESTHESIA: ICD-10-CM

## 2023-04-28 LAB — D DIMER PPP IA.FEU-MCNC: 1.83 UG/ML (FEU) (ref 0–0.5)

## 2023-04-28 PROCEDURE — 85379 FIBRIN DEGRADATION QUANT: CPT

## 2023-04-28 PROCEDURE — 36415 COLL VENOUS BLD VENIPUNCTURE: CPT

## 2023-04-28 PROCEDURE — 93971 EXTREMITY STUDY: CPT | Mod: RT

## 2023-04-28 PROCEDURE — 93971 EXTREMITY STUDY: CPT | Mod: 26,RT | Performed by: INTERNAL MEDICINE

## 2023-04-28 PROCEDURE — 99284 EMERGENCY DEPT VISIT MOD MDM: CPT

## 2023-04-28 RX ORDER — VITAMIN B COMPLEX
1000 TABLET ORAL DAILY
COMMUNITY

## 2023-04-28 RX ORDER — CARVEDILOL 25 MG/1
25 TABLET ORAL EVERY MORNING
Status: SHIPPED | COMMUNITY
End: 2023-08-15 | Stop reason: SDUPTHER

## 2023-04-28 RX ORDER — FUROSEMIDE 20 MG/1
20 TABLET ORAL DAILY
COMMUNITY

## 2023-04-28 ASSESSMENT — FIBROSIS 4 INDEX: FIB4 SCORE: 2.81

## 2023-04-28 NOTE — ED TRIAGE NOTES
Chief Complaint   Patient presents with    Foot Swelling     Right foot swollen and left foot numb.    2-3 days of this. Reports when he got out of bed he almost fell due to numbness in foot.

## 2023-04-28 NOTE — DISCHARGE INSTRUCTIONS
Your exam today was very reassuring.  Please follow-up closely with your primary care physician    Return to ER for any problems or concerns.

## 2023-04-28 NOTE — ED NOTES
Pt given discharge instructions. Informed to follow up with primary care and come back to the ER if symptoms worsen.   Pt states they have no questions at this time and reports they have access to [facility] and will retrieve lab results on [facility].  Pt ambulates to waiting area with use of cane, pt reports that is the baseline way they ambulate.  No swelling in foot at this time.

## 2023-04-28 NOTE — DISCHARGE SUMMARY
ED Observation Discharge Summary    Patient:Lencho Parker  Patient : 1940  Patient MRN: [id number]  Patient PCP: Brennan Castorena M.D.    Admit Date: 2023  Discharge Date and Time: 23 3:19 PM  Discharge Diagnosis:   1. Foot swelling        2. Paresthesia           Discharge Attending: Hannah Valencia M.D.  Discharge Service: ED Observation    ED Course  Lencho is a 82 y.o. male who was evaluated at [facility] by my partner, Dr. Palma.  Care was transferred to me at change of shift.  Patient was awaiting results of the right lower extremity ultrasound.  If negative for DVT, then patient was to be discharged home per Dr. Palma.  Presuming a negative ultrasound, Dr. Palma had already prepared the patient's discharge papers.  Patient does indeed have a negative right lower extremity ultrasound.  No evidence of DVT.  Patient tells me that he has had some intermittent tingling to the bottom of his feet for the last week or so.  He does not have any tingling above the feet.  He has normal sensation to light touch from the distal tib-fib area upward.  Patient does not have diabetes but he does drink several glasses of wine a day.  He says he feels that his right foot is swollen on the plantar aspect of his foot.  Perhaps he has some developing neuropathy.  Perhaps he has a neuroma.  Nonetheless, he has good pedal pulses.  No evidence for arterial compromise.  There is no evidence of DVT on ultrasound.  He has good lower extremity strength with testing of dorsiflexors and plantar flexors on the right foot.  He does have some weakness of the dorsiflexor of the left great toe but says this is been going on for several years.  He has no complaints of back pain.  No urinary symptoms.  He is able to walk.s patient [facility].  Patient is safe and stable for outpatient management discharge home.  Vitals are normal and  "stable.  Has been given strict return precautions and discharge instructions and understands if he gets worse in any way he should return to ER immediately.  Patient understands and agrees with plan.    Discharge Exam:  /81   Pulse 77   Temp 36.1 °C (97 °F) (Temporal)   Resp 16   Ht 1.88 m (6' 2\")   Wt 84 kg (185 lb 3 oz)   SpO2 95%   BMI 23.78 kg/m² .    Constitutional: Awake and alert. Nontoxic  HENT:  Grossly normal  Eyes: Grossly normal  Neck: Normal range of motion  Cardiovascular: Normal heart rate   Thorax & Lungs: No respiratory distress  Skin:  No pathologic rash.   Extremities: Well perfused.  2+ pedal pulses equal bilaterally.  No pretibial edema.  No tenderness to the tib-fib, ankle or foot.  Patient has 5 out of 5 strength with testing of dorsiflexors and plantar flexors on the right.  He has some decrease sensation to bilateral feet with normal sensation from the distal tib-fib areas upwards towards the trunk.  He has some weakness of the left dorsi flexor of the great toe but says this is normal for him and has been present for the last several years.  Calves are nontender.  Psychiatric: Affect normal    Labs  Results for orders placed or performed during the hospital encounter of 04/28/23   D-DIMER   Result Value Ref Range    D-Dimer 1.83 (H) 0.00 - 0.50 ug/mL (FEU)       Radiology  US-EXTREMITY VENOUS LOWER UNILAT RIGHT   Final Result          Medications:   New Prescriptions    No medications on file       My final assessment includes: Please see discussion above.    Upon Reevaluation, the patient's condition has: Improved; and will be discharged.    Patient discharged from ED Observation status at 1540 (Time) April 28, 2023 (Date).     Total time spent on this ED Observation discharge encounter is < 30 Minutes    Electronically signed by: Hannah Valencia M.D., 4/28/2023 3:19 PM       "

## 2023-04-28 NOTE — ED NOTES
Pt rounded on. Vitals taken as noted. Pt educated on using call light.  No complaints reported at this time. Pt asking how long they will be here.

## 2023-04-28 NOTE — ED NOTES
Med Rec completed per patient   Allergies reviewed  No ORAL antibiotics in last 30 days    Patient states that he is taking his Carvedilol ONCE a day   Patient states that he only takes his Lasix once a week

## 2023-04-28 NOTE — ED PROVIDER NOTES
ED Provider Note    CHIEF COMPLAINT  Chief Complaint   Patient presents with    Foot Swelling     Right foot swollen and left foot numb.       EXTERNAL RECORDS REVIEWED  Patient pulmonary note, cardiology note for 27 2023, 4/25/2023    HPI/ROS  LIMITATION TO HISTORY   none      Lencho Parker is a 82 y.o. male who presents with chief complaint of right lower extremity swelling and left lower extremity numbness.  Patient reports symptoms have been present for the last 3 days.  He denies any persistent left foot numbness.  He reports this comes and goes and moves all throughout his foot, sometimes it is on the top of the foot sometimes it is on the bottom of the foot.  He denies any associated pain with this.  He denies any associated falls or weakness.  Patient also reports he believes his right foot is swollen, he denies any calf swelling.  He has a history of A-fib and is on blood thinners for this.  Patient denies any overlying skin changes.  Patient denies any associated back pain.  He denies any knee pain ankle pain or hip pain.  Patient denies any chest pain or abdominal pain.    PAST MEDICAL HISTORY   has a past medical history of Breath shortness, Cardiomyopathy (HCC) (05/2021), Chronic obstructive pulmonary disease (HCC), [place] measles, Heart attack (HCC), History of abdominal aortic aneurysm (AAA) (2009), Hyperlipidemia, Hypertension, Mumps, Pacemaker, Paroxysmal atrial fibrillation (HCC), Renal disorder, Sleep apnea, Snoring, Stroke (HCC) (2012), Tonsillitis, and Ventricular tachycardia (HCC).    SURGICAL HISTORY   has a past surgical history that includes aicd battery change (Left, 01/03/2017); aaa with stent graft (2009); other (2000); other orthopedic surgery (2005); other cardiac surgery (1991); other cardiac surgery (2014); and aicd battery change (Left, 09/10/2020).    FAMILY HISTORY  Family History   Problem Relation Age of Onset    Cancer Mother     Cancer Sister        SOCIAL  "HISTORY  Social History     Tobacco Use    Smoking status: Former     Packs/day: 0.50     Years: 15.00     Pack years: 7.50     Types: Cigarettes     Quit date: 2004     Years since quittin.8    Smokeless tobacco: Former   Vaping Use    Vaping Use: Never used   Substance and Sexual Activity    Alcohol use: Yes     Alcohol/week: 8.4 oz     Types: 14 Glasses of wine per week     Comment: 2 per day    Drug use: Not Currently     Types: Inhaled     Comment: marijuana occ    Sexual activity: Not on file       CURRENT MEDICATIONS  Home Medications       Reviewed by Jyoti Chance R.N. (Registered Nurse) on 23 at 1003  Med List Status: Not Addressed     Medication Last Dose Status   albuterol 108 (90 Base) MCG/ACT Aero Soln inhalation aerosol  Active   amiodarone (CORDARONE) 200 MG Tab  Active   apixaban (ELIQUIS) 2.5mg Tab  Active   atorvastatin (LIPITOR) 20 MG Tab  Active   carvedilol (COREG) 25 MG Tab  Active   Cholecalciferol (VITAMIN D3) 1.25 MG (72666 UT) Cap  Active   cinacalcet (SENSIPAR) 30 MG Tab  Active   Empagliflozin (JARDIANCE) 10 MG Tab  Active   furosemide (LASIX) 20 MG Tab  Active   losartan (COZAAR) 100 MG Tab  Active   Multiple Vitamins-Minerals (MULTIVITAMIN ADULT EXTRA C PO)  Active   multivitamin (THERAGRAN) Tab  Active   Omega-3 Fatty Acids (FISH OIL) 1000 MG Cap capsule  Active   rosuvastatin (CRESTOR) 40 MG tablet  Active   spironolactone (ALDACTONE) 25 MG Tab  Active   tamsulosin (FLOMAX) 0.4 MG capsule  Active   Vericiguat (VERQUVO) 5 MG Tab  Active   VERQUVO 2.5 MG Tab  Active   vitamin D3 (CHOLECALCIFEROL) 1000 Unit (25 mcg) Tab  Active                    ALLERGIES  Allergies   Allergen Reactions    Entresto [Sacubitril-Valsartan]      Swollen tongue. Angioedema.    Crestor [Rosuvastatin]        PHYSICAL EXAM  VITAL SIGNS: /86   Pulse (!) 53   Temp 36.1 °C (97 °F) (Temporal)   Resp 16   Ht 1.88 m (6' 2\")   Wt 84 kg (185 lb 3 oz)   SpO2 96%   BMI 23.78 kg/m²  "   Physical Exam  Constitutional:       Appearance: Normal appearance.   HENT:      Mouth/Throat:      Mouth: Mucous membranes are moist.   Pulmonary:      Effort: Pulmonary effort is normal.   Musculoskeletal:      Comments: Bilateral lower extremities are identical in diameter, there is no discernible swelling that I am unable to identify on patient's right foot.  There is no associated overlying skin changes of bilateral lower extremities.  Sensation is intact throughout both feet without any observable discrepancy.  Distal pulses are 2+, cap refill is approximately 1-1/2 seconds, both DP and PT have triphasic signal.  Full range of motion of ankle and toes without any pain evoked.  There is no tenderness of the cervical thoracic or lumbar spine.   Neurological:      General: No focal deficit present.      Mental Status: He is alert and oriented to person, place, and time.   Psychiatric:         Mood and Affect: Mood normal.         DIAGNOSTIC STUDIES / PROCEDURES      LABS  Results for orders placed or performed during the hospital encounter of 04/28/23   D-DIMER   Result Value Ref Range    D-Dimer 1.83 (H) 0.00 - 0.50 ug/mL (FEU)         COURSE & MEDICAL DECISION MAKING    ED Observation Status? Yes; I am placing the patient in to an observation status due to a diagnostic uncertainty as well as therapeutic intensity. Patient placed in observation status at 1:25 PM, 4/28/2023.     Observation plan is as follows: Await ultrasound results      INITIAL ASSESSMENT, COURSE AND PLAN  Care Narrative: Patient here with complaint of right foot swelling and left foot numbness.  Both of these do not appear present on patient's exam.  The numbness that is coming gone would be highly atypical of a CVA, it is not persistent or reproducible on exam.  I am unable to identify any discernible difference in patient's lower extremities but patient is very confident that his right foot is more swollen than his left.  Will check a  D-dimer, and use the age-adjusted D-dimer given my very low suspicion of this being a clot.  Certainly given patient's age she is at risk for this.  There is no vascular abnormality, patient has great pulses and triphasic signal in his tissues are warm and well-perfused.  Patient has no associated chest pain or shortness of breath or symptoms to suggest cardiopulmonary etiology.    D-dimer is elevated, awaiting ultrasound.    There are considerable constraints on our vascular ultrasound system at this point and therefore ultrasound will be significantly delayed.  We discussed this delay with patient and apologized.  Patient will be signed out to my oncoming partner for follow-up of ultrasound results.  Patient will follow-up with his primary care physician        DISPOSITION AND DISCUSSIONS      Escalation of care considered, and ultimately not performed: Patient without any associated cardiopulmonary symptoms to suggest propagated clot.  Patient without any associated neck pain or back pain.  Patient without any other neurologic sequelae, symptoms are coming and going.  Given these issues work-up of cardiopulmonary etiology including EKG, troponin, D-dimer were deferred, given patient's lack of any associated neurologic sequelae outside of his his paroxysmal foot numbness that is nonreducible on exam further work-up into CVA, or compressive lesion was deferred.  Patient with great pulses bilaterally, suspicion of proximal dissection is incredibly low.      FINAL DIAGNOSIS  1. Foot swelling    2. Paresthesia

## 2023-06-08 ENCOUNTER — NON-PROVIDER VISIT (OUTPATIENT)
Dept: CARDIOLOGY | Facility: MEDICAL CENTER | Age: 83
End: 2023-06-08
Payer: MEDICARE

## 2023-06-08 PROCEDURE — 93295 DEV INTERROG REMOTE 1/2/MLT: CPT | Performed by: INTERNAL MEDICINE

## 2023-06-08 NOTE — CARDIAC REMOTE MONITOR - SCAN
Device transmission reviewed. Device demonstrated appropriate function.       Electronically Signed by: Rupert Jay M.D.    6/10/2023  11:30 AM

## 2023-06-28 ENCOUNTER — HOSPITAL ENCOUNTER (OUTPATIENT)
Facility: MEDICAL CENTER | Age: 83
End: 2023-06-28
Attending: STUDENT IN AN ORGANIZED HEALTH CARE EDUCATION/TRAINING PROGRAM
Payer: MEDICARE

## 2023-06-28 PROCEDURE — 87077 CULTURE AEROBIC IDENTIFY: CPT | Mod: 91

## 2023-06-28 PROCEDURE — 87186 SC STD MICRODIL/AGAR DIL: CPT

## 2023-06-28 PROCEDURE — 87086 URINE CULTURE/COLONY COUNT: CPT

## 2023-07-13 ENCOUNTER — HOSPITAL ENCOUNTER (OUTPATIENT)
Facility: MEDICAL CENTER | Age: 83
End: 2023-07-13
Attending: PHYSICIAN ASSISTANT
Payer: MEDICARE

## 2023-07-13 PROCEDURE — 87086 URINE CULTURE/COLONY COUNT: CPT

## 2023-07-16 ENCOUNTER — HOSPITAL ENCOUNTER (INPATIENT)
Facility: MEDICAL CENTER | Age: 83
LOS: 4 days | DRG: 699 | End: 2023-07-20
Attending: EMERGENCY MEDICINE | Admitting: HOSPITALIST
Payer: COMMERCIAL

## 2023-07-16 DIAGNOSIS — R33.9 URINARY RETENTION: ICD-10-CM

## 2023-07-16 DIAGNOSIS — N39.0 ACUTE UTI: ICD-10-CM

## 2023-07-16 DIAGNOSIS — R31.9 HEMATURIA, UNSPECIFIED TYPE: ICD-10-CM

## 2023-07-16 DIAGNOSIS — Z78.9 ALCOHOL USE: ICD-10-CM

## 2023-07-16 PROBLEM — N30.01 ACUTE CYSTITIS WITH HEMATURIA: Status: ACTIVE | Noted: 2023-07-16

## 2023-07-16 PROBLEM — D69.6 THROMBOCYTOPENIA (HCC): Status: ACTIVE | Noted: 2023-07-16

## 2023-07-16 PROBLEM — Z66 DNR (DO NOT RESUSCITATE): Status: ACTIVE | Noted: 2023-07-16

## 2023-07-16 LAB
ALBUMIN SERPL BCP-MCNC: 3.7 G/DL (ref 3.2–4.9)
ALBUMIN/GLOB SERPL: 1.2 G/DL
ALP SERPL-CCNC: 71 U/L (ref 30–99)
ALT SERPL-CCNC: 35 U/L (ref 2–50)
ANION GAP SERPL CALC-SCNC: 12 MMOL/L (ref 7–16)
APPEARANCE UR: ABNORMAL
AST SERPL-CCNC: 34 U/L (ref 12–45)
BACTERIA #/AREA URNS HPF: NEGATIVE /HPF
BACTERIA UR CULT: NORMAL
BASOPHILS # BLD AUTO: 0.2 % (ref 0–1.8)
BASOPHILS # BLD: 0.01 K/UL (ref 0–0.12)
BILIRUB SERPL-MCNC: 0.8 MG/DL (ref 0.1–1.5)
BILIRUB UR QL STRIP.AUTO: NEGATIVE
BUN SERPL-MCNC: 42 MG/DL (ref 8–22)
CALCIUM ALBUM COR SERPL-MCNC: 9.4 MG/DL (ref 8.5–10.5)
CALCIUM SERPL-MCNC: 9.2 MG/DL (ref 8.4–10.2)
CHLORIDE SERPL-SCNC: 104 MMOL/L (ref 96–112)
CO2 SERPL-SCNC: 20 MMOL/L (ref 20–33)
COLOR UR: ABNORMAL
CREAT SERPL-MCNC: 2.27 MG/DL (ref 0.5–1.4)
EOSINOPHIL # BLD AUTO: 0.06 K/UL (ref 0–0.51)
EOSINOPHIL NFR BLD: 1.2 % (ref 0–6.9)
EPI CELLS #/AREA URNS HPF: ABNORMAL /HPF
ERYTHROCYTE [DISTWIDTH] IN BLOOD BY AUTOMATED COUNT: 57.1 FL (ref 35.9–50)
GFR SERPLBLD CREATININE-BSD FMLA CKD-EPI: 28 ML/MIN/1.73 M 2
GLOBULIN SER CALC-MCNC: 3 G/DL (ref 1.9–3.5)
GLUCOSE SERPL-MCNC: 103 MG/DL (ref 65–99)
GLUCOSE UR STRIP.AUTO-MCNC: 250 MG/DL
HCT VFR BLD AUTO: 49.3 % (ref 42–52)
HGB BLD-MCNC: 16.2 G/DL (ref 14–18)
IMM GRANULOCYTES # BLD AUTO: 0.02 K/UL (ref 0–0.11)
IMM GRANULOCYTES NFR BLD AUTO: 0.4 % (ref 0–0.9)
KETONES UR STRIP.AUTO-MCNC: NEGATIVE MG/DL
LEUKOCYTE ESTERASE UR QL STRIP.AUTO: ABNORMAL
LYMPHOCYTES # BLD AUTO: 1.39 K/UL (ref 1–4.8)
LYMPHOCYTES NFR BLD: 27.9 % (ref 22–41)
MCH RBC QN AUTO: 34.5 PG (ref 27–33)
MCHC RBC AUTO-ENTMCNC: 32.9 G/DL (ref 32.3–36.5)
MCV RBC AUTO: 104.9 FL (ref 81.4–97.8)
MICRO URNS: ABNORMAL
MONOCYTES # BLD AUTO: 0.59 K/UL (ref 0–0.85)
MONOCYTES NFR BLD AUTO: 11.8 % (ref 0–13.4)
MUCOUS THREADS #/AREA URNS HPF: ABNORMAL /HPF
NEUTROPHILS # BLD AUTO: 2.91 K/UL (ref 1.82–7.42)
NEUTROPHILS NFR BLD: 58.5 % (ref 44–72)
NITRITE UR QL STRIP.AUTO: NEGATIVE
NRBC # BLD AUTO: 0 K/UL
NRBC BLD-RTO: 0 /100 WBC (ref 0–0.2)
PH UR STRIP.AUTO: 6 [PH] (ref 5–8)
PLATELET # BLD AUTO: 154 K/UL (ref 164–446)
PMV BLD AUTO: 11.2 FL (ref 9–12.9)
POTASSIUM SERPL-SCNC: 5.1 MMOL/L (ref 3.6–5.5)
PROT SERPL-MCNC: 6.7 G/DL (ref 6–8.2)
PROT UR QL STRIP: 100 MG/DL
RBC # BLD AUTO: 4.7 M/UL (ref 4.7–6.1)
RBC # URNS HPF: >150 /HPF
RBC UR QL AUTO: ABNORMAL
SIGNIFICANT IND 70042: NORMAL
SITE SITE: NORMAL
SODIUM SERPL-SCNC: 136 MMOL/L (ref 135–145)
SOURCE SOURCE: NORMAL
SP GR UR STRIP.AUTO: 1.02
WBC # BLD AUTO: 5 K/UL (ref 4.8–10.8)
WBC #/AREA URNS HPF: ABNORMAL /HPF

## 2023-07-16 PROCEDURE — 700102 HCHG RX REV CODE 250 W/ 637 OVERRIDE(OP): Performed by: EMERGENCY MEDICINE

## 2023-07-16 PROCEDURE — 80053 COMPREHEN METABOLIC PANEL: CPT

## 2023-07-16 PROCEDURE — 36415 COLL VENOUS BLD VENIPUNCTURE: CPT

## 2023-07-16 PROCEDURE — 85025 COMPLETE CBC W/AUTO DIFF WBC: CPT

## 2023-07-16 PROCEDURE — 0T2BX0Z CHANGE DRAINAGE DEVICE IN BLADDER, EXTERNAL APPROACH: ICD-10-PCS | Performed by: EMERGENCY MEDICINE

## 2023-07-16 PROCEDURE — 700102 HCHG RX REV CODE 250 W/ 637 OVERRIDE(OP): Performed by: HOSPITALIST

## 2023-07-16 PROCEDURE — 303105 HCHG CATHETER EXTRA

## 2023-07-16 PROCEDURE — 700105 HCHG RX REV CODE 258: Performed by: HOSPITALIST

## 2023-07-16 PROCEDURE — 99223 1ST HOSP IP/OBS HIGH 75: CPT | Mod: AI | Performed by: HOSPITALIST

## 2023-07-16 PROCEDURE — A9270 NON-COVERED ITEM OR SERVICE: HCPCS | Performed by: HOSPITALIST

## 2023-07-16 PROCEDURE — 700101 HCHG RX REV CODE 250: Performed by: EMERGENCY MEDICINE

## 2023-07-16 PROCEDURE — 81001 URINALYSIS AUTO W/SCOPE: CPT

## 2023-07-16 PROCEDURE — A9270 NON-COVERED ITEM OR SERVICE: HCPCS | Performed by: EMERGENCY MEDICINE

## 2023-07-16 PROCEDURE — 99285 EMERGENCY DEPT VISIT HI MDM: CPT

## 2023-07-16 PROCEDURE — 770001 HCHG ROOM/CARE - MED/SURG/GYN PRIV*

## 2023-07-16 PROCEDURE — 87040 BLOOD CULTURE FOR BACTERIA: CPT

## 2023-07-16 PROCEDURE — 700111 HCHG RX REV CODE 636 W/ 250 OVERRIDE (IP): Mod: JZ | Performed by: HOSPITALIST

## 2023-07-16 PROCEDURE — 51702 INSERT TEMP BLADDER CATH: CPT

## 2023-07-16 PROCEDURE — 87086 URINE CULTURE/COLONY COUNT: CPT

## 2023-07-16 RX ORDER — OXYCODONE HYDROCHLORIDE 5 MG/1
5 TABLET ORAL
Status: DISCONTINUED | OUTPATIENT
Start: 2023-07-16 | End: 2023-07-18

## 2023-07-16 RX ORDER — ACETAMINOPHEN 325 MG/1
650 TABLET ORAL ONCE
Status: COMPLETED | OUTPATIENT
Start: 2023-07-16 | End: 2023-07-16

## 2023-07-16 RX ORDER — OXYCODONE HYDROCHLORIDE 5 MG/1
2.5 TABLET ORAL
Status: DISCONTINUED | OUTPATIENT
Start: 2023-07-16 | End: 2023-07-18

## 2023-07-16 RX ORDER — AMOXICILLIN 250 MG
2 CAPSULE ORAL 2 TIMES DAILY
Status: DISCONTINUED | OUTPATIENT
Start: 2023-07-16 | End: 2023-07-20 | Stop reason: HOSPADM

## 2023-07-16 RX ORDER — BISACODYL 10 MG
10 SUPPOSITORY, RECTAL RECTAL
Status: DISCONTINUED | OUTPATIENT
Start: 2023-07-16 | End: 2023-07-20 | Stop reason: HOSPADM

## 2023-07-16 RX ORDER — ACETAMINOPHEN 325 MG/1
650 TABLET ORAL EVERY 6 HOURS PRN
Status: DISCONTINUED | OUTPATIENT
Start: 2023-07-16 | End: 2023-07-20 | Stop reason: HOSPADM

## 2023-07-16 RX ORDER — CINACALCET 30 MG/1
30 TABLET, FILM COATED ORAL DAILY
Status: DISCONTINUED | OUTPATIENT
Start: 2023-07-17 | End: 2023-07-20 | Stop reason: HOSPADM

## 2023-07-16 RX ORDER — CEFTRIAXONE 2 G/1
2000 INJECTION, POWDER, FOR SOLUTION INTRAMUSCULAR; INTRAVENOUS ONCE
Status: DISCONTINUED | OUTPATIENT
Start: 2023-07-16 | End: 2023-07-16

## 2023-07-16 RX ORDER — VITAMIN B COMPLEX
1000 TABLET ORAL DAILY
Status: DISCONTINUED | OUTPATIENT
Start: 2023-07-17 | End: 2023-07-20 | Stop reason: HOSPADM

## 2023-07-16 RX ORDER — MORPHINE SULFATE 4 MG/ML
2 INJECTION INTRAVENOUS
Status: DISCONTINUED | OUTPATIENT
Start: 2023-07-16 | End: 2023-07-18

## 2023-07-16 RX ORDER — CARVEDILOL 25 MG/1
25 TABLET ORAL EVERY MORNING
Status: DISCONTINUED | OUTPATIENT
Start: 2023-07-17 | End: 2023-07-20 | Stop reason: HOSPADM

## 2023-07-16 RX ORDER — SPIRONOLACTONE 25 MG/1
12.5 TABLET ORAL DAILY
Status: DISCONTINUED | OUTPATIENT
Start: 2023-07-17 | End: 2023-07-20 | Stop reason: HOSPADM

## 2023-07-16 RX ORDER — LIDOCAINE HYDROCHLORIDE 20 MG/ML
JELLY TOPICAL
Status: COMPLETED | OUTPATIENT
Start: 2023-07-16 | End: 2023-07-16

## 2023-07-16 RX ORDER — POLYETHYLENE GLYCOL 3350 17 G/17G
1 POWDER, FOR SOLUTION ORAL
Status: DISCONTINUED | OUTPATIENT
Start: 2023-07-16 | End: 2023-07-20 | Stop reason: HOSPADM

## 2023-07-16 RX ORDER — LABETALOL HYDROCHLORIDE 5 MG/ML
10 INJECTION, SOLUTION INTRAVENOUS EVERY 4 HOURS PRN
Status: DISCONTINUED | OUTPATIENT
Start: 2023-07-16 | End: 2023-07-20 | Stop reason: HOSPADM

## 2023-07-16 RX ORDER — ONDANSETRON 2 MG/ML
4 INJECTION INTRAMUSCULAR; INTRAVENOUS EVERY 4 HOURS PRN
Status: DISCONTINUED | OUTPATIENT
Start: 2023-07-16 | End: 2023-07-20 | Stop reason: HOSPADM

## 2023-07-16 RX ORDER — ALBUTEROL SULFATE 90 UG/1
2 AEROSOL, METERED RESPIRATORY (INHALATION) EVERY 6 HOURS PRN
Status: DISCONTINUED | OUTPATIENT
Start: 2023-07-16 | End: 2023-07-20 | Stop reason: HOSPADM

## 2023-07-16 RX ORDER — SODIUM CHLORIDE 9 MG/ML
INJECTION, SOLUTION INTRAVENOUS CONTINUOUS
Status: DISCONTINUED | OUTPATIENT
Start: 2023-07-16 | End: 2023-07-18

## 2023-07-16 RX ORDER — ONDANSETRON 4 MG/1
4 TABLET, ORALLY DISINTEGRATING ORAL EVERY 4 HOURS PRN
Status: DISCONTINUED | OUTPATIENT
Start: 2023-07-16 | End: 2023-07-20 | Stop reason: HOSPADM

## 2023-07-16 RX ORDER — AMIODARONE HYDROCHLORIDE 200 MG/1
200 TABLET ORAL DAILY
Status: DISCONTINUED | OUTPATIENT
Start: 2023-07-17 | End: 2023-07-20 | Stop reason: HOSPADM

## 2023-07-16 RX ORDER — CEFDINIR 300 MG/1
300 CAPSULE ORAL 2 TIMES DAILY
COMMUNITY
Start: 2023-07-14 | End: 2024-01-11

## 2023-07-16 RX ORDER — OXYCODONE HYDROCHLORIDE 10 MG/1
10 TABLET ORAL ONCE
Status: COMPLETED | OUTPATIENT
Start: 2023-07-16 | End: 2023-07-16

## 2023-07-16 RX ORDER — TAMSULOSIN HYDROCHLORIDE 0.4 MG/1
0.4 CAPSULE ORAL 2 TIMES DAILY
Status: DISCONTINUED | OUTPATIENT
Start: 2023-07-16 | End: 2023-07-20 | Stop reason: HOSPADM

## 2023-07-16 RX ORDER — LOSARTAN POTASSIUM 50 MG/1
100 TABLET ORAL DAILY
Status: DISCONTINUED | OUTPATIENT
Start: 2023-07-17 | End: 2023-07-20 | Stop reason: HOSPADM

## 2023-07-16 RX ADMIN — SENNOSIDES AND DOCUSATE SODIUM 2 TABLET: 50; 8.6 TABLET ORAL at 19:50

## 2023-07-16 RX ADMIN — APIXABAN 2.5 MG: 2.5 TABLET, FILM COATED ORAL at 19:50

## 2023-07-16 RX ADMIN — OXYCODONE HYDROCHLORIDE 10 MG: 10 TABLET ORAL at 15:57

## 2023-07-16 RX ADMIN — TAMSULOSIN HYDROCHLORIDE 0.4 MG: 0.4 CAPSULE ORAL at 19:51

## 2023-07-16 RX ADMIN — ACETAMINOPHEN 650 MG: 325 TABLET ORAL at 15:57

## 2023-07-16 RX ADMIN — LIDOCAINE HYDROCHLORIDE 1 TUBE: 20 JELLY TOPICAL at 16:22

## 2023-07-16 RX ADMIN — SODIUM CHLORIDE: 9 INJECTION, SOLUTION INTRAVENOUS at 19:49

## 2023-07-16 RX ADMIN — CEFTRIAXONE SODIUM 1000 MG: 1 INJECTION, POWDER, FOR SOLUTION INTRAMUSCULAR; INTRAVENOUS at 19:50

## 2023-07-16 ASSESSMENT — COGNITIVE AND FUNCTIONAL STATUS - GENERAL
MOBILITY SCORE: 24
SUGGESTED CMS G CODE MODIFIER DAILY ACTIVITY: CH
SUGGESTED CMS G CODE MODIFIER MOBILITY: CH
DAILY ACTIVITIY SCORE: 24

## 2023-07-16 ASSESSMENT — ENCOUNTER SYMPTOMS
SPUTUM PRODUCTION: 0
POLYDIPSIA: 0
SEIZURES: 0
NEUROLOGICAL NEGATIVE: 1
SINUS PAIN: 0
BLOOD IN STOOL: 0
DOUBLE VISION: 0
SENSORY CHANGE: 0
MYALGIAS: 0
FALLS: 0
DEPRESSION: 0
TINGLING: 0
EYES NEGATIVE: 1
INSOMNIA: 0
STRIDOR: 0
CHILLS: 0
PND: 0
MUSCULOSKELETAL NEGATIVE: 1
FLANK PAIN: 0
ABDOMINAL PAIN: 0
EYE REDNESS: 0
FOCAL WEAKNESS: 0
CONSTITUTIONAL NEGATIVE: 1
BACK PAIN: 0
CARDIOVASCULAR NEGATIVE: 1
WHEEZING: 0
PHOTOPHOBIA: 0
GASTROINTESTINAL NEGATIVE: 1
EYE DISCHARGE: 0
RESPIRATORY NEGATIVE: 1
COUGH: 0
FEVER: 0
SHORTNESS OF BREATH: 0
DIAPHORESIS: 0
HEARTBURN: 0
DIZZINESS: 0
BRUISES/BLEEDS EASILY: 0
ORTHOPNEA: 0
PSYCHIATRIC NEGATIVE: 1

## 2023-07-16 ASSESSMENT — PAIN DESCRIPTION - PAIN TYPE: TYPE: ACUTE PAIN

## 2023-07-16 ASSESSMENT — LIFESTYLE VARIABLES
TOTAL SCORE: 0
TOTAL SCORE: 0
SUBSTANCE_ABUSE: 0
HAVE PEOPLE ANNOYED YOU BY CRITICIZING YOUR DRINKING: NO
ON A TYPICAL DAY WHEN YOU DRINK ALCOHOL HOW MANY DRINKS DO YOU HAVE: 2
EVER FELT BAD OR GUILTY ABOUT YOUR DRINKING: NO
HAVE YOU EVER FELT YOU SHOULD CUT DOWN ON YOUR DRINKING: NO
AVERAGE NUMBER OF DAYS PER WEEK YOU HAVE A DRINK CONTAINING ALCOHOL: 5
HOW MANY TIMES IN THE PAST YEAR HAVE YOU HAD 5 OR MORE DRINKS IN A DAY: 0
CONSUMPTION TOTAL: NEGATIVE
TOTAL SCORE: 0
EVER HAD A DRINK FIRST THING IN THE MORNING TO STEADY YOUR NERVES TO GET RID OF A HANGOVER: NO
ALCOHOL_USE: YES

## 2023-07-16 ASSESSMENT — CHA2DS2 SCORE
PRIOR STROKE OR TIA OR THROMBOEMBOLISM: YES
HYPERTENSION: YES
CHF OR LEFT VENTRICULAR DYSFUNCTION: YES
SEX: MALE
CHA2DS2 VASC SCORE: 6
AGE 65 TO 74: NO
VASCULAR DISEASE: NO
AGE 75 OR GREATER: YES
DIABETES: NO

## 2023-07-16 ASSESSMENT — FIBROSIS 4 INDEX
FIB4 SCORE: 2.81
FIB4 SCORE: 2.99
FIB4 SCORE: 2.99

## 2023-07-16 NOTE — ED PROVIDER NOTES
ER Provider Note    Scribed for Antony Huff M.D. by Amelia Pringle. 7/16/2023  2:52 PM    Primary Care Provider: Brennan Castorena M.D.    CHIEF COMPLAINT  Catheter issues        HPI/ROS  LIMITATION TO HISTORY   None  OUTSIDE HISTORIAN(S):  None    Lencho Parker is a 82 y.o. male who presents to the ED complaining of catheter issues onset four days ago. Patient reports he has a catheter placed 2 days ago at [facility]. He adds he had a UTI and was started on Cefdinir. He states he came in today, because he was worried his catheter is not working properly, and that he was going to urinate on himself. He states associated symptoms of blood in his urine that started when the catheter was placed. He notes he had a catheter placed for urinary retention. He states he takes Flomax 4 mg and Eliquis. He notes he has an appointment with urology tomorrow.     PAST MEDICAL HISTORY  Past Medical History:   Diagnosis Date    Breath shortness     on exertion    Cardiomyopathy (HCC) 05/2021    Echocardiogram with moderately dilated LV, mild concentric LVH, LVEF 20%. Global hypokinesis. Normal RA and RV. Severely dilated LA. Mild MR, mild TR. RVSP 34mmHg.    Chronic obstructive pulmonary disease (HCC)     [place] measles     Heart attack (HCC)     hx 2007    History of abdominal aortic aneurysm (AAA) 2009    Status post stent    Hyperlipidemia     Hypertension     Mumps     Pacemaker     AICD    Paroxysmal atrial fibrillation (HCC)     Status post Watchman procedure in [place].    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     Sleep apnea     Snoring     Stroke (HCC) 2012    TIA    Tonsillitis     Ventricular tachycardia (HCC)        SURGICAL HISTORY  Past Surgical History:   Procedure Laterality Date    AICD BATTERY CHANGE Left 09/10/2020     Upgrade to WITOIia MRI Quad CRT-D [id number] implanted by Dr. Barrios.    AICD BATTERY CHANGE Left 01/03/2017    Generator replacement with [facility] Evera MRI XT   [id number] implanted in [place].    OTHER CARDIAC SURGERY  2014    watchman device    AAA WITH STENT GRAFT  2009    OTHER ORTHOPEDIC SURGERY  2005    hip surgery    OTHER  2000    kidney removed    OTHER CARDIAC SURGERY  1991    AICD implanted       FAMILY HISTORY  Family History   Problem Relation Age of Onset    Cancer Mother     Cancer Sister        SOCIAL HISTORY   reports that he quit smoking about 19 years ago. His smoking use included cigarettes. He has a 7.50 pack-year smoking history. He has quit using smokeless tobacco. He reports current alcohol use of about 8.4 oz of alcohol per week. He reports that he does not currently use drugs after having used the following drugs: Inhaled.    CURRENT MEDICATIONS  Previous Medications    ALBUTEROL 108 (90 BASE) MCG/ACT AERO SOLN INHALATION AEROSOL    Inhale 2 Puffs by mouth every 6 hours as needed for Shortness of Breath.    AMIODARONE (CORDARONE) 200 MG TAB    Take 1 Tablet by mouth every day.    APIXABAN (ELIQUIS) 2.5MG TAB    Take 1 Tablet by mouth 2 times a day.    CARVEDILOL (COREG) 25 MG TAB    Take 25 mg by mouth every morning. Once daily    CEFDINIR (OMNICEF) 300 MG CAP    Take 300 mg by mouth 2 times a day. Pt started a 7 day course on 7/14    CINACALCET (SENSIPAR) 30 MG TAB    Take 30 mg by mouth every day.    FUROSEMIDE (LASIX) 20 MG TAB    Take 20 mg by mouth every Monday and Thursday.    LOSARTAN (COZAAR) 100 MG TAB    Take 1 Tablet by mouth every day.    MULTIPLE VITAMINS-MINERALS (MULTIVITAMIN ADULT EXTRA C PO)    Take 1 Tablet by mouth every day.    OMEGA-3 FATTY ACIDS (FISH OIL) 1000 MG CAP CAPSULE    Take 1,000 mg by mouth every evening.    SPIRONOLACTONE (ALDACTONE) 25 MG TAB    Take 0.5 Tablets by mouth every day.    TAMSULOSIN (FLOMAX) 0.4 MG CAPSULE    Take 0.4 mg by mouth 2 times a day.    VERICIGUAT (VERQUVO) 5 MG TAB    Take 5 mg by mouth every day.    VITAMIN D3 (CHOLECALCIFEROL) 1000 UNIT (25 MCG) TAB    Take 1,000 Units by mouth every day.  "      ALLERGIES  Entresto [sacubitril-valsartan] and Crestor [rosuvastatin]    PHYSICAL EXAM  /69   Pulse 81   Temp 36 °C (96.8 °F) (Tympanic)   Resp 20   Ht 1.88 m (6' 2\")   Wt 83.4 kg (183 lb 13.8 oz)   SpO2 96%   BMI 23.61 kg/m²     Constitutional: Alert in no apparent distress.  HENT: No signs of trauma, Bilateral external ears normal, Nose normal. Uvula midline.   Eyes: Pupils are equal and reactive, Conjunctiva normal, Non-icteric.   Neck: Normal range of motion, No tenderness, Supple, No stridor.   Lymphatic: No lymphadenopathy noted.   Cardiovascular: Regular rate and rhythm, no murmurs.   Thorax & Lungs: Normal breath sounds, No respiratory distress, No wheezing, No chest tenderness.   Abdomen: Bowel sounds normal, Soft, No tenderness, No peritoneal signs, No masses, No pulsatile masses.   : Fruit punch colored urine in his leg bag  Skin: Warm, Dry, No erythema, No rash.   Back: No bony tenderness, No CVA tenderness.   Extremities: Intact distal pulses, No edema, No tenderness, No cyanosis.  Musculoskeletal: Good range of motion in all major joints. No tenderness to palpation or major deformities noted.   Neurologic: Alert , Normal motor function, Normal sensory function, No focal deficits noted.   Psychiatric: Affect normal, Judgment normal, Mood normal.      DIAGNOSTIC STUDIES    Labs:   Labs Reviewed   CBC WITH DIFFERENTIAL - Abnormal; Notable for the following components:       Result Value    .9 (*)     MCH 34.5 (*)     RDW 57.1 (*)     Platelet Count 154 (*)     All other components within normal limits   COMP METABOLIC PANEL - Abnormal; Notable for the following components:    Glucose 103 (*)     Bun 42 (*)     Creatinine 2.27 (*)     All other components within normal limits   URINALYSIS - Abnormal; Notable for the following components:    Color Red (*)     Character Turbid (*)     Glucose 250 (*)     Protein 100 (*)     Leukocyte Esterase Trace (*)     Occult Blood Large (*)  "    All other components within normal limits    Narrative:     Indication for culture:->New onset fever with no other  identified cause   ESTIMATED GFR - Abnormal; Notable for the following components:    GFR (CKD-EPI) 28 (*)     All other components within normal limits   URINE MICROSCOPIC (W/UA) - Abnormal; Notable for the following components:    WBC Rare (*)     RBC >150 (*)     All other components within normal limits    Narrative:     Indication for culture:->New onset fever with no other  identified cause   CORRECTED CALCIUM   URINE CULTURE(NEW)    Narrative:     Indication for culture:->New onset fever with no other  identified cause   BLOOD CULTURE   BLOOD CULTURE       COURSE & MEDICAL DECISION MAKING     ED Observation Status? No; Patient does not meet criteria for ED Observation.     INITIAL ASSESSMENT, COURSE AND PLAN  Care Narrative: 82 y.o. M with recent history of urinary obstruction with hematuria and UTI presents with worsening hematuria and new obstruction    Patient been on cefdinir since last visit  Patient given ceftriaxone in emergency department  Patient admitted for CBI  Patient still on Eliquis due to prior AAA stent and EF of 20%      3:38 PM - Patient seen and examined at bedside. Discussed plan of care, including labs and replacing his catheter. Patient agrees to the plan of care.     Ordered for UA, CMP, CBC w/ Diff, and UC to evaluate his symptoms.      Plan for CBI and repeat hemoglobin and urology consultation in a.m.      DISPOSITION AND DISCUSSIONS  I have discussed management of the patient with the following physicians and JARED's:  Toribio        FINAL DIAGNOSIS  1. Acute UTI    2. Hematuria, unspecified type    3. Urinary retention        Amelia URIARTE (Scribe), am scribing for, and in the presence of, Antony Huff M.D..    Electronically signed by: Amelia Pringle (Belkis), 7/16/2023    Antony URIARTE M.D. personally performed the services described in this documentation,  as scribed by Amelia Pringle in my presence, and it is both accurate and complete.    The note accurately reflects work and decisions made by me.  Antony Huff M.D.  7/16/2023  5:29 PM

## 2023-07-16 NOTE — ED NOTES
Daily that patient arrived with has been removed to initiate CBI. 26F daily placed and CBI initiated. Pt had fruit punch colored urine initially. After CBI started, color is light pink. No blood clots noted. Dr. Huff notified.

## 2023-07-16 NOTE — ED TRIAGE NOTES
Pt presents by self from home for problem with his catheter. Pt reports his main concern was a cap that may have come off of the saline port of daily catheter but also has a concern for continuous blood in urine for past 4 days. Also reports draining around his catheter. No fever. Is on antibiotics for UTI. Takes Eliquis. Pt A&Ox4 and ambulatory.

## 2023-07-17 ENCOUNTER — APPOINTMENT (OUTPATIENT)
Dept: RADIOLOGY | Facility: MEDICAL CENTER | Age: 83
DRG: 699 | End: 2023-07-17
Attending: STUDENT IN AN ORGANIZED HEALTH CARE EDUCATION/TRAINING PROGRAM
Payer: COMMERCIAL

## 2023-07-17 LAB
ANION GAP SERPL CALC-SCNC: 12 MMOL/L (ref 7–16)
BUN SERPL-MCNC: 39 MG/DL (ref 8–22)
CALCIUM SERPL-MCNC: 8.4 MG/DL (ref 8.4–10.2)
CHLORIDE SERPL-SCNC: 106 MMOL/L (ref 96–112)
CO2 SERPL-SCNC: 20 MMOL/L (ref 20–33)
CREAT SERPL-MCNC: 2.16 MG/DL (ref 0.5–1.4)
ERYTHROCYTE [DISTWIDTH] IN BLOOD BY AUTOMATED COUNT: 57.7 FL (ref 35.9–50)
GFR SERPLBLD CREATININE-BSD FMLA CKD-EPI: 30 ML/MIN/1.73 M 2
GLUCOSE SERPL-MCNC: 114 MG/DL (ref 65–99)
HCT VFR BLD AUTO: 45.4 % (ref 42–52)
HGB BLD-MCNC: 14.9 G/DL (ref 14–18)
MCH RBC QN AUTO: 34.8 PG (ref 27–33)
MCHC RBC AUTO-ENTMCNC: 32.8 G/DL (ref 32.3–36.5)
MCV RBC AUTO: 106.1 FL (ref 81.4–97.8)
PLATELET # BLD AUTO: 144 K/UL (ref 164–446)
PMV BLD AUTO: 11.7 FL (ref 9–12.9)
POTASSIUM SERPL-SCNC: 4.4 MMOL/L (ref 3.6–5.5)
RBC # BLD AUTO: 4.28 M/UL (ref 4.7–6.1)
SODIUM SERPL-SCNC: 138 MMOL/L (ref 135–145)
WBC # BLD AUTO: 5 K/UL (ref 4.8–10.8)

## 2023-07-17 PROCEDURE — 94760 N-INVAS EAR/PLS OXIMETRY 1: CPT

## 2023-07-17 PROCEDURE — 99232 SBSQ HOSP IP/OBS MODERATE 35: CPT | Performed by: INTERNAL MEDICINE

## 2023-07-17 PROCEDURE — 770001 HCHG ROOM/CARE - MED/SURG/GYN PRIV*

## 2023-07-17 PROCEDURE — 85027 COMPLETE CBC AUTOMATED: CPT

## 2023-07-17 PROCEDURE — 80048 BASIC METABOLIC PNL TOTAL CA: CPT

## 2023-07-17 PROCEDURE — 36415 COLL VENOUS BLD VENIPUNCTURE: CPT

## 2023-07-17 PROCEDURE — 700102 HCHG RX REV CODE 250 W/ 637 OVERRIDE(OP): Performed by: HOSPITALIST

## 2023-07-17 PROCEDURE — 74176 CT ABD & PELVIS W/O CONTRAST: CPT

## 2023-07-17 PROCEDURE — 700105 HCHG RX REV CODE 258: Performed by: HOSPITALIST

## 2023-07-17 PROCEDURE — A9270 NON-COVERED ITEM OR SERVICE: HCPCS | Performed by: HOSPITALIST

## 2023-07-17 PROCEDURE — 700111 HCHG RX REV CODE 636 W/ 250 OVERRIDE (IP): Performed by: HOSPITALIST

## 2023-07-17 RX ADMIN — OXYCODONE 5 MG: 5 TABLET ORAL at 20:45

## 2023-07-17 RX ADMIN — TAMSULOSIN HYDROCHLORIDE 0.4 MG: 0.4 CAPSULE ORAL at 05:51

## 2023-07-17 RX ADMIN — LOSARTAN POTASSIUM 100 MG: 50 TABLET, FILM COATED ORAL at 05:53

## 2023-07-17 RX ADMIN — TAMSULOSIN HYDROCHLORIDE 0.4 MG: 0.4 CAPSULE ORAL at 17:25

## 2023-07-17 RX ADMIN — OXYCODONE 5 MG: 5 TABLET ORAL at 12:06

## 2023-07-17 RX ADMIN — MORPHINE SULFATE 2 MG: 4 INJECTION INTRAVENOUS at 05:50

## 2023-07-17 RX ADMIN — MORPHINE SULFATE 2 MG: 4 INJECTION INTRAVENOUS at 21:49

## 2023-07-17 RX ADMIN — AMIODARONE HYDROCHLORIDE 200 MG: 200 TABLET ORAL at 05:51

## 2023-07-17 RX ADMIN — SPIRONOLACTONE 12.5 MG: 25 TABLET ORAL at 05:53

## 2023-07-17 RX ADMIN — CARVEDILOL 25 MG: 25 TABLET, FILM COATED ORAL at 05:53

## 2023-07-17 RX ADMIN — CINACALCET HYDROCHLORIDE 30 MG: 30 TABLET, FILM COATED ORAL at 05:51

## 2023-07-17 RX ADMIN — APIXABAN 2.5 MG: 2.5 TABLET, FILM COATED ORAL at 17:25

## 2023-07-17 RX ADMIN — SENNOSIDES AND DOCUSATE SODIUM 2 TABLET: 50; 8.6 TABLET ORAL at 05:51

## 2023-07-17 RX ADMIN — CEFTRIAXONE SODIUM 1000 MG: 1 INJECTION, POWDER, FOR SOLUTION INTRAMUSCULAR; INTRAVENOUS at 17:25

## 2023-07-17 RX ADMIN — Medication 1000 UNITS: at 05:51

## 2023-07-17 RX ADMIN — APIXABAN 2.5 MG: 2.5 TABLET, FILM COATED ORAL at 05:51

## 2023-07-17 RX ADMIN — MORPHINE SULFATE 2 MG: 4 INJECTION INTRAVENOUS at 02:27

## 2023-07-17 ASSESSMENT — ENCOUNTER SYMPTOMS
HEARTBURN: 0
ABDOMINAL PAIN: 1
WEAKNESS: 0
PALPITATIONS: 0
FOCAL WEAKNESS: 0
HEADACHES: 0
CHILLS: 0
MYALGIAS: 0
BLOOD IN STOOL: 0
VOMITING: 0
SHORTNESS OF BREATH: 0
HALLUCINATIONS: 0
FEVER: 0
SORE THROAT: 0
BACK PAIN: 0
DIARRHEA: 0
COUGH: 0
DIZZINESS: 0
NAUSEA: 0
DEPRESSION: 0

## 2023-07-17 ASSESSMENT — PAIN DESCRIPTION - PAIN TYPE
TYPE: ACUTE PAIN

## 2023-07-17 NOTE — CARE PLAN
The patient is Stable - Low risk of patient condition declining or worsening    Shift Goals  Clinical Goals: Maintain pink to clear output on daily  Patient Goals: Rest; pain control 3/10 or less  Family Goals: n/a    Progress made toward(s) clinical / shift goals:  pain control 3/10 or less; CBI output clear to pink    Patient is not progressing towards the following goals:

## 2023-07-17 NOTE — PROGRESS NOTES
Received bedside report from night RN. Patient alert and oriented x4. Denies any complains at this time. Discussed plan of care and understood. Duran catheter in place attached to continuous bladder irrigation. Falls precaution in place. Call light placed within reach. Will continue to monitor.

## 2023-07-17 NOTE — ASSESSMENT & PLAN NOTE
Chronic heart failure which at this point is well controlled  Continue Coreg and losartan    Creatinine 1.57

## 2023-07-17 NOTE — ASSESSMENT & PLAN NOTE
Continue with Eliquis and rate control with amiodarone and coreg  Urology recommending continuation of Eliquis    - I have spoken with urology [place], they are recommending continue monitoring, hold Eliquis.

## 2023-07-17 NOTE — PROGRESS NOTES
Hospital Medicine Daily Progress Note    Date of Service  7/17/2023    Chief Complaint  Lencho Parker is a 82 y.o. male admitted 7/16/2023 with blood in the urine    Hospital Course  History of atrial fibrillation on Eliquis, amiodarone and Coreg, stage III CKD who had a Duran catheter placed as an outpatient and developed hematuria several days later bradycardia in the hospital.  Hemoglobin was found to be stable.  Urology was consulted and recommended CBI    Interval Problem Update  7/17: Eliquis continued and hematuria has improved significantly.  Hemoglobin stable today.  Urology recommending against cystoscopy at this time however would like CT abdomen pelvis which has been ordered    I have discussed this patient's plan of care and discharge plan at IDT rounds today with Case Management, Nursing, Nursing leadership, and other members of the IDT team.    Consultants/Specialty  urology    Code Status  DNAR/DNI    Disposition  The patient is not medically cleared for discharge to home or a post-acute facility.  Anticipate discharge to: home with close outpatient follow-up    I have placed the appropriate orders for post-discharge needs.    Review of Systems  Review of Systems   Constitutional:  Negative for chills, fever and malaise/fatigue.   HENT:  Negative for sore throat.    Respiratory:  Negative for cough and shortness of breath.    Cardiovascular:  Negative for chest pain and palpitations.   Gastrointestinal:  Positive for abdominal pain. Negative for blood in stool, diarrhea, heartburn, nausea and vomiting.   Genitourinary:  Positive for hematuria (Improved). Negative for dysuria and frequency.   Musculoskeletal:  Negative for back pain and myalgias.   Neurological:  Negative for dizziness, focal weakness, weakness and headaches.   Psychiatric/Behavioral:  Negative for depression and hallucinations.    All other systems reviewed and are negative.       Physical Exam  Temp:  [36 °C (96.8 °F)-36.9  °C (98.5 °F)] 36.6 °C (97.8 °F)  Pulse:  [76-83] 80  Resp:  [17-20] 17  BP: ()/(64-71) 102/71  SpO2:  [95 %-98 %] 95 %    Physical Exam  Constitutional:       Appearance: Normal appearance.   HENT:      Head: Normocephalic and atraumatic.      Nose: Nose normal.      Mouth/Throat:      Mouth: Mucous membranes are moist.   Eyes:      Extraocular Movements: Extraocular movements intact.      Pupils: Pupils are equal, round, and reactive to light.   Cardiovascular:      Rate and Rhythm: Normal rate and regular rhythm.      Heart sounds: No murmur heard.  Pulmonary:      Effort: Pulmonary effort is normal. No respiratory distress.      Breath sounds: Normal breath sounds. No stridor.   Abdominal:      General: Abdomen is flat. Bowel sounds are normal. There is no distension.      Palpations: Abdomen is soft.      Tenderness: There is abdominal tenderness.   Musculoskeletal:         General: No swelling, tenderness or deformity.      Cervical back: Normal range of motion and neck supple.   Skin:     General: Skin is warm and dry.      Coloration: Skin is not pale.   Neurological:      General: No focal deficit present.      Mental Status: He is alert and oriented to person, place, and time. Mental status is at baseline.   Psychiatric:         Mood and Affect: Mood normal.         Behavior: Behavior normal.         Thought Content: Thought content normal.         Fluids    Intake/Output Summary (Last 24 hours) at 7/17/2023 1159  Last data filed at 7/17/2023 0400  Gross per 24 hour   Intake 6120 ml   Output 9000 ml   Net -2880 ml       Laboratory  Recent Labs     07/16/23  1547 07/17/23  0255   WBC 5.0 5.0   RBC 4.70 4.28*   HEMOGLOBIN 16.2 14.9   HEMATOCRIT 49.3 45.4   .9* 106.1*   MCH 34.5* 34.8*   MCHC 32.9 32.8   RDW 57.1* 57.7*   PLATELETCT 154* 144*   MPV 11.2 11.7     Recent Labs     07/16/23  1547 07/17/23  0255   SODIUM 136 138   POTASSIUM 5.1 4.4   CHLORIDE 104 106   CO2 20 20   GLUCOSE 103* 114*    BUN 42* 39*   CREATININE 2.27* 2.16*   CALCIUM 9.2 8.4                   Imaging  CT-ABDOMEN-PELVIS W/O    (Results Pending)        Assessment/Plan  * Hematuria- (present on admission)  Assessment & Plan  Patient about a week ago started to have some difficulty with urination and clots in his urine.  Seen by urology who placed a Duran catheter-and a few days later he developed hematuria and lower abdominal pain  Seen by urology, they recommend continuing CBI  CT ordered    DNR (do not resuscitate)  Assessment & Plan  .    Thrombocytopenia (HCC)  Assessment & Plan  Mild thrombocytopenia, doubt contributing to hematuria  Monitor and transfuse if needed    Acute cystitis with hematuria  Assessment & Plan  Continue ceftriaxone  Urine culture and blood culture    Stage 3 chronic kidney disease (HCC)- (present on admission)  Assessment & Plan  Monitor renal functions avoid nephrotoxic medications    ACC/AHA stage C systolic heart failure (HCC)- (present on admission)  Assessment & Plan  Chronic heart failure which at this point is well controlled  Continue Coreg and losartan  Resume Lasix in a.m. 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)- (present on admission)  Assessment & Plan  Stable and will need outpatient monitoring through cardiology on a chronic basis    Paroxysmal atrial fibrillation (HCC)- (present on admission)  Assessment & Plan  Continue with Eliquis and rate control with amiodarone and coreg  Urology recommending continuation of Eliquis  Hgb stable    Mixed hyperlipidemia- (present on admission)  Assessment & Plan  Low-fat low-cholesterol diet  Statin  Fasting lipid panel    Essential hypertension, benign- (present on admission)  Assessment & Plan  Optimize blood pressure management keep systolic blood pressure less than 140 diastolic under 90         VTE prophylaxis: SCDs/TEDs and therapeutic anticoagulation with Eliquis    I have  performed a physical exam and reviewed and updated ROS and Plan today (7/17/2023). In review of yesterday's note (7/16/2023), there are no changes except as documented above.

## 2023-07-17 NOTE — DOCUMENTATION QUERY
[facility]                                                                       Query Response Note      PATIENT:               KYUNG LEONARD  ACCT #:                  [id number]  MRN:                     [id number]  :                      1940  ADMIT DATE:       2023 2:38 PM  DISCH DATE:          RESPONDING  PROVIDER #:        [id number]           QUERY TEXT:    Documentation in the medical record indicates that this patient has been diagnosed with the presence of a device and infection.  Daily catheter that was placed 2 days ago POA and acute cystitis with hematuria.     Can the relation to the device and infection be further clarified based on the above clinical indicators?        The patient's Clinical Indicators include:  Findings:  --Patient admitted for acute cystitis with hematuria and thrombocytopenia  --Per ER provider notes , ''Patient reports he has a catheter placed 2 days ago at [facility]. He      adds he had a UTI and was started on Cefdinir. He states associated symptoms of blood in his urine that      started when the catheter was placed. He notes he had a catheter placed for urinary retention'' documented  --Per ER nursing noted , Daily that patient arrived with has been removed to initiate CBI. 26F daily      placed and CBI initiated  --UA on  admission:  red, turbid, glucose 250, large occult blood, 100 protein, trace leukocyte esterase,      rare wbc, rbc >150    Treatment:  --Daily catheter exchange in the ER on admission   --IV Rocephin 1000mg every evening  --IVF  --CBI  --Urology consult with cystoscopy pending  --UA    Risk Factors:  --Daily catheter that was placed 2 days ago by [facility] POA  --CKD3  --Paroxysmal AFib on Eliquis    Thank you,  Nallely VELAZQUEZN, RN  Clinical   Connect via [employer]  Options provided:   -- Acute cystitis  with hematuria is related to Duran catheter POA   -- Acute cystitis with hematuria is not related to Duran catheter POA   -- Other explanation, please specify   -- Unable to determine      Query created by: Nallely Wayne on 7/17/2023 5:40 AM    RESPONSE TEXT:    Acute cystitis with hematuria is related to Duran catheter POA          Electronically signed by:  KRYSTIN METCALF DO 7/17/2023 7:22 AM

## 2023-07-17 NOTE — ASSESSMENT & PLAN NOTE
I have continued IV ceftriaxone, end date 7/20  Urine culture and blood culture  Due to Eliquis use, perforation occurred after initial hematuria  Continue Duran catheter, holding Eliquis

## 2023-07-17 NOTE — PROGRESS NOTES
Report received from ED RN. Pt transported via gurney, able to walk to bed using cane. Pt not in any distress, on RA. Oriented to room and floor. On CBI draining light pink output. Safety risk precaution in place, locked lowest position and call light within reach. All needs met at this time.

## 2023-07-17 NOTE — HOSPITAL COURSE
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  Hemoglobin was found to be stable.  Urology was consulted and recommended CBI. Patient was kept on Eliquis upon admission.     Patient was eval by urology [place] who recommended a CT abdomen pelvis, possibly needs cystoscopy.  On 6 the morning of 7/18, patient had complaints of severe abdominal pain, was disoriented tried to call 911 to get help.  Patient was transferred to the ICU, had a worsening bleed   And high concern for bladder perforation.  CT scan that showed there was 2 perforation or defects in the superior bladder, the tip of the Duran catheter was protruding.  Urology reevaluated and determined the perforation may heal on its own.  Patient had Eliquis on board which urology did not feel safe to have an emergency exploration.  They recommended for serial abdominal exams, hand irrigate 18 [place] Duran catheter to remove blood clots, hold all anticoagulation and antiplatelet medication, possible plan for cystogram in 7 to 10 days.  Patient may need possible reimaging with CT cystogram prior to exploratory laparotomy and cystostomy closure.  Patient was downgraded on 7/19 from ICU.

## 2023-07-17 NOTE — PROGRESS NOTES
4 Eyes Skin Assessment Completed by MAYUR Snow and MAYUR Tobias.    Head WDL  Ears WDL  Nose WDL  Mouth WDL  Neck WDL  Breast/Chest WDL  Shoulder Blades WDL  Spine WDL  (R) Arm/Elbow/Hand Bruising, Scab, and Discoloration  (L) Arm/Elbow/Hand Bruising, Scab, and Discoloration  Abdomen WDL  Groin WDL  Scrotum/Coccyx/Buttocks WDL  (R) Leg WDL  (L) Leg Scab R L knee  (R) Heel/Foot/Toe Dry  (L) Heel/Foot/Toe Dry          Devices In Places Blood Pressure Cuff, Pulse Ox, Duran, and SCD's      Interventions In Place Pillows    Possible Skin Injury No    Pictures Uploaded Into Epic N/A  Wound Consult Placed N/A  RN Wound Prevention Protocol Ordered No

## 2023-07-17 NOTE — PROGRESS NOTES
1940- BSSR received from day shift RN. Patient laying in bed in no apparent distress with no complaints. CBI running at a slow rate. A&O X4. Plan of care discussed with patient. Bed in low position with bed brakes applied and call light in reach. Patient states no needs at this time.     2222- Patient laying in bed in no apparent distress. CBI running O2 92% on RA    0006- Patient appears to be sleeping comfortably, easily aroused CBI running at slow rate, O2 sat at 93%    0220- Patient laying in bed c/o pain CBI running at slow rate.    0349- Patient appears to be sleeping comfortably, easily aroused     0632- Patient medicated earlier

## 2023-07-17 NOTE — H&P
Hospital Medicine History & Physical Note    Date of Service  7/16/2023    Primary Care Physician  Brennan Castorena M.D.    Consultants  Urology    Specialist Names: Dr. Ly    Code Status  DNAR/DNI    Chief Complaint  No chief complaint on file.      History of Presenting Illness  Lencho Parker is a 82 y.o. male who presented 7/16/2023 with ongoing worsening hematuria.  Patient initially was having some clots in his urine and thus went to the urologist office on Wednesday.  Duran catheter was placed for him and he was sent home with this.  By Friday he was having hematuria in the Duran catheter.  Saturday all day long he had hematuria and today he decided to come to the emergency room for evaluation.  Urology was consulted they recommended CBI and they will plan on taking the patient for a cystoscopy with cauterization tomorrow.  They do recommend continuation of the Eliquis he is on for atrial fibrillation.    I discussed the plan of care with patient, bedside RN, and emergency room physician .    Review of Systems  Review of Systems   Constitutional: Negative.  Negative for chills, diaphoresis and fever.   HENT: Negative.  Negative for nosebleeds and sinus pain.    Eyes: Negative.  Negative for double vision, photophobia, discharge and redness.   Respiratory: Negative.  Negative for cough, sputum production, shortness of breath, wheezing and stridor.    Cardiovascular: Negative.  Negative for chest pain, orthopnea, leg swelling and PND.   Gastrointestinal: Negative.  Negative for abdominal pain, blood in stool and heartburn.   Genitourinary:  Positive for hematuria. Negative for dysuria, flank pain and frequency.   Musculoskeletal: Negative.  Negative for back pain, falls and myalgias.   Skin: Negative.  Negative for itching.   Neurological: Negative.  Negative for dizziness, tingling, sensory change, focal weakness and seizures.   Endo/Heme/Allergies: Negative.  Negative for polydipsia. Does not  bruise/bleed easily.   Psychiatric/Behavioral: Negative.  Negative for depression, substance abuse and suicidal ideas. The patient does not have insomnia.    All other systems reviewed and are negative.      Past Medical History   has a past medical history of Breath shortness, Cardiomyopathy (HCC) (05/2021), Chronic obstructive pulmonary disease (HCC), [place] measles, Heart attack (HCC), History of abdominal aortic aneurysm (AAA) (2009), Hyperlipidemia, Hypertension, Mumps, Pacemaker, Paroxysmal atrial fibrillation (HCC), Renal disorder, Sleep apnea, Snoring, Stroke (HCC) (2012), Tonsillitis, and Ventricular tachycardia (HCC).    Surgical History   has a past surgical history that includes aicd battery change (Left, 01/03/2017); aaa with stent graft (2009); other (2000); other orthopedic surgery (2005); other cardiac surgery (1991); other cardiac surgery (2014); and aicd battery change (Left, 09/10/2020).     Family History  family history includes Cancer in his mother and sister.   Family history reviewed with patient. There is no family history that is pertinent to the chief complaint.     Social History   reports that he quit smoking about 19 years ago. His smoking use included cigarettes. He has a 7.50 pack-year smoking history. He has quit using smokeless tobacco. He reports current alcohol use of about 8.4 oz of alcohol per week. He reports that he does not currently use drugs after having used the following drugs: Inhaled.    Allergies  Allergies   Allergen Reactions    Entresto [Sacubitril-Valsartan]      Swollen tongue. Angioedema.    Crestor [Rosuvastatin]        Medications  Prior to Admission Medications   Prescriptions Last Dose Informant Patient Reported? Taking?   Multiple Vitamins-Minerals (MULTIVITAMIN ADULT EXTRA C PO) 7/16/2023 at AM Patient Yes No   Sig: Take 1 Tablet by mouth every day.   Omega-3 Fatty Acids (FISH OIL) 1000 MG Cap capsule 7/16/2023 at AM Patient Yes No   Sig: Take 1,000 mg by  mouth every evening.   Vericiguat (VERQUVO) 5 MG Tab 7/16/2023 at AM Patient No No   Sig: Take 5 mg by mouth every day.   albuterol 108 (90 Base) MCG/ACT Aero Soln inhalation aerosol 3 days Patient No No   Sig: Inhale 2 Puffs by mouth every 6 hours as needed for Shortness of Breath.   amiodarone (CORDARONE) 200 MG Tab 7/16/2023 at AM Patient No No   Sig: Take 1 Tablet by mouth every day.   apixaban (ELIQUIS) 2.5mg Tab 7/16/2023 at AM Patient No No   Sig: Take 1 Tablet by mouth 2 times a day.   carvedilol (COREG) 25 MG Tab 7/16/2023 at AM Patient Yes No   Sig: Take 25 mg by mouth every morning. Once daily   cefdinir (OMNICEF) 300 MG Cap 7/16/2023 at 0630 Patient Yes Yes   Sig: Take 300 mg by mouth 2 times a day. Pt started a 7 day course on 7/14   cinacalcet (SENSIPAR) 30 MG Tab 7/16/2023 at AM Patient Yes No   Sig: Take 30 mg by mouth every day.   furosemide (LASIX) 20 MG Tab 7/13/2023 at [facility] Patient Yes No   Sig: Take 20 mg by mouth every Monday and Thursday.   losartan (COZAAR) 100 MG Tab 7/16/2023 at AM Patient No No   Sig: Take 1 Tablet by mouth every day.   spironolactone (ALDACTONE) 25 MG Tab 7/16/2023 at AM Patient No No   Sig: Take 0.5 Tablets by mouth every day.   tamsulosin (FLOMAX) 0.4 MG capsule 7/16/2023 at AM Patient Yes No   Sig: Take 0.4 mg by mouth 2 times a day.   vitamin D3 (CHOLECALCIFEROL) 1000 Unit (25 mcg) Tab 7/16/2023 at AM Patient Yes No   Sig: Take 1,000 Units by mouth every day.      Facility-Administered Medications: None       Physical Exam  Temp:  [36 °C (96.8 °F)] 36 °C (96.8 °F)  Pulse:  [81] 81  Resp:  [20] 20  BP: (104)/(69) 104/69  SpO2:  [96 %] 96 %  Blood Pressure : 104/69   Temperature: 36 °C (96.8 °F)   Pulse: 81   Respiration: 20   Pulse Oximetry: 96 %       Physical Exam  Vitals and nursing note reviewed. Exam conducted with a chaperone present.   Constitutional:       General: He is not in acute distress.     Appearance: Normal appearance. He is well-developed and normal  weight. He is not ill-appearing, toxic-appearing or diaphoretic.   HENT:      Head: Normocephalic and atraumatic.      Right Ear: External ear normal.      Left Ear: External ear normal.      Nose: Nose normal.      Mouth/Throat:      Mouth: Mucous membranes are moist.      Pharynx: Oropharynx is clear.   Eyes:      Extraocular Movements: Extraocular movements intact.      Conjunctiva/sclera: Conjunctivae normal.      Pupils: Pupils are equal, round, and reactive to light.   Neck:      Thyroid: No thyromegaly.      Vascular: No JVD.   Cardiovascular:      Rate and Rhythm: Normal rate and regular rhythm.      Pulses: Normal pulses.      Heart sounds: Normal heart sounds.   Pulmonary:      Effort: Pulmonary effort is normal.      Breath sounds: Normal breath sounds.   Chest:      Chest wall: No tenderness.   Abdominal:      General: Abdomen is flat. Bowel sounds are normal. There is no distension.      Palpations: Abdomen is soft. There is no mass.      Tenderness: There is no abdominal tenderness. There is no guarding or rebound.   Genitourinary:     Comments: CBI in place with active hematuria  Musculoskeletal:         General: Normal range of motion.      Cervical back: Normal range of motion and neck supple.      Right lower leg: No edema.   Lymphadenopathy:      Cervical: No cervical adenopathy.   Skin:     General: Skin is warm and dry.      Capillary Refill: Capillary refill takes less than 2 seconds.      Findings: No rash.   Neurological:      General: No focal deficit present.      Mental Status: He is alert and oriented to person, place, and time. Mental status is at baseline.      GCS: GCS eye subscore is 4. GCS verbal subscore is 5. GCS motor subscore is 6.      Cranial Nerves: No cranial nerve deficit.      Deep Tendon Reflexes: Reflexes are normal and symmetric.   Psychiatric:         Mood and Affect: Mood normal.         Behavior: Behavior normal.         Thought Content: Thought content normal.          Judgment: Judgment normal.         Laboratory:  Recent Labs     07/16/23  1547   WBC 5.0   RBC 4.70   HEMOGLOBIN 16.2   HEMATOCRIT 49.3   .9*   MCH 34.5*   MCHC 32.9   RDW 57.1*   PLATELETCT 154*   MPV 11.2     Recent Labs     07/16/23  1547   SODIUM 136   POTASSIUM 5.1   CHLORIDE 104   CO2 20   GLUCOSE 103*   BUN 42*   CREATININE 2.27*   CALCIUM 9.2     Recent Labs     07/16/23  1547   ALTSGPT 35   ASTSGOT 34   ALKPHOSPHAT 71   TBILIRUBIN 0.8   GLUCOSE 103*         No results for input(s): NTPROBNP in the last 72 hours.      No results for input(s): TROPONINT in the last 72 hours.    Imaging:  No orders to display       X-Ray:  I have personally reviewed the images and compared with prior images.  EKG:  I have personally reviewed the images and compared with prior images.    Assessment/Plan:  Justification for Admission Status  I anticipate this patient will require at least two midnights for appropriate medical management, necessitating inpatient admission because patient has acute hematuria with a Duran catheter in place that was placed through urology.  Patient is on blood thinners as well as is thrombocytopenic.  At this point he is a complex patient due to the anticoagulation and will need at this point monitoring of his H&H while cystoscopy is performed and cauterization of the bleeding areas are done.  This will require at least 48 hours of inpatient management    Patient will need a Med/Surg bed on MEDICAL service .  The need is secondary to ongoing hematuria while on blood thinners.    * Hematuria- (present on admission)  Assessment & Plan  Patient about a week ago started to have some difficulty with urination and clots in his urine.  During the course of the week he went to the urologist where they placed a Duran catheter for him.  Initially everything was going well but then the patient started to have worsening urination with blood in it.  This was yesterday and thus he decided to repeat ported  to the emergency room today.  Urology was contacted and they plan on taking the patient for cystoscopy tomorrow and cauterization of any bleeding areas.  Patient will be n.p.o. at midnight in the meantime patient will be on a CBI.    Acute cystitis with hematuria  Assessment & Plan  Start IV Rocephin as he has failed outpatient management.  Urine culture and blood culture has been ordered    Thrombocytopenia (HCC)  Assessment & Plan  Mild thrombocytopenia at this point patient will need platelet count to be monitored.  Currently has hematuria and we will need to continue monitoring his platelet counts very carefully currently does not need a transfusion    Stage 3 chronic kidney disease (HCC)- (present on admission)  Assessment & Plan  Monitor renal functions avoid nephrotoxic medications    ACC/AHA stage C systolic heart failure (HCC)- (present on admission)  Assessment & Plan  Chronic heart failure which at this point is well controlled  Patient is on Coreg as well as losartan  For now we will hold the Lasix he was on, this can be resumed once his hematuria is under control    Paroxysmal atrial fibrillation (HCC)- (present on admission)  Assessment & Plan  Continue with Eliquis and rate control with amiodarone  Urology at this point is recommending continuation of the blood thinner    Essential hypertension, benign- (present on admission)  Assessment & Plan  Optimize blood pressure management keep systolic blood pressure less than 140 diastolic under 90    DNR (do not resuscitate)  Assessment & Plan  Discussed advanced care planning with him and he wishes to be DO NOT RESUSCITATE CODE STATUS.    Presence of biventricular automatic cardioverter/defibrillator (AICD)- (present on admission)  Assessment & Plan  Stable and will need outpatient monitoring through cardiology on a chronic basis    Mixed hyperlipidemia- (present on admission)  Assessment & Plan  Low-fat low-cholesterol diet  Statin  Fasting lipid  panel        VTE prophylaxis: therapeutic anticoagulation with Eliquis

## 2023-07-17 NOTE — CARE PLAN
The patient is Stable - Low risk of patient condition declining or worsening    Shift Goals  Clinical Goals: Continue CBI with almost clear output  Patient Goals: Sleep/rest overnight    Progress made toward(s) clinical / shift goals:  CBI running slow with no clots and almost clear output.    Patient is not progressing towards the following goals:

## 2023-07-17 NOTE — ASSESSMENT & PLAN NOTE
Patient about a week ago started to have some difficulty with urination and clots in his urine.  Seen by urology who placed a Duran catheter-and a few days later he developed hematuria and lower abdominal pain  Seen by urology, they recommend continuing CBI    - I reviewed patient's CT scan on 7/18, which had shown the perforations with Duran catheter protruding.

## 2023-07-17 NOTE — CONSULTS
"UROLOGY Consult Note:    Amrita Ervin P.A.-C.  Date & Time note created:    7/17/2023   8:02 AM     Consulting MD:  Dr. Rooney    Patient ID:  Name:             Lencho Parker   YOB: 1940  Age:                 82 y.o.  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 Patient reports the hematuria has been ongoing for ~1 week, was seen in our office where a catheter was placed and hand irrigation performed. He is on tamsulosin 0.8mg daily, denies any bothersome lower urinary tract symptoms although notes his urinary stream has progressively weakened. He does not have a history of urinary retention.     UA was not suspicious for infection. H/H found to be normal at 16.2/49.3.    26fr 3-way daily catheter was placed in the ED and CBI was started.     Today, patient is resting comfortably in bed. Daily is draining very light pink-tinged urine on slow flow CBI without any clots. Patient reports it has improved considerably overnight.     He reports \"bladder burning\" and denies F/C/N/V/abdominal pain.     Review of Systems:      Constitutional: Denies fevers, Denies weight changes  Eyes: Denies changes in vision, no eye pain  Ears/Nose/Throat/Mouth: Denies nasal congestion or sore throat   Cardiovascular: no chest pain, no palpitations   Respiratory: no shortness of breath , Denies cough  Gastrointestinal/Hepatic: Denies abdominal pain, nausea, vomiting, diarrhea, constipation or GI bleeding   Genitourinary: +hematuria, +bladder pain  Musculoskeletal/Rheum: Denies  joint pain and swelling, no edema  Skin: Denies rash  Neurological: Denies headache, confusion, memory loss or focal " weakness/parasthesias  Psychiatric: denies mood disorder   Endocrine: Aleah thyroid problems  Heme/Oncology/Lymph Nodes: Denies enlarged lymph nodes, denies brusing or known bleeding disorder  All other systems were reviewed and are negative (AMA/CMS criteria)                Past Medical History:   Past Medical History:   Diagnosis Date    Breath shortness     on exertion    Cardiomyopathy (HCC) 05/2021    Echocardiogram with moderately dilated LV, mild concentric LVH, LVEF 20%. Global hypokinesis. Normal RA and RV. Severely dilated LA. Mild MR, mild TR. RVSP 34mmHg.    Chronic obstructive pulmonary disease (HCC)     [place] measles     Heart attack (HCC)     hx 2007    History of abdominal aortic aneurysm (AAA) 2009    Status post stent    Hyperlipidemia     Hypertension     Mumps     Pacemaker     AICD    Paroxysmal atrial fibrillation (HCC)     Status post Watchman procedure in [place].    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     Sleep apnea     Snoring     Stroke (HCC) 2012    TIA    Tonsillitis     Ventricular tachycardia (HCC)      Active Hospital Problems    Diagnosis     Hematuria [R31.9]     Acute cystitis with hematuria [N30.01]     Thrombocytopenia (HCC) [D69.6]     DNR (do not resuscitate) [Z66]     Stage 3 chronic kidney disease (HCC) [N18.30]     ACC/AHA stage C systolic heart failure (HCC) [I50.20]     Presence of biventricular automatic cardioverter/defibrillator (AICD) [Z95.810]     Paroxysmal atrial fibrillation (HCC) [I48.0]     Mixed hyperlipidemia [E78.2]     Essential hypertension, benign [I10]        Past Surgical History:  Past Surgical History:   Procedure Laterality Date    AICD BATTERY CHANGE Left 09/10/2020     Upgrade to [employer] MRI Quad CRT-D [id number] implanted by Dr. Barrios.    AICD BATTERY CHANGE Left 01/03/2017    Generator replacement with Medtronic Evera MRI XT  [id number] implanted in [place].    OTHER CARDIAC SURGERY  2014    watchman device    AAA WITH STENT GRAFT  2009     OTHER ORTHOPEDIC SURGERY  2005    hip surgery    OTHER  2000    kidney removed    OTHER CARDIAC SURGERY  1991    [facility] implanted       Hospital Medications:    Current Facility-Administered Medications:     albuterol inhaler 2 Puff, 2 Puff, Inhalation, Q6HRS PRN, Gloria Rooney M.D.    amiodarone (Cordarone) tablet 200 mg, 200 mg, Oral, DAILY, Gloria Rooney M.D., 200 mg at 07/17/23 0551    apixaban (Eliquis) tablet 2.5 mg, 2.5 mg, Oral, BID, Glroia Rooney M.D., 2.5 mg at 07/17/23 0551    carvedilol (Coreg) tablet 25 mg, 25 mg, Oral, QAM, Gloria Rooney M.D., 25 mg at 07/17/23 0553    cinacalcet (Sensipar) tablet 30 mg, 30 mg, Oral, DAILY, Gloria Rooney M.D., 30 mg at 07/17/23 0551    losartan (Cozaar) tablet 100 mg, 100 mg, Oral, DAILY, Gloria Rooney M.D., 100 mg at 07/17/23 0553    spironolactone (Aldactone) tablet 12.5 mg, 12.5 mg, Oral, DAILY, Gloria Rooney M.D., 12.5 mg at 07/17/23 0553    tamsulosin (Flomax) capsule 0.4 mg, 0.4 mg, Oral, BID, Gloria Rooney M.D., 0.4 mg at 07/17/23 0551    vitamin D3 (Cholecalciferol) tablet 1,000 Units, 1,000 Units, Oral, DAILY, Gloria Rooney M.D., 1,000 Units at 07/17/23 0551    cefTRIAXone (Rocephin) 1,000 mg in  mL IVPB, 1,000 mg, Intravenous, Q EVENING, Gloria Rooney M.D., Stopped at 07/16/23 2020    senna-docusate (Pericolace Or Senokot S) 8.6-50 MG per tablet 2 Tablet, 2 Tablet, Oral, BID, 2 Tablet at 07/17/23 0551 **AND** polyethylene glycol/lytes (Miralax) PACKET 1 Packet, 1 Packet, Oral, QDAY PRN **AND** magnesium hydroxide (Milk Of Magnesia) suspension 30 mL, 30 mL, Oral, QDAY PRN **AND** bisacodyl (Dulcolax) suppository 10 mg, 10 mg, Rectal, QDAY PRN, Gloria Rooney M.D.    Respiratory Therapy Consult, , Nebulization, Continuous RT, Gloria Rooney M.D.    NS infusion, , Intravenous, Continuous, Gloria Rooney M.D., Last Rate: 75 mL/hr at 07/16/23 1949, New Bag at 07/16/23 1949    acetaminophen (Tylenol) tablet 650 mg, 650 mg, Oral, Q6HRS PRN, Gloria  ANTONIETA Rooney    Notify provider if pain remains uncontrolled, , , CONTINUOUS **AND** Use the Numeric Rating Scale (NRS), Silva-Baker Faces (WBF), or FLACC on regular floors and Critical-Care Pain Observation Tool (CPOT) on ICUs/Trauma to assess pain, , , CONTINUOUS **AND** Pulse Ox, , , CONTINUOUS **AND** Pharmacy Consult Request ...Pain Management Review 1 Each, 1 Each, Other, PHARMACY TO DOSE **AND** If patient difficult to arouse and/or has respiratory depression (respiratory rate of 10 or less), stop any opiates that are currently infusing and call a Rapid Response., , , CONTINUOUS, Gloria Rooney M.D.    oxyCODONE immediate-release (Roxicodone) tablet 2.5 mg, 2.5 mg, Oral, Q3HRS PRN **OR** oxyCODONE immediate-release (Roxicodone) tablet 5 mg, 5 mg, Oral, Q3HRS PRN **OR** morphine 4 MG/ML injection 2 mg, 2 mg, Intravenous, Q3HRS PRN, Gloria Rooney M.D., 2 mg at 07/17/23 0550    labetalol (Normodyne/Trandate) injection 10 mg, 10 mg, Intravenous, Q4HRS PRN, Gloria Rooney M.D.    ondansetron (Zofran) syringe/vial injection 4 mg, 4 mg, Intravenous, Q4HRS PRN, Gloria Rooney M.D.    ondansetron (Zofran ODT) dispertab 4 mg, 4 mg, Oral, Q4HRS PRN, Gloria Rooney M.D.    Current Outpatient Medications:  Medications Prior to Admission   Medication Sig Dispense Refill Last Dose    cefdinir (OMNICEF) 300 MG Cap Take 300 mg by mouth 2 times a day. Pt started a 7 day course on 7/14 7/16/2023 at 0630    carvedilol (COREG) 25 MG Tab Take 25 mg by mouth every morning. Once daily   7/16/2023 at AM    vitamin D3 (CHOLECALCIFEROL) 1000 Unit (25 mcg) Tab Take 1,000 Units by mouth every day.   7/16/2023 at AM    furosemide (LASIX) 20 MG Tab Take 20 mg by mouth every Monday and Thursday.   7/13/2023 at [facility]    Multiple Vitamins-Minerals (MULTIVITAMIN ADULT EXTRA C PO) Take 1 Tablet by mouth every day.   7/16/2023 at AM    Vericiguat (VERQUVO) 5 MG Tab Take 5 mg by mouth every day. 30 Tablet 11 7/16/2023 at AM    spironolactone  (ALDACTONE) 25 MG Tab Take 0.5 Tablets by mouth every day. 45 Tablet 3 2023 at AM    losartan (COZAAR) 100 MG Tab Take 1 Tablet by mouth every day. 90 Each 4 2023 at AM    amiodarone (CORDARONE) 200 MG Tab Take 1 Tablet by mouth every day. 90 Tablet 4 2023 at AM    apixaban (ELIQUIS) 2.5mg Tab Take 1 Tablet by mouth 2 times a day. 60 Tablet 1 2023 at AM    tamsulosin (FLOMAX) 0.4 MG capsule Take 0.4 mg by mouth 2 times a day.   2023 at AM    cinacalcet (SENSIPAR) 30 MG Tab Take 30 mg by mouth every day.   2023 at AM    Omega-3 Fatty Acids (FISH OIL) 1000 MG Cap capsule Take 1,000 mg by mouth every evening.   2023 at AM    albuterol 108 (90 Base) MCG/ACT Aero Soln inhalation aerosol Inhale 2 Puffs by mouth every 6 hours as needed for Shortness of Breath. 8.5 g 0 3 days       Medication Allergy:  Allergies   Allergen Reactions    Entresto [Sacubitril-Valsartan]      Swollen tongue. Angioedema.    Crestor [Rosuvastatin]        Family History:  Family History   Problem Relation Age of Onset    Cancer Mother     Cancer Sister        Social History:  Social History     Socioeconomic History    Marital status: Single     Spouse name: Not on file    Number of children: Not on file    Years of education: Not on file    Highest education level: Not on file   Occupational History    Not on file   Tobacco Use    Smoking status: Former     Packs/day: 0.50     Years: 15.00     Pack years: 7.50     Types: Cigarettes     Quit date: 2004     Years since quittin.0    Smokeless tobacco: Former   Vaping Use    Vaping Use: Never used   Substance and Sexual Activity    Alcohol use: Yes     Alcohol/week: 8.4 oz     Types: 14 Glasses of wine per week     Comment: 2 per day    Drug use: Not Currently     Types: Inhaled     Comment: marijuana occ    Sexual activity: Not on file   Other Topics Concern    Not on file   Social History Narrative    Not on file     Social Determinants of Health  "    Financial Resource Strain: Not on file   Food Insecurity: Not on file   Transportation Needs: Not on file   Physical Activity: Not on file   Stress: Not on file   Social Connections: Not on file   Intimate Partner Violence: Not on file   Housing Stability: Not on file         Physical Exam:  Vitals/ General Appearance:   Weight/BMI: Body mass index is 23.6 kg/m².  BP 97/64   Pulse 83   Temp 36.6 °C (97.9 °F) (Oral)   Resp 18   Ht 1.88 m (6' 2.02\")   Wt 83.4 kg (183 lb 13.8 oz)   SpO2 98%   Vitals:    07/16/23 1826 07/16/23 2009 07/16/23 2010 07/17/23 0400   BP: 108/69   97/64   Pulse: 76   83   Resp: 20   18   Temp: 36.9 °C (98.5 °F)   36.6 °C (97.9 °F)   TempSrc: Oral   Oral   SpO2: 96%   98%   Weight:  83.4 kg (183 lb 13.8 oz) 83.4 kg (183 lb 13.8 oz)    Height:  1.88 m (6' 2.02\")       Oxygen Therapy:  Pulse Oximetry: 98 %, O2 Delivery Device: None - Room Air    Constitutional:   Well developed, Well nourished, No acute distress.  HENMT:  Normocephalic, Atraumatic  Eyes:  EOMI, Conjunctiva normal, No discharge.  Neck:  Normal range of motion  Lungs:  Normal breath sounds, breath sounds clear to auscultation bilaterally,  no crackles, no wheezing.   Abdomen: Soft, No tenderness, No guarding  : Normal external genitalia without lesion. Uncircumcised. 26fr 3-way daily catheter in place draining very light pink tinged urine, no clots. Hand irrigation at bedside with return of clear pink urine, no clots.   Skin: Warm, Dry, No erythema, No rash, no induration.  Neurologic: Alert & oriented x 3, No focal deficits noted  Psychiatric: Affect normal, Judgment normal, Mood normal.      MDM (Data Review):     Records reviewed and summarized in current documentation    Lab Data Review:  Recent Results (from the past 24 hour(s))   CBC WITH DIFFERENTIAL    Collection Time: 07/16/23  3:47 PM   Result Value Ref Range    WBC 5.0 4.8 - 10.8 K/uL    RBC 4.70 4.70 - 6.10 M/uL    Hemoglobin 16.2 14.0 - 18.0 g/dL    " Hematocrit 49.3 42.0 - 52.0 %    .9 (H) 81.4 - 97.8 fL    MCH 34.5 (H) 27.0 - 33.0 pg    MCHC 32.9 32.3 - 36.5 g/dL    RDW 57.1 (H) 35.9 - 50.0 fL    Platelet Count 154 (L) 164 - 446 K/uL    MPV 11.2 9.0 - 12.9 fL    Neutrophils-Polys 58.50 44.00 - 72.00 %    Lymphocytes 27.90 22.00 - 41.00 %    Monocytes 11.80 0.00 - 13.40 %    Eosinophils 1.20 0.00 - 6.90 %    Basophils 0.20 0.00 - 1.80 %    Immature Granulocytes 0.40 0.00 - 0.90 %    Nucleated RBC 0.00 0.00 - 0.20 /100 WBC    Neutrophils (Absolute) 2.91 1.82 - 7.42 K/uL    Lymphs (Absolute) 1.39 1.00 - 4.80 K/uL    Monos (Absolute) 0.59 0.00 - 0.85 K/uL    Eos (Absolute) 0.06 0.00 - 0.51 K/uL    Baso (Absolute) 0.01 0.00 - 0.12 K/uL    Immature Granulocytes (abs) 0.02 0.00 - 0.11 K/uL    NRBC (Absolute) 0.00 K/uL   CMP    Collection Time: 07/16/23  3:47 PM   Result Value Ref Range    Sodium 136 135 - 145 mmol/L    Potassium 5.1 3.6 - 5.5 mmol/L    Chloride 104 96 - 112 mmol/L    Co2 20 20 - 33 mmol/L    Anion Gap 12.0 7.0 - 16.0    Glucose 103 (H) 65 - 99 mg/dL    Bun 42 (H) 8 - 22 mg/dL    Creatinine 2.27 (H) 0.50 - 1.40 mg/dL    Calcium 9.2 8.4 - 10.2 mg/dL    AST(SGOT) 34 12 - 45 U/L    ALT(SGPT) 35 2 - 50 U/L    Alkaline Phosphatase 71 30 - 99 U/L    Total Bilirubin 0.8 0.1 - 1.5 mg/dL    Albumin 3.7 3.2 - 4.9 g/dL    Total Protein 6.7 6.0 - 8.2 g/dL    Globulin 3.0 1.9 - 3.5 g/dL    A-G Ratio 1.2 g/dL   CORRECTED CALCIUM    Collection Time: 07/16/23  3:47 PM   Result Value Ref Range    Correct Calcium 9.4 8.5 - 10.5 mg/dL   ESTIMATED GFR    Collection Time: 07/16/23  3:47 PM   Result Value Ref Range    GFR (CKD-EPI) 28 (A) >60 mL/min/1.73 m 2   URINALYSIS    Collection Time: 07/16/23  4:47 PM    Specimen: Urine   Result Value Ref Range    Color Red (A)     Character Turbid (A)     Specific Gravity 1.020 <1.035    Ph 6.0 5.0 - 8.0    Glucose 250 (A) Negative mg/dL    Ketones Negative Negative mg/dL    Protein 100 (A) Negative mg/dL    Bilirubin  Negative Negative    Nitrite Negative Negative    Leukocyte Esterase Trace (A) Negative    Occult Blood Large (A) Negative    Micro Urine Req Microscopic    URINE MICROSCOPIC (W/UA)    Collection Time: 07/16/23  4:47 PM   Result Value Ref Range    WBC Rare (A) /hpf    RBC >150 (A) /hpf    Bacteria Negative None /hpf    Epithelial Cells Rare Few /hpf    Mucous Threads Rare /hpf   Blood Culture    Collection Time: 07/16/23  6:37 PM    Specimen: Peripheral; Blood   Result Value Ref Range    Significant Indicator NEG     Source BLD     Site PERIPHERAL     Culture Result       No Growth  Note: Blood cultures are incubated for 5 days and  are monitored continuously.Positive blood cultures  are called to the RN and reported as soon as  they are identified.  Blood culture testing and Gram stain, if indicated, are  performed at [facility], [address] [place] [address].  Positive blood cultures are  sent to [facility], [address] [place] [address], for organism identification and  susceptibility testing.     Blood Culture    Collection Time: 07/16/23  6:37 PM    Specimen: Peripheral; Blood   Result Value Ref Range    Significant Indicator NEG     Source BLD     Site PERIPHERAL     Culture Result       No Growth  Note: Blood cultures are incubated for 5 days and  are monitored continuously.Positive blood cultures  are called to the RN and reported as soon as  they are identified.  Blood culture testing and Gram stain, if indicated, are  performed at [facility], [address] [place] [address].  Positive blood cultures are  sent to [facility], [address] [place] [address], for organism identification and  susceptibility testing.     CBC without Differential    Collection Time: 07/17/23  2:55 AM   Result Value Ref Range    WBC 5.0 4.8 - 10.8 K/uL    RBC 4.28 (L) 4.70 - 6.10 M/uL    Hemoglobin 14.9 14.0 - 18.0 g/dL     Hematocrit 45.4 42.0 - 52.0 %    .1 (H) 81.4 - 97.8 fL    MCH 34.8 (H) 27.0 - 33.0 pg    MCHC 32.8 32.3 - 36.5 g/dL    RDW 57.7 (H) 35.9 - 50.0 fL    Platelet Count 144 (L) 164 - 446 K/uL    MPV 11.7 9.0 - 12.9 fL   Basic Metabolic Panel (BMP)    Collection Time: 07/17/23  2:55 AM   Result Value Ref Range    Sodium 138 135 - 145 mmol/L    Potassium 4.4 3.6 - 5.5 mmol/L    Chloride 106 96 - 112 mmol/L    Co2 20 20 - 33 mmol/L    Glucose 114 (H) 65 - 99 mg/dL    Bun 39 (H) 8 - 22 mg/dL    Creatinine 2.16 (H) 0.50 - 1.40 mg/dL    Calcium 8.4 8.4 - 10.2 mg/dL    Anion Gap 12.0 7.0 - 16.0   ESTIMATED GFR    Collection Time: 07/17/23  2:55 AM   Result Value Ref Range    GFR (CKD-EPI) 30 (A) >60 mL/min/1.73 m 2       Imaging/Procedures Review:    Reviewed    MDM (Assessment and Plan):     Active Hospital Problems    Diagnosis     Hematuria [R31.9]     Acute cystitis with hematuria [N30.01]     Thrombocytopenia (HCC) [D69.6]     DNR (do not resuscitate) [Z66]     Stage 3 chronic kidney disease (HCC) [N18.30]     ACC/AHA stage C systolic heart failure (HCC) [I50.20]     Presence of biventricular automatic cardioverter/defibrillator (AICD) [Z95.810]     Paroxysmal atrial fibrillation (HCC) [I48.0]     Mixed hyperlipidemia [E78.2]     Essential hypertension, benign [I10]      82yoM with solitary kidney and LUTS due to BPH admitted for worsening hematuria requiring CBI. Improved considerably with continuous irrigation overnight.     Hand irrigation this morning with return of no clots and clear urine, urine continues to drain very light pink on slow flow. On Rocephin per primary team.     H/H 14.9/45.4  WBC 5.0  Creat 2.16 (2.27)    PLAN:    - Diet per hospital team - lift NPO status  - Continue CBI, wean as tolerated to keep urine clear  - RN to hand irrigate PRN for any clots or worsening hematuria  - CT imaging to evaluate  tract (non-contrast due to decreased renal function, solitary kidney)  - No plans for  surgical intervention at this time due to improving hematuria and stable H/H  - Appreciate this consult, urology will continue to follow    Dr. Jaime is aware of this consultation and has directed the plan of care.     Plan of care discussed with patient, MAYUR Cowart, and urology team.     Amrita Ervin PA-C  [facility]

## 2023-07-18 ENCOUNTER — APPOINTMENT (OUTPATIENT)
Dept: RADIOLOGY | Facility: MEDICAL CENTER | Age: 83
DRG: 699 | End: 2023-07-18
Attending: HOSPITALIST
Payer: COMMERCIAL

## 2023-07-18 PROBLEM — N18.32 STAGE 3B CHRONIC KIDNEY DISEASE: Chronic | Status: ACTIVE | Noted: 2022-09-06

## 2023-07-18 PROBLEM — N32.89: Status: ACTIVE | Noted: 2023-07-18

## 2023-07-18 PROBLEM — N18.9 ACUTE ON CHRONIC RENAL FAILURE (HCC): Status: ACTIVE | Noted: 2021-07-09

## 2023-07-18 LAB
ALBUMIN SERPL BCP-MCNC: 3.1 G/DL (ref 3.2–4.9)
BASOPHILS # BLD AUTO: 0.3 % (ref 0–1.8)
BASOPHILS # BLD: 0.02 K/UL (ref 0–0.12)
BUN SERPL-MCNC: 30 MG/DL (ref 8–22)
CALCIUM ALBUM COR SERPL-MCNC: 8.9 MG/DL (ref 8.5–10.5)
CALCIUM SERPL-MCNC: 8.2 MG/DL (ref 8.4–10.2)
CHLORIDE SERPL-SCNC: 108 MMOL/L (ref 96–112)
CO2 SERPL-SCNC: 18 MMOL/L (ref 20–33)
CREAT SERPL-MCNC: 1.73 MG/DL (ref 0.5–1.4)
EOSINOPHIL # BLD AUTO: 0.15 K/UL (ref 0–0.51)
EOSINOPHIL NFR BLD: 2.6 % (ref 0–6.9)
ERYTHROCYTE [DISTWIDTH] IN BLOOD BY AUTOMATED COUNT: 58.4 FL (ref 35.9–50)
GFR SERPLBLD CREATININE-BSD FMLA CKD-EPI: 39 ML/MIN/1.73 M 2
GLUCOSE SERPL-MCNC: 124 MG/DL (ref 65–99)
HCT VFR BLD AUTO: 47.9 % (ref 42–52)
HGB BLD-MCNC: 15.5 G/DL (ref 14–18)
IMM GRANULOCYTES # BLD AUTO: 0.02 K/UL (ref 0–0.11)
IMM GRANULOCYTES NFR BLD AUTO: 0.3 % (ref 0–0.9)
LACTATE SERPL-SCNC: 1.1 MMOL/L (ref 0.5–2)
LYMPHOCYTES # BLD AUTO: 1.37 K/UL (ref 1–4.8)
LYMPHOCYTES NFR BLD: 23.3 % (ref 22–41)
MAGNESIUM SERPL-MCNC: 1.8 MG/DL (ref 1.5–2.5)
MCH RBC QN AUTO: 34.5 PG (ref 27–33)
MCHC RBC AUTO-ENTMCNC: 32.4 G/DL (ref 32.3–36.5)
MCV RBC AUTO: 106.7 FL (ref 81.4–97.8)
MONOCYTES # BLD AUTO: 0.61 K/UL (ref 0–0.85)
MONOCYTES NFR BLD AUTO: 10.4 % (ref 0–13.4)
NEUTROPHILS # BLD AUTO: 3.7 K/UL (ref 1.82–7.42)
NEUTROPHILS NFR BLD: 63.1 % (ref 44–72)
NRBC # BLD AUTO: 0 K/UL
NRBC BLD-RTO: 0 /100 WBC (ref 0–0.2)
PHOSPHATE SERPL-MCNC: 2.6 MG/DL (ref 2.5–4.5)
PLATELET # BLD AUTO: 139 K/UL (ref 164–446)
PMV BLD AUTO: 11.5 FL (ref 9–12.9)
POTASSIUM SERPL-SCNC: 4.9 MMOL/L (ref 3.6–5.5)
RBC # BLD AUTO: 4.49 M/UL (ref 4.7–6.1)
SODIUM SERPL-SCNC: 136 MMOL/L (ref 135–145)
WBC # BLD AUTO: 5.9 K/UL (ref 4.8–10.8)

## 2023-07-18 PROCEDURE — 74176 CT ABD & PELVIS W/O CONTRAST: CPT

## 2023-07-18 PROCEDURE — 99292 CRITICAL CARE ADDL 30 MIN: CPT | Performed by: HOSPITALIST

## 2023-07-18 PROCEDURE — 85025 COMPLETE CBC W/AUTO DIFF WBC: CPT

## 2023-07-18 PROCEDURE — 700111 HCHG RX REV CODE 636 W/ 250 OVERRIDE (IP): Mod: JZ | Performed by: INTERNAL MEDICINE

## 2023-07-18 PROCEDURE — 99291 CRITICAL CARE FIRST HOUR: CPT | Performed by: HOSPITALIST

## 2023-07-18 PROCEDURE — 700105 HCHG RX REV CODE 258: Performed by: INTERNAL MEDICINE

## 2023-07-18 PROCEDURE — 36415 COLL VENOUS BLD VENIPUNCTURE: CPT

## 2023-07-18 PROCEDURE — 700111 HCHG RX REV CODE 636 W/ 250 OVERRIDE (IP): Mod: JZ | Performed by: HOSPITALIST

## 2023-07-18 PROCEDURE — 94760 N-INVAS EAR/PLS OXIMETRY 1: CPT

## 2023-07-18 PROCEDURE — 700102 HCHG RX REV CODE 250 W/ 637 OVERRIDE(OP): Performed by: HOSPITALIST

## 2023-07-18 PROCEDURE — 770020 HCHG ROOM/CARE - TELE (206)

## 2023-07-18 PROCEDURE — A9270 NON-COVERED ITEM OR SERVICE: HCPCS | Performed by: HOSPITALIST

## 2023-07-18 PROCEDURE — 99291 CRITICAL CARE FIRST HOUR: CPT | Performed by: INTERNAL MEDICINE

## 2023-07-18 PROCEDURE — 83735 ASSAY OF MAGNESIUM: CPT

## 2023-07-18 PROCEDURE — 99292 CRITICAL CARE ADDL 30 MIN: CPT | Performed by: INTERNAL MEDICINE

## 2023-07-18 PROCEDURE — 94660 CPAP INITIATION&MGMT: CPT

## 2023-07-18 PROCEDURE — 80069 RENAL FUNCTION PANEL: CPT

## 2023-07-18 PROCEDURE — 83605 ASSAY OF LACTIC ACID: CPT

## 2023-07-18 PROCEDURE — 71045 X-RAY EXAM CHEST 1 VIEW: CPT

## 2023-07-18 PROCEDURE — 302140 GU CART: Performed by: STUDENT IN AN ORGANIZED HEALTH CARE EDUCATION/TRAINING PROGRAM

## 2023-07-18 RX ORDER — HYDROMORPHONE HYDROCHLORIDE 1 MG/ML
1 INJECTION, SOLUTION INTRAMUSCULAR; INTRAVENOUS; SUBCUTANEOUS
Status: DISCONTINUED | OUTPATIENT
Start: 2023-07-18 | End: 2023-07-20 | Stop reason: HOSPADM

## 2023-07-18 RX ORDER — MORPHINE SULFATE 4 MG/ML
4 INJECTION INTRAVENOUS
Status: DISCONTINUED | OUTPATIENT
Start: 2023-07-18 | End: 2023-07-20 | Stop reason: HOSPADM

## 2023-07-18 RX ADMIN — CARVEDILOL 25 MG: 25 TABLET, FILM COATED ORAL at 11:06

## 2023-07-18 RX ADMIN — TAMSULOSIN HYDROCHLORIDE 0.4 MG: 0.4 CAPSULE ORAL at 17:10

## 2023-07-18 RX ADMIN — SENNOSIDES AND DOCUSATE SODIUM 2 TABLET: 50; 8.6 TABLET ORAL at 17:11

## 2023-07-18 RX ADMIN — LOSARTAN POTASSIUM 100 MG: 50 TABLET, FILM COATED ORAL at 11:07

## 2023-07-18 RX ADMIN — SPIRONOLACTONE 12.5 MG: 25 TABLET ORAL at 11:06

## 2023-07-18 RX ADMIN — Medication 1000 UNITS: at 11:05

## 2023-07-18 RX ADMIN — CINACALCET HYDROCHLORIDE 30 MG: 30 TABLET, FILM COATED ORAL at 11:07

## 2023-07-18 RX ADMIN — AMIODARONE HYDROCHLORIDE 200 MG: 200 TABLET ORAL at 11:06

## 2023-07-18 RX ADMIN — HYDROMORPHONE HYDROCHLORIDE 1 MG: 1 INJECTION, SOLUTION INTRAMUSCULAR; INTRAVENOUS; SUBCUTANEOUS at 02:58

## 2023-07-18 RX ADMIN — CEFTRIAXONE SODIUM 2000 MG: 2 INJECTION, POWDER, FOR SOLUTION INTRAMUSCULAR; INTRAVENOUS at 17:19

## 2023-07-18 RX ADMIN — TAMSULOSIN HYDROCHLORIDE 0.4 MG: 0.4 CAPSULE ORAL at 11:05

## 2023-07-18 RX ADMIN — MORPHINE SULFATE 4 MG: 4 INJECTION INTRAVENOUS at 01:53

## 2023-07-18 RX ADMIN — MORPHINE SULFATE 4 MG: 4 INJECTION INTRAVENOUS at 08:19

## 2023-07-18 ASSESSMENT — PAIN DESCRIPTION - PAIN TYPE
TYPE: ACUTE PAIN

## 2023-07-18 ASSESSMENT — COGNITIVE AND FUNCTIONAL STATUS - GENERAL
MOBILITY SCORE: 22
SUGGESTED CMS G CODE MODIFIER DAILY ACTIVITY: CI
DRESSING REGULAR LOWER BODY CLOTHING: A LITTLE
STANDING UP FROM CHAIR USING ARMS: A LITTLE
SUGGESTED CMS G CODE MODIFIER MOBILITY: CJ
DAILY ACTIVITIY SCORE: 23
MOVING TO AND FROM BED TO CHAIR: A LITTLE

## 2023-07-18 ASSESSMENT — ENCOUNTER SYMPTOMS: ABDOMINAL PAIN: 1

## 2023-07-18 ASSESSMENT — COPD QUESTIONNAIRES
COPD SCREENING SCORE: 3
DO YOU EVER COUGH UP ANY MUCUS OR PHLEGM?: NO/ONLY WITH OCCASIONAL COLDS OR INFECTIONS
HAVE YOU SMOKED AT LEAST 100 CIGARETTES IN YOUR ENTIRE LIFE: NO/DON'T KNOW
DURING THE PAST 4 WEEKS HOW MUCH DID YOU FEEL SHORT OF BREATH: SOME OF THE TIME

## 2023-07-18 ASSESSMENT — FIBROSIS 4 INDEX: FIB4 SCORE: 3.31

## 2023-07-18 NOTE — PROGRESS NOTES
0815: Amrita MCCLURE from [facility] at bedside to replace daily catheter, 4 mg morphine given, catheter removed and replaced with an 18 Green[place]ic, dual balloon catheter. Hand irrigation done at bedside and verbal orders from REN Ervin for nursing staff to hand irrigate if catheter stops draining.

## 2023-07-18 NOTE — ASSESSMENT & PLAN NOTE
EF 10%, s/p AICD  Currently euvolemic, normotensive  Closely monitor and continue all GDMT  Resume lasix pending volume status and resolution of bladder perforation  Telemetry

## 2023-07-18 NOTE — PROGRESS NOTES
Rounding  1900= introduced myself to patient  2100= checked patients CBI and gave a partial bath  2300= checked vitals and gave full bath to patient because daily was leaking. RN is aware of the daily situation.   0000= patient is sleeping  0100= patient asked me to close his door  0140= assisted patient to bathroom with RN and cleaned up due to leaking from his daily  0300= patient resting in bed  0500= took vitals and took patient to ct and xray       Duration Of Freeze Thaw-Cycle (Seconds): 12 Post-Care Instructions: 1. A blister may result in the majority of cases.  The blister may be filled with clear fluid or blood.  This is a normal response to therapy.\\n2. If the blister causes pain because of significant tension within the blister, you may puncture the blister with a sterile needle.  Do not unroof the blister.\\n3. If any significant discomfort arises, cold compresses may be applied.  Tylenol will help in most circumstances.\\n4. If the blister is broken by you or breaks on its own, soak the area in warm tap water twice a day, then apply Bacitracin Ointment or Vaseline.\\n5. Should there be marked swelling and pain and redness a the site of treatment, feel free to call our office.\\n6.  If the area still persists in 6 weeks, please phone for a follow-up appointment. Show Applicator Variable?: Yes Render Note In Bullet Format When Appropriate: No Detail Level: Detailed Consent: The patient's consent was obtained including but not limited to risks of crusting, scabbing, blistering, scarring, darker or lighter pigmentary change, recurrence, incomplete removal and infection. Number Of Freeze-Thaw Cycles: 2 freeze-thaw cycles

## 2023-07-18 NOTE — PROGRESS NOTES
OVERNIGHT HOSPITALIST:    Paged by nursing Staff and came to the room to evaluate patient. He was c/o severe abdominal pain and abdominal distension. He was calling 911 to try to get help. He is oriented x3.     I examine patient and he has significant abdominal distension and pain.    CBI was flowing well earlier, no clots seen.  Earier Nurse noticed mild penile leaking/driping (about 100 ml) of fluif during CBI. CBI rate was slow and urine was clear.   Pain started c/o increased pain around 12:30 am and had increased pain medications, but pain now is severe and much worse as compared to prior. Increased O2 needs to 8 L. CBI was held and STAT CT Abdomen ordered.NPO and Labs ordered. 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 Seen by Urologist on Wednesday 7/12 and had Duran Catheter placed and hand irrigation. Noticed worsening bleeding and came to the ED for further evaluation and 26 fr 3-way Duran catheter was placed. Urology Team following, started on CBI. Hgb stable, eliquis continued and CT Abdomen done yesterday was negative for acute findings.No immediate plan for Procedures.       Vitals: Temp:97.5       HR:91      RR:19      BP:134/86  Gen:Looks in pain, Acute Distress, Diaphoretic   Lungs:Bilateral end expiratory wheezing   Heart:Tachycardic  Abd:Abdominal Distension, painful on palpation diffusely. No guarding/ no rebound.   : Duran Catheter in place, pink tinged urine in bag, no clots        CT Abdomen and Pelvis  IMPRESSION:        1. There are two perforation/defects within the superior bladder wall. The tip of the Duran catheter protrudes through one of these defects. There is associated large amount of retroperitoneal and intraperitoneal fluid. Consistent with bladder/urine   leak. As detailed  above.  2. Slightly increased tiny simple bilateral pleural effusions, colonic diverticulosis, and aneurysm with previous stent graft repair are again noted.    A/P:  #1:Acute Abdominal pain and Abdominal Disten  #2:Bladder Perforation with Duran Catheter in place  #3.Retroperitoneal Fluid  #4.Hypoxia    -Transfer patient to the ICU  -STAT Pager sent to Urology. I discussed above CT findings with Dr. Feldman  -NPO/ Pain control  -He will need emergency surgical procedure for repair.   -Closely monitor.        The patient remains critically ill.  Critical care time =120  minutes in directly providing and coordinating critical care and extensive data review.  No time overlap and excludes procedures.

## 2023-07-18 NOTE — PROGRESS NOTES
Charge RN note: Called to pt bedside by primary RN. Dr. Pascual to bedside as well. Pt having abdominal swelling and SOB. Orders for Chest Xray and CT of abdomen. Pt brought to imaging by this RN and CNA.     0600 Primary RN Edie came to notify this RN of CT results. Dr. Pascual notified as well. [facility] ICU nurse notified of change in pt condition and CT result and that pt may need to be transferred.     0615 Dr. Pascual stated to transfer pt to ICU. [facility] informed. Pt to go to 323.     0629 Pt brought to ICU with CNA.

## 2023-07-18 NOTE — PROGRESS NOTES
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 Patient reports the hematuria has been ongoing for ~1 week, was seen in our office where a catheter was placed and hand irrigation performed. He is on tamsulosin 0.8mg daily, denies any bothersome lower urinary tract symptoms although notes his urinary stream has progressively weakened. He does not have a history of urinary retention. UA was not suspicious for infection. H/H found to be normal at 16.2/49.3. 26fr 3-way daily catheter was placed in the ED and CBI was started. He reported \"bladder burning\" and denies F/C/N/V/abdominal pain.     Overnight Events:    -CT a/p w/o completed 7/17 ~1400 with daily projecting into decompressed bladder, surgical absence of R kidney, no L hydro or abnormalities  - Onset of severe abdominal pain and SOB  - Repeat CT on 7/18 at ~0530 with evidence of bladder perforation x2 superior bladder wall, daily catheter through one of the perforations, and urine leak  - Moved to ICU and currently weaning down O2 requirements (8L now on 1L)      Today, patient is resting in bed in NAD. In no pain or discomfort. Denies CP, SOB, abdominal pain currently.      Review of Systems:      Constitutional: Denies fevers, Denies weight changes  Eyes: Denies changes in vision, no eye pain  Ears/Nose/Throat/Mouth: Denies nasal congestion or sore throat   Cardiovascular: no chest pain, no palpitations   Respiratory: no shortness of breath , Denies cough  Gastrointestinal/Hepatic: +mild abdominal pain. Denies nausea, vomiting.  Genitourinary: +hematuria, +bladder pain  Musculoskeletal/Rheum: Denies  joint pain and swelling, no edema  Skin: Denies rash  Neurological: Denies headache, confusion, memory loss or focal weakness/parasthesias  Psychiatric: denies mood " disorder   Endocrine: Aleah thyroid problems  Heme/Oncology/Lymph Nodes: Denies enlarged lymph nodes, denies brusing or known bleeding disorder  All other systems were reviewed and are negative (AMA/CMS criteria)                Past Medical History:   Past Medical History:   Diagnosis Date    Breath shortness     on exertion    Cardiomyopathy (HCC) 05/2021    Echocardiogram with moderately dilated LV, mild concentric LVH, LVEF 20%. Global hypokinesis. Normal RA and RV. Severely dilated LA. Mild MR, mild TR. RVSP 34mmHg.    Chronic obstructive pulmonary disease (HCC)     [place] measles     Heart attack (HCC)     hx 2007    History of abdominal aortic aneurysm (AAA) 2009    Status post stent    Hyperlipidemia     Hypertension     Mumps     Pacemaker     AICD    Paroxysmal atrial fibrillation (HCC)     Status post Watchman procedure in [place].    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 Barrios.    AICD BATTERY CHANGE Left 01/03/2017    Generator replacement with Med28msec Evera MRI XT  [id number] implanted in [place].    OTHER CARDIAC SURGERY  2014    watchman device    AAA WITH STENT GRAFT  2009     OTHER ORTHOPEDIC SURGERY  2005    hip surgery    OTHER  2000    kidney removed    [facility]  [facility]       Hospital Medications:    Current Facility-Administered Medications:     morphine 4 MG/ML injection 4 mg, 4 mg, Intravenous, Q3HRS PRN, Velvet Bal M.D., 4 mg at 07/18/23 0819    HYDROmorphone (Dilaudid) injection 1 mg, 1 mg, Intravenous, Q3HRS PRN, Velvet Bal M.D., 1 mg at 07/18/23 0258    albuterol inhaler 2 Puff, 2 Puff, Inhalation, Q6HRS PRN, Gloria Rooney M.D.    amiodarone (Cordarone) tablet 200 mg, 200 mg, Oral, DAILY, SADE Frias.D., 200 mg at 07/17/23 0551    apixaban (Eliquis) tablet 2.5 mg, 2.5 mg, Oral, BID, SADE Frias.D., 2.5 mg at 07/17/23 1725    carvedilol (Coreg) tablet 25 mg, 25 mg, Oral, QAM, SADE Frias.D., 25 mg at 07/17/23 0553    cinacalcet (Sensipar) tablet 30 mg, 30 mg, Oral, DAILY, Levjean carlos Rooney, M.D., 30 mg at 07/17/23 0551    losartan (Cozaar) tablet 100 mg, 100 mg, Oral, DAILY, Gloria Rooney, M.D., 100 mg at 07/17/23 0553    spironolactone (Aldactone) tablet 12.5 mg, 12.5 mg, Oral, DAILY, Gloria Rooney, M.D., 12.5 mg at 07/17/23 0553    tamsulosin (Flomax) capsule 0.4 mg, 0.4 mg, Oral, BID, Gloria Rooney M.D., 0.4 mg at 07/17/23 1725    vitamin D3 (Cholecalciferol) tablet 1,000 Units, 1,000 Units, Oral, DAILY, Gloria Rooney M.D., 1,000 Units at 07/17/23 0551    cefTRIAXone (Rocephin) 1,000 mg in  mL IVPB, 1,000 mg, Intravenous, Q EVENING, Gloria Rooney M.D., Stopped at 07/17/23 1755    senna-docusate (Pericolace Or Senokot S) 8.6-50 MG per tablet 2 Tablet, 2 Tablet, Oral, BID, 2 Tablet at 07/17/23 0551 **AND** polyethylene glycol/lytes (Miralax) PACKET 1 Packet, 1 Packet, Oral, QDAY PRN **AND** magnesium hydroxide (Milk Of Magnesia) suspension 30 mL, 30 mL, Oral, QDAY PRN **AND** bisacodyl (Dulcolax) suppository 10 mg, 10 mg, Rectal, QDAY PRN, Gloria Rooney M.D.    Respiratory Therapy Consult, ,  Nebulization, Continuous RT, Gloria Rooney M.D.    acetaminophen (Tylenol) tablet 650 mg, 650 mg, Oral, Q6HRS PRN, Gloria Rooney M.D.    Notify provider if pain remains uncontrolled, , , CONTINUOUS **AND** Use the Numeric Rating Scale (NRS), Silva-Baker Faces (WBF), or FLACC on regular floors and Critical-Care Pain Observation Tool (CPOT) on ICUs/Trauma to assess pain, , , CONTINUOUS **AND** Pulse Ox, , , CONTINUOUS **AND** Pharmacy Consult Request ...Pain Management Review 1 Each, 1 Each, Other, PHARMACY TO DOSE **AND** If patient difficult to arouse and/or has respiratory depression (respiratory rate of 10 or less), stop any opiates that are currently infusing and call a Rapid Response., , , CONTINUOUS, Gloria Rooney M.D.    labetalol (Normodyne/Trandate) injection 10 mg, 10 mg, Intravenous, Q4HRS PRN, Gloria Rooney M.D.    ondansetron (Zofran) syringe/vial injection 4 mg, 4 mg, Intravenous, Q4HRS PRN, Gloria Rooney M.D.    ondansetron (Zofran ODT) dispertab 4 mg, 4 mg, Oral, Q4HRS PRN, Gloria Rooney M.D.    Current Outpatient Medications:  Medications Prior to Admission   Medication Sig Dispense Refill Last Dose    cefdinir (OMNICEF) 300 MG Cap Take 300 mg by mouth 2 times a day. Pt started a 7 day course on 7/14 7/16/2023 at 0630    carvedilol (COREG) 25 MG Tab Take 25 mg by mouth every morning. Once daily   7/16/2023 at AM    vitamin D3 (CHOLECALCIFEROL) 1000 Unit (25 mcg) Tab Take 1,000 Units by mouth every day.   7/16/2023 at AM    furosemide (LASIX) 20 MG Tab Take 20 mg by mouth every Monday and Thursday.   7/13/2023 at [facility]    Multiple Vitamins-Minerals (MULTIVITAMIN ADULT EXTRA C PO) Take 1 Tablet by mouth every day.   7/16/2023 at AM    Vericiguat (VERQUVO) 5 MG Tab Take 5 mg by mouth every day. 30 Tablet 11 7/16/2023 at AM    spironolactone (ALDACTONE) 25 MG Tab Take 0.5 Tablets by mouth every day. 45 Tablet 3 7/16/2023 at AM    losartan (COZAAR) 100 MG Tab Take 1 Tablet by mouth every day. 90 Each  4 2023 at AM    amiodarone (CORDARONE) 200 MG Tab Take 1 Tablet by mouth every day. 90 Tablet 4 2023 at AM    apixaban (ELIQUIS) 2.5mg Tab Take 1 Tablet by mouth 2 times a day. 60 Tablet 1 2023 at AM    tamsulosin (FLOMAX) 0.4 MG capsule Take 0.4 mg by mouth 2 times a day.   2023 at AM    cinacalcet (SENSIPAR) 30 MG Tab Take 30 mg by mouth every day.   2023 at AM    Omega-3 Fatty Acids (FISH OIL) 1000 MG Cap capsule Take 1,000 mg by mouth every evening.   2023 at AM    albuterol 108 (90 Base) MCG/ACT Aero Soln inhalation aerosol Inhale 2 Puffs by mouth every 6 hours as needed for Shortness of Breath. 8.5 g 0 3 days       Medication Allergy:  Allergies   Allergen Reactions    Entresto [Sacubitril-Valsartan]      Swollen tongue. Angioedema.    Crestor [Rosuvastatin]        Family History:  Family History   Problem Relation Age of Onset    Cancer Mother     Cancer Sister        Social History:  Social History     Socioeconomic History    Marital status: Single     Spouse name: Not on file    Number of children: Not on file    Years of education: Not on file    Highest education level: Not on file   Occupational History    Not on file   Tobacco Use    Smoking status: Former     Packs/day: 0.50     Years: 15.00     Pack years: 7.50     Types: Cigarettes     Quit date: 2004     Years since quittin.0    Smokeless tobacco: Former   Vaping Use    Vaping Use: Never used   Substance and Sexual Activity    Alcohol use: Yes     Alcohol/week: 8.4 oz     Types: 14 Glasses of wine per week     Comment: 2 per day    Drug use: Not Currently     Types: Inhaled     Comment: marijuana occ    Sexual activity: Not on file   Other Topics Concern    Not on file   Social History Narrative    Not on file     Social Determinants of Health     Financial Resource Strain: Not on file   Food Insecurity: Not on file   Transportation Needs: Not on file   Physical Activity: Not on file   Stress: Not on file  "  Social Connections: Not on file   Intimate Partner Violence: Not on file   Housing Stability: Not on file         Physical Exam:  Vitals/ General Appearance:   Weight/BMI: Body mass index is 23.6 kg/m².  /74   Pulse 80   Temp 36.8 °C (98.2 °F) (Temporal)   Resp 18   Ht 1.88 m (6' 2.02\")   Wt 83.4 kg (183 lb 13.8 oz)   SpO2 92%   Vitals:    07/18/23 0500 07/18/23 0700 07/18/23 0730 07/18/23 0800   BP: 134/86 107/74 99/76 104/74   Pulse: 91 78 81 80   Resp: 18 20 15 18   Temp: 36.4 °C (97.5 °F)  36.8 °C (98.2 °F) 36.8 °C (98.2 °F)   TempSrc: Oral  Oral Temporal   SpO2: 94% 99% 97% 92%   Weight:       Height:         Oxygen Therapy:  Pulse Oximetry: 92 %, O2 (LPM): 2, O2 Delivery Device: Silicone Nasal Cannula    Constitutional:   Well developed, Well nourished, No acute distress.  HENMT:  Normocephalic, Atraumatic  Eyes:  EOMI, Conjunctiva normal, No discharge.  Neck:  Normal range of motion  Lungs:  Normal breath sounds, breath sounds clear to auscultation bilaterally,  no crackles, no wheezing.   Abdomen: Diffuse abdominal distension with no tenderness to palpation, no guarding  : Normal external genitalia without lesion. Uncircumcised. 26fr 3-way daily catheter in place draining pinkish red urine, I removed at bedside and replaced with 18fr Claudy catheter with return of red urine, no clots. Hand irrigated with return of clear yellow urine. Now draining light red-tinged urine.  Skin: Warm, Dry, No erythema, No rash, no induration.  Neurologic: Alert & oriented x 3, No focal deficits noted  Psychiatric: Affect normal, Judgment normal, Mood normal.      MDM (Data Review):     Records reviewed and summarized in current documentation    Lab Data Review:  Recent Results (from the past 24 hour(s))   CBC WITH DIFFERENTIAL    Collection Time: 07/18/23  3:23 AM   Result Value Ref Range    WBC 5.9 4.8 - 10.8 K/uL    RBC 4.49 (L) 4.70 - 6.10 M/uL    Hemoglobin 15.5 14.0 - 18.0 g/dL    Hematocrit 47.9 42.0 - " 52.0 %    .7 (H) 81.4 - 97.8 fL    MCH 34.5 (H) 27.0 - 33.0 pg    MCHC 32.4 32.3 - 36.5 g/dL    RDW 58.4 (H) 35.9 - 50.0 fL    Platelet Count 139 (L) 164 - 446 K/uL    MPV 11.5 9.0 - 12.9 fL    Neutrophils-Polys 63.10 44.00 - 72.00 %    Lymphocytes 23.30 22.00 - 41.00 %    Monocytes 10.40 0.00 - 13.40 %    Eosinophils 2.60 0.00 - 6.90 %    Basophils 0.30 0.00 - 1.80 %    Immature Granulocytes 0.30 0.00 - 0.90 %    Nucleated RBC 0.00 0.00 - 0.20 /100 WBC    Neutrophils (Absolute) 3.70 1.82 - 7.42 K/uL    Lymphs (Absolute) 1.37 1.00 - 4.80 K/uL    Monos (Absolute) 0.61 0.00 - 0.85 K/uL    Eos (Absolute) 0.15 0.00 - 0.51 K/uL    Baso (Absolute) 0.02 0.00 - 0.12 K/uL    Immature Granulocytes (abs) 0.02 0.00 - 0.11 K/uL    NRBC (Absolute) 0.00 K/uL   Renal Function Panel    Collection Time: 07/18/23  3:23 AM   Result Value Ref Range    Sodium 136 135 - 145 mmol/L    Potassium 4.9 3.6 - 5.5 mmol/L    Chloride 108 96 - 112 mmol/L    Co2 18 (L) 20 - 33 mmol/L    Glucose 124 (H) 65 - 99 mg/dL    Creatinine 1.73 (H) 0.50 - 1.40 mg/dL    Bun 30 (H) 8 - 22 mg/dL    Calcium 8.2 (L) 8.4 - 10.2 mg/dL    Phosphorus 2.6 2.5 - 4.5 mg/dL    Albumin 3.1 (L) 3.2 - 4.9 g/dL   MAGNESIUM    Collection Time: 07/18/23  3:23 AM   Result Value Ref Range    Magnesium 1.8 1.5 - 2.5 mg/dL   CORRECTED CALCIUM    Collection Time: 07/18/23  3:23 AM   Result Value Ref Range    Correct Calcium 8.9 8.5 - 10.5 mg/dL   ESTIMATED GFR    Collection Time: 07/18/23  3:23 AM   Result Value Ref Range    GFR (CKD-EPI) 39 (A) >60 mL/min/1.73 m 2   LACTIC ACID    Collection Time: 07/18/23  6:03 AM   Result Value Ref Range    Lactic Acid 1.1 0.5 - 2.0 mmol/L       Imaging/Procedures Review:    Reviewed    MDM (Assessment and Plan):     Active Hospital Problems    Diagnosis     Hematuria [R31.9]     Acute cystitis with hematuria [N30.01]     Thrombocytopenia (HCC) [D69.6]     DNR (do not resuscitate) [Z66]     Stage 3b chronic kidney disease (HCC) [N18.32]      RANI (acute kidney injury) (HCC) [N17.9]     ACC/AHA stage C systolic heart failure (HCC) [I50.20]     Presence of biventricular automatic cardioverter/defibrillator (AICD) [Z95.810]     Paroxysmal atrial fibrillation (HCC) [I48.0]     Mixed hyperlipidemia [E78.2]     Essential hypertension, benign [I10]      82yoM with solitary kidney and LUTS due to BPH admitted for worsening hematuria requiring CBI. Improved considerably with continuous bladder irrigation night 1, however developed abdominal pain and SOB with repeat CT demonstrating bladder perforation x2 and urine leak overnight last night.    Per Dr. Jaime, I removed the 26fr 3-way catheter and replaced with an 18fr Claudy catheter (please note balloons x2 - 10cc and 5cc; RN informed) under sterile technique with no complications and return of dark red urine followed by light pink-tinged urine. Patient tolerated well. Urine is now draining light red-tinged, hand irrigation with return of clear yellow urine.     H/H 15.5/47.9 (14.9/45.4)  WBC 5.9 (5.0)  Creat 1.73 (2.16)    PLAN:    - Continue NPO diet in the event surgical intervention is required (PRN for worsening patient condition)  - New 18fr Claudy catheter in place (TWO BALLOONS - 10cc red port and 5cc black port)  - Plan to monitor with catheter in place, if doing well will need to keep in place for 1-2 weeks to allow for bladder to heal spontaneously  - Please monitor to ensure foreskin is pulled down over glans, RN and CNA aware  - Urology will continue to follow    Dr. Jaime has directed the plan of care.     Plan of care discussed with patient, RN, CNA, Dr. Arroyo, and urology team.     Amrita Ervin PA-C  [facility]

## 2023-07-18 NOTE — PROGRESS NOTES
4 Eyes Skin Assessment Completed by Emily RN and Maddie RN.    Head WDL  Ears WDL  Nose Redness  Mouth WDL  Neck WDL  Breast/Chest WDL  Shoulder Blades WDL  Spine WDL  (R) Arm/Elbow/Hand Scab  (L) Arm/Elbow/Hand Scab  Abdomen WDL  Groin WDL  Scrotum/Coccyx/Buttocks WDL  (R) Leg WDL  (L) Leg WDL  (R) Heel/Foot/Toe WDL  (L) Heel/Foot/Toe WDL          Devices In Places ECG, Blood Pressure Cuff, Pulse Ox, Duran, SCD's, and Nasal Cannula      Interventions In Place Q2 Turns and Low Air Loss Mattress    Possible Skin Injury No    Pictures Uploaded Into Epic N/A  Wound Consult Placed N/A  RN Wound Prevention Protocol Ordered No

## 2023-07-18 NOTE — ASSESSMENT & PLAN NOTE
AV paced, no issues, continue close outpatient monitoring through cardiology clinic on long-term basis.  Telemetry while inpatient.

## 2023-07-18 NOTE — PROGRESS NOTES
Pt is supine in bed when received. No complaints of pain. Verbalizes needs well and demonstrates use of call bell. Call bell in reach.     Chart check completed    0024 Pt Duran is leaking from penis, Dr Pascual made aware. It is not suggested to increase size of catheter at this time.    0441 Pt stomach distended and firm, Dr Pascual made aware    0445 Dr pascual in room to exam    0449 Pt left to STAT CT    0600 CT results back Dr Pascual notified    0602 Ankur informing urologist

## 2023-07-18 NOTE — CARE PLAN
The patient is Watcher - Medium risk of patient condition declining or worsening    Shift Goals  Clinical Goals: monitor CBI output and pain <3\10 throughout shift  Patient Goals: rest and comfort  Family Goals: n/a    Progress made toward(s) clinical / shift goals:  Medication given per scale  Problem: Knowledge Deficit - Standard  Goal: Patient and family/care givers will demonstrate understanding of plan of care, disease process/condition, diagnostic tests and medications  Outcome: Progressing       Patient is not progressing towards the following goals:

## 2023-07-18 NOTE — ASSESSMENT & PLAN NOTE
Monitor renal function, avoid nephrotoxic medications, renally dose all meds, strict intake output monitoring.

## 2023-07-18 NOTE — ASSESSMENT & PLAN NOTE
Risks > benefits currently of therapeutic anticoagulation, hold Eliquis  Continue Coreg, amiodarone, telemetry monitoring

## 2023-07-18 NOTE — ASSESSMENT & PLAN NOTE
Unclear etiology  Closely monitoring in ICU for decompensation and potential operative repair  High operative risk due to therapeutic anticoagulation, HFrEF EF 10%, etc  Bladder catheter exchanged by urology team 7/18, insturctions for management relayed to bedside RN  Hold Eliquis as risks > benefits currently  Ceftriaxone for peritonitis prophylaxis; hx e faecalis (2020, vanc sensitive), staph epidermis  Maintain NPO

## 2023-07-18 NOTE — CARE PLAN
The patient is Watcher - Medium risk of patient condition declining or worsening    Shift Goals  Clinical Goals: Abdominal assessments, monitor for s/s of severe sepsis, Surgery  Patient Goals: Pain control, go to surgery  Family Goals: JOCE    Progress made toward(s) clinical / shift goals:    Problem: Pain - Standard  Goal: Alleviation of pain or a reduction in pain to the patient’s comfort goal  Outcome: Progressing     Problem: Respiratory  Goal: Patient will achieve/maintain optimum respiratory ventilation and gas exchange  Outcome: Progressing     Problem: Hemodynamics  Goal: Patient's hemodynamics, fluid balance and neurologic status will be stable or improve  Outcome: Progressing       Patient is not progressing towards the following goals:  Problem: Urinary Elimination  Goal: Establish and maintain regular urinary output  Outcome: Not Progressing  Note: Urology PA replaced catheter, Monitoring s/s of infection, monitoring urine output and characteristics q2hrs, abdominal assessments done q4hr and as needed, pain management implemented as needed, cathter tip being lubricated, foreskin being pulled down to make sure there is no restrictions, hand irrigation done as needed

## 2023-07-18 NOTE — CONSULTS
Critical Care Consultation    Date of consult: 7/18/2023    Referring Physician  Dr. Jaime, urology    Reason for Consultation  Bladder perforation    History of Presenting Illness    82 y.o. male who presented 7/16/2023 with hematuria.  He has a history of a right nephrectomy (donated to his son in the 90s), CKD stage III, HFrEF with AICD, EF 10% in 9/2022, CAD with stents COPD, paroxysmal A-fib on Eliquis.  He has been having hematuria for 1 week PTA, was seen in his urology office where catheter was placed and irrigation was performed.  H&H was stable.  And UA demonstrated no concerns for infection.  Hematuria persisted and was seen in the ED where a 26 [place] three-way catheter was placed in the ED and CBI was started.  His urine was slowly clearing with slow flow without any clots.  Hemoglobin stable.  Overnight, he had sudden worsening abdominal pain to the point that he called 911 from his hospital bed.  He was found to have a distended abdomen and significant pain.  Stat CT was ordered which demonstrated to perforation/defects in the superior bladder wall at the tip of the Duran catheter protruding into 1 of these defects.  This is associated with a large amount of retroperitoneal and intraperitoneal fluid consistent with bladder/urine leak.  He was transferred to the ICU by the overnight hospitalist and urology was emergently consulted.    This morning at time of consultation, urology PA was replacing bladder catheter 18F double balloon catheter with a plan for bladder decompression and DC CBI for hopefully spontaneous healing.    Cardiac: AV paced on demand rates 70-80s. -130s. MAP 84. Last TTE 9/2022 EF 10%, grade III diastolic dysfunction  Pulm: 2LPM  Neuro: intact, at baseline   Heme: Hb stable, mild thrombocytopenia stable, remains on Eliquis  Renal/volume status: Cr normalizing 2.2 -> 1.7, baseline ~1.7   ID:  BCx NGTD, afebrile, on C3 since 7/16-  GI/endo: NPO, FSBG at goal  Labs/Imaging:  CT and labs reviewed  Lines: replaced bladder catheter by urology this AM    Code Status  DNAR/DNI    Review of Systems  Review of Systems   Gastrointestinal:  Positive for abdominal pain.   Genitourinary:  Positive for dysuria and hematuria.   All other systems reviewed and are negative.    Past Medical History   has a past medical history of Breath shortness, Cardiomyopathy (HCC) (05/2021), Chronic obstructive pulmonary disease (HCC), Romansh measles, Heart attack (HCC), History of abdominal aortic aneurysm (AAA) (2009), Hyperlipidemia, Hypertension, Mumps, Pacemaker, Paroxysmal atrial fibrillation (HCC), Renal disorder, Sleep apnea, Snoring, Stroke (HCC) (2012), Tonsillitis, and Ventricular tachycardia (HCC).    Surgical History   has a past surgical history that includes aicd battery change (Left, 01/03/2017); aaa with stent graft (2009); other (2000); other orthopedic surgery (2005); other cardiac surgery (1991); other cardiac surgery (2014); and aicd battery change (Left, 09/10/2020).    Family History  family history includes Cancer in his mother and sister.    Social History   reports that he quit smoking about 19 years ago. His smoking use included cigarettes. He has a 7.50 pack-year smoking history. He has quit using smokeless tobacco. He reports current alcohol use of about 8.4 oz of alcohol per week. He reports that he does not currently use drugs after having used the following drugs: Inhaled.    Medications  Home Medications       Reviewed by Mj Simmons R.N. (Registered Nurse) on 07/16/23 at 1954  Med List Status: Complete     Medication Last Dose Status   albuterol 108 (90 Base) MCG/ACT Aero Soln inhalation aerosol 3 days Active   amiodarone (CORDARONE) 200 MG Tab 7/16/2023 Active   apixaban (ELIQUIS) 2.5mg Tab 7/16/2023 Active   carvedilol (COREG) 25 MG Tab 7/16/2023 Active   cefdinir (OMNICEF) 300 MG Cap 7/16/2023 Active   cinacalcet (SENSIPAR) 30 MG Tab 7/16/2023 Active   furosemide (LASIX)  20 MG Tab 7/13/2023 Active   losartan (COZAAR) 100 MG Tab 7/16/2023 Active   Multiple Vitamins-Minerals (MULTIVITAMIN ADULT EXTRA C PO) 7/16/2023 Active   Omega-3 Fatty Acids (FISH OIL) 1000 MG Cap capsule 7/16/2023 Active   spironolactone (ALDACTONE) 25 MG Tab 7/16/2023 Active   tamsulosin (FLOMAX) 0.4 MG capsule 7/16/2023 Active   Vericiguat (VERQUVO) 5 MG Tab 7/16/2023 Active   vitamin D3 (CHOLECALCIFEROL) 1000 Unit (25 mcg) Tab 7/16/2023 Active                  Current Facility-Administered Medications   Medication Dose Route Frequency Provider Last Rate Last Admin    morphine 4 MG/ML injection 4 mg  4 mg Intravenous Q3HRS PRN Velvet Bal M.D.   4 mg at 07/18/23 0153    HYDROmorphone (Dilaudid) injection 1 mg  1 mg Intravenous Q3HRS PRN Velvet Bal M.D.   1 mg at 07/18/23 0258    albuterol inhaler 2 Puff  2 Puff Inhalation Q6HRS PRN Gloria Rooney M.D.        amiodarone (Cordarone) tablet 200 mg  200 mg Oral DAILY LevSADE Mata.D.   200 mg at 07/17/23 0551    apixaban (Eliquis) tablet 2.5 mg  2.5 mg Oral BID Levjean carlos Rooney M.D.   2.5 mg at 07/17/23 1725    carvedilol (Coreg) tablet 25 mg  25 mg Oral QAM Levjean carlos Rooney M.D.   25 mg at 07/17/23 0553    cinacalcet (Sensipar) tablet 30 mg  30 mg Oral DAILY Levente Levdilia, M.D.   30 mg at 07/17/23 0551    losartan (Cozaar) tablet 100 mg  100 mg Oral DAILY Levente Levdilia, M.D.   100 mg at 07/17/23 0553    spironolactone (Aldactone) tablet 12.5 mg  12.5 mg Oral DAILY Levente Levdilia M.D.   12.5 mg at 07/17/23 0553    tamsulosin (Flomax) capsule 0.4 mg  0.4 mg Oral BID Levjean carlos Rooney M.D.   0.4 mg at 07/17/23 1725    vitamin D3 (Cholecalciferol) tablet 1,000 Units  1,000 Units Oral DAILY Gloria Rooney M.D.   1,000 Units at 07/17/23 0551    cefTRIAXone (Rocephin) 1,000 mg in  mL IVPB  1,000 mg Intravenous Q EVENING Gloria Rooney M.D.   Stopped at 07/17/23 1755    senna-docusate (Pericolace Or Senokot S) 8.6-50 MG per tablet 2 Tablet  2  Tablet Oral BID Gloria Rooney M.D.   2 Tablet at 07/17/23 0551    And    polyethylene glycol/lytes (Miralax) PACKET 1 Packet  1 Packet Oral QDAY PRN Gloria Rooney M.D.        And    magnesium hydroxide (Milk Of Magnesia) suspension 30 mL  30 mL Oral QDAY PRN Gloria Rooney M.D.        And    bisacodyl (Dulcolax) suppository 10 mg  10 mg Rectal QDAY PRN Gloria Rooney M.D.        Respiratory Therapy Consult   Nebulization Continuous RT Gloria Rooney M.D.        acetaminophen (Tylenol) tablet 650 mg  650 mg Oral Q6HRS PRN Gloria Rooney M.D.        Pharmacy Consult Request ...Pain Management Review 1 Each  1 Each Other PHARMACY TO DOSE Gloria Rooney M.D.        labetalol (Normodyne/Trandate) injection 10 mg  10 mg Intravenous Q4HRS PRN Gloria Rooney M.D.        ondansetron (Zofran) syringe/vial injection 4 mg  4 mg Intravenous Q4HRS PRN Gloria Rooney M.D.        ondansetron (Zofran ODT) dispertab 4 mg  4 mg Oral Q4HRS PRN Gloria Rooney M.D.           Allergies  Allergies   Allergen Reactions    Entresto [Sacubitril-Valsartan]      Swollen tongue. Angioedema.    Crestor [Rosuvastatin]        Vital Signs last 24 hours  Temp:  [36.4 °C (97.5 °F)-36.9 °C (98.4 °F)] 36.8 °C (98.2 °F)  Pulse:  [77-91] 81  Resp:  [14-20] 15  BP: ()/(71-86) 99/76  SpO2:  [92 %-99 %] 97 %    Physical Exam  Physical Exam  Vitals and nursing note reviewed.   Constitutional:       General: He is not in acute distress.     Appearance: He is well-developed. He is ill-appearing. He is not diaphoretic.   HENT:      Nose: Nose normal.      Mouth/Throat:      Pharynx: No oropharyngeal exudate.   Eyes:      General: No scleral icterus.        Right eye: No discharge.         Left eye: No discharge.      Conjunctiva/sclera: Conjunctivae normal.      Pupils: Pupils are equal, round, and reactive to light.   Neck:      Thyroid: No thyromegaly.      Vascular: No JVD.      Trachea: No tracheal deviation.   Cardiovascular:      Rate and Rhythm:  Normal rate and regular rhythm.      Heart sounds: Normal heart sounds. No murmur heard.  Pulmonary:      Effort: Pulmonary effort is normal. No respiratory distress.      Breath sounds: Normal breath sounds. No stridor. No wheezing or rales.   Abdominal:      General: There is distension (slight).      Palpations: Abdomen is soft.      Tenderness: There is abdominal tenderness (mild, generalized). There is no right CVA tenderness or guarding.   Genitourinary:     Comments: Bladder catheter draining brown/yellow urine, no blood  Musculoskeletal:         General: No tenderness. Normal range of motion.      Cervical back: Neck supple.   Lymphadenopathy:      Cervical: No cervical adenopathy.   Skin:     General: Skin is warm and dry.      Capillary Refill: Capillary refill takes less than 2 seconds.      Coloration: Skin is not pale.      Findings: No erythema.   Neurological:      Mental Status: He is alert and oriented to person, place, and time.      Sensory: No sensory deficit.      Motor: No abnormal muscle tone.      Coordination: Coordination normal.      Deep Tendon Reflexes: Reflexes normal.   Psychiatric:         Behavior: Behavior normal.         Thought Content: Thought content normal.         Judgment: Judgment normal.       Fluids    Intake/Output Summary (Last 24 hours) at 7/18/2023 0807  Last data filed at 7/18/2023 0700  Gross per 24 hour   Intake 1731.21 ml   Output 450 ml   Net 1281.21 ml       Laboratory  Recent Results (from the past 48 hour(s))   CBC WITH DIFFERENTIAL    Collection Time: 07/16/23  3:47 PM   Result Value Ref Range    WBC 5.0 4.8 - 10.8 K/uL    RBC 4.70 4.70 - 6.10 M/uL    Hemoglobin 16.2 14.0 - 18.0 g/dL    Hematocrit 49.3 42.0 - 52.0 %    .9 (H) 81.4 - 97.8 fL    MCH 34.5 (H) 27.0 - 33.0 pg    MCHC 32.9 32.3 - 36.5 g/dL    RDW 57.1 (H) 35.9 - 50.0 fL    Platelet Count 154 (L) 164 - 446 K/uL    MPV 11.2 9.0 - 12.9 fL    Neutrophils-Polys 58.50 44.00 - 72.00 %    Lymphocytes  27.90 22.00 - 41.00 %    Monocytes 11.80 0.00 - 13.40 %    Eosinophils 1.20 0.00 - 6.90 %    Basophils 0.20 0.00 - 1.80 %    Immature Granulocytes 0.40 0.00 - 0.90 %    Nucleated RBC 0.00 0.00 - 0.20 /100 WBC    Neutrophils (Absolute) 2.91 1.82 - 7.42 K/uL    Lymphs (Absolute) 1.39 1.00 - 4.80 K/uL    Monos (Absolute) 0.59 0.00 - 0.85 K/uL    Eos (Absolute) 0.06 0.00 - 0.51 K/uL    Baso (Absolute) 0.01 0.00 - 0.12 K/uL    Immature Granulocytes (abs) 0.02 0.00 - 0.11 K/uL    NRBC (Absolute) 0.00 K/uL   CMP    Collection Time: 07/16/23  3:47 PM   Result Value Ref Range    Sodium 136 135 - 145 mmol/L    Potassium 5.1 3.6 - 5.5 mmol/L    Chloride 104 96 - 112 mmol/L    Co2 20 20 - 33 mmol/L    Anion Gap 12.0 7.0 - 16.0    Glucose 103 (H) 65 - 99 mg/dL    Bun 42 (H) 8 - 22 mg/dL    Creatinine 2.27 (H) 0.50 - 1.40 mg/dL    Calcium 9.2 8.4 - 10.2 mg/dL    AST(SGOT) 34 12 - 45 U/L    ALT(SGPT) 35 2 - 50 U/L    Alkaline Phosphatase 71 30 - 99 U/L    Total Bilirubin 0.8 0.1 - 1.5 mg/dL    Albumin 3.7 3.2 - 4.9 g/dL    Total Protein 6.7 6.0 - 8.2 g/dL    Globulin 3.0 1.9 - 3.5 g/dL    A-G Ratio 1.2 g/dL   CORRECTED CALCIUM    Collection Time: 07/16/23  3:47 PM   Result Value Ref Range    Correct Calcium 9.4 8.5 - 10.5 mg/dL   ESTIMATED GFR    Collection Time: 07/16/23  3:47 PM   Result Value Ref Range    GFR (CKD-EPI) 28 (A) >60 mL/min/1.73 m 2   URINALYSIS    Collection Time: 07/16/23  4:47 PM    Specimen: Urine   Result Value Ref Range    Color Red (A)     Character Turbid (A)     Specific Gravity 1.020 <1.035    Ph 6.0 5.0 - 8.0    Glucose 250 (A) Negative mg/dL    Ketones Negative Negative mg/dL    Protein 100 (A) Negative mg/dL    Bilirubin Negative Negative    Nitrite Negative Negative    Leukocyte Esterase Trace (A) Negative    Occult Blood Large (A) Negative    Micro Urine Req Microscopic    URINE MICROSCOPIC (W/UA)    Collection Time: 07/16/23  4:47 PM   Result Value Ref Range    WBC Rare (A) /hpf    RBC >150 (A) /hpf     Bacteria Negative None /hpf    Epithelial Cells Rare Few /hpf    Mucous Threads Rare /hpf   Blood Culture    Collection Time: 07/16/23  6:37 PM    Specimen: Peripheral; Blood   Result Value Ref Range    Significant Indicator NEG     Source BLD     Site PERIPHERAL     Culture Result       No Growth  Note: Blood cultures are incubated for 5 days and  are monitored continuously.Positive blood cultures  are called to the RN and reported as soon as  they are identified.  Blood culture testing and Gram stain, if indicated, are  performed at [facility], [address] [place] [address].  Positive blood cultures are  sent to [facility], [address] [place] [address], for organism identification and  susceptibility testing.     Blood Culture    Collection Time: 07/16/23  6:37 PM    Specimen: Peripheral; Blood   Result Value Ref Range    Significant Indicator NEG     Source BLD     Site PERIPHERAL     Culture Result       No Growth  Note: Blood cultures are incubated for 5 days and  are monitored continuously.Positive blood cultures  are called to the RN and reported as soon as  they are identified.  Blood culture testing and Gram stain, if indicated, are  performed at [facility], [address] [place] [address].  Positive blood cultures are  sent to [facility], [address] [place] [address], for organism identification and  susceptibility testing.     CBC without Differential    Collection Time: 07/17/23  2:55 AM   Result Value Ref Range    WBC 5.0 4.8 - 10.8 K/uL    RBC 4.28 (L) 4.70 - 6.10 M/uL    Hemoglobin 14.9 14.0 - 18.0 g/dL    Hematocrit 45.4 42.0 - 52.0 %    .1 (H) 81.4 - 97.8 fL    MCH 34.8 (H) 27.0 - 33.0 pg    MCHC 32.8 32.3 - 36.5 g/dL    RDW 57.7 (H) 35.9 - 50.0 fL    Platelet Count 144 (L) 164 - 446 K/uL    MPV 11.7 9.0 - 12.9 fL   Basic Metabolic Panel (BMP)    Collection Time: 07/17/23  2:55 AM    Result Value Ref Range    Sodium 138 135 - 145 mmol/L    Potassium 4.4 3.6 - 5.5 mmol/L    Chloride 106 96 - 112 mmol/L    Co2 20 20 - 33 mmol/L    Glucose 114 (H) 65 - 99 mg/dL    Bun 39 (H) 8 - 22 mg/dL    Creatinine 2.16 (H) 0.50 - 1.40 mg/dL    Calcium 8.4 8.4 - 10.2 mg/dL    Anion Gap 12.0 7.0 - 16.0   ESTIMATED GFR    Collection Time: 07/17/23  2:55 AM   Result Value Ref Range    GFR (CKD-EPI) 30 (A) >60 mL/min/1.73 m 2   CBC WITH DIFFERENTIAL    Collection Time: 07/18/23  3:23 AM   Result Value Ref Range    WBC 5.9 4.8 - 10.8 K/uL    RBC 4.49 (L) 4.70 - 6.10 M/uL    Hemoglobin 15.5 14.0 - 18.0 g/dL    Hematocrit 47.9 42.0 - 52.0 %    .7 (H) 81.4 - 97.8 fL    MCH 34.5 (H) 27.0 - 33.0 pg    MCHC 32.4 32.3 - 36.5 g/dL    RDW 58.4 (H) 35.9 - 50.0 fL    Platelet Count 139 (L) 164 - 446 K/uL    MPV 11.5 9.0 - 12.9 fL    Neutrophils-Polys 63.10 44.00 - 72.00 %    Lymphocytes 23.30 22.00 - 41.00 %    Monocytes 10.40 0.00 - 13.40 %    Eosinophils 2.60 0.00 - 6.90 %    Basophils 0.30 0.00 - 1.80 %    Immature Granulocytes 0.30 0.00 - 0.90 %    Nucleated RBC 0.00 0.00 - 0.20 /100 WBC    Neutrophils (Absolute) 3.70 1.82 - 7.42 K/uL    Lymphs (Absolute) 1.37 1.00 - 4.80 K/uL    Monos (Absolute) 0.61 0.00 - 0.85 K/uL    Eos (Absolute) 0.15 0.00 - 0.51 K/uL    Baso (Absolute) 0.02 0.00 - 0.12 K/uL    Immature Granulocytes (abs) 0.02 0.00 - 0.11 K/uL    NRBC (Absolute) 0.00 K/uL   Renal Function Panel    Collection Time: 07/18/23  3:23 AM   Result Value Ref Range    Sodium 136 135 - 145 mmol/L    Potassium 4.9 3.6 - 5.5 mmol/L    Chloride 108 96 - 112 mmol/L    Co2 18 (L) 20 - 33 mmol/L    Glucose 124 (H) 65 - 99 mg/dL    Creatinine 1.73 (H) 0.50 - 1.40 mg/dL    Bun 30 (H) 8 - 22 mg/dL    Calcium 8.2 (L) 8.4 - 10.2 mg/dL    Phosphorus 2.6 2.5 - 4.5 mg/dL    Albumin 3.1 (L) 3.2 - 4.9 g/dL   MAGNESIUM    Collection Time: 07/18/23  3:23 AM   Result Value Ref Range    Magnesium 1.8 1.5 - 2.5 mg/dL   CORRECTED CALCIUM     Collection Time: 07/18/23  3:23 AM   Result Value Ref Range    Correct Calcium 8.9 8.5 - 10.5 mg/dL   ESTIMATED GFR    Collection Time: 07/18/23  3:23 AM   Result Value Ref Range    GFR (CKD-EPI) 39 (A) >60 mL/min/1.73 m 2   LACTIC ACID    Collection Time: 07/18/23  6:03 AM   Result Value Ref Range    Lactic Acid 1.1 0.5 - 2.0 mmol/L     Imaging    DX-CHEST-PORTABLE (1 VIEW)   Final Result      Stable mild CHF pattern. No acute process or change.      CT-ABDOMEN-PELVIS W/O   Final Result         1. There are two perforation/defects within the superior bladder wall. The tip of the Duran catheter protrudes through one of these defects. There is associated large amount of retroperitoneal and intraperitoneal fluid. Consistent with bladder/urine    leak. As detailed above.   2. Slightly increased tiny simple bilateral pleural effusions, colonic diverticulosis, and aneurysm with previous stent graft repair are again noted.      CT-ABDOMEN-PELVIS W/O   Final Result      1.  Surgical absence right kidney.      2.  No left hydronephrosis or urolithiasis.      3.  Duran catheter projects into a decompressed urinary bladder.      4.  4.4 cm aneurysm infrarenal abdominal aorta and aortic iliac stent graft again demonstrated.      5.  Proximal colectomy and ileocolic anastomosis. No evidence of bowel obstruction. No free fluid.      6.  Colon diverticula.        Assessment/Plan    * Rupture of bladder with uroperitoneum  Assessment & Plan  Unclear etiology  Closely monitoring in ICU for decompensation and potential operative repair  High operative risk due to therapeutic anticoagulation, HFrEF EF 10%, etc  Bladder catheter exchanged by urology team 7/18, insturctions for management relayed to bedside RN  Hold Eliquis as risks > benefits currently  Ceftriaxone for peritonitis prophylaxis; hx e faecalis (2020, vanc sensitive), staph epidermis  Maintain NPO    ACC/AHA stage C systolic heart failure (HCC)- (present on  admission)  Assessment & Plan  EF 10%, s/p AICD  Currently euvolemic, normotensive  Closely monitor and continue all GDMT  Resume lasix pending volume status and resolution of bladder perforation  Telemetry    Acute cystitis with hematuria  Assessment & Plan  DC CBI due to bladder perforation/rupture    Stage 3b chronic kidney disease (HCC)- (present on admission)  Assessment & Plan  Monitor renal function, avoid nephrotoxic medications, renally dose all meds, strict intake output monitoring.    Acute on chronic renal failure (HCC)- (present on admission)  Assessment & Plan  Improving  Plan of care as outlined above.    Presence of biventricular automatic cardioverter/defibrillator (AICD)- (present on admission)  Assessment & Plan  AV paced, no issues, continue close outpatient monitoring through cardiology clinic on long-term basis.  Telemetry while inpatient.    Paroxysmal atrial fibrillation (HCC)- (present on admission)  Assessment & Plan  Risks > benefits currently of therapeutic anticoagulation, hold Eliquis  Continue Coreg, amiodarone, telemetry monitoring    Essential hypertension, benign- (present on admission)  Assessment & Plan  Continue and resume home antihypertensive medications, maintain goal SBP 140s/90s    DNR (do not resuscitate)  Assessment & Plan  Noted, confirmed with pt 7/18/2023      Discussed patient condition and risk of morbidity and/or mortality with Hospitalist, RN, Pharmacy, Code status disscussed, Charge nurse / hot rounds, Patient, and urology.      The patient remains critically ill.  Critical care time = 110 minutes in directly providing and coordinating critical care and extensive data review.  No time overlap and excludes procedures.    This note was generated using voice recognition software which has a chance of producing errors of grammar and content.  I have made every reasonable attempt to find and correct any errors, but it should be expected that some may not be found prior  to finalization of this note.  __________  Brian Arroyo MD  Pulmonary and Critical Care Medicine  [facility]

## 2023-07-19 PROBLEM — E83.42 HYPOMAGNESEMIA: Status: ACTIVE | Noted: 2023-07-19

## 2023-07-19 LAB
ALBUMIN SERPL BCP-MCNC: 2.8 G/DL (ref 3.2–4.9)
ALBUMIN/GLOB SERPL: 1.1 G/DL
ALP SERPL-CCNC: 57 U/L (ref 30–99)
ALT SERPL-CCNC: 22 U/L (ref 2–50)
ANION GAP SERPL CALC-SCNC: 9 MMOL/L (ref 7–16)
AST SERPL-CCNC: 21 U/L (ref 12–45)
BACTERIA UR CULT: NORMAL
BASOPHILS # BLD AUTO: 0.2 % (ref 0–1.8)
BASOPHILS # BLD: 0.01 K/UL (ref 0–0.12)
BILIRUB SERPL-MCNC: 0.5 MG/DL (ref 0.1–1.5)
BUN SERPL-MCNC: 25 MG/DL (ref 8–22)
CALCIUM ALBUM COR SERPL-MCNC: 8.9 MG/DL (ref 8.5–10.5)
CALCIUM SERPL-MCNC: 7.9 MG/DL (ref 8.4–10.2)
CHLORIDE SERPL-SCNC: 112 MMOL/L (ref 96–112)
CO2 SERPL-SCNC: 17 MMOL/L (ref 20–33)
CREAT SERPL-MCNC: 1.51 MG/DL (ref 0.5–1.4)
EOSINOPHIL # BLD AUTO: 0.09 K/UL (ref 0–0.51)
EOSINOPHIL NFR BLD: 1.9 % (ref 0–6.9)
ERYTHROCYTE [DISTWIDTH] IN BLOOD BY AUTOMATED COUNT: 60.9 FL (ref 35.9–50)
GFR SERPLBLD CREATININE-BSD FMLA CKD-EPI: 46 ML/MIN/1.73 M 2
GLOBULIN SER CALC-MCNC: 2.5 G/DL (ref 1.9–3.5)
GLUCOSE SERPL-MCNC: 81 MG/DL (ref 65–99)
HCT VFR BLD AUTO: 43.5 % (ref 42–52)
HGB BLD-MCNC: 14 G/DL (ref 14–18)
IMM GRANULOCYTES # BLD AUTO: 0.02 K/UL (ref 0–0.11)
IMM GRANULOCYTES NFR BLD AUTO: 0.4 % (ref 0–0.9)
LYMPHOCYTES # BLD AUTO: 1.29 K/UL (ref 1–4.8)
LYMPHOCYTES NFR BLD: 26.7 % (ref 22–41)
MAGNESIUM SERPL-MCNC: 1.7 MG/DL (ref 1.5–2.5)
MCH RBC QN AUTO: 34.8 PG (ref 27–33)
MCHC RBC AUTO-ENTMCNC: 32.2 G/DL (ref 32.3–36.5)
MCV RBC AUTO: 108.2 FL (ref 81.4–97.8)
MONOCYTES # BLD AUTO: 0.6 K/UL (ref 0–0.85)
MONOCYTES NFR BLD AUTO: 12.4 % (ref 0–13.4)
NEUTROPHILS # BLD AUTO: 2.82 K/UL (ref 1.82–7.42)
NEUTROPHILS NFR BLD: 58.4 % (ref 44–72)
NRBC # BLD AUTO: 0 K/UL
NRBC BLD-RTO: 0 /100 WBC (ref 0–0.2)
PHOSPHATE SERPL-MCNC: 2.9 MG/DL (ref 2.5–4.5)
PLATELET # BLD AUTO: 120 K/UL (ref 164–446)
PMV BLD AUTO: 11.5 FL (ref 9–12.9)
POTASSIUM SERPL-SCNC: 4.7 MMOL/L (ref 3.6–5.5)
PROT SERPL-MCNC: 5.3 G/DL (ref 6–8.2)
RBC # BLD AUTO: 4.02 M/UL (ref 4.7–6.1)
SIGNIFICANT IND 70042: NORMAL
SITE SITE: NORMAL
SODIUM SERPL-SCNC: 138 MMOL/L (ref 135–145)
SOURCE SOURCE: NORMAL
WBC # BLD AUTO: 4.8 K/UL (ref 4.8–10.8)

## 2023-07-19 PROCEDURE — 99233 SBSQ HOSP IP/OBS HIGH 50: CPT | Performed by: INTERNAL MEDICINE

## 2023-07-19 PROCEDURE — 80053 COMPREHEN METABOLIC PANEL: CPT

## 2023-07-19 PROCEDURE — 94660 CPAP INITIATION&MGMT: CPT

## 2023-07-19 PROCEDURE — 700105 HCHG RX REV CODE 258: Performed by: INTERNAL MEDICINE

## 2023-07-19 PROCEDURE — 94760 N-INVAS EAR/PLS OXIMETRY 1: CPT

## 2023-07-19 PROCEDURE — 700111 HCHG RX REV CODE 636 W/ 250 OVERRIDE (IP): Mod: JZ | Performed by: INTERNAL MEDICINE

## 2023-07-19 PROCEDURE — 770020 HCHG ROOM/CARE - TELE (206)

## 2023-07-19 PROCEDURE — 83735 ASSAY OF MAGNESIUM: CPT

## 2023-07-19 PROCEDURE — 700102 HCHG RX REV CODE 250 W/ 637 OVERRIDE(OP): Performed by: HOSPITALIST

## 2023-07-19 PROCEDURE — A9270 NON-COVERED ITEM OR SERVICE: HCPCS | Performed by: HOSPITALIST

## 2023-07-19 PROCEDURE — 85025 COMPLETE CBC W/AUTO DIFF WBC: CPT

## 2023-07-19 PROCEDURE — 84100 ASSAY OF PHOSPHORUS: CPT

## 2023-07-19 RX ORDER — MAGNESIUM SULFATE HEPTAHYDRATE 40 MG/ML
2 INJECTION, SOLUTION INTRAVENOUS ONCE
Status: COMPLETED | OUTPATIENT
Start: 2023-07-19 | End: 2023-07-19

## 2023-07-19 RX ADMIN — CINACALCET HYDROCHLORIDE 30 MG: 30 TABLET, FILM COATED ORAL at 06:28

## 2023-07-19 RX ADMIN — SENNOSIDES AND DOCUSATE SODIUM 2 TABLET: 50; 8.6 TABLET ORAL at 06:28

## 2023-07-19 RX ADMIN — AMIODARONE HYDROCHLORIDE 200 MG: 200 TABLET ORAL at 06:28

## 2023-07-19 RX ADMIN — CEFTRIAXONE SODIUM 2000 MG: 2 INJECTION, POWDER, FOR SOLUTION INTRAMUSCULAR; INTRAVENOUS at 17:47

## 2023-07-19 RX ADMIN — CARVEDILOL 25 MG: 25 TABLET, FILM COATED ORAL at 06:27

## 2023-07-19 RX ADMIN — LOSARTAN POTASSIUM 100 MG: 50 TABLET, FILM COATED ORAL at 06:28

## 2023-07-19 RX ADMIN — TAMSULOSIN HYDROCHLORIDE 0.4 MG: 0.4 CAPSULE ORAL at 17:44

## 2023-07-19 RX ADMIN — SPIRONOLACTONE 12.5 MG: 25 TABLET ORAL at 06:28

## 2023-07-19 RX ADMIN — MAGNESIUM SULFATE HEPTAHYDRATE 2 G: 2 INJECTION, SOLUTION INTRAVENOUS at 08:34

## 2023-07-19 RX ADMIN — Medication 1000 UNITS: at 06:28

## 2023-07-19 RX ADMIN — TAMSULOSIN HYDROCHLORIDE 0.4 MG: 0.4 CAPSULE ORAL at 06:27

## 2023-07-19 ASSESSMENT — ENCOUNTER SYMPTOMS
CONSTIPATION: 0
FEVER: 0
SHORTNESS OF BREATH: 0
DIZZINESS: 0
HEADACHES: 0
NAUSEA: 0
CHILLS: 0
VOMITING: 0
BACK PAIN: 0
ABDOMINAL PAIN: 0
DIARRHEA: 0
PALPITATIONS: 0
COUGH: 0

## 2023-07-19 ASSESSMENT — COGNITIVE AND FUNCTIONAL STATUS - GENERAL
SUGGESTED CMS G CODE MODIFIER MOBILITY: CH
SUGGESTED CMS G CODE MODIFIER DAILY ACTIVITY: CI
DAILY ACTIVITIY SCORE: 23
DRESSING REGULAR LOWER BODY CLOTHING: A LITTLE
MOBILITY SCORE: 24

## 2023-07-19 ASSESSMENT — PAIN DESCRIPTION - PAIN TYPE: TYPE: ACUTE PAIN

## 2023-07-19 NOTE — FLOWSHEET NOTE
Telemetry Shift Summary     Rhythm: Paced  HR Range: 77  Ectopy: rPVC              Normal Values  Rhythm SR  HR Range    Measurements 0.12-0.20 / 0.06-0.10  / 0.30-0.52

## 2023-07-19 NOTE — RESPIRATORY CARE
"   COPD EDUCATION by COPD CLINICAL EDUCATOR  7/19/2023 at 8:07 AM by Zahira Leblanc RRT     Patient reviewed by COPD education team. Patient does not have a history or diagnosis of COPD and is a former smoker.  Therefore, patient does not qualify for the COPD program.     COPD Screen  COPD Risk Screening  Do you have a history of COPD?: No (Per PFT FEV1/FVC 77% Not COPD)  Do you have a Pulmonologist?: No  COPD Population Screener  During the past 4 weeks, how much did you feel short of breath?: Some of the time  Do you ever cough up any mucus or phlegm?: No/only with occasional colds or infections  In the past 12 months, you do less than you used to because of your breathing problems: Disagree/unsure  Have you smoked at least 100 cigarettes in your entire life?: No/don't know  How old are you?: 60+  COPD Screening Score: 3  COPD Coordinator Not Recommended: Yes    COPD Assessment  COPD Clinical Specialists ONLY  COPD Education Initiated: No--Quick Screen (PFT 7/13/21 Interpretation:  Normal spirometry.    Normal diffusion capacity (DLCO:92%).FEV1/FVC 77%, hx HARPAL admit for Bladder perforation)  Interdisciplinary Rounds: Attendance at Rounds (30 Min)    PFT Results    No results found for: PFT    Meds to Beds  Would the patient like to opt in for Bedside Medication Delivery at Discharge?: No     MY COPD ACTION PLAN     It is recommended that patients and physicians /healthcare providers complete this action plan together. This plan should be discussed at each physician visit and updated as needed.    The green, yellow and red zones show groups of symptoms of COPD. This list of symptoms is not comprehensive, and you may experience other symptoms. In the \"Actions\" column, your healthcare provider has recommended actions for you to take based on your symptoms.    Patient Name: Lencho Parker   YOB: 1940   Last Updated on: 9/7/2022  7:56 AM   Green Zone:  I am doing well today Actions     Usual " "activitiy and exercise level   Take daily medications     Usual amounts of cough and phlegm/mucus   Use oxygen as prescribed     Sleep well at night   Continue regular exercise/diet plan     Appetite is good   At all times avoid cigarette smoke, inhaled irritants     Daily Medications (these medications are taken every day):   Albuterol (Accuneb, Proair, Proventil, Ventolin) 2 Puffs Every 4 hours PRN     Additional Information:  Use spacer for rescue inhaler    Yellow Zone:  I am having a bad day or a COPD flare Actions     More breathless than usual   Continue daily medications     I have less energy for my daily activities   Use quick relief inhaler as ordered     Increased or thicker phlegm/mucus   Use oxygen as prescribed     Using quick relief inhaler/nebulizer more often   Get plenty of rest     Swelling of ankles more than usual   Use pursed lip breathing     More coughing than usual   At all times avoid cigarette smoke, inhaled irritants     I feel like I have a \"chest cold\"     Poor sleep and my symptoms woke me up     My appetite is not good     My medicine is not helping      Call provider immediately if symptoms don’t improve     Continue daily medications, add rescue medications:   Albuterol 2 Puffs Every 4 hours       Medications to be used during a flare up, (as Discussed with Provider):           Additional Information:  Call provider immediately if symptoms don't improve    Red Zone:  I need urgent medical care Actions     Severe shortness of breath even at rest   Call 911 or seek medical care immediately     Not able to do any activity because of breathing      Fever or shaking chills      Feeling confused or very drowsy       Chest pains      Coughing up blood                  "

## 2023-07-19 NOTE — PROGRESS NOTES
12-hour chart check complete.    Monitor Summary  Rhythm: AV paced  Rate: 70's-80's  Ectopy: occ PVCs  Measurements: -/0.18/0.48

## 2023-07-19 NOTE — PROGRESS NOTES
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 Patient reports the hematuria has been ongoing for ~1 week, was seen in our office where a catheter was placed and hand irrigation performed. He is on tamsulosin 0.8mg daily, denies any bothersome lower urinary tract symptoms although notes his urinary stream has progressively weakened. He does not have a history of urinary retention. UA was not suspicious for infection. H/H found to be normal at 16.2/49.3. 26fr 3-way daily catheter was placed in the ED and CBI was started. He reported \"bladder burning\" and denies F/C/N/V/abdominal pain.     Interval History:    7/19. Patient resting comfortably in bed this morning. Hemodynamically stable. Benign abdominal exam, denies tenderness. Good UOP from daily, 1445cc/24 hours, orange-light red. No clots on hand irrigation. H/H stable, 14.0/43.5. Cr 1.51, improving.     Review of Systems:      Constitutional: Denies fevers, chills  Pulmonary: Denies SOB.   Gastrointestinal/Hepatic: Denies abdominal pain.   Genitourinary: +hematuria    All other systems were reviewed and are negative (AMA/CMS criteria)                Past Medical History:   Past Medical History:   Diagnosis Date    Breath shortness     on exertion    Cardiomyopathy (HCC) 05/2021    Echocardiogram with moderately dilated LV, mild concentric LVH, LVEF 20%. Global hypokinesis. Normal RA and RV. Severely dilated LA. Mild MR, mild TR. RVSP 34mmHg.    Chronic obstructive pulmonary disease (HCC)     Citizen of [place] measles     Heart attack (HCC)     hx 2007    History of abdominal aortic aneurysm (AAA) 2009    Status post stent    Hyperlipidemia     Hypertension     Mumps     Pacemaker     AICD    Paroxysmal atrial fibrillation (HCC)     Status post Watchman procedure in [place].    " Renal disorder     Pt  donated 1 kidney to son.     Sleep apnea     Snoring     Stroke (HCC) 2012    TIA    Tonsillitis     Ventricular tachycardia (HCC)      Active Hospital Problems    Diagnosis     Rupture of bladder with uroperitoneum [N32.89]     Hematuria [R31.9]     Acute cystitis with hematuria [N30.01]     Thrombocytopenia (HCC) [D69.6]     DNR (do not resuscitate) [Z66]     Stage 3b chronic kidney disease (HCC) [N18.32]     Acute on chronic renal failure (HCC) [N17.9, N18.9]     ACC/AHA stage C systolic heart failure (HCC) [I50.20]     Presence of biventricular automatic cardioverter/defibrillator (AICD) [Z95.810]     Paroxysmal atrial fibrillation (HCC) [I48.0]     Mixed hyperlipidemia [E78.2]     Essential hypertension, benign [I10]        Past Surgical History:  Past Surgical History:   Procedure Laterality Date    AICD BATTERY CHANGE Left 09/10/2020     Upgrade to [email or web address] MRI Quad CRT-D [id number] implanted by Dr. Barrios.    AICD BATTERY CHANGE Left 01/03/2017    Generator replacement with [employer] My Own Meda MRI XT  [id number] implanted in [place].    OTHER CARDIAC SURGERY  2014    watchman device    AAA WITH STENT GRAFT  2009    OTHER ORTHOPEDIC SURGERY  2005    hip surgery    OTHER  2000    kidney removed    OTHER CARDIAC SURGERY  1991    AICD implanted       Hospital Medications:    Current Facility-Administered Medications:     magnesium sulfate IVPB premix 2 g, 2 g, Intravenous, Once, Quintin Young M.D.    morphine 4 MG/ML injection 4 mg, 4 mg, Intravenous, Q3HRS PRN, Velvet Bal M.D., 4 mg at 07/18/23 0819    HYDROmorphone (Dilaudid) injection 1 mg, 1 mg, Intravenous, Q3HRS PRN, Velvet Bal M.D., 1 mg at 07/18/23 0258    cefTRIAXone (Rocephin) 2,000 mg in  mL IVPB, 2,000 mg, Intravenous, Q EVENING, Brian Arroyo M.D., Stopped at 07/18/23 1749    albuterol inhaler 2 Puff, 2 Puff, Inhalation, Q6HRS PRN, Gloria Rooney M.D.    amiodarone (Cordarone) tablet 200 mg,  200 mg, Oral, DAILY, SADE Frias.D., 200 mg at 07/19/23 0628    carvedilol (Coreg) tablet 25 mg, 25 mg, Oral, QAM, SADE Frias.D., 25 mg at 07/19/23 0627    cinacalcet (Sensipar) tablet 30 mg, 30 mg, Oral, DAILY, SADE Frias.D., 30 mg at 07/19/23 0628    losartan (Cozaar) tablet 100 mg, 100 mg, Oral, DAILY, Levjean carlos Rooney, M.D., 100 mg at 07/19/23 0628    spironolactone (Aldactone) tablet 12.5 mg, 12.5 mg, Oral, DAILY, SADE Frias.D., 12.5 mg at 07/19/23 0628    tamsulosin (Flomax) capsule 0.4 mg, 0.4 mg, Oral, BID, SADE Frias.D., 0.4 mg at 07/19/23 0627    vitamin D3 (Cholecalciferol) tablet 1,000 Units, 1,000 Units, Oral, DAILY, VALDO FriasD., 1,000 Units at 07/19/23 0628    senna-docusate (Pericolace Or Senokot S) 8.6-50 MG per tablet 2 Tablet, 2 Tablet, Oral, BID, 2 Tablet at 07/19/23 0628 **AND** polyethylene glycol/lytes (Miralax) PACKET 1 Packet, 1 Packet, Oral, QDAY PRN **AND** magnesium hydroxide (Milk Of Magnesia) suspension 30 mL, 30 mL, Oral, QDAY PRN **AND** bisacodyl (Dulcolax) suppository 10 mg, 10 mg, Rectal, QDAY PRN, Gloria Rooney M.D.    Respiratory Therapy Consult, , Nebulization, Continuous RT, Gloria Rooney M.D.    acetaminophen (Tylenol) tablet 650 mg, 650 mg, Oral, Q6HRS PRN, Gloria Rooney M.D.    Notify provider if pain remains uncontrolled, , , CONTINUOUS **AND** Use the Numeric Rating Scale (NRS), Silva-Baker Faces (WBF), or FLACC on regular floors and Critical-Care Pain Observation Tool (CPOT) on ICUs/Trauma to assess pain, , , CONTINUOUS **AND** Pulse Ox, , , CONTINUOUS **AND** Pharmacy Consult Request ...Pain Management Review 1 Each, 1 Each, Other, PHARMACY TO DOSE **AND** If patient difficult to arouse and/or has respiratory depression (respiratory rate of 10 or less), stop any opiates that are currently infusing and call a Rapid Response., , , CONTINUOUS, Gloria Rooney M.D.    labetalol (Normodyne/Trandate) injection 10 mg, 10 mg, Intravenous, Q4HRS  PRN, Gloria Rooney M.D.    ondansetron (Zofran) syringe/vial injection 4 mg, 4 mg, Intravenous, Q4HRS PRN, Gloria Rooney M.D.    ondansetron (Zofran ODT) dispertab 4 mg, 4 mg, Oral, Q4HRS PRN, Gloria Rooney M.D.    Current Outpatient Medications:  Medications Prior to Admission   Medication Sig Dispense Refill Last Dose    cefdinir (OMNICEF) 300 MG Cap Take 300 mg by mouth 2 times a day. Pt started a 7 day course on 7/14 7/16/2023 at 0630    carvedilol (COREG) 25 MG Tab Take 25 mg by mouth every morning. Once daily   7/16/2023 at AM    vitamin D3 (CHOLECALCIFEROL) 1000 Unit (25 mcg) Tab Take 1,000 Units by mouth every day.   7/16/2023 at AM    furosemide (LASIX) 20 MG Tab Take 20 mg by mouth every Monday and Thursday.   7/13/2023 at [facility]    Multiple Vitamins-Minerals (MULTIVITAMIN ADULT EXTRA C PO) Take 1 Tablet by mouth every day.   7/16/2023 at AM    Vericiguat (VERQUVO) 5 MG Tab Take 5 mg by mouth every day. 30 Tablet 11 7/16/2023 at AM    spironolactone (ALDACTONE) 25 MG Tab Take 0.5 Tablets by mouth every day. 45 Tablet 3 7/16/2023 at AM    losartan (COZAAR) 100 MG Tab Take 1 Tablet by mouth every day. 90 Each 4 7/16/2023 at AM    amiodarone (CORDARONE) 200 MG Tab Take 1 Tablet by mouth every day. 90 Tablet 4 7/16/2023 at AM    apixaban (ELIQUIS) 2.5mg Tab Take 1 Tablet by mouth 2 times a day. 60 Tablet 1 7/16/2023 at AM    tamsulosin (FLOMAX) 0.4 MG capsule Take 0.4 mg by mouth 2 times a day.   7/16/2023 at AM    cinacalcet (SENSIPAR) 30 MG Tab Take 30 mg by mouth every day.   7/16/2023 at AM    Omega-3 Fatty Acids (FISH OIL) 1000 MG Cap capsule Take 1,000 mg by mouth every evening.   7/16/2023 at AM    albuterol 108 (90 Base) MCG/ACT Aero Soln inhalation aerosol Inhale 2 Puffs by mouth every 6 hours as needed for Shortness of Breath. 8.5 g 0 3 days       Medication Allergy:  Allergies   Allergen Reactions    Entresto [Sacubitril-Valsartan]      Swollen tongue. Angioedema.    Crestor [Rosuvastatin]   "      Family History:  Family History   Problem Relation Age of Onset    Cancer Mother     Cancer Sister        Social History:  Social History     Socioeconomic History    Marital status: Single     Spouse name: Not on file    Number of children: Not on file    Years of education: Not on file    Highest education level: Not on file   Occupational History    Not on file   Tobacco Use    Smoking status: Former     Packs/day: 0.50     Years: 15.00     Pack years: 7.50     Types: Cigarettes     Quit date: 2004     Years since quittin.0    Smokeless tobacco: Former   Vaping Use    Vaping Use: Never used   Substance and Sexual Activity    Alcohol use: Yes     Alcohol/week: 8.4 oz     Types: 14 Glasses of wine per week     Comment: 2 per day    Drug use: Not Currently     Types: Inhaled     Comment: marijuana occ    Sexual activity: Not on file   Other Topics Concern    Not on file   Social History Narrative    Not on file     Social Determinants of Health     Financial Resource Strain: Not on file   Food Insecurity: Not on file   Transportation Needs: Not on file   Physical Activity: Not on file   Stress: Not on file   Social Connections: Not on file   Intimate Partner Violence: Not on file   Housing Stability: Not on file         Physical Exam:  Vitals/ General Appearance:   Weight/BMI: Body mass index is 27.43 kg/m².  /81   Pulse 77   Temp 36.7 °C (98 °F) (Temporal)   Resp 18   Ht 1.854 m (6' 1\")   Wt 94.3 kg (207 lb 14.3 oz)   SpO2 99%   Vitals:    23 0400 23 0500 23 0600 23 0733   BP: 109/81  109/81    Pulse: 78 75 72 77   Resp: 17  17 18   Temp: 36.4 °C (97.5 °F)  36.7 °C (98 °F)    TempSrc: Temporal  Temporal    SpO2: 99% 98% 97% 99%   Weight:       Height:         Oxygen Therapy:  Pulse Oximetry: 99 %, O2 (LPM): 4 (Titrated to RIOS RN aware.), O2 Delivery Device: Silicone Nasal Cannula    Constitutional:   Well developed, Well nourished, No acute distress.  HENMT:  " Normocephalic, Atraumatic  Eyes:  EOMI, Conjunctiva normal, No discharge.  Neck:  Normal range of motion  Lungs:  Normal effort. On supplemental O2 by NC.   Abdomen: Abdomen soft, nontender throughout, no ecchymosis or distention   : Normal external genitalia without lesion. Uncircumcised. 18fr duette dual balloon catheter in place draining orange-red urine.  Skin: Warm, Dry, No erythema, No rash, no induration.  Neurologic: Alert & oriented x 3, No focal deficits noted  Psychiatric: Affect normal, Judgment normal, Mood normal.      MDM (Data Review):     Records reviewed and summarized in current documentation    Lab Data Review:  Recent Results (from the past 24 hour(s))   CBC WITH DIFFERENTIAL    Collection Time: 07/19/23  5:30 AM   Result Value Ref Range    WBC 4.8 4.8 - 10.8 K/uL    RBC 4.02 (L) 4.70 - 6.10 M/uL    Hemoglobin 14.0 14.0 - 18.0 g/dL    Hematocrit 43.5 42.0 - 52.0 %    .2 (H) 81.4 - 97.8 fL    MCH 34.8 (H) 27.0 - 33.0 pg    MCHC 32.2 (L) 32.3 - 36.5 g/dL    RDW 60.9 (H) 35.9 - 50.0 fL    Platelet Count 120 (L) 164 - 446 K/uL    MPV 11.5 9.0 - 12.9 fL    Neutrophils-Polys 58.40 44.00 - 72.00 %    Lymphocytes 26.70 22.00 - 41.00 %    Monocytes 12.40 0.00 - 13.40 %    Eosinophils 1.90 0.00 - 6.90 %    Basophils 0.20 0.00 - 1.80 %    Immature Granulocytes 0.40 0.00 - 0.90 %    Nucleated RBC 0.00 0.00 - 0.20 /100 WBC    Neutrophils (Absolute) 2.82 1.82 - 7.42 K/uL    Lymphs (Absolute) 1.29 1.00 - 4.80 K/uL    Monos (Absolute) 0.60 0.00 - 0.85 K/uL    Eos (Absolute) 0.09 0.00 - 0.51 K/uL    Baso (Absolute) 0.01 0.00 - 0.12 K/uL    Immature Granulocytes (abs) 0.02 0.00 - 0.11 K/uL    NRBC (Absolute) 0.00 K/uL   Comp Metabolic Panel    Collection Time: 07/19/23  5:30 AM   Result Value Ref Range    Sodium 138 135 - 145 mmol/L    Potassium 4.7 3.6 - 5.5 mmol/L    Chloride 112 96 - 112 mmol/L    Co2 17 (L) 20 - 33 mmol/L    Anion Gap 9.0 7.0 - 16.0    Glucose 81 65 - 99 mg/dL    Bun 25 (H) 8 - 22  mg/dL    Creatinine 1.51 (H) 0.50 - 1.40 mg/dL    Calcium 7.9 (L) 8.4 - 10.2 mg/dL    AST(SGOT) 21 12 - 45 U/L    ALT(SGPT) 22 2 - 50 U/L    Alkaline Phosphatase 57 30 - 99 U/L    Total Bilirubin 0.5 0.1 - 1.5 mg/dL    Albumin 2.8 (L) 3.2 - 4.9 g/dL    Total Protein 5.3 (L) 6.0 - 8.2 g/dL    Globulin 2.5 1.9 - 3.5 g/dL    A-G Ratio 1.1 g/dL   MAGNESIUM    Collection Time: 07/19/23  5:30 AM   Result Value Ref Range    Magnesium 1.7 1.5 - 2.5 mg/dL   PHOSPHORUS    Collection Time: 07/19/23  5:30 AM   Result Value Ref Range    Phosphorus 2.9 2.5 - 4.5 mg/dL   CORRECTED CALCIUM    Collection Time: 07/19/23  5:30 AM   Result Value Ref Range    Correct Calcium 8.9 8.5 - 10.5 mg/dL   ESTIMATED GFR    Collection Time: 07/19/23  5:30 AM   Result Value Ref Range    GFR (CKD-EPI) 46 (A) >60 mL/min/1.73 m 2       Imaging/Procedures Review:    Reviewed    MDM (Assessment and Plan):     Active Hospital Problems    Diagnosis     Rupture of bladder with uroperitoneum [N32.89]     Hematuria [R31.9]     Acute cystitis with hematuria [N30.01]     Thrombocytopenia (HCC) [D69.6]     DNR (do not resuscitate) [Z66]     Stage 3b chronic kidney disease (HCC) [N18.32]     Acute on chronic renal failure (HCC) [N17.9, N18.9]     ACC/AHA stage C systolic heart failure (HCC) [I50.20]     Presence of biventricular automatic cardioverter/defibrillator (AICD) [Z95.810]     Paroxysmal atrial fibrillation (HCC) [I48.0]     Mixed hyperlipidemia [E78.2]     Essential hypertension, benign [I10]      82yoM with solitary kidney and LUTS due to BPH admitted for worsening hematuria requiring CBI. Improved considerably with continuous bladder irrigation night 1, however developed abdominal pain and SOB with repeat CT demonstrating bladder perforation x2 and urine leak overnight on 7/17.    Replaced daily with with an 18fr Duette catheter (please note balloons x2 - 10cc and 5cc; RN informed).     PLAN:    - Plan to monitor with catheter in place, if doing  well will need to keep in place for 1-2 weeks to allow for bladder to heal spontaneously  - Please monitor to ensure foreskin is pulled down over glans, RN and CNA aware  -Nursing to perform serial abdominal exams, alert Urology if worsening exam or HD unstable   -Nursing to hand irrigate daily for clots prn for poor drainage or clots   -Continue to hold anticoagulation   - Urology will continue to follow    Dr. Jaime has directed the plan of care.     Plan of care discussed with patient, CNA, and urology team.     Candie Wright PA-C  [facility]

## 2023-07-19 NOTE — FLOWSHEET NOTE
4 Eyes Skin Assessment Completed by MAYUR Vasquez and MAYUR Bautista.    Head WDL  Ears WDL  Nose WDL  Mouth WDL  Neck WDL  Breast/Chest WDL  Shoulder Blades WDL  Spine WDL  (R) Arm/Elbow/Hand Redness and Abrasion  (L) Arm/Elbow/Hand Scab  Abdomen WDL  Groin WDL  Scrotum/Coccyx/Buttocks WDL  (R) Leg WDL  (L) Leg Scab  (R) Heel/Foot/Toe WDLDry  (L) Heel/Foot/Toe WDLDry          Devices In Places Tele Box and Blood Pressure Cuff      Interventions In Place Pillows    Possible Skin Injury No    Pictures Uploaded Into Epic N/A  Wound Consult Placed N/A  RN Wound Prevention Protocol Ordered Yes

## 2023-07-19 NOTE — ASSESSMENT & PLAN NOTE
- I reviewed patient's CT scan on 7/18, which had shown the perforations with Duran catheter protruding.  Occurred after initial hematuria.  Holding Eliquis

## 2023-07-19 NOTE — PROGRESS NOTES
RN attempted to contact family to update them on Patient's room change. There was no answer and no option to leave a voicemail.

## 2023-07-19 NOTE — PROGRESS NOTES
Hospital Medicine Daily Progress Note    Date of Service  7/19/2023    Chief Complaint  Lencho Parker is a 82 y.o. 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  Hemoglobin was found to be stable.  Urology was consulted and recommended CBI. Patient was kept on Eliquis upon admission.     Patient was eval by [facility] who recommended a CT abdomen pelvis, possibly needs cystoscopy.  On 6 the morning of 7/18, patient had complaints of severe abdominal pain, was disoriented tried to call 911 to get help.  Patient was transferred to the ICU, had a worsening bleed   And high concern for bladder perforation.  CT scan that showed there was 2 perforation or defects in the superior bladder, the tip of the Daily catheter was protruding.  Urology reevaluated and determined the perforation may heal on its own.  Patient had Eliquis on board which urology did not feel safe to have an emergency exploration.  They recommended for serial abdominal exams, hand irrigate 18 [place]orean Daily catheter to remove blood clots, hold all anticoagulation and antiplatelet medication, possible plan for cystogram in 7 to 10 days.  Patient may need possible reimaging with CT cystogram prior to exploratory laparotomy and cystostomy closure.  Patient was downgraded on 7/19 from ICU.    Interval Problem Update    7/19:   Patient had been seen and evaluated by prior  Hospitalists and Intensivist up to this point.  -Hemoglobin I reviewed all labs.  Hemoglobin is 14.0.  Creatinine 1.57.  Magnesium 1.7 and replaced via IV.  - I reviewed patient's CT scan on 7/18, which had shown the perforations with Duran catheter protruding.  - I have spoken with urology [place], they are recommending continue monitoring, hold Eliquis.    I have discussed this patient's plan of care and discharge plan at IDT rounds today with Case Management, Nursing, Nursing leadership, and other members of the IDT team.    Consultants/Specialty  critical care and urology    Code Status  DNAR/DNI    Disposition  The patient is not medically cleared for discharge to home or a post-acute facility.      I have placed the appropriate orders for post-discharge needs.    Review of Systems  Review of Systems   Constitutional:  Negative for chills, fever and malaise/fatigue.   Respiratory:  Negative for cough and shortness of breath.    Cardiovascular:  Negative for chest pain and palpitations.   Gastrointestinal:  Negative for abdominal pain, constipation, diarrhea, nausea and vomiting.   Genitourinary:  Positive for hematuria.   Musculoskeletal:  Negative for back pain and joint pain.   Neurological:  Negative for dizziness and headaches.   All other systems reviewed and are negative.       Physical Exam  Temp:  [36.4 °C (97.5 °F)-37.3 °C (99.1 °F)] 36.4 °C (97.5 °F)  Pulse:  [72-87] 76  Resp:  [16-22] 16  BP: ()/(71-85) 109/78  SpO2:  [86 %-99 %] 95 %    Physical Exam  Vitals and nursing note reviewed.   Constitutional:       General: He is not in acute distress.     Appearance: He is not diaphoretic.   HENT:      Mouth/Throat:      Mouth: Mucous membranes are dry.      Pharynx: No oropharyngeal exudate.   Cardiovascular:      Rate and Rhythm: Normal rate and regular rhythm.      Pulses: Normal pulses.      Heart sounds: Normal heart sounds. No murmur heard.  Pulmonary:      Effort: Pulmonary effort is normal.  No respiratory distress.      Breath sounds: Normal breath sounds. No wheezing or rales.   Abdominal:      General: Bowel sounds are normal. There is no distension.      Tenderness: There is no abdominal tenderness.   Genitourinary:     Comments: Continue Duran catheter in place  Musculoskeletal:         General: No swelling or tenderness. Normal range of motion.   Skin:     General: Skin is warm.      Capillary Refill: Capillary refill takes less than 2 seconds.      Coloration: Skin is not jaundiced or pale.   Neurological:      General: No focal deficit present.      Mental Status: He is alert and oriented to person, place, and time. Mental status is at baseline.      Motor: No weakness.   Psychiatric:         Mood and Affect: Mood normal.         Behavior: Behavior normal.         Thought Content: Thought content normal.         Judgment: Judgment normal.         Fluids    Intake/Output Summary (Last 24 hours) at 7/19/2023 1352  Last data filed at 7/19/2023 1200  Gross per 24 hour   Intake 263.16 ml   Output 1575 ml   Net -1311.84 ml       Laboratory  Recent Labs     07/17/23  0255 07/18/23 0323 07/19/23  0530   WBC 5.0 5.9 4.8   RBC 4.28* 4.49* 4.02*   HEMOGLOBIN 14.9 15.5 14.0   HEMATOCRIT 45.4 47.9 43.5   .1* 106.7* 108.2*   MCH 34.8* 34.5* 34.8*   MCHC 32.8 32.4 32.2*   RDW 57.7* 58.4* 60.9*   PLATELETCT 144* 139* 120*   MPV 11.7 11.5 11.5     Recent Labs     07/17/23 0255 07/18/23  0323 07/19/23  0530   SODIUM 138 136 138   POTASSIUM 4.4 4.9 4.7   CHLORIDE 106 108 112   CO2 20 18* 17*   GLUCOSE 114* 124* 81   BUN 39* 30* 25*   CREATININE 2.16* 1.73* 1.51*   CALCIUM 8.4 8.2* 7.9*                   Imaging  DX-CHEST-PORTABLE (1 VIEW)   Final Result      Stable mild CHF pattern. No acute process or change.      CT-ABDOMEN-PELVIS W/O   Final Result         1. There are two perforation/defects within the superior bladder wall. The tip of the Duran catheter protrudes through one of these defects. There  is associated large amount of retroperitoneal and intraperitoneal fluid. Consistent with bladder/urine    leak. As detailed above.   2. Slightly increased tiny simple bilateral pleural effusions, colonic diverticulosis, and aneurysm with previous stent graft repair are again noted.      CT-ABDOMEN-PELVIS W/O   Final Result      1.  Surgical absence right kidney.      2.  No left hydronephrosis or urolithiasis.      3.  Duran catheter projects into a decompressed urinary bladder.      4.  4.4 cm aneurysm infrarenal abdominal aorta and aortic iliac stent graft again demonstrated.      5.  Proximal colectomy and ileocolic anastomosis. No evidence of bowel obstruction. No free fluid.      6.  Colon diverticula.           Assessment/Plan  * Rupture of bladder with uroperitoneum  Assessment & Plan  - I reviewed patient's CT scan on 7/18, which had shown the perforations with Duran catheter protruding.  Occurred after initial hematuria.  Holding Eliquis    Acute cystitis with hematuria  Assessment & Plan  I have continued IV ceftriaxone, end date 7/20  Urine culture and blood culture  Due to Eliquis use, perforation occurred after initial hematuria  Continue Duran catheter, holding Eliquis    Hematuria- (present on admission)  Assessment & Plan  Patient about a week ago started to have some difficulty with urination and clots in his urine.  Seen by urology who placed a Duran catheter-and a few days later he developed hematuria and lower abdominal pain  Seen by urology, they recommend continuing CBI    - I reviewed patient's CT scan on 7/18, which had shown the perforations with Duran catheter protruding.    Thrombocytopenia (HCC)- (present on admission)  Assessment & Plan  Mild thrombocytopenia, doubt contributing to hematuria  Monitor and transfuse if needed    120    ACC/AHA stage C systolic heart failure (HCC)- (present on admission)  Assessment & Plan  Chronic heart failure which at this point is well  controlled  Continue Coreg and losartan    Creatinine 1.57    Paroxysmal atrial fibrillation (HCC)- (present on admission)  Assessment & Plan  Continue with Eliquis and rate control with amiodarone and coreg  Urology recommending continuation of Eliquis    - I have spoken with urology [place], they are recommending continue monitoring, hold Eliquis.    Hypomagnesemia- (present on admission)  Assessment & Plan  Low serum magnesium levels, 1.7  I am replacing via IV, monitoring closely for hypotension side effect  I am repeating labs in AM    DNR (do not resuscitate)  Assessment & Plan  .    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 (AICD)- (present on admission)  Assessment & Plan  Stable and will need outpatient monitoring through cardiology on a chronic basis    Mixed hyperlipidemia- (present on admission)  Assessment & Plan  Low-fat low-cholesterol diet  Statin  Fasting lipid panel    Essential hypertension, benign- (present on admission)  Assessment & Plan  Optimize blood pressure management keep systolic blood pressure less than 140 diastolic under 90         VTE prophylaxis: SCDs/TEDs and pharmacologic prophylaxis contraindicated due to bladder perforation, hematuria    I have performed a physical exam and reviewed and updated ROS and Plan today (7/19/2023). In review of yesterday's note (7/18/2023), there are no changes except as documented above.      Total time spent 52 minutes.    This included my review of patient's overnight RN notes, face to face interview, physical examination, lab analysis.  Also includes my documented assessments and interventions above.  I spoke with specialist urology,  intensivist.  In addition, I spoke with entire care team on patient's treatment plan and DC planning on IDT rounds.

## 2023-07-19 NOTE — PROGRESS NOTES
12-hour chart check complete.    Monitor Summary  Rhythm: AV paced  Rate: 70's  Ectopy: occ PVC's  Measurements: .20/0.20/.48

## 2023-07-19 NOTE — CARE PLAN
The patient is Stable - Low risk of patient condition declining or worsening    Shift Goals  Clinical Goals: Abdominal assessments, monitor for s/s of severe sepsis, Surgery  Patient Goals: Pain control, go to surgery  Family Goals: JOCE    Progress made toward(s) clinical / shift goals:    Problem: Knowledge Deficit - Standard  Goal: Patient and family/care givers will demonstrate understanding of plan of care, disease process/condition, diagnostic tests and medications  Outcome: Progressing     Problem: Pain - Standard  Goal: Alleviation of pain or a reduction in pain to the patient’s comfort goal  Outcome: Progressing     Problem: Neuro Status  Goal: Neuro status will remain stable or improve  Outcome: Progressing     Problem: Respiratory  Goal: Patient will achieve/maintain optimum respiratory ventilation and gas exchange  Outcome: Progressing     Problem: Fall Risk  Goal: Patient will remain free from falls  Outcome: Progressing       Patient is not progressing towards the following goals:      Problem: Urinary - Renal Perfusion  Goal: Ability to achieve and maintain adequate renal perfusion and functioning will improve  Outcome: Not Progressing

## 2023-07-19 NOTE — ASSESSMENT & PLAN NOTE
Low serum magnesium levels, 1.7  I am replacing via IV, monitoring closely for hypotension side effect  I am repeating labs in AM

## 2023-07-20 ENCOUNTER — PATIENT OUTREACH (OUTPATIENT)
Dept: SCHEDULING | Facility: IMAGING CENTER | Age: 83
End: 2023-07-20
Payer: MEDICARE

## 2023-07-20 VITALS
RESPIRATION RATE: 18 BRPM | DIASTOLIC BLOOD PRESSURE: 86 MMHG | WEIGHT: 207.89 LBS | HEIGHT: 73 IN | TEMPERATURE: 97.8 F | SYSTOLIC BLOOD PRESSURE: 118 MMHG | OXYGEN SATURATION: 92 % | HEART RATE: 79 BPM | BODY MASS INDEX: 27.55 KG/M2

## 2023-07-20 LAB
ALBUMIN SERPL BCP-MCNC: 3 G/DL (ref 3.2–4.9)
BUN SERPL-MCNC: 26 MG/DL (ref 8–22)
CALCIUM ALBUM COR SERPL-MCNC: 9.3 MG/DL (ref 8.5–10.5)
CALCIUM SERPL-MCNC: 8.5 MG/DL (ref 8.4–10.2)
CHLORIDE SERPL-SCNC: 112 MMOL/L (ref 96–112)
CO2 SERPL-SCNC: 16 MMOL/L (ref 20–33)
CREAT SERPL-MCNC: 1.42 MG/DL (ref 0.5–1.4)
ERYTHROCYTE [DISTWIDTH] IN BLOOD BY AUTOMATED COUNT: 58.6 FL (ref 35.9–50)
GFR SERPLBLD CREATININE-BSD FMLA CKD-EPI: 50 ML/MIN/1.73 M 2
GLUCOSE SERPL-MCNC: 127 MG/DL (ref 65–99)
HCT VFR BLD AUTO: 46.8 % (ref 42–52)
HGB BLD-MCNC: 15.1 G/DL (ref 14–18)
MAGNESIUM SERPL-MCNC: 2.2 MG/DL (ref 1.5–2.5)
MCH RBC QN AUTO: 34.5 PG (ref 27–33)
MCHC RBC AUTO-ENTMCNC: 32.3 G/DL (ref 32.3–36.5)
MCV RBC AUTO: 106.8 FL (ref 81.4–97.8)
PHOSPHATE SERPL-MCNC: 2.7 MG/DL (ref 2.5–4.5)
PLATELET # BLD AUTO: 113 K/UL (ref 164–446)
PMV BLD AUTO: 11.8 FL (ref 9–12.9)
POTASSIUM SERPL-SCNC: 5.3 MMOL/L (ref 3.6–5.5)
RBC # BLD AUTO: 4.38 M/UL (ref 4.7–6.1)
SODIUM SERPL-SCNC: 140 MMOL/L (ref 135–145)
VIT B12 SERPL-MCNC: 1410 PG/ML (ref 211–911)
WBC # BLD AUTO: 4.6 K/UL (ref 4.8–10.8)

## 2023-07-20 PROCEDURE — A9270 NON-COVERED ITEM OR SERVICE: HCPCS | Performed by: HOSPITALIST

## 2023-07-20 PROCEDURE — 99239 HOSP IP/OBS DSCHRG MGMT >30: CPT | Performed by: INTERNAL MEDICINE

## 2023-07-20 PROCEDURE — 84207 ASSAY OF VITAMIN B-6: CPT

## 2023-07-20 PROCEDURE — 700102 HCHG RX REV CODE 250 W/ 637 OVERRIDE(OP): Performed by: HOSPITALIST

## 2023-07-20 PROCEDURE — 82607 VITAMIN B-12: CPT

## 2023-07-20 PROCEDURE — 85027 COMPLETE CBC AUTOMATED: CPT

## 2023-07-20 PROCEDURE — 94760 N-INVAS EAR/PLS OXIMETRY 1: CPT

## 2023-07-20 PROCEDURE — 84425 ASSAY OF VITAMIN B-1: CPT

## 2023-07-20 PROCEDURE — 36415 COLL VENOUS BLD VENIPUNCTURE: CPT

## 2023-07-20 PROCEDURE — 83735 ASSAY OF MAGNESIUM: CPT

## 2023-07-20 PROCEDURE — 700102 HCHG RX REV CODE 250 W/ 637 OVERRIDE(OP): Performed by: INTERNAL MEDICINE

## 2023-07-20 PROCEDURE — A9270 NON-COVERED ITEM OR SERVICE: HCPCS | Performed by: INTERNAL MEDICINE

## 2023-07-20 PROCEDURE — 80069 RENAL FUNCTION PANEL: CPT

## 2023-07-20 RX ORDER — LACTULOSE 20 G/30ML
30 SOLUTION ORAL ONCE
Status: COMPLETED | OUTPATIENT
Start: 2023-07-20 | End: 2023-07-20

## 2023-07-20 RX ORDER — FOLIC ACID 1 MG/1
1 TABLET ORAL DAILY
Status: DISCONTINUED | OUTPATIENT
Start: 2023-07-20 | End: 2023-07-20 | Stop reason: HOSPADM

## 2023-07-20 RX ORDER — LANOLIN ALCOHOL/MO/W.PET/CERES
100 CREAM (GRAM) TOPICAL DAILY
Qty: 30 TABLET | Refills: 3 | Status: SHIPPED | OUTPATIENT
Start: 2023-07-21 | End: 2023-08-20

## 2023-07-20 RX ORDER — GAUZE BANDAGE 2" X 2"
100 BANDAGE TOPICAL DAILY
Status: DISCONTINUED | OUTPATIENT
Start: 2023-07-20 | End: 2023-07-20 | Stop reason: HOSPADM

## 2023-07-20 RX ORDER — FOLIC ACID 1 MG/1
1 TABLET ORAL DAILY
Qty: 30 TABLET | Refills: 3 | Status: SHIPPED | OUTPATIENT
Start: 2023-07-21 | End: 2023-08-20

## 2023-07-20 RX ORDER — CHOLECALCIFEROL (VITAMIN D3) 125 MCG
1000 CAPSULE ORAL DAILY
Status: DISCONTINUED | OUTPATIENT
Start: 2023-07-20 | End: 2023-07-20 | Stop reason: HOSPADM

## 2023-07-20 RX ADMIN — CYANOCOBALAMIN TAB 500 MCG 1000 MCG: 500 TAB at 06:45

## 2023-07-20 RX ADMIN — CINACALCET HYDROCHLORIDE 30 MG: 30 TABLET, FILM COATED ORAL at 04:39

## 2023-07-20 RX ADMIN — THIAMINE HCL TAB 100 MG 100 MG: 100 TAB at 06:45

## 2023-07-20 RX ADMIN — SENNOSIDES AND DOCUSATE SODIUM 2 TABLET: 50; 8.6 TABLET ORAL at 04:37

## 2023-07-20 RX ADMIN — AMIODARONE HYDROCHLORIDE 200 MG: 200 TABLET ORAL at 04:38

## 2023-07-20 RX ADMIN — LOSARTAN POTASSIUM 100 MG: 50 TABLET, FILM COATED ORAL at 04:38

## 2023-07-20 RX ADMIN — LACTULOSE 30 ML: 10 SOLUTION ORAL; RECTAL at 10:30

## 2023-07-20 RX ADMIN — FOLIC ACID 1 MG: 1 TABLET ORAL at 06:45

## 2023-07-20 RX ADMIN — TAMSULOSIN HYDROCHLORIDE 0.4 MG: 0.4 CAPSULE ORAL at 04:38

## 2023-07-20 RX ADMIN — CARVEDILOL 25 MG: 25 TABLET, FILM COATED ORAL at 04:39

## 2023-07-20 RX ADMIN — SPIRONOLACTONE 12.5 MG: 25 TABLET ORAL at 04:43

## 2023-07-20 RX ADMIN — ALBUTEROL SULFATE 2 PUFF: 90 AEROSOL, METERED RESPIRATORY (INHALATION) at 07:38

## 2023-07-20 RX ADMIN — Medication 1000 UNITS: at 04:43

## 2023-07-20 NOTE — PROGRESS NOTES
Telemetry shift summary:  Rhythm: 100% Paced      HR Range: 70's to 80's      Measurements from strip printed at 00:46:15   ID: [id number]   QRS 0.16   QT 0.50     Ectopies (As per Telemetry Tech report) rare PVC and trigeminy.

## 2023-07-20 NOTE — DISCHARGE INSTRUCTIONS
Bladder Perforation:    You have 2 small holes in the top of your bladder that are healing with the catheter in place. Warning signs that you may need emergent care are bloating/swelling of the abdomen, worsening lower abdominal pain, new onset shortness of breath, and light headedness or syncopal episode. Please seek emergent medical attention if these occur.

## 2023-07-20 NOTE — PROGRESS NOTES
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 Patient reports the hematuria has been ongoing for ~1 week, was seen in our office where a catheter was placed and hand irrigation performed. He is on tamsulosin 0.8mg daily, denies any bothersome lower urinary tract symptoms although notes his urinary stream has progressively weakened. He does not have a history of urinary retention. UA was not suspicious for infection. H/H found to be normal at 16.2/49.3. 26fr 3-way daily catheter was placed in the ED and CBI was started. He reported \"bladder burning\" and denies F/C/N/V/abdominal pain.     Interval History:    7/20. Transferred out of ICU yesterday. Patient feeling well this morning, ready to go home. Denies abdominal pain or bloating. Discussed plan for catheter and bladder perforation.     7/19. Patient resting comfortably in bed this morning. Hemodynamically stable. Benign abdominal exam, denies tenderness. Good UOP from daily, 1445cc/24 hours, orange-light red. No clots on hand irrigation. H/H stable, 14.0/43.5. Cr 1.51, improving.     Review of Systems:      Gastrointestinal/Hepatic: Denies abdominal pain.   Genitourinary: -hematuria    All other systems were reviewed and are negative (AMA/CMS criteria)                Past Medical History:   Past Medical History:   Diagnosis Date    Breath shortness     on exertion    Cardiomyopathy (HCC) 05/2021    Echocardiogram with moderately dilated LV, mild concentric LVH, LVEF 20%. Global hypokinesis. Normal RA and RV. Severely dilated LA. Mild MR, mild TR. RVSP 34mmHg.    [place] measles     Heart attack (HCC)     hx 2007    History of abdominal aortic aneurysm (AAA) 2009    Status post stent    Hyperlipidemia     Hypertension     Mumps     Pacemaker     AICD    Paroxysmal " atrial fibrillation (HCC)     Status post Watchman procedure in [place].    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     Sleep apnea     Snoring     Stroke (HCC) 2012    TIA    Tonsillitis     Ventricular tachycardia (HCC)      Active Hospital Problems    Diagnosis     Hypomagnesemia [E83.42]     Rupture of bladder with uroperitoneum [N32.89]     Hematuria [R31.9]     Acute cystitis with hematuria [N30.01]     Thrombocytopenia (HCC) [D69.6]     DNR (do not resuscitate) [Z66]     Stage 3b chronic kidney disease (HCC) [N18.32]     Acute on chronic renal failure (HCC) [N17.9, N18.9]     ACC/AHA stage C systolic heart failure (HCC) [I50.20]     Presence of biventricular automatic cardioverter/defibrillator (AICD) [Z95.810]     Paroxysmal atrial fibrillation (HCC) [I48.0]     Mixed hyperlipidemia [E78.2]     Essential hypertension, benign [I10]        Past Surgical History:  Past Surgical History:   Procedure Laterality Date    AICD BATTERY CHANGE Left 09/10/2020     Upgrade to FluGen MRI Quad CRT-D [id number] implanted by Dr. Barrios.    AICD BATTERY CHANGE Left 01/03/2017    Generator replacement with [id number] MRI XT  [id number] implanted in [place].    OTHER CARDIAC SURGERY  2014    watchman device    AAA WITH STENT GRAFT  2009    OTHER ORTHOPEDIC SURGERY  2005    hip surgery    OTHER  2000    kidney removed    OTHER CARDIAC SURGERY  1991    AICD implanted       Hospital Medications:    Current Facility-Administered Medications:     thiamine (Vitamin B-1) tablet 100 mg, 100 mg, Oral, DAILY, Quintin Young M.D., 100 mg at 07/20/23 0645    folic acid (Folvite) tablet 1 mg, 1 mg, Oral, DAILY, Quintin Young M.D., 1 mg at 07/20/23 0645    cyanocobalamin (Vitamin B-12) tablet 1,000 mcg, 1,000 mcg, Oral, DAILY, Quintin Young M.D., 1,000 mcg at 07/20/23 0645    morphine 4 MG/ML injection 4 mg, 4 mg, Intravenous, Q3HRS PRN, Velvet Bal M.D., 4 mg at 07/18/23 0819    HYDROmorphone (Dilaudid)  injection 1 mg, 1 mg, Intravenous, Q3HRS PRN, Velvet Bal M.D., 1 mg at 07/18/23 0258    cefTRIAXone (Rocephin) 2,000 mg in  mL IVPB, 2,000 mg, Intravenous, Q EVENING, Brian Arroyo M.D., Stopped at 07/19/23 1817    albuterol inhaler 2 Puff, 2 Puff, Inhalation, Q6HRS PRN, Gloria Rooney M.D., 2 Puff at 07/20/23 0738    amiodarone (Cordarone) tablet 200 mg, 200 mg, Oral, DAILY, Gloria Rooney M.D., 200 mg at 07/20/23 0438    carvedilol (Coreg) tablet 25 mg, 25 mg, Oral, QAM, Gloria Rooney M.D., 25 mg at 07/20/23 0439    cinacalcet (Sensipar) tablet 30 mg, 30 mg, Oral, DAILY, VALDO FriasDCris, 30 mg at 07/20/23 0439    losartan (Cozaar) tablet 100 mg, 100 mg, Oral, DAILY, SADE Frias.D., 100 mg at 07/20/23 0438    spironolactone (Aldactone) tablet 12.5 mg, 12.5 mg, Oral, DAILY, VALDO FriasD., 12.5 mg at 07/20/23 0443    tamsulosin (Flomax) capsule 0.4 mg, 0.4 mg, Oral, BID, VALDO FriasDCris, 0.4 mg at 07/20/23 0438    vitamin D3 (Cholecalciferol) tablet 1,000 Units, 1,000 Units, Oral, DAILY, VALDO FriasD., 1,000 Units at 07/20/23 0443    senna-docusate (Pericolace Or Senokot S) 8.6-50 MG per tablet 2 Tablet, 2 Tablet, Oral, BID, 2 Tablet at 07/20/23 0437 **AND** polyethylene glycol/lytes (Miralax) PACKET 1 Packet, 1 Packet, Oral, QDAY PRN **AND** magnesium hydroxide (Milk Of Magnesia) suspension 30 mL, 30 mL, Oral, QDAY PRN **AND** bisacodyl (Dulcolax) suppository 10 mg, 10 mg, Rectal, QDAY PRN, Gloria Rooney M.D.    Respiratory Therapy Consult, , Nebulization, Continuous RT, Gloria Rooney M.D.    acetaminophen (Tylenol) tablet 650 mg, 650 mg, Oral, Q6HRS PRN, Gloria Rooney M.D.    Notify provider if pain remains uncontrolled, , , CONTINUOUS **AND** Use the Numeric Rating Scale (NRS), Silva-Baker Faces (WBF), or FLACC on regular floors and Critical-Care Pain Observation Tool (CPOT) on ICUs/Trauma to assess pain, , , CONTINUOUS **AND** Pulse Ox, , , CONTINUOUS **AND**  Pharmacy Consult Request ...Pain Management Review 1 Each, 1 Each, Other, PHARMACY TO DOSE **AND** If patient difficult to arouse and/or has respiratory depression (respiratory rate of 10 or less), stop any opiates that are currently infusing and call a Rapid Response., , , CONTINUOUS, Gloria Rooney M.D.    labetalol (Normodyne/Trandate) injection 10 mg, 10 mg, Intravenous, Q4HRS PRN, Gloria Rooney M.D.    ondansetron (Zofran) syringe/vial injection 4 mg, 4 mg, Intravenous, Q4HRS PRN, Gloria Rooney M.D.    ondansetron (Zofran ODT) dispertab 4 mg, 4 mg, Oral, Q4HRS PRN, Gloria Rooney M.D.    Current Outpatient Medications:  Medications Prior to Admission   Medication Sig Dispense Refill Last Dose    cefdinir (OMNICEF) 300 MG Cap Take 300 mg by mouth 2 times a day. Pt started a 7 day course on 7/14 7/16/2023 at 0630    carvedilol (COREG) 25 MG Tab Take 25 mg by mouth every morning. Once daily   7/16/2023 at AM    vitamin D3 (CHOLECALCIFEROL) 1000 Unit (25 mcg) Tab Take 1,000 Units by mouth every day.   7/16/2023 at AM    furosemide (LASIX) 20 MG Tab Take 20 mg by mouth every Monday and Thursday.   7/13/2023 at [facility]    Multiple Vitamins-Minerals (MULTIVITAMIN ADULT EXTRA C PO) Take 1 Tablet by mouth every day.   7/16/2023 at AM    Vericiguat (VERQUVO) 5 MG Tab Take 5 mg by mouth every day. 30 Tablet 11 7/16/2023 at AM    spironolactone (ALDACTONE) 25 MG Tab Take 0.5 Tablets by mouth every day. 45 Tablet 3 7/16/2023 at AM    losartan (COZAAR) 100 MG Tab Take 1 Tablet by mouth every day. 90 Each 4 7/16/2023 at AM    amiodarone (CORDARONE) 200 MG Tab Take 1 Tablet by mouth every day. 90 Tablet 4 7/16/2023 at AM    apixaban (ELIQUIS) 2.5mg Tab Take 1 Tablet by mouth 2 times a day. 60 Tablet 1 7/16/2023 at AM    tamsulosin (FLOMAX) 0.4 MG capsule Take 0.4 mg by mouth 2 times a day.   7/16/2023 at AM    cinacalcet (SENSIPAR) 30 MG Tab Take 30 mg by mouth every day.   7/16/2023 at AM    Omega-3 Fatty Acids (FISH OIL)  "1000 MG Cap capsule Take 1,000 mg by mouth every evening.   2023 at AM    albuterol 108 (90 Base) MCG/ACT Aero Soln inhalation aerosol Inhale 2 Puffs by mouth every 6 hours as needed for Shortness of Breath. 8.5 g 0 3 days       Medication Allergy:  Allergies   Allergen Reactions    Entresto [Sacubitril-Valsartan]      Swollen tongue. Angioedema.    Crestor [Rosuvastatin]        Family History:  Family History   Problem Relation Age of Onset    Cancer Mother     Cancer Sister        Social History:  Social History     Socioeconomic History    Marital status: Single     Spouse name: Not on file    Number of children: Not on file    Years of education: Not on file    Highest education level: Not on file   Occupational History    Not on file   Tobacco Use    Smoking status: Former     Packs/day: 0.50     Years: 15.00     Pack years: 7.50     Types: Cigarettes     Quit date: 2004     Years since quittin.0    Smokeless tobacco: Former   Vaping Use    Vaping Use: Never used   Substance and Sexual Activity    Alcohol use: Yes     Alcohol/week: 8.4 oz     Types: 14 Glasses of wine per week     Comment: 2 per day    Drug use: Not Currently     Types: Inhaled     Comment: marijuana occ    Sexual activity: Not on file   Other Topics Concern    Not on file   Social History Narrative    Not on file     Social Determinants of Health     Financial Resource Strain: Not on file   Food Insecurity: Not on file   Transportation Needs: Not on file   Physical Activity: Not on file   Stress: Not on file   Social Connections: Not on file   Intimate Partner Violence: Not on file   Housing Stability: Not on file         Physical Exam:  Vitals/ General Appearance:   Weight/BMI: Body mass index is 27.43 kg/m².  /86   Pulse 80   Temp 36.6 °C (97.9 °F) (Oral)   Resp 18   Ht 1.854 m (6' 1\")   Wt 94.3 kg (207 lb 14.3 oz)   SpO2 95%   Vitals:    23 2313 23 0438 23 0708 23 0752   BP: 107/84 (!) " 114/92 120/86    Pulse: 78 83 81 80   Resp: 18 18 18 18   Temp: 36.4 °C (97.5 °F) 36.4 °C (97.6 °F) 36.6 °C (97.9 °F)    TempSrc: Axillary Axillary Oral    SpO2: 98% 97% 95% 95%   Weight:       Height:         Oxygen Therapy:  Pulse Oximetry: 95 %, O2 (LPM): 0, O2 Delivery Device: Room air w/o2 available    Constitutional:   Well developed, Well nourished, No acute distress.  HENMT:  Normocephalic, Atraumatic  Eyes:  EOMI, Conjunctiva normal, No discharge.  Neck:  Normal range of motion  Lungs:  Normal effort.   Abdomen: Abdomen soft, nontender throughout, no ecchymosis or distention   : Normal external genitalia without lesion. Uncircumcised. 18fr duette dual balloon catheter in place draining yelow urine.   Skin: Warm, Dry, No erythema, No rash, no induration.  Neurologic: Alert & oriented x 3, No focal deficits noted  Psychiatric: Affect normal, Judgment normal, Mood normal.      MDM (Data Review):     Records reviewed and summarized in current documentation    Lab Data Review:  Recent Results (from the past 24 hour(s))   Renal Function Panel    Collection Time: 07/20/23  1:08 AM   Result Value Ref Range    Sodium 140 135 - 145 mmol/L    Potassium 5.3 3.6 - 5.5 mmol/L    Chloride 112 96 - 112 mmol/L    Co2 16 (L) 20 - 33 mmol/L    Glucose 127 (H) 65 - 99 mg/dL    Creatinine 1.42 (H) 0.50 - 1.40 mg/dL    Bun 26 (H) 8 - 22 mg/dL    Calcium 8.5 8.4 - 10.2 mg/dL    Phosphorus 2.7 2.5 - 4.5 mg/dL    Albumin 3.0 (L) 3.2 - 4.9 g/dL   MAGNESIUM    Collection Time: 07/20/23  1:08 AM   Result Value Ref Range    Magnesium 2.2 1.5 - 2.5 mg/dL   CBC WITHOUT DIFFERENTIAL    Collection Time: 07/20/23  1:08 AM   Result Value Ref Range    WBC 4.6 (L) 4.8 - 10.8 K/uL    RBC 4.38 (L) 4.70 - 6.10 M/uL    Hemoglobin 15.1 14.0 - 18.0 g/dL    Hematocrit 46.8 42.0 - 52.0 %    .8 (H) 81.4 - 97.8 fL    MCH 34.5 (H) 27.0 - 33.0 pg    MCHC 32.3 32.3 - 36.5 g/dL    RDW 58.6 (H) 35.9 - 50.0 fL    Platelet Count 113 (L) 164 - 446 K/uL     MPV 11.8 9.0 - 12.9 fL   CORRECTED CALCIUM    Collection Time: 07/20/23  1:08 AM   Result Value Ref Range    Correct Calcium 9.3 8.5 - 10.5 mg/dL   ESTIMATED GFR    Collection Time: 07/20/23  1:08 AM   Result Value Ref Range    GFR (CKD-EPI) 50 (A) >60 mL/min/1.73 m 2       Imaging/Procedures Review:    Reviewed    MDM (Assessment and Plan):     Active Hospital Problems    Diagnosis     Hypomagnesemia [E83.42]     Rupture of bladder with uroperitoneum [N32.89]     Hematuria [R31.9]     Acute cystitis with hematuria [N30.01]     Thrombocytopenia (HCC) [D69.6]     DNR (do not resuscitate) [Z66]     Stage 3b chronic kidney disease (HCC) [N18.32]     Acute on chronic renal failure (HCC) [N17.9, N18.9]     ACC/AHA stage C systolic heart failure (HCC) [I50.20]     Presence of biventricular automatic cardioverter/defibrillator (AICD) [Z95.810]     Paroxysmal atrial fibrillation (HCC) [I48.0]     Mixed hyperlipidemia [E78.2]     Essential hypertension, benign [I10]      82yoM with solitary kidney and LUTS due to BPH admitted for worsening hematuria requiring CBI. Improved considerably with continuous bladder irrigation night 1, however developed abdominal pain and SOB with repeat CT demonstrating bladder perforation x2 and urine leak overnight on 7/17.    Replaced daily with with an 18fr Duette catheter (please note balloons x2 - 10cc and 5cc; RN informed).     PLAN:    - Please keep duette catheter in place to allow for bladder healing. Patient will have follow up with our office in ~1 week with cystogram prior for possible removal. We will arrange this.   - Please monitor to ensure foreskin is pulled down over glans to prevent paraphimosis   - Continue to hold anticoagulation if possible   - From Urology standpoint, patient is stable for discharge. Urology to sign off at this time. Appreciate this consult.     Dr. Jaime has directed the plan of care.     Plan of care discussed with patient and urology team.      Candie Wright PA-C  [facility]

## 2023-07-20 NOTE — PROGRESS NOTES
Bedside report received from Christina MERCHANT and assumed Pt's cares. Call light within reach. Safety measures in place.

## 2023-07-20 NOTE — CARE PLAN
The patient is Stable - Low risk of patient condition declining or worsening    Shift Goals  Clinical Goals: Monitor Urinary ouput, monitor for urinary blood clots  Patient Goals: Sleep  Family Goals: No family present    Progress made toward(s) clinical / shift goals:  Pt still has hematuria, good urinary flow with no clots.     Patient is not progressing towards the following goals:      Problem: Knowledge Deficit - Standard  Goal: Patient and family/care givers will demonstrate understanding of plan of care, disease process/condition, diagnostic tests and medications  Outcome: Progressing     Problem: Pain - Standard  Goal: Alleviation of pain or a reduction in pain to the patient’s comfort goal  Outcome: Progressing     Problem: Skin Integrity  Goal: Skin integrity is maintained or improved  Outcome: Progressing     Problem: Fall Risk  Goal: Patient will remain free from falls  Outcome: Progressing

## 2023-07-20 NOTE — DISCHARGE SUMMARY
Discharge Summary    CHIEF COMPLAINT ON ADMISSION  No chief complaint on file.      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  Hemoglobin was found to be stable.  Urology was consulted and recommended CBI. Patient was kept on Eliquis upon admission.     Patient was eval by [facility] [place] who recommended a CT abdomen pelvis, possibly needs cystoscopy.  On 6 the morning of 7/18, patient had complaints of severe abdominal pain, was disoriented tried to call 911 to get help.  Patient was transferred to the ICU, had a worsening bleed   And high concern for bladder perforation.  CT scan that showed there was 2 perforation or defects in the superior bladder, the tip of the Duran catheter was protruding.  Urology reevaluated and determined the perforation may heal on its own.  Patient had Eliquis on board which urology did not feel safe to have an emergency exploration.  They recommended for serial abdominal exams, hand irrigate 18 Italian Duran catheter to remove blood clots, hold all anticoagulation and antiplatelet medication, possible plan for cystogram in 7 to 10 days.  Patient may need possible reimaging with CT cystogram prior to exploratory laparotomy and cystostomy closure.  Patient was downgraded on 7/19 from ICU.    Patient did well after Duette catheter was placed.  He had no further hematochezia.  We had continue to  hold Eliquis.  At this time [facility] [place] has decided patient will need to continue with the Duette catheter for at least another week until they follow-up in clinic which they may repeat a cystogram.  At this time on discharge, I have held patient's Eliquis as he will require ongoing urological procedures.  Patient's hemoglobin is 15.1 on discharge.  His renal function is 1.42 creatinine, GFR is 50%.  Patient was agreeing and eager to go home.    Therefore, he is discharged in fair and stable condition to home with close outpatient follow-up.    The patient met 2-midnight criteria for an inpatient stay at the time of discharge.    Discharge Date  7/20/2023    FOLLOW UP ITEMS POST DISCHARGE  07/20/2023    DISCHARGE DIAGNOSES  Principal Problem:    Rupture of bladder with uroperitoneum (POA: Unknown)  Active Problems:    Hematuria (POA: Yes)    Acute cystitis with hematuria (POA: Unknown)    Paroxysmal atrial fibrillation (HCC) (POA: Yes)      Overview: Status post Watchman procedure in [place]. Now off of anticoagulation.    ACC/AHA stage C systolic heart failure (HCC) (POA: Yes)    Thrombocytopenia (HCC) (POA: Yes)    Essential hypertension, benign (POA: Yes)    Mixed hyperlipidemia (POA: Yes)    Presence of biventricular automatic cardioverter/defibrillator (AICD) (POA: Yes)      Overview: September 2020: Upgrade to Medtronic Unfoldia MRI Quad CRT-D [id number]       implanted by Dr. Barrios.      January 2017: Generator replacement with Medtronic Evera MRI XT  [id number]       implanted in [place].    Acute on chronic renal failure (HCC) (POA: Yes)    Stage 3b chronic kidney disease (HCC) (Chronic) (POA: Yes)    DNR (do not resuscitate) (POA: Unknown)    Hypomagnesemia (POA: Yes)  Resolved Problems:    * No resolved hospital problems. *      FOLLOW UP  Future Appointments   Date Time Provider Department Center   8/15/2023  8:15 AM [facility] EXAM 9 ECHO [facility]   8/15/2023  3:30 PM [facility] None     Florian MCKEON  ANTONIETA Jaime  [address]  [address]  [place] [address]  [phone]    Follow up  Our office will call patient to schedule follow up for daily removal. Patient will need to complete cystogram the morning of his appointment with us.    Brennan Castorena M.D.  [address] [place] [address]  [phone]    Go on 7/20/2023  [facility] appointment with a primary care provider for a hospital follow up. Thank you.      MEDICATIONS ON DISCHARGE     Medication List        START taking these medications        Instructions   folic acid 1 MG Tabs  Start taking on: July 21, 2023  Commonly known as: Folvite   Take 1 Tablet by mouth every day for 30 days.  Dose: 1 mg     thiamine 100 MG tablet  Start taking on: July 21, 2023  Commonly known as: Thiamine   Take 1 Tablet by mouth every day for 30 days.  Dose: 100 mg            CONTINUE taking these medications        Instructions   albuterol 108 (90 Base) MCG/ACT Aers inhalation aerosol   Inhale 2 Puffs by mouth every 6 hours as needed for Shortness of Breath.  Dose: 2 Puff     amiodarone 200 MG Tabs  Commonly known as: Cordarone   Take 1 Tablet by mouth every day.  Dose: 200 mg     carvedilol 25 MG Tabs  Commonly known as: Coreg   Take 25 mg by mouth every morning. Once daily  Dose: 25 mg     cefdinir 300 MG Caps  Commonly known as: Omnicef   Take 300 mg by mouth 2 times a day. Pt started a 7 day course on 7/14  Dose: 300 mg     cinacalcet 30 MG Tabs  Commonly known as: Sensipar   Take 30 mg by mouth every day.  Dose: 30 mg     fish oil 1000 MG Caps capsule   Take 1,000 mg by mouth every evening.  Dose: 1,000 mg     furosemide 20 MG Tabs  Commonly known as: Lasix   Take 20 mg by mouth every Monday and Thursday.  Dose: 20 mg     losartan 100 MG Tabs  Commonly known as: Cozaar   Take 1 Tablet by mouth every day.  Dose: 100 mg     MULTIVITAMIN ADULT EXTRA C PO   Take 1 Tablet by mouth every day.  Dose: 1 Tablet     spironolactone 25 MG  Tabs  Commonly known as: Aldactone   Take 0.5 Tablets by mouth every day.  Dose: 12.5 mg     tamsulosin 0.4 MG capsule  Commonly known as: Flomax   Take 0.4 mg by mouth 2 times a day.  Dose: 0.4 mg     Verquvo 5 MG Tabs  Generic drug: Vericiguat   Take 5 mg by mouth every day.  Dose: 5 mg     vitamin D3 1000 Unit (25 mcg) Tabs  Commonly known as: Cholecalciferol   Take 1,000 Units by mouth every day.  Dose: 1,000 Units            STOP taking these medications      Eliquis 2.5mg Tabs  Generic drug: apixaban              Allergies  Allergies   Allergen Reactions    Entresto [Sacubitril-Valsartan]      Swollen tongue. Angioedema.    Crestor [Rosuvastatin]        DIET  Cardiac    ACTIVITY  As tolerated.  Weight bearing as tolerated    CONSULTATIONS  [facility] [place]    PROCEDURES  Bedside Duette Catheter placement    LABORATORY  Lab Results   Component Value Date    SODIUM 140 07/20/2023    POTASSIUM 5.3 07/20/2023    CHLORIDE 112 07/20/2023    CO2 16 (L) 07/20/2023    GLUCOSE 127 (H) 07/20/2023    BUN 26 (H) 07/20/2023    CREATININE 1.42 (H) 07/20/2023        Lab Results   Component Value Date    WBC 4.6 (L) 07/20/2023    HEMOGLOBIN 15.1 07/20/2023    HEMATOCRIT 46.8 07/20/2023    PLATELETCT 113 (L) 07/20/2023        Total time of the discharge process exceeds 35 minutes.  More than 50% of time was spent face to face with patient.  This included but not limited to review of hospital course with patient, treatment goals upon discharge, recommendations to PCP, continued and new medications and their adverse reactions, and nursing instructions for patient.          DX-CHEST-PORTABLE (1 VIEW)   Final Result      Stable mild CHF pattern. No acute process or change.      CT-ABDOMEN-PELVIS W/O   Final Result         1. There are two perforation/defects within the superior bladder wall. The tip of the Duran catheter protrudes through one of these defects. There is associated large amount of retroperitoneal and intraperitoneal  fluid. Consistent with bladder/urine    leak. As detailed above.   2. Slightly increased tiny simple bilateral pleural effusions, colonic diverticulosis, and aneurysm with previous stent graft repair are again noted.      CT-ABDOMEN-PELVIS W/O   Final Result      1.  Surgical absence right kidney.      2.  No left hydronephrosis or urolithiasis.      3.  Duran catheter projects into a decompressed urinary bladder.      4.  4.4 cm aneurysm infrarenal abdominal aorta and aortic iliac stent graft again demonstrated.      5.  Proximal colectomy and ileocolic anastomosis. No evidence of bowel obstruction. No free fluid.      6.  Colon diverticula.

## 2023-07-21 ENCOUNTER — TELEPHONE (OUTPATIENT)
Dept: HEALTH INFORMATION MANAGEMENT | Facility: OTHER | Age: 83
End: 2023-07-21

## 2023-07-21 LAB
BACTERIA BLD CULT: NORMAL
BACTERIA BLD CULT: NORMAL
SIGNIFICANT IND 70042: NORMAL
SIGNIFICANT IND 70042: NORMAL
SITE SITE: NORMAL
SITE SITE: NORMAL
SOURCE SOURCE: NORMAL
SOURCE SOURCE: NORMAL

## 2023-07-23 LAB
VIT B1 BLD-MCNC: 154 NMOL/L (ref 70–180)
VIT B6 SERPL-MCNC: 31.1 NMOL/L (ref 20–125)

## 2023-07-31 ENCOUNTER — HOSPITAL ENCOUNTER (OUTPATIENT)
Dept: RADIOLOGY | Facility: MEDICAL CENTER | Age: 83
End: 2023-07-31
Attending: UROLOGY
Payer: MEDICARE

## 2023-07-31 DIAGNOSIS — R31.0 GROSS HEMATURIA: ICD-10-CM

## 2023-07-31 PROCEDURE — 51600 INJECTION FOR BLADDER X-RAY: CPT

## 2023-07-31 PROCEDURE — 700117 HCHG RX CONTRAST REV CODE 255: Performed by: UROLOGY

## 2023-07-31 RX ADMIN — IOHEXOL 25 ML: 300 INJECTION, SOLUTION INTRAVENOUS at 16:31

## 2023-07-31 RX ADMIN — IOHEXOL 100 ML: 300 INJECTION, SOLUTION INTRAVENOUS at 16:29

## 2023-08-02 ENCOUNTER — HOSPITAL ENCOUNTER (EMERGENCY)
Facility: MEDICAL CENTER | Age: 83
End: 2023-08-02
Attending: EMERGENCY MEDICINE
Payer: COMMERCIAL

## 2023-08-02 VITALS
TEMPERATURE: 97.1 F | HEIGHT: 74 IN | OXYGEN SATURATION: 94 % | BODY MASS INDEX: 23.71 KG/M2 | SYSTOLIC BLOOD PRESSURE: 98 MMHG | HEART RATE: 84 BPM | WEIGHT: 184.75 LBS | RESPIRATION RATE: 17 BRPM | DIASTOLIC BLOOD PRESSURE: 63 MMHG

## 2023-08-02 DIAGNOSIS — R33.8 ACUTE URINARY RETENTION: ICD-10-CM

## 2023-08-02 PROCEDURE — 51702 INSERT TEMP BLADDER CATH: CPT

## 2023-08-02 PROCEDURE — 99284 EMERGENCY DEPT VISIT MOD MDM: CPT

## 2023-08-02 PROCEDURE — 51798 US URINE CAPACITY MEASURE: CPT

## 2023-08-02 PROCEDURE — 303105 HCHG CATHETER EXTRA

## 2023-08-02 ASSESSMENT — FIBROSIS 4 INDEX: FIB4 SCORE: 3.17

## 2023-08-02 NOTE — ED NOTES
Reviewed discharge instructions w/ pt, verbalized understanding to information provided including follow up care w/ Urology, return precautions and daily care, denied questions/concerns.  Pt ambulated from ED without difficulty.

## 2023-08-02 NOTE — ED TRIAGE NOTES
"Chief Complaint   Patient presents with    Urinary Retention     Pt states had daily catheter removed yesterday by Urology, states is having difficulty voiding and feels \"bloated\"     BP 96/73   Pulse 92   Temp 36.2 °C (97.1 °F) (Temporal)   Resp 18   Ht 1.88 m (6' 2\")   Wt 83.8 kg (184 lb 11.9 oz)   SpO2 94%   BMI 23.72 kg/m²     "

## 2023-08-02 NOTE — ED NOTES
Patient ambulates to the ER complaining of inability to urinate, reports that he was in the hospital for a week and had a urinary catheter placed which was removed on 8/1/23 at 1000 and he has not been able to urinate since. Reports feeling like he has to urinate very badly but is only able to produce very minimal urin.

## 2023-08-02 NOTE — ED NOTES
"ED Tech noted > 999mL urine in bladder.  16F daily catheter placed.  Pt voiced discomfort during placement, once catheter in placed and 800mL urine drained, pt voiced feeling \"relief.\"    "

## 2023-08-02 NOTE — ED NOTES
800mL urine drained from daily catheter.  Catheter switched to leg bag.  Pt able to verbalize daily catheter care and usage.

## 2023-08-02 NOTE — ED PROVIDER NOTES
"  ER Provider Note    Scribed for Florian Hassan M.D. by Sue Cueva. 8/2/2023   6:37 AM    Primary Care Provider: Brennan Castorena M.D.    CHIEF COMPLAINT  Chief Complaint   Patient presents with    Urinary Retention     Pt states had daily catheter removed yesterday by Urology, states is having difficulty voiding and feels \"bloated\"     EXTERNAL RECORDS REVIEWED  Inpatient Notes The patient was hospitalized on 7/16/23 for urinary retention.    HPI/ROS  LIMITATION TO HISTORY   Select: : None  OUTSIDE HISTORIAN(S):  None noted    Lencho Parker is a 82 y.o. male who presents to the ED for evaluation of urinary retention. The patient states that he had hematuria and had a catheter placed. The patient states he was hospitalized for 4 days starting on 7/16/23. He reports he had his catheter was removed yesterday at 10 am and then began to have urinary retention. There are no known alleviating or exacerbating factors.     PAST MEDICAL HISTORY  Past Medical History:   Diagnosis Date    Breath shortness     on exertion    Cardiomyopathy (HCC) 05/2021    Echocardiogram with moderately dilated LV, mild concentric LVH, LVEF 20%. Global hypokinesis. Normal RA and RV. Severely dilated LA. Mild MR, mild TR. RVSP 34mmHg.    [place] measles     Heart attack (HCC)     hx 2007    History of abdominal aortic aneurysm (AAA) 2009    Status post stent    Hyperlipidemia     Hypertension     Mumps     Pacemaker     AICD    Paroxysmal atrial fibrillation (HCC)     Status post Watchman procedure in [place].    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     Sleep apnea     Snoring     Stroke (HCC) 2012    TIA    Tonsillitis     Ventricular tachycardia (HCC)        SURGICAL HISTORY  Past Surgical History:   Procedure Laterality Date    AICD BATTERY CHANGE Left 09/10/2020     Upgrade to Medtronic Claria MRI Quad CRT-D [id number] implanted by Dr. Barrios.    AICD BATTERY CHANGE Left 01/03/2017    Generator replacement with Medtronic Evera " MRI XT  [id number] implanted in [place].    OTHER CARDIAC SURGERY  2014    watchman device    AAA WITH STENT GRAFT  2009    OTHER ORTHOPEDIC SURGERY  2005    hip surgery    OTHER  2000    kidney removed    OTHER CARDIAC SURGERY  1991    AICD implanted       FAMILY HISTORY  Family History   Problem Relation Age of Onset    Cancer Mother     Cancer Sister        SOCIAL HISTORY   reports that he has quit smoking. His smoking use included cigarettes. He smoked an average of .5 packs per day. He has quit using smokeless tobacco. He reports current alcohol use of about 8.4 oz of alcohol per week. He reports current drug use. Drug: Inhaled.    CURRENT MEDICATIONS  Discharge Medication List as of 8/2/2023  7:02 AM        CONTINUE these medications which have NOT CHANGED    Details   thiamine (THIAMINE) 100 MG tablet Take 1 Tablet by mouth every day for 30 days., Disp-30 Tablet, R-3, Normal      folic acid (FOLVITE) 1 MG Tab Take 1 Tablet by mouth every day for 30 days., Disp-30 Tablet, R-3, Normal      cefdinir (OMNICEF) 300 MG Cap Take 300 mg by mouth 2 times a day. Pt started a 7 day course on 7/14, Historical Med      carvedilol (COREG) 25 MG Tab Take 25 mg by mouth every morning. Once daily, Historical Med      vitamin D3 (CHOLECALCIFEROL) 1000 Unit (25 mcg) Tab Take 1,000 Units by mouth every day., Historical Med      furosemide (LASIX) 20 MG Tab Take 20 mg by mouth every Monday and Thursday., Historical Med      Multiple Vitamins-Minerals (MULTIVITAMIN ADULT EXTRA C PO) Take 1 Tablet by mouth every day., Historical Med      Vericiguat (VERQUVO) 5 MG Tab Take 5 mg by mouth every day., Disp-30 Tablet, R-11, Print Rx Paper      spironolactone (ALDACTONE) 25 MG Tab Take 0.5 Tablets by mouth every day., Disp-45 Tablet, R-3, Print Rx Paper      losartan (COZAAR) 100 MG Tab Take 1 Tablet by mouth every day., Disp-90 Each, R-4, Print Rx Paper      amiodarone (CORDARONE) 200 MG Tab Take 1 Tablet by mouth every day., Disp-90 Tablet,  "R-4, Print Rx Paper      tamsulosin (FLOMAX) 0.4 MG capsule Take 0.4 mg by mouth 2 times a day., Historical Med      cinacalcet (SENSIPAR) 30 MG Tab Take 30 mg by mouth every day., Historical Med      Omega-3 Fatty Acids (FISH OIL) 1000 MG Cap capsule Take 1,000 mg by mouth every evening., Historical Med      albuterol 108 (90 Base) MCG/ACT Aero Soln inhalation aerosol Inhale 2 Puffs by mouth every 6 hours as needed for Shortness of Breath., Disp-8.5 g, R-0, Print Rx Paper             ALLERGIES  Allergies   Allergen Reactions    Entresto [Sacubitril-Valsartan]      Swollen tongue. Angioedema.    Crestor [Rosuvastatin]         PHYSICAL EXAM  BP 96/73   Pulse 92   Temp 36.2 °C (97.1 °F) (Temporal)   Resp 18   Ht 1.88 m (6' 2\")   Wt 83.8 kg (184 lb 11.9 oz)   SpO2 94%   BMI 23.72 kg/m²      Nursing note and vitals reviewed.  Constitutional: Well-developed and well-nourished. No distress.   HENT: Head is normocephalic and atraumatic. Oropharynx is clear and moist without exudate or erythema.   Eyes: Pupils are equal, round, and reactive to light. Conjunctiva are normal.   Cardiovascular: Normal rate and regular rhythm. No murmur heard. Normal radial pulses.  Pulmonary/Chest: Breath sounds normal. No wheezes or rales.   Abdominal: Soft and non-tender. No distention    : Daily cather in place draining clear yellow urine, feels significantly improved.  Musculoskeletal: Extremities exhibit normal range of motion without edema or tenderness.   Neurological: Awake, alert and oriented to person, place, and time. No focal deficits noted.  Skin: Skin is warm and dry. No rash.   Psychiatric: Normal mood and affect. Appropriate for clinical situation       INITIAL ASSESSMENT AND PLAN    6:37 AM - Patient was evaluated at bedside for urinary retention. The patient has a daily cather in place draining clear yellow urine and feels significantly improved. Will plan for bladder scan. Patient verbalizes understanding and " support with my plan of care. I instructed the patient to return the the ED if he has a reoccurrence of urinary retention. The patient present's today with reoccurring urinary retention.    ED Observation Status? No; Patient does not meet criteria for ED Observation.        DISPOSITION AND DISCUSSIONS    I have discussed management of the patient with the following physicians and JARED's:  None.    Discussion of management with other Q or appropriate source(s): None     Escalation of care considered, and ultimately not performed: acute inpatient care management, however at this time, the patient is most appropriate for outpatient management. Patient will follow up with urology.    Barriers to care at this time, including but not limited to:  None .     Decision tools and prescription drugs considered including, but not limited to: Antibiotics: I considered prescribing antibiotics, however I have not identified a bacterial source of infection. .     The patient will return for new or worsening symptoms and is stable at the time of discharge.    The patient is referred to a primary physician for blood pressure management, diabetic screening, and for all other preventative health concerns.    DISPOSITION:  Patient will be discharged home in stable condition.    FOLLOW UP:  Brennan Castorena M.D.  [address] [facility] [address]  [phone]    Schedule an appointment as soon as possible for a visit       [facility], Emergency Dept  [address] [facility] [id number] [phone]    If symptoms worsen    Cedric Montgomery M.D.  [address] [facility] [address]  [phone]    Schedule an appointment as soon as possible for a visit   urology. call today    FINAL DIAGNOSIS  1. Acute urinary retention         Sue URIARTE (Scrfabrice), am scribing for, and in the presence of, Florian Hassan M.D..    Electronically signed by: Sue Scott), 8/2/2023    Florian URIARTE  M.D. personally performed the services described in this documentation, as scribed by Sue Cueva in my presence, and it is both accurate and complete.      The note accurately reflects work and decisions made by me.  Florian Hassan M.D.  8/2/2023  1:15 PM

## 2023-08-09 ENCOUNTER — TELEPHONE (OUTPATIENT)
Dept: CARDIOLOGY | Facility: MEDICAL CENTER | Age: 83
End: 2023-08-09
Payer: MEDICARE

## 2023-08-09 NOTE — TELEPHONE ENCOUNTER
Ksenia Velázquez M.D.  You; Joselin Posada, Med Ass't 2 minutes ago (2:14 PM)     Will see him at next visit and need device interrogation then.     Ksenia Velázquez M.D.

## 2023-08-09 NOTE — TELEPHONE ENCOUNTER
Remote Transmission 8/9/2023:    Pt presenting in [facility], episode appears to have begun 8/8/2023@1:25 am.    Mode Switch@25.5%.  Pt has a Watchman.    Full report scanned into media.

## 2023-08-15 ENCOUNTER — HOSPITAL ENCOUNTER (OUTPATIENT)
Dept: CARDIOLOGY | Facility: MEDICAL CENTER | Age: 83
End: 2023-08-15
Attending: INTERNAL MEDICINE
Payer: MEDICARE

## 2023-08-15 VITALS
DIASTOLIC BLOOD PRESSURE: 72 MMHG | SYSTOLIC BLOOD PRESSURE: 124 MMHG | HEART RATE: 82 BPM | HEIGHT: 74 IN | WEIGHT: 181 LBS | OXYGEN SATURATION: 92 % | BODY MASS INDEX: 23.23 KG/M2 | RESPIRATION RATE: 18 BRPM

## 2023-08-15 DIAGNOSIS — J44.9 CHRONIC OBSTRUCTIVE PULMONARY DISEASE, UNSPECIFIED COPD TYPE (HCC): ICD-10-CM

## 2023-08-15 DIAGNOSIS — I48.0 HYPERCOAGULABLE STATE DUE TO PAROXYSMAL ATRIAL FIBRILLATION (HCC): ICD-10-CM

## 2023-08-15 DIAGNOSIS — I51.89 LEFT VENTRICULAR SYSTOLIC DYSFUNCTION, NYHA CLASS 3: ICD-10-CM

## 2023-08-15 DIAGNOSIS — Z79.899 HIGH RISK MEDICATION USE: ICD-10-CM

## 2023-08-15 DIAGNOSIS — D68.69 HYPERCOAGULABLE STATE DUE TO PAROXYSMAL ATRIAL FIBRILLATION (HCC): ICD-10-CM

## 2023-08-15 DIAGNOSIS — I47.20 VENTRICULAR TACHYCARDIA (HCC): ICD-10-CM

## 2023-08-15 DIAGNOSIS — R06.09 DYSPNEA ON EXERTION: ICD-10-CM

## 2023-08-15 DIAGNOSIS — E78.2 MIXED HYPERLIPIDEMIA: ICD-10-CM

## 2023-08-15 DIAGNOSIS — Z95.810 PRESENCE OF BIVENTRICULAR AUTOMATIC CARDIOVERTER/DEFIBRILLATOR (AICD): ICD-10-CM

## 2023-08-15 DIAGNOSIS — I10 ESSENTIAL HYPERTENSION, BENIGN: ICD-10-CM

## 2023-08-15 DIAGNOSIS — I50.20 ACC/AHA STAGE C SYSTOLIC HEART FAILURE (HCC): ICD-10-CM

## 2023-08-15 DIAGNOSIS — I73.9 PVD (PERIPHERAL VASCULAR DISEASE) (HCC): ICD-10-CM

## 2023-08-15 DIAGNOSIS — I48.0 PAROXYSMAL ATRIAL FIBRILLATION (HCC): ICD-10-CM

## 2023-08-15 DIAGNOSIS — I10 HTN (HYPERTENSION), MALIGNANT: ICD-10-CM

## 2023-08-15 DIAGNOSIS — G47.31 COMPLEX SLEEP APNEA SYNDROME: ICD-10-CM

## 2023-08-15 LAB — EKG IMPRESSION: NORMAL

## 2023-08-15 PROCEDURE — 93306 TTE W/DOPPLER COMPLETE: CPT

## 2023-08-15 PROCEDURE — 99214 OFFICE O/P EST MOD 30 MIN: CPT | Mod: 25 | Performed by: INTERNAL MEDICINE

## 2023-08-15 PROCEDURE — 3074F SYST BP LT 130 MM HG: CPT | Performed by: INTERNAL MEDICINE

## 2023-08-15 PROCEDURE — 99213 OFFICE O/P EST LOW 20 MIN: CPT | Mod: 25 | Performed by: INTERNAL MEDICINE

## 2023-08-15 PROCEDURE — 93005 ELECTROCARDIOGRAM TRACING: CPT | Performed by: INTERNAL MEDICINE

## 2023-08-15 PROCEDURE — 3078F DIAST BP <80 MM HG: CPT | Performed by: INTERNAL MEDICINE

## 2023-08-15 PROCEDURE — 93010 ELECTROCARDIOGRAM REPORT: CPT | Performed by: INTERNAL MEDICINE

## 2023-08-15 RX ORDER — CARVEDILOL 25 MG/1
25 TABLET ORAL EVERY MORNING
Qty: 180 TABLET | Refills: 4 | Status: SHIPPED | OUTPATIENT
Start: 2023-08-15 | End: 2023-10-13 | Stop reason: SDUPTHER

## 2023-08-15 RX ORDER — LOSARTAN POTASSIUM 100 MG/1
100 TABLET ORAL DAILY
Qty: 90 EACH | Refills: 4 | Status: SHIPPED | OUTPATIENT
Start: 2023-08-15 | End: 2023-10-13 | Stop reason: SDUPTHER

## 2023-08-15 RX ORDER — SPIRONOLACTONE 25 MG/1
12.5 TABLET ORAL DAILY
Qty: 45 TABLET | Refills: 3 | Status: SHIPPED | OUTPATIENT
Start: 2023-08-15 | End: 2023-10-13 | Stop reason: SDUPTHER

## 2023-08-15 RX ORDER — AMIODARONE HYDROCHLORIDE 200 MG/1
200 TABLET ORAL DAILY
Qty: 90 TABLET | Refills: 4 | Status: SHIPPED | OUTPATIENT
Start: 2023-08-15 | End: 2023-10-13 | Stop reason: SDUPTHER

## 2023-08-15 ASSESSMENT — ENCOUNTER SYMPTOMS
DEPRESSION: 0
FALLS: 0
ABDOMINAL PAIN: 0
HEADACHES: 0
BLURRED VISION: 0
DIAPHORESIS: 0
DIZZINESS: 0
SHORTNESS OF BREATH: 1
MYALGIAS: 0
DOUBLE VISION: 0
BRUISES/BLEEDS EASILY: 0
PALPITATIONS: 0
MEMORY LOSS: 0
FEVER: 0
COUGH: 0
SENSORY CHANGE: 0

## 2023-08-15 ASSESSMENT — FIBROSIS 4 INDEX: FIB4 SCORE: 3.21

## 2023-08-15 NOTE — PROGRESS NOTES
Chief Complaint   Patient presents with    Congestive Heart Failure     F/V Dx: ACC/AHA stage C systolic heart failure (HCC)    Transient Ischemic Attack    Hypertension       Subjective:   Lencho Parker is an 83 y.o. male who presents today for cardiac care and management due to cardiac issues of nonischemic cardiomyopathy diagnosed in 2009, with most recent cholangiogram in 2016 showing nonobstructive coronary to disease, status post dual-chamber ICD placed in 2010, paroxysmal atrial fibrillation status post watchman device in October 2015, prior history of ventricular tachycardia status post ablation in 2012, hypertension, hyperlipidemia, treated AAA, prior history of TIA without residual neurological deficits.    Of note, patient was seen by his cardiologist in [place] before he moved to [facility].  He was initially started on Entresto therapy but developed angioedema.    His most recent transthoracic echocardiogram showed LV function of 10%.  He is status post CardioMEMS implantation for remote monitoring of volume status. Numbers have been good.    LVEF of 10% with global hypokinesis. No significant valvular disease. I have independently interpreted and reviewed echocardiogram's actual images. 09/2022.    LVEF of 10% with global hypokinesis. Mild MR. I have independently interpreted and reviewed echocardiogram's actual images. 08/2023.    I have independently interpreted and reviewed blood tests results with patient in clinic which shows normal LDL level 92, elevated triglycerides 161, renal and liver function. GFR is 29.    Became dehydrated with Farxiga and was in hospital. Now on Jardiance.    09/2022 Had stroke even with Watchman in place. Now on Eliquis 2.5 mg bid.    08/2023 Had blood in urine. Eliquis was stopped.    Now walks with a cane. Patient still gets winded with daily living activities and exertion. No symptoms at rest.      Past Medical History:   Diagnosis Date    Breath  shortness     on exertion    Cardiomyopathy (HCC) 05/2021    Echocardiogram with moderately dilated LV, mild concentric LVH, LVEF 20%. Global hypokinesis. Normal RA and RV. Severely dilated LA. Mild MR, mild TR. RVSP 34mmHg.    [place] measles     Heart attack (HCC)     hx 2007    History of abdominal aortic aneurysm (AAA) 2009    Status post stent    Hyperlipidemia     Hypertension     Mumps     Pacemaker     AICD    Paroxysmal atrial fibrillation (HCC)     Status post Watchman procedure in [place].    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     Sleep apnea     Snoring     Stroke (HCC) 2012    TIA    Tonsillitis     Ventricular tachycardia (HCC)      Past Surgical History:   Procedure Laterality Date    AICD BATTERY CHANGE Left 09/10/2020     Upgrade to [employer] MRI Quad CRT-D [id number] implanted by Dr. Barrios.    AICD BATTERY CHANGE Left 01/03/2017    Generator replacement with [employer] MRI XT  [id number] implanted in [place].    OTHER CARDIAC SURGERY  2014    watchman device    AAA WITH STENT GRAFT  2009    OTHER ORTHOPEDIC SURGERY  2005    hip surgery    OTHER  2000    kidney removed    OTHER CARDIAC SURGERY  1991    AICD implanted     Family History   Problem Relation Age of Onset    Cancer Mother     Cancer Sister      Social History     Socioeconomic History    Marital status: Single     Spouse name: Not on file    Number of children: Not on file    Years of education: Not on file    Highest education level: Not on file   Occupational History    Not on file   Tobacco Use    Smoking status: Former     Packs/day: 0.50     Types: Cigarettes    Smokeless tobacco: Former   Vaping Use    Vaping Use: Never used   Substance and Sexual Activity    Alcohol use: Yes     Alcohol/week: 8.4 oz     Types: 14 Glasses of wine per week    Drug use: Yes     Types: Inhaled     Comment: marijuana    Sexual activity: Not on file   Other Topics Concern    Not on file   Social History Narrative    Not on file     Social  Determinants of Health     Financial Resource Strain: Not on file   Food Insecurity: Not on file   Transportation Needs: Not on file   Physical Activity: Not on file   Stress: Not on file   Social Connections: Not on file   Intimate Partner Violence: Not on file   Housing Stability: Not on file     Allergies   Allergen Reactions    Entresto [Sacubitril-Valsartan]      Swollen tongue. Angioedema.    Crestor [Rosuvastatin]      Outpatient Encounter Medications as of 8/15/2023   Medication Sig Dispense Refill    thiamine (THIAMINE) 100 MG tablet Take 1 Tablet by mouth every day for 30 days. 30 Tablet 3    folic acid (FOLVITE) 1 MG Tab Take 1 Tablet by mouth every day for 30 days. 30 Tablet 3    cefdinir (OMNICEF) 300 MG Cap Take 300 mg by mouth 2 times a day. Pt started a 7 day course on 7/14      carvedilol (COREG) 25 MG Tab Take 25 mg by mouth every morning. Once daily      vitamin D3 (CHOLECALCIFEROL) 1000 Unit (25 mcg) Tab Take 1,000 Units by mouth every day.      furosemide (LASIX) 20 MG Tab Take 20 mg by mouth every Monday and Thursday.      Multiple Vitamins-Minerals (MULTIVITAMIN ADULT EXTRA C PO) Take 1 Tablet by mouth every day.      Vericiguat (VERQUVO) 5 MG Tab Take 5 mg by mouth every day. 30 Tablet 11    spironolactone (ALDACTONE) 25 MG Tab Take 0.5 Tablets by mouth every day. 45 Tablet 3    losartan (COZAAR) 100 MG Tab Take 1 Tablet by mouth every day. 90 Each 4    amiodarone (CORDARONE) 200 MG Tab Take 1 Tablet by mouth every day. 90 Tablet 4    tamsulosin (FLOMAX) 0.4 MG capsule Take 0.4 mg by mouth 2 times a day.      cinacalcet (SENSIPAR) 30 MG Tab Take 30 mg by mouth every day.      Omega-3 Fatty Acids (FISH OIL) 1000 MG Cap capsule Take 1,000 mg by mouth every evening.      albuterol 108 (90 Base) MCG/ACT Aero Soln inhalation aerosol Inhale 2 Puffs by mouth every 6 hours as needed for Shortness of Breath. 8.5 g 0     No facility-administered encounter medications on file as of 8/15/2023.     Review  "of Systems   Constitutional:  Negative for diaphoresis and fever.   HENT:  Negative for nosebleeds.    Eyes:  Negative for blurred vision and double vision.   Respiratory:  Positive for shortness of breath. Negative for cough.    Cardiovascular:  Negative for chest pain and palpitations.   Gastrointestinal:  Negative for abdominal pain.   Genitourinary:  Negative for dysuria and frequency.   Musculoskeletal:  Negative for falls and myalgias.   Skin:  Negative for rash.   Neurological:  Negative for dizziness, sensory change and headaches.   Endo/Heme/Allergies:  Does not bruise/bleed easily.   Psychiatric/Behavioral:  Negative for depression and memory loss.         Objective:   /72 (BP Location: Left arm, Patient Position: Sitting, BP Cuff Size: Adult)   Pulse 82   Resp 18   Ht 1.88 m (6' 2\")   Wt 82.1 kg (181 lb)   SpO2 92%   BMI 23.24 kg/m²     Physical Exam  Vitals and nursing note reviewed.   Constitutional:       General: He is not in acute distress.     Appearance: He is not diaphoretic.   HENT:      Head: Normocephalic and atraumatic.      Right Ear: External ear normal.      Left Ear: External ear normal.   Eyes:      General:         Right eye: No discharge.         Left eye: No discharge.   Neck:      Thyroid: No thyromegaly.      Vascular: No JVD.   Cardiovascular:      Rate and Rhythm: Normal rate and regular rhythm.      Comments: Ausculation was not performed to minimize the risk of COVID spread during current pandemic. This complies with Medicare policies and guidelines.    Pulmonary:      Effort: No respiratory distress.   Abdominal:      General: There is no distension.      Tenderness: There is no abdominal tenderness.   Skin:     General: Skin is warm and dry.   Neurological:      Mental Status: He is alert and oriented to person, place, and time.      Cranial Nerves: No cranial nerve deficit.   Psychiatric:         Behavior: Behavior normal.         Assessment:     1. Essential " hypertension, benign  EKG          Medical Decision Making:  Today's Assessment / Status / Plan:   Non-ischemic Cardiomyopathy LVEF of 15% with already optimized medical therapy:  Chronically illed condition which requires ongoing close monitoring and treatment to improve survival rate along with decreasing risk of clinical decompensation and hospitalization.    Today, based on physical examination findings, patient is euvolemic. No JVD, lungs are clear to auscultation, no pitting edema in bilateral lower extremities, no ascites.     Dry weight is 186 lbs.     Continue coreg 25 mg po bid.     Will continue Losartan 100 mg once a day. NO Entresto due to anaphylactic shock.    Based on recent data on SGLT2 and heart failure with reduced ejection fraction, patient will be benefited from Jardiance 10 mg p.o. once a day for further reduction in mortality and hospitalization with absolute risk reduction of 5.2%.  Therefore, I will start patient on Jardiance 10 mg p.o. once a day.  Risks and benefits were explained to patient and patient has agreed to proceed.     Diuretic with Lasix 20 mg changed to as needed.  Patient was instructed to monitor symptoms of lightheadedness or syncope rather than actual numbers.      Aldactone antagonist with Spironolactone 12.5 mg daily.    At this time, s/p CRT upgrade by adding LV lead via coronary sinus.    Continue remote monitor with MEMs.    Based on recent data on Vericiguat's FDA approval and indication for symptomatic HF patients with recent hospitalization (within the last 6 months) and LVEF of less than 45%, at this time, I will increase Verquvo to 10 mg once a day for potential benefits of cardiovascular risk reduction in mortality and heart failure hospitalization.    At this time, I discussed with patient the possibility of LVAD in the event of worsening clinical status. He is not too enthusiastic about it. He will think about it.    Paroxysmal Atrial fibrillation:  Now on  anticoagulation, already with Watchman device but still had breakthrough with stroke in 09/2022.  No significnat mode switching seen on recent interrogation in 05/2022.    Hypertension:  Optimize control using cardiomyopathy medical regimen as well.    Hyperlipidemia:  Optimize statin as within guidelines of CAD treatment as above.     I will continue to closely monitor for side effects of patient's high risk medication(s) including liver, renal function and electrolytes.    Ksenia Velázquez M.D.

## 2023-08-16 LAB
LV EJECT FRACT  99904: 10
LV EJECT FRACT MOD 2C 99903: 10.64
LV EJECT FRACT MOD 4C 99902: 26.95
LV EJECT FRACT MOD BP 99901: 18.52

## 2023-08-16 PROCEDURE — 93306 TTE W/DOPPLER COMPLETE: CPT | Mod: 26 | Performed by: INTERNAL MEDICINE

## 2023-09-05 ENCOUNTER — TELEPHONE (OUTPATIENT)
Dept: CARDIOLOGY | Facility: MEDICAL CENTER | Age: 83
End: 2023-09-05
Payer: MEDICARE

## 2023-09-05 NOTE — TELEPHONE ENCOUNTER
Last OV: 8/15/2023  Proposed Surgery: Bipolar Transurethral Resection of Prostate   Surgery Date: 10/19/2023  Requesting Office Name: [facility]  Fax Number: [phone]  Preference of Location (default is surgery center unless specified by Cardiologist or JARED)  Prior Clearance Addressed: No      Anticoags/Antiplatelets: Other None  Outstanding Cardiac Imaging : No  Stent, Cardiac Devices, or Catheterization: No  Ablation, TAVR/Valve (including open heart), Cardioversion: No  Recent Cardiac Hospitalization: Yes  Date:  7/16/2023            When: Hospitalized in the last 6 months. Forward to provider to review.   History (cardiac history):   Past Medical History:   Diagnosis Date    Breath shortness     on exertion    Cardiomyopathy (HCC) 05/2021    Echocardiogram with moderately dilated LV, mild concentric LVH, LVEF 20%. Global hypokinesis. Normal RA and RV. Severely dilated LA. Mild MR, mild TR. RVSP 34mmHg.    [place] measles     Heart attack (HCC)     hx 2007    History of abdominal aortic aneurysm (AAA) 2009    Status post stent    Hyperlipidemia     Hypertension     Mumps     Pacemaker     AICD    Paroxysmal atrial fibrillation (HCC)     Status post Watchman procedure in [place].    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     Sleep apnea     Snoring     Stroke (HCC) 2012    TIA    Tonsillitis     Ventricular tachycardia (HCC)              Surgical Clearance Letter Sent: NO. Provider to advise.   **Scan clearance request letter into [facility].**

## 2023-09-05 NOTE — LETTER
PROCEDURE/SURGERY CLEARANCE FORM    Date: 9/14/2023   Patient Name: Lencho Parker    Dear Surgeon or Proceduralist,      Thank you for your request for cardiac stratification of our mutual patient Lencho Parker 1940. We have reviewed their [facility] records; and to the best of our understanding this patient has not had stenting, ablation, cardiothoracic surgery or hospitalization for cardiovascular reasons in the past 6 months.  Lencho Parker has been seen within the past 18 months and is considered to have non-modifiable cardiac risk for this low-risk procedure/surgery (Bipolar Transurethral Resection of Prostate). They may proceed from a cardiovascular standpoint and may hold their antiplatelet/anticoagulation as briefly as possible. Please have patient resume this medication when hemodynamically stable to do so.     Aspirin or Prasugrel   - hold 7 days prior to procedure/surgery, resume when hemodynamically stable      Clopidrogrel or Ticagrelor  - hold 7 days for all neurological procedures, hold 5 days prior to all other procedure/surgery,  resume when hemodynamically stable     Warfarin - hold 7 days for all neurological procedures, hold 5 days prior to all other procedure/surgery and coordinate with [facility] ([phone]) INR testing and dose management.      Pradaxa/Xarelto/Eliquis/Savesya - hold 1 day prior to procedure for low bleeding risk procedure, 2 days for high bleeding risk procedure, or consider holding 3 days or longer for patients with reduced kidney function (CrCl <30mL/min) or spinal/cranial surgeries/procedures.      If they have a mechanical heart valve, please coordinate with [facility] ([phone]) the proper management of their anticoagulant in the periprocedural or perioperative period.      Some patients have higher risk for cardiovascular complications or holding medication. If our patient has had prior  complications of holding antiplatelet or anticoagulants in the past and we have seen them after these events, we have addressed these concerns with the patient. They are at an unknown degree of increased risk for recurrent complication.  You may hold anticoagulation/antiplatelets for the procedure or surgery if the benefits of the procedure or surgery outweigh this nonmodifiable risk.      If Lencho Parker 1940 has new symptoms of heart failure decompensation, unstable arrythmia, or angina please reach out and we will assess the patient.      If you have other patient-specific concerns, please feel free to reach out to the patient's cardiologist directly at [phone].     Thank you,       [facility]

## 2023-09-09 ENCOUNTER — NON-PROVIDER VISIT (OUTPATIENT)
Dept: CARDIOLOGY | Facility: MEDICAL CENTER | Age: 83
End: 2023-09-09
Payer: MEDICARE

## 2023-09-09 PROCEDURE — 93295 DEV INTERROG REMOTE 1/2/MLT: CPT | Performed by: INTERNAL MEDICINE

## 2023-09-11 NOTE — CARDIAC REMOTE MONITOR - SCAN
Device transmission reviewed. Device demonstrated appropriate function. AT/AF being undersensed.      Electronically Signed by: Rupert Jay M.D.    9/19/2023  4:05 PM

## 2023-10-10 ENCOUNTER — TELEPHONE (OUTPATIENT)
Dept: CARDIOLOGY | Facility: MEDICAL CENTER | Age: 83
End: 2023-10-10
Payer: MEDICARE

## 2023-10-10 NOTE — TELEPHONE ENCOUNTER
Remote transmission received via home monitor, patient is presenting as persistent AT/AF at this time, AT/AF burden has increased to 23%, full report scanned into Media.

## 2023-10-10 NOTE — TELEPHONE ENCOUNTER
Phone number called: [phone]    Call outcome: Voicemail reached. Message left with number provided to return call.

## 2023-10-10 NOTE — TELEPHONE ENCOUNTER
Ksenia Velázquez M.D.  You; Holli Pitt, Med Ass't 2 minutes ago (3:56 PM)     Please bring patient in to see me ASAP.     Thanks   Ksenia Velázquez M.D.      To schedulers: Please reach out to schedule office visit with TT soonest available, thank you!

## 2023-10-13 ENCOUNTER — OFFICE VISIT (OUTPATIENT)
Dept: CARDIOLOGY | Facility: MEDICAL CENTER | Age: 83
End: 2023-10-13
Attending: INTERNAL MEDICINE
Payer: MEDICARE

## 2023-10-13 VITALS
HEART RATE: 82 BPM | SYSTOLIC BLOOD PRESSURE: 112 MMHG | DIASTOLIC BLOOD PRESSURE: 52 MMHG | WEIGHT: 178 LBS | BODY MASS INDEX: 22.84 KG/M2 | HEIGHT: 74 IN | OXYGEN SATURATION: 97 % | RESPIRATION RATE: 16 BRPM

## 2023-10-13 DIAGNOSIS — I47.20 VENTRICULAR TACHYCARDIA (HCC): ICD-10-CM

## 2023-10-13 DIAGNOSIS — J44.9 CHRONIC OBSTRUCTIVE PULMONARY DISEASE, UNSPECIFIED COPD TYPE (HCC): ICD-10-CM

## 2023-10-13 DIAGNOSIS — I50.20 ACC/AHA STAGE C SYSTOLIC HEART FAILURE (HCC): ICD-10-CM

## 2023-10-13 DIAGNOSIS — I48.0 PAF (PAROXYSMAL ATRIAL FIBRILLATION) (HCC): ICD-10-CM

## 2023-10-13 DIAGNOSIS — Z95.810 PRESENCE OF BIVENTRICULAR AUTOMATIC CARDIOVERTER/DEFIBRILLATOR (AICD): ICD-10-CM

## 2023-10-13 DIAGNOSIS — I10 HTN (HYPERTENSION), MALIGNANT: ICD-10-CM

## 2023-10-13 DIAGNOSIS — E78.2 MIXED HYPERLIPIDEMIA: ICD-10-CM

## 2023-10-13 DIAGNOSIS — R06.09 DYSPNEA ON EXERTION: ICD-10-CM

## 2023-10-13 DIAGNOSIS — G47.31 COMPLEX SLEEP APNEA SYNDROME: ICD-10-CM

## 2023-10-13 DIAGNOSIS — I48.0 HYPERCOAGULABLE STATE DUE TO PAROXYSMAL ATRIAL FIBRILLATION (HCC): ICD-10-CM

## 2023-10-13 DIAGNOSIS — I51.89 LEFT VENTRICULAR SYSTOLIC DYSFUNCTION, NYHA CLASS 3: ICD-10-CM

## 2023-10-13 DIAGNOSIS — D68.69 HYPERCOAGULABLE STATE DUE TO PAROXYSMAL ATRIAL FIBRILLATION (HCC): ICD-10-CM

## 2023-10-13 DIAGNOSIS — I48.0 PAROXYSMAL ATRIAL FIBRILLATION (HCC): ICD-10-CM

## 2023-10-13 LAB — EKG IMPRESSION: NORMAL

## 2023-10-13 PROCEDURE — 99213 OFFICE O/P EST LOW 20 MIN: CPT | Performed by: INTERNAL MEDICINE

## 2023-10-13 PROCEDURE — 3078F DIAST BP <80 MM HG: CPT | Performed by: INTERNAL MEDICINE

## 2023-10-13 PROCEDURE — 93005 ELECTROCARDIOGRAM TRACING: CPT | Performed by: INTERNAL MEDICINE

## 2023-10-13 PROCEDURE — 93010 ELECTROCARDIOGRAM REPORT: CPT | Performed by: INTERNAL MEDICINE

## 2023-10-13 PROCEDURE — 3074F SYST BP LT 130 MM HG: CPT | Performed by: INTERNAL MEDICINE

## 2023-10-13 PROCEDURE — 99214 OFFICE O/P EST MOD 30 MIN: CPT | Mod: 25 | Performed by: INTERNAL MEDICINE

## 2023-10-13 RX ORDER — AMIODARONE HYDROCHLORIDE 200 MG/1
200 TABLET ORAL DAILY
Qty: 90 TABLET | Refills: 4 | Status: SHIPPED | OUTPATIENT
Start: 2023-10-13

## 2023-10-13 RX ORDER — SPIRONOLACTONE 25 MG/1
12.5 TABLET ORAL DAILY
Qty: 45 TABLET | Refills: 3 | Status: SHIPPED | OUTPATIENT
Start: 2023-10-13

## 2023-10-13 RX ORDER — LOSARTAN POTASSIUM 100 MG/1
100 TABLET ORAL DAILY
Qty: 90 EACH | Refills: 4 | Status: SHIPPED | OUTPATIENT
Start: 2023-10-13

## 2023-10-13 RX ORDER — CARVEDILOL 25 MG/1
25 TABLET ORAL EVERY MORNING
Qty: 180 TABLET | Refills: 4 | Status: SHIPPED | OUTPATIENT
Start: 2023-10-13 | End: 2024-01-11

## 2023-10-13 ASSESSMENT — ENCOUNTER SYMPTOMS
FEVER: 0
COUGH: 0
PALPITATIONS: 0
HEADACHES: 0
FALLS: 0
DOUBLE VISION: 0
SHORTNESS OF BREATH: 1
BRUISES/BLEEDS EASILY: 0
MEMORY LOSS: 0
DIZZINESS: 0
ABDOMINAL PAIN: 0
BLURRED VISION: 0
DEPRESSION: 0
MYALGIAS: 0
SENSORY CHANGE: 0
DIAPHORESIS: 0

## 2023-10-13 ASSESSMENT — FIBROSIS 4 INDEX: FIB4 SCORE: 3.21

## 2023-10-13 NOTE — PROGRESS NOTES
No chief complaint on file.      Subjective:   Lencho Parker is an 83 y.o. male who presents today for cardiac care and management due to cardiac issues of nonischemic cardiomyopathy diagnosed in 2009, with most recent cholangiogram in 2016 showing nonobstructive coronary to disease, status post dual-chamber ICD placed in 2010, paroxysmal atrial fibrillation status post watchman device in October 2015, prior history of ventricular tachycardia status post ablation in 2012, hypertension, hyperlipidemia, treated AAA, prior history of TIA without residual neurological deficits.    Of note, patient was seen by his cardiologist in [place] before he moved to [facility].  He was initially started on Entresto therapy but developed angioedema.    His most recent transthoracic echocardiogram showed LV function of 10%.  He is status post CardioMEMS implantation for remote monitoring of volume status. Numbers have been good.    LVEF of 10% with global hypokinesis. No significant valvular disease. I have independently interpreted and reviewed echocardiogram's actual images. 09/2022.    LVEF of 10% with global hypokinesis. Mild MR. I have independently interpreted and reviewed echocardiogram's actual images. 08/2023.    I have independently interpreted and reviewed blood tests results with patient in clinic which shows normal LDL level 92, elevated triglycerides 161, renal and liver function. GFR is 29.    Became dehydrated with Farxiga and was in hospital.    09/2022 Had stroke even with Watchman in place. Now on Eliquis 2.5 mg bid.    08/2023 Had blood in urine. Eliquis was stopped.    09/2023 His Bladder ruptured.    Now walks with a cane. Patient still gets winded with daily living activities and exertion. No symptoms at rest.      Past Medical History:   Diagnosis Date    Breath shortness     on exertion    Cardiomyopathy (HCC) 05/2021    Echocardiogram with moderately dilated LV, mild concentric LVH, LVEF  20%. Global hypokinesis. Normal RA and RV. Severely dilated LA. Mild MR, mild TR. RVSP 34mmHg.    [place] measles     Heart attack (HCC)     hx 2007    History of abdominal aortic aneurysm (AAA) 2009    Status post stent    Hyperlipidemia     Hypertension     Mumps     Pacemaker     AICD    Paroxysmal atrial fibrillation (HCC)     Status post Watchman procedure in [place].    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     Sleep apnea     Snoring     Stroke (HCC) 2012    TIA    Tonsillitis     Ventricular tachycardia (HCC)      Past Surgical History:   Procedure Laterality Date    AICD BATTERY CHANGE Left 09/10/2020     Upgrade to [employer] MRI Quad CRT-D [id number] implanted by Dr. Barrios.    AICD BATTERY CHANGE Left 01/03/2017    Generator replacement with Med10-20 Mediaa MRI XT  [id number] implanted in [place].    OTHER CARDIAC SURGERY  2014    watchman device    AAA WITH STENT GRAFT  2009    OTHER ORTHOPEDIC SURGERY  2005    hip surgery    OTHER  2000    kidney removed    OTHER CARDIAC SURGERY  1991    AICD implanted     Family History   Problem Relation Age of Onset    Cancer Mother     Cancer Sister      Social History     Socioeconomic History    Marital status: Single     Spouse name: Not on file    Number of children: Not on file    Years of education: Not on file    Highest education level: Not on file   Occupational History    Not on file   Tobacco Use    Smoking status: Former     Current packs/day: 0.50     Types: Cigarettes    Smokeless tobacco: Former   Vaping Use    Vaping Use: Never used   Substance and Sexual Activity    Alcohol use: Yes     Alcohol/week: 8.4 oz     Types: 14 Glasses of wine per week    Drug use: Yes     Types: Inhaled     Comment: marijuana    Sexual activity: Not on file   Other Topics Concern    Not on file   Social History Narrative    Not on file     Social Determinants of Health     Financial Resource Strain: Not on file   Food Insecurity: Not on file   Transportation Needs: Not on  file   Physical Activity: Not on file   Stress: Not on file   Social Connections: Not on file   Intimate Partner Violence: Not on file   Housing Stability: Not on file     Allergies   Allergen Reactions    Entresto [Sacubitril-Valsartan]      Swollen tongue. Angioedema.    Crestor [Rosuvastatin]      Outpatient Encounter Medications as of 10/13/2023   Medication Sig Dispense Refill    spironolactone (ALDACTONE) 25 MG Tab Take 0.5 Tablets by mouth every day. 45 Tablet 3    losartan (COZAAR) 100 MG Tab Take 1 Tablet by mouth every day. 90 Each 4    carvedilol (COREG) 25 MG Tab Take 1 Tablet by mouth every morning. Once daily 180 Tablet 4    amiodarone (CORDARONE) 200 MG Tab Take 1 Tablet by mouth every day. 90 Tablet 4    Vericiguat 10 MG Tab Take 10 mg by mouth every day. 100 Tablet 4    vitamin D3 (CHOLECALCIFEROL) 1000 Unit (25 mcg) Tab Take 1,000 Units by mouth every day.      furosemide (LASIX) 20 MG Tab Take 20 mg by mouth every Monday and Thursday.      Multiple Vitamins-Minerals (MULTIVITAMIN ADULT EXTRA C PO) Take 1 Tablet by mouth every day.      tamsulosin (FLOMAX) 0.4 MG capsule Take 0.4 mg by mouth 2 times a day.      cinacalcet (SENSIPAR) 30 MG Tab Take 30 mg by mouth every day.      Omega-3 Fatty Acids (FISH OIL) 1000 MG Cap capsule Take 1,000 mg by mouth every evening.      albuterol 108 (90 Base) MCG/ACT Aero Soln inhalation aerosol Inhale 2 Puffs by mouth every 6 hours as needed for Shortness of Breath. 8.5 g 0    [DISCONTINUED] Vericiguat 10 MG Tab Take 10 mg by mouth every day. 100 Tablet 4    [DISCONTINUED] spironolactone (ALDACTONE) 25 MG Tab Take 0.5 Tablets by mouth every day. 45 Tablet 3    [DISCONTINUED] losartan (COZAAR) 100 MG Tab Take 1 Tablet by mouth every day. 90 Each 4    [DISCONTINUED] amiodarone (CORDARONE) 200 MG Tab Take 1 Tablet by mouth every day. 90 Tablet 4    [DISCONTINUED] carvedilol (COREG) 25 MG Tab Take 1 Tablet by mouth every morning. Once daily 180 Tablet 4    cefdinir  "(OMNICEF) 300 MG Cap Take 300 mg by mouth 2 times a day. Pt started a 7 day course on 7/14 (Patient not taking: Reported on 10/13/2023)       No facility-administered encounter medications on file as of 10/13/2023.     Review of Systems   Constitutional:  Negative for diaphoresis and fever.   HENT:  Negative for nosebleeds.    Eyes:  Negative for blurred vision and double vision.   Respiratory:  Positive for shortness of breath. Negative for cough.    Cardiovascular:  Negative for chest pain and palpitations.   Gastrointestinal:  Negative for abdominal pain.   Genitourinary:  Negative for dysuria and frequency.   Musculoskeletal:  Negative for falls and myalgias.   Skin:  Negative for rash.   Neurological:  Negative for dizziness, sensory change and headaches.   Endo/Heme/Allergies:  Does not bruise/bleed easily.   Psychiatric/Behavioral:  Negative for depression and memory loss.         Objective:   /52 (BP Location: Left arm, Patient Position: Sitting, BP Cuff Size: Adult)   Pulse 82   Resp 16   Ht 1.88 m (6' 2\")   Wt 80.7 kg (178 lb)   SpO2 97%   BMI 22.85 kg/m²     Physical Exam  Vitals and nursing note reviewed.   Constitutional:       General: He is not in acute distress.     Appearance: He is not diaphoretic.   HENT:      Head: Normocephalic and atraumatic.      Right Ear: External ear normal.      Left Ear: External ear normal.   Eyes:      General:         Right eye: No discharge.         Left eye: No discharge.   Neck:      Thyroid: No thyromegaly.      Vascular: No JVD.   Cardiovascular:      Rate and Rhythm: Normal rate and regular rhythm.      Comments: Ausculation was not performed to minimize the risk of COVID spread during current pandemic. This complies with Medicare policies and guidelines.    Pulmonary:      Effort: No respiratory distress.   Abdominal:      General: There is no distension.      Tenderness: There is no abdominal tenderness.   Skin:     General: Skin is warm and dry. "   Neurological:      Mental Status: He is alert and oriented to person, place, and time.      Cranial Nerves: No cranial nerve deficit.   Psychiatric:         Behavior: Behavior normal.         Assessment:     1. Paroxysmal atrial fibrillation (HCC)  EKG    amiodarone (CORDARONE) 200 MG Tab      2. ACC/AHA stage C systolic heart failure (HCC)  spironolactone (ALDACTONE) 25 MG Tab    losartan (COZAAR) 100 MG Tab    carvedilol (COREG) 25 MG Tab    Vericiguat 10 MG Tab      3. Left ventricular systolic dysfunction, NYHA class 3  spironolactone (ALDACTONE) 25 MG Tab    losartan (COZAAR) 100 MG Tab    carvedilol (COREG) 25 MG Tab    Vericiguat 10 MG Tab      4. Dyspnea on exertion  spironolactone (ALDACTONE) 25 MG Tab    losartan (COZAAR) 100 MG Tab    carvedilol (COREG) 25 MG Tab    Vericiguat 10 MG Tab    Basic Metabolic Panel    proBrain Natriuretic Peptide, NT      5. Ventricular tachycardia (HCC)  amiodarone (CORDARONE) 200 MG Tab      6. Mixed hyperlipidemia        7. HTN (hypertension), malignant        8. Presence of biventricular automatic cardioverter/defibrillator (AICD)        9. Complex sleep apnea syndrome        10. Chronic obstructive pulmonary disease, unspecified COPD type (HCC)        11. PAF (paroxysmal atrial fibrillation) (HCC)        12. Hypercoagulable state due to paroxysmal atrial fibrillation (HCC)            Medical Decision Making:  Today's Assessment / Status / Plan:   Non-ischemic Cardiomyopathy LVEF of 15% with already optimized medical therapy:  Chronically illed condition which requires ongoing close monitoring and treatment to improve survival rate along with decreasing risk of clinical decompensation and hospitalization.    Today, based on physical examination findings, patient is euvolemic. No JVD, lungs are clear to auscultation, no pitting edema in bilateral lower extremities, no ascites.     Dry weight is 186 lbs.     Continue coreg 25 mg po bid.     Will continue Losartan 100 mg  once a day. NO Entresto due to anaphylactic shock.    Based on recent data on SGLT2 and heart failure with reduced ejection fraction, patient will be benefited from Jardiance 10 mg p.o. once a day for further reduction in mortality and hospitalization with absolute risk reduction of 5.2%.  Therefore, I will start patient on Jardiance 10 mg p.o. once a day.  Risks and benefits were explained to patient and patient has agreed to proceed.     Diuretic with Lasix 20 mg changed to as needed.  Patient was instructed to monitor symptoms of lightheadedness or syncope rather than actual numbers.      Aldactone antagonist with Spironolactone 12.5 mg daily.    At this time, s/p CRT upgrade by adding LV lead via coronary sinus.    Continue remote monitor with MEMs.    Based on recent data on Vericiguat's FDA approval and indication for symptomatic HF patients with recent hospitalization (within the last 6 months) and LVEF of less than 45%, at this time, I will continue Verquvo to 10 mg once a day for potential benefits of cardiovascular risk reduction in mortality and heart failure hospitalization.    At this time, I discussed with patient the possibility of LVAD in the event of worsening clinical status. He is not too enthusiastic about it. He will think about it.    Paroxysmal Atrial fibrillation:  Now on anticoagulation, already with Watchman device but still had breakthrough with stroke in 09/2022.  No significnat mode switching seen on recent interrogation in 05/2022.    Hypertension:  Optimize control using cardiomyopathy medical regimen as well.    Hyperlipidemia:  Optimize statin as within guidelines of CAD treatment as above.     I will continue to closely monitor for side effects of patient's high risk medication(s) including liver, renal function and electrolytes.    Ksenia Velázquez M.D.

## 2023-10-26 ENCOUNTER — TELEPHONE (OUTPATIENT)
Dept: CARDIOLOGY | Facility: MEDICAL CENTER | Age: 83
End: 2023-10-26
Payer: MEDICARE

## 2023-10-26 NOTE — TELEPHONE ENCOUNTER
"TT    Caller: MOUSTAPHA Doss    Office Name, phone number, fax number:     [facility] (PCP)  P: [phone] xt [phone]  F: [phone]    Fax clearance to [phone]    Procedure Name: BIPOLAR TRANSURETHRAL RESECTION OF PROSTATE (SURG)    Selam is from patient's PCP office. Lencho had a recent visit and the PCP will not clear him until they have clear \"black and white\" clearance from cardiology. Selam is requesting that if pt has been cleared from a cardiac standpoint if they could also get a copy of our clearance faxed over to the fax number listed above. Please advise.    Thank you,  Hammad ROMERO    Procedure Scheduled Date: 11/28/2023    Callback Number: [phone] xt [phone]    "

## 2023-10-31 ENCOUNTER — HOSPITAL ENCOUNTER (EMERGENCY)
Facility: MEDICAL CENTER | Age: 83
End: 2023-10-31
Attending: EMERGENCY MEDICINE
Payer: COMMERCIAL

## 2023-10-31 ENCOUNTER — APPOINTMENT (OUTPATIENT)
Dept: RADIOLOGY | Facility: MEDICAL CENTER | Age: 83
End: 2023-10-31
Attending: EMERGENCY MEDICINE
Payer: COMMERCIAL

## 2023-10-31 VITALS
SYSTOLIC BLOOD PRESSURE: 84 MMHG | TEMPERATURE: 97.5 F | OXYGEN SATURATION: 97 % | HEART RATE: 83 BPM | RESPIRATION RATE: 18 BRPM | DIASTOLIC BLOOD PRESSURE: 60 MMHG

## 2023-10-31 DIAGNOSIS — R55 NEAR SYNCOPE: ICD-10-CM

## 2023-10-31 DIAGNOSIS — I95.9 HYPOTENSION, UNSPECIFIED HYPOTENSION TYPE: ICD-10-CM

## 2023-10-31 DIAGNOSIS — F10.920 ALCOHOLIC INTOXICATION WITHOUT COMPLICATION (HCC): ICD-10-CM

## 2023-10-31 LAB
ALBUMIN SERPL BCP-MCNC: 3.5 G/DL (ref 3.2–4.9)
ALBUMIN/GLOB SERPL: 1.2 G/DL
ALP SERPL-CCNC: 74 U/L (ref 30–99)
ALT SERPL-CCNC: 46 U/L (ref 2–50)
ANION GAP SERPL CALC-SCNC: 17 MMOL/L (ref 7–16)
AST SERPL-CCNC: 45 U/L (ref 12–45)
BASOPHILS # BLD AUTO: 0.2 % (ref 0–1.8)
BASOPHILS # BLD: 0.01 K/UL (ref 0–0.12)
BILIRUB SERPL-MCNC: 0.8 MG/DL (ref 0.1–1.5)
BUN SERPL-MCNC: 28 MG/DL (ref 8–22)
CALCIUM ALBUM COR SERPL-MCNC: 10.1 MG/DL (ref 8.5–10.5)
CALCIUM SERPL-MCNC: 9.7 MG/DL (ref 8.4–10.2)
CHLORIDE SERPL-SCNC: 104 MMOL/L (ref 96–112)
CO2 SERPL-SCNC: 16 MMOL/L (ref 20–33)
CREAT SERPL-MCNC: 1.82 MG/DL (ref 0.5–1.4)
EKG IMPRESSION: NORMAL
EOSINOPHIL # BLD AUTO: 0.09 K/UL (ref 0–0.51)
EOSINOPHIL NFR BLD: 2.1 % (ref 0–6.9)
ERYTHROCYTE [DISTWIDTH] IN BLOOD BY AUTOMATED COUNT: 62.8 FL (ref 35.9–50)
ETHANOL BLD-MCNC: 167.7 MG/DL
GFR SERPLBLD CREATININE-BSD FMLA CKD-EPI: 37 ML/MIN/1.73 M 2
GLOBULIN SER CALC-MCNC: 2.9 G/DL (ref 1.9–3.5)
GLUCOSE SERPL-MCNC: 141 MG/DL (ref 65–99)
HCT VFR BLD AUTO: 46.6 % (ref 42–52)
HGB BLD-MCNC: 15.5 G/DL (ref 14–18)
IMM GRANULOCYTES # BLD AUTO: 0.02 K/UL (ref 0–0.11)
IMM GRANULOCYTES NFR BLD AUTO: 0.5 % (ref 0–0.9)
LYMPHOCYTES # BLD AUTO: 1.67 K/UL (ref 1–4.8)
LYMPHOCYTES NFR BLD: 39.3 % (ref 22–41)
MCH RBC QN AUTO: 34.8 PG (ref 27–33)
MCHC RBC AUTO-ENTMCNC: 33.3 G/DL (ref 32.3–36.5)
MCV RBC AUTO: 104.5 FL (ref 81.4–97.8)
MONOCYTES # BLD AUTO: 0.37 K/UL (ref 0–0.85)
MONOCYTES NFR BLD AUTO: 8.7 % (ref 0–13.4)
NEUTROPHILS # BLD AUTO: 2.09 K/UL (ref 1.82–7.42)
NEUTROPHILS NFR BLD: 49.2 % (ref 44–72)
NRBC # BLD AUTO: 0 K/UL
NRBC BLD-RTO: 0 /100 WBC (ref 0–0.2)
PLATELET # BLD AUTO: 156 K/UL (ref 164–446)
PMV BLD AUTO: 10.9 FL (ref 9–12.9)
POTASSIUM SERPL-SCNC: 4.1 MMOL/L (ref 3.6–5.5)
PROT SERPL-MCNC: 6.4 G/DL (ref 6–8.2)
RBC # BLD AUTO: 4.46 M/UL (ref 4.7–6.1)
SODIUM SERPL-SCNC: 137 MMOL/L (ref 135–145)
TROPONIN T SERPL-MCNC: 28 NG/L (ref 6–19)
WBC # BLD AUTO: 4.3 K/UL (ref 4.8–10.8)

## 2023-10-31 PROCEDURE — 82077 ASSAY SPEC XCP UR&BREATH IA: CPT

## 2023-10-31 PROCEDURE — 93005 ELECTROCARDIOGRAM TRACING: CPT

## 2023-10-31 PROCEDURE — 80053 COMPREHEN METABOLIC PANEL: CPT

## 2023-10-31 PROCEDURE — 70450 CT HEAD/BRAIN W/O DYE: CPT

## 2023-10-31 PROCEDURE — 85025 COMPLETE CBC W/AUTO DIFF WBC: CPT

## 2023-10-31 PROCEDURE — 700105 HCHG RX REV CODE 258: Performed by: EMERGENCY MEDICINE

## 2023-10-31 PROCEDURE — 99285 EMERGENCY DEPT VISIT HI MDM: CPT

## 2023-10-31 PROCEDURE — 36415 COLL VENOUS BLD VENIPUNCTURE: CPT

## 2023-10-31 PROCEDURE — 84484 ASSAY OF TROPONIN QUANT: CPT

## 2023-10-31 RX ORDER — SODIUM CHLORIDE 9 MG/ML
500 INJECTION, SOLUTION INTRAVENOUS ONCE
Status: COMPLETED | OUTPATIENT
Start: 2023-10-31 | End: 2023-10-31

## 2023-10-31 RX ADMIN — SODIUM CHLORIDE 500 ML: 9 INJECTION, SOLUTION INTRAVENOUS at 19:15

## 2023-11-01 NOTE — ED PROVIDER NOTES
ED Provider Note    CHIEF COMPLAINT  Chief Complaint   Patient presents with    Dizziness     Pt c/o feeling dizzy, fell to knees  Denies hitting head, denies LOC    Blood Pressure Problem     Pt was found w/ BP 78/58  Given IVF via [facility] PTA       EXTERNAL RECORDS REVIEWED  Outpatient cardiology note 10/13/2023, severe nonischemic cardiomyopathy status post AICD, paroxysmal atrial fibrillation status post Watchman, breakthrough stroke now on Eliquis    HPI/ROS  LIMITATION TO HISTORY   Select: Intoxication  OUTSIDE HISTORIAN(S):  EMS      Lencho Parker is a 83 y.o. male who presents for evaluation of a ground-level fall and some dizziness.  He was at a [employer] party, he was drinking some wine, he was dressed as one of the 3 Musketeers and won first place in the [employer] contest.  He was then feeling little bit lightheaded so he decided to go lay down but ended up falling.  He denies a loss of conscious.  He is anticoagulant with a history of A-fib on Eliquis but denies striking his head has no headache.  He had no preceding chest pain or shortness of breath, no abdominal pain vomiting or diarrhea.  EMS arrived and found him to be significantly hypotensive with a blood pressure around 80/60, administered some IV fluids and reports some improvement of his blood pressure.  He offers no other complaints at this time.    PAST MEDICAL HISTORY   has a past medical history of Breath shortness, Cardiomyopathy (HCC) (05/2021), [place] measles, Heart attack (HCC), History of abdominal aortic aneurysm (AAA) (2009), Hyperlipidemia, Hypertension, Mumps, Pacemaker, Paroxysmal atrial fibrillation (HCC), Renal disorder, Sleep apnea, Snoring, Stroke (HCC) (2012), Tonsillitis, and Ventricular tachycardia (HCC).    SURGICAL HISTORY   has a past surgical history that includes aicd battery change (Left, 01/03/2017); aaa with stent graft (2009); other (2000); other orthopedic surgery (2005); other cardiac surgery (1991); other  cardiac surgery (2014); and aicd battery change (Left, 09/10/2020).    FAMILY HISTORY  Family History   Problem Relation Age of Onset    Cancer Mother     Cancer Sister        SOCIAL HISTORY  Social History     Tobacco Use    Smoking status: Former     Current packs/day: 0.50     Types: Cigarettes    Smokeless tobacco: Former   Vaping Use    Vaping Use: Never used   Substance and Sexual Activity    Alcohol use: Yes     Alcohol/week: 8.4 oz     Types: 14 Glasses of wine per week    Drug use: Not Currently     Types: Inhaled     Comment: denies    Sexual activity: Not on file       CURRENT MEDICATIONS  Home Medications    **Home medications have not yet been reviewed for this encounter**         ALLERGIES  Allergies   Allergen Reactions    Entresto [Sacubitril-Valsartan]      Swollen tongue. Angioedema.    Crestor [Rosuvastatin]        PHYSICAL EXAM  VITAL SIGNS: BP 94/61   Pulse 80   Temp 36.3 °C (97.4 °F) (Temporal)   Resp 18   SpO2 95%    Constitutional: Well appearing patient in no acute distress.  Awake and alert, not toxic nor ill in appearance.  HENT: Normocephalic, no obvious evidence of acute trauma.  Eyes: No scleral icterus. Normal conjunctiva   Thorax & Lungs: Large AICD left chest.  Normal nonlabored respirations. I appreciate no wheezing, rhonchi or rales. There is normal air movement.  Upon cardiac ascultation I appreciate a regular heart rhythm and a normal rate.   Abdomen: The abdomen is not visibly distended. Upon palpation, I find it to be without tenderness.  No mass appreciated.  Skin: The exposed portions of skin reveal no obvious rash or other abnormalities.  Extremities/Musculoskeletal: No obvious sign of acute trauma. No asymmetric calf tenderness or edema.   Neurologic: Alert & oriented.  Minimally slurred speech.  No focal deficits observed.      DIAGNOSTIC STUDIES / PROCEDURES    LABS  Results for orders placed or performed during the hospital encounter of 10/31/23   CBC WITH  DIFFERENTIAL   Result Value Ref Range    WBC 4.3 (L) 4.8 - 10.8 K/uL    RBC 4.46 (L) 4.70 - 6.10 M/uL    Hemoglobin 15.5 14.0 - 18.0 g/dL    Hematocrit 46.6 42.0 - 52.0 %    .5 (H) 81.4 - 97.8 fL    MCH 34.8 (H) 27.0 - 33.0 pg    MCHC 33.3 32.3 - 36.5 g/dL    RDW 62.8 (H) 35.9 - 50.0 fL    Platelet Count 156 (L) 164 - 446 K/uL    MPV 10.9 9.0 - 12.9 fL    Neutrophils-Polys 49.20 44.00 - 72.00 %    Lymphocytes 39.30 22.00 - 41.00 %    Monocytes 8.70 0.00 - 13.40 %    Eosinophils 2.10 0.00 - 6.90 %    Basophils 0.20 0.00 - 1.80 %    Immature Granulocytes 0.50 0.00 - 0.90 %    Nucleated RBC 0.00 0.00 - 0.20 /100 WBC    Neutrophils (Absolute) 2.09 1.82 - 7.42 K/uL    Lymphs (Absolute) 1.67 1.00 - 4.80 K/uL    Monos (Absolute) 0.37 0.00 - 0.85 K/uL    Eos (Absolute) 0.09 0.00 - 0.51 K/uL    Baso (Absolute) 0.01 0.00 - 0.12 K/uL    Immature Granulocytes (abs) 0.02 0.00 - 0.11 K/uL    NRBC (Absolute) 0.00 K/uL   COMP METABOLIC PANEL   Result Value Ref Range    Sodium 137 135 - 145 mmol/L    Potassium 4.1 3.6 - 5.5 mmol/L    Chloride 104 96 - 112 mmol/L    Co2 16 (L) 20 - 33 mmol/L    Anion Gap 17.0 (H) 7.0 - 16.0    Glucose 141 (H) 65 - 99 mg/dL    Bun 28 (H) 8 - 22 mg/dL    Creatinine 1.82 (H) 0.50 - 1.40 mg/dL    Calcium 9.7 8.4 - 10.2 mg/dL    Correct Calcium 10.1 8.5 - 10.5 mg/dL    AST(SGOT) 45 12 - 45 U/L    ALT(SGPT) 46 2 - 50 U/L    Alkaline Phosphatase 74 30 - 99 U/L    Total Bilirubin 0.8 0.1 - 1.5 mg/dL    Albumin 3.5 3.2 - 4.9 g/dL    Total Protein 6.4 6.0 - 8.2 g/dL    Globulin 2.9 1.9 - 3.5 g/dL    A-G Ratio 1.2 g/dL   TROPONIN   Result Value Ref Range    Troponin T 28 (H) 6 - 19 ng/L   ETHYL ALCOHOL (BLOOD)   Result Value Ref Range    Diagnostic Alcohol 167.7 (H) <10.1 mg/dL   ESTIMATED GFR   Result Value Ref Range    GFR (CKD-EPI) 37 (A) >60 mL/min/1.73 m 2   EKG   Result Value Ref Range    Report       [facility] Emergency Dept.    Test Date:  2023-10-31  Pt Name:    KYUNG  HORACIO               Department: EDS  MRN:        [id number]                      Room:       [facility]  Gender:     Male                         Technician: [id number]  :        [id number]                   Requested By:ER TRIAGE PROTOCOL  Order #:    [id number]                    Reading MD: GRICELDA LOPEZ MD    Measurements  Intervals                                Axis  Rate:       79                           P:          78  MT:         80                           QRS:        -78  QRSD:       188                          T:          78  QT:         482  QTc:        553    Interpretive Statements  Atrial-ventricular dual-paced complexes  No further analysis attempted due to paced rhythm  Compared to ECG 10/13/2023 15:52:58  No significant changes  I independently interpreted this EKG  Electronically Signed On 10- 19:00:59 PDT by GRICELDA LOPEZ MD          RADIOLOGY  I have independently interpreted the diagnostic imaging associated with this visit and am waiting the final reading from the radiologist.   My preliminary interpretation is as follows: No ICH  Radiologist interpretation:   CT-HEAD W/O   Final Result         1.  No acute intracranial abnormality is identified, there are nonspecific white matter changes, commonly associated with small vessel ischemic disease.  Associated mild cerebral atrophy is noted.   2.  Atherosclerosis.               COURSE & MEDICAL DECISION MAKING      INITIAL ASSESSMENT, COURSE AND PLAN  Care Narrative: 83-year-old male coming in with a near syncopal episode and ground-level fall, found hypotensive by EMS.  Was at a Halloween party drinking alcohol, had no preceding symptoms otherwise and has no injuries related to the fall.  He is anticoagulated because of atrial fibrillation and will receive a head CT.  Blood work will be obtained.  He will be watched closely and reevaluated often  Differential includes: Dehydration, electrolyte abnormalities, anemia, ICH,  intoxication    Blood work reveals chronic essentially baseline renal function and minimal elevation in troponin also chronic.  No significant anemia.  His alcohol level comes back at 167.  CT head excludes intracranial hemorrhage.  His blood pressure remains soft in the 90s systolic but he reports this is actually his baseline, he states that his cardiologist is aggressive in keeping his blood pressure in the 90s systolic given his severe heart failure.  He is walking around the department at this point.  He is being discharged home in stable condition with strict return instructions provided    DISPOSITION AND DISCUSSIONS    Escalation of care considered, and ultimately not performed: I did consider acute inpatient management given his near syncope and hypotension although given his report that that is his baseline blood pressure and his resolution of symptoms he is appropriate to be discharged  Decision tools and prescription drugs considered including, but not limited to: I did consider medication adjustment of his antihypertensives although he reports that this aggressive management is purposeful by his cardiologist.    FINAL DIAGNOSIS  1. Near syncope    2. Hypotension, unspecified hypotension type    3. Alcoholic intoxication without complication (HCC)           Electronically signed by: Farhad Alvarez M.D., 10/31/2023 7:02 PM

## 2023-11-01 NOTE — ED NOTES
ERP aware of hypotension and approved DC at this time;    Pt stated that his cardiologist keeps his BP low, ERP aware;

## 2023-11-01 NOTE — TELEPHONE ENCOUNTER
TT    Caller: Selam from [facility] (PCP)  P: [phone] xt [phone]  F: [phone]    Topic/issue: Selam calling to check on status of clearance. Advised Selam that our office faxed 10/30/23 but Selam states that they did not receive and is asking if it can be faxed again please.Verified that the fax number above is correct.    Callback Number: [phone] xt [phone]     Thank you,  Jyoti LENNON

## 2023-11-01 NOTE — ED TRIAGE NOTES
Chief Complaint   Patient presents with    Dizziness     Pt c/o feeling dizzy, fell to knees  Denies hitting head, denies LOC    Blood Pressure Problem     Pt was found w/ BP 78/58  Given IVF via [facility] PTA     Pt BIB [facility] from a local party s/p fall after feeling dizzy.  Pt states has been drinking wine today.  Pt denies any injuries from fall, did not hit head, no LOC, currently taking ELIQUIS.  Pt was given IVF via [facility] PTA

## 2023-11-10 ENCOUNTER — TELEPHONE (OUTPATIENT)
Dept: CARDIOLOGY | Facility: MEDICAL CENTER | Age: 83
End: 2023-11-10
Payer: MEDICARE

## 2023-11-10 NOTE — TELEPHONE ENCOUNTER
Remote transmission received via home monitor, pt presenting in AT/AF, episode onset 11/7/23 @ 4:50 am, ongoing at time of transmission on 11/10/23 @ 5:35 am.     AT/AF [place] up to 43% from 23% on 10/10/23 transmission. Full report scanned into Media.

## 2023-11-13 NOTE — TELEPHONE ENCOUNTER
Called and spoke with patient.  Patient denies any symptoms, states he feels normal.  Patient is taking all medication as prescribed.    Patient plans on stopping his eliquis towards the end of the month for an upcoming procedure.    TT- please let me know if you have any recommendations.

## 2023-11-20 ENCOUNTER — HOSPITAL ENCOUNTER (OUTPATIENT)
Facility: MEDICAL CENTER | Age: 83
End: 2023-11-20
Attending: UROLOGY
Payer: MEDICARE

## 2023-11-20 LAB
ANION GAP SERPL CALC-SCNC: 16 MMOL/L (ref 7–16)
APPEARANCE UR: ABNORMAL
APTT PPP: 39.3 SEC (ref 24.7–36)
BACTERIA #/AREA URNS HPF: ABNORMAL /HPF
BASOPHILS # BLD AUTO: 0.4 % (ref 0–1.8)
BASOPHILS # BLD: 0.02 K/UL (ref 0–0.12)
BILIRUB UR QL STRIP.AUTO: ABNORMAL
BUN SERPL-MCNC: 28 MG/DL (ref 8–22)
CALCIUM SERPL-MCNC: 8.7 MG/DL (ref 8.5–10.5)
CHLORIDE SERPL-SCNC: 105 MMOL/L (ref 96–112)
CO2 SERPL-SCNC: 21 MMOL/L (ref 20–33)
COLOR UR: ABNORMAL
CREAT SERPL-MCNC: 1.59 MG/DL (ref 0.5–1.4)
EOSINOPHIL # BLD AUTO: 0.11 K/UL (ref 0–0.51)
EOSINOPHIL NFR BLD: 2.3 % (ref 0–6.9)
EPI CELLS #/AREA URNS HPF: NEGATIVE /HPF
ERYTHROCYTE [DISTWIDTH] IN BLOOD BY AUTOMATED COUNT: 60.1 FL (ref 35.9–50)
GFR SERPLBLD CREATININE-BSD FMLA CKD-EPI: 43 ML/MIN/1.73 M 2
GLUCOSE SERPL-MCNC: 79 MG/DL (ref 65–99)
GLUCOSE UR STRIP.AUTO-MCNC: NEGATIVE MG/DL
HCT VFR BLD AUTO: 49.8 % (ref 42–52)
HGB BLD-MCNC: 16.4 G/DL (ref 14–18)
HYALINE CASTS #/AREA URNS LPF: ABNORMAL /LPF
IMM GRANULOCYTES # BLD AUTO: 0.02 K/UL (ref 0–0.11)
IMM GRANULOCYTES NFR BLD AUTO: 0.4 % (ref 0–0.9)
INR PPP: 1.43 (ref 0.87–1.13)
KETONES UR STRIP.AUTO-MCNC: 15 MG/DL
LEUKOCYTE ESTERASE UR QL STRIP.AUTO: ABNORMAL
LYMPHOCYTES # BLD AUTO: 1.35 K/UL (ref 1–4.8)
LYMPHOCYTES NFR BLD: 28 % (ref 22–41)
MCH RBC QN AUTO: 34.6 PG (ref 27–33)
MCHC RBC AUTO-ENTMCNC: 32.9 G/DL (ref 32.3–36.5)
MCV RBC AUTO: 105.1 FL (ref 81.4–97.8)
MICRO URNS: ABNORMAL
MONOCYTES # BLD AUTO: 0.51 K/UL (ref 0–0.85)
MONOCYTES NFR BLD AUTO: 10.6 % (ref 0–13.4)
NEUTROPHILS # BLD AUTO: 2.82 K/UL (ref 1.82–7.42)
NEUTROPHILS NFR BLD: 58.3 % (ref 44–72)
NITRITE UR QL STRIP.AUTO: NEGATIVE
NRBC # BLD AUTO: 0 K/UL
NRBC BLD-RTO: 0 /100 WBC (ref 0–0.2)
PH UR STRIP.AUTO: 5.5 [PH] (ref 5–8)
PLATELET # BLD AUTO: 175 K/UL (ref 164–446)
PMV BLD AUTO: 11.9 FL (ref 9–12.9)
POTASSIUM SERPL-SCNC: 5.2 MMOL/L (ref 3.6–5.5)
PROT UR QL STRIP: 100 MG/DL
PROTHROMBIN TIME: 17.6 SEC (ref 12–14.6)
RBC # BLD AUTO: 4.74 M/UL (ref 4.7–6.1)
RBC # URNS HPF: ABNORMAL /HPF
RBC UR QL AUTO: ABNORMAL
SODIUM SERPL-SCNC: 142 MMOL/L (ref 135–145)
SP GR UR STRIP.AUTO: 1.02
UROBILINOGEN UR STRIP.AUTO-MCNC: 1 MG/DL
WBC # BLD AUTO: 4.8 K/UL (ref 4.8–10.8)
WBC #/AREA URNS HPF: ABNORMAL /HPF

## 2023-11-20 PROCEDURE — 85025 COMPLETE CBC W/AUTO DIFF WBC: CPT

## 2023-11-20 PROCEDURE — 85610 PROTHROMBIN TIME: CPT

## 2023-11-20 PROCEDURE — 87077 CULTURE AEROBIC IDENTIFY: CPT

## 2023-11-20 PROCEDURE — 85730 THROMBOPLASTIN TIME PARTIAL: CPT

## 2023-11-20 PROCEDURE — 87086 URINE CULTURE/COLONY COUNT: CPT

## 2023-11-20 PROCEDURE — 81001 URINALYSIS AUTO W/SCOPE: CPT

## 2023-11-20 PROCEDURE — 87186 SC STD MICRODIL/AGAR DIL: CPT

## 2023-11-20 PROCEDURE — 80048 BASIC METABOLIC PNL TOTAL CA: CPT

## 2023-11-29 ENCOUNTER — PATIENT MESSAGE (OUTPATIENT)
Dept: HEALTH INFORMATION MANAGEMENT | Facility: OTHER | Age: 83
End: 2023-11-29

## 2023-12-01 ENCOUNTER — HOSPITAL ENCOUNTER (EMERGENCY)
Facility: MEDICAL CENTER | Age: 83
End: 2023-12-01
Attending: EMERGENCY MEDICINE
Payer: MEDICARE

## 2023-12-01 VITALS
SYSTOLIC BLOOD PRESSURE: 101 MMHG | TEMPERATURE: 97.5 F | RESPIRATION RATE: 18 BRPM | HEART RATE: 67 BPM | DIASTOLIC BLOOD PRESSURE: 87 MMHG | OXYGEN SATURATION: 99 %

## 2023-12-01 DIAGNOSIS — R31.0 GROSS HEMATURIA: ICD-10-CM

## 2023-12-01 LAB
APPEARANCE UR: ABNORMAL
BACTERIA #/AREA URNS HPF: ABNORMAL /HPF
BILIRUB UR QL STRIP.AUTO: NEGATIVE
COLOR UR: ABNORMAL
EPI CELLS #/AREA URNS HPF: NEGATIVE /HPF
GLUCOSE UR STRIP.AUTO-MCNC: NEGATIVE MG/DL
KETONES UR STRIP.AUTO-MCNC: NEGATIVE MG/DL
LEUKOCYTE ESTERASE UR QL STRIP.AUTO: ABNORMAL
MICRO URNS: ABNORMAL
MUCOUS THREADS #/AREA URNS HPF: ABNORMAL /HPF
NITRITE UR QL STRIP.AUTO: NEGATIVE
PH UR STRIP.AUTO: 6.5 [PH] (ref 5–8)
PROT UR QL STRIP: 30 MG/DL
RBC # URNS HPF: ABNORMAL /HPF
RBC UR QL AUTO: ABNORMAL
SP GR UR STRIP.AUTO: <=1.005
WBC #/AREA URNS HPF: ABNORMAL /HPF

## 2023-12-01 PROCEDURE — 99284 EMERGENCY DEPT VISIT MOD MDM: CPT

## 2023-12-01 PROCEDURE — 81001 URINALYSIS AUTO W/SCOPE: CPT

## 2023-12-02 NOTE — ED TRIAGE NOTES
Pt started taking Eliquis yesterday as ordered, noticed increased hematuria throughout the day, denies dysuria at this time.  Pt states is currently taking antibx s/p TURP.

## 2023-12-02 NOTE — DISCHARGE INSTRUCTIONS
I recommend holding on the Eliquis for the next 6 days.  Restart after that.  Drink plenty of fluids, ensure that you are able to empty your bladder.

## 2023-12-02 NOTE — ED PROVIDER NOTES
"ED Provider Note    Primary care provider: Brennan Castorena M.D.    CHIEF COMPLAINT  Chief Complaint   Patient presents with    Blood in Urine     \"Gross hematuria\"   S/p TURP 11/29  Currently taking Eliquis     HPI  Lencho Parker is a 83 y.o. male who presents to the Emergency Department for gross hematuria.  The patient held his Eliquis for a week getting into the TURP.  He then restarted on postop day 1.  He has had large clots that have been somewhat challenging in the past.  He notes urinary frequency.  Denies any fevers, chills or abdominal pain.      External Record Review: Patient underwent TURP 11/28 at [facility] for BPH.  He had a Duran catheter that was draining clear according to the discharge summary, discontinued prior to discharge.  He previously was on Eliquis for history of atrial fibrillation.    REVIEW OF SYSTEMS  See HPI.     PAST MEDICAL HISTORY   has a past medical history of Breath shortness, Cardiomyopathy (HCC) (05/2021), Wolof measles, Heart attack (HCC), History of abdominal aortic aneurysm (AAA) (2009), Hyperlipidemia, Hypertension, Mumps, Pacemaker, Paroxysmal atrial fibrillation (HCC), Renal disorder, Sleep apnea, Snoring, Stroke (HCC) (2012), Tonsillitis, and Ventricular tachycardia (HCC).    SURGICAL HISTORY   has a past surgical history that includes aicd battery change (Left, 01/03/2017); aaa with stent graft (2009); other (2000); other orthopedic surgery (2005); other cardiac surgery (1991); other cardiac surgery (2014); and aicd battery change (Left, 09/10/2020).    SOCIAL HISTORY  Social History     Tobacco Use    Smoking status: Former     Current packs/day: 0.50     Types: Cigarettes    Smokeless tobacco: Former   Vaping Use    Vaping Use: Never used   Substance Use Topics    Alcohol use: Yes     Alcohol/week: 8.4 oz     Types: 14 Glasses of wine per week    Drug use: Not Currently     Comment: denies      Social History     Substance and Sexual " Activity   Drug Use Not Currently    Comment: denies       FAMILY HISTORY  Family History   Problem Relation Age of Onset    Cancer Mother     Cancer Sister        CURRENT MEDICATIONS  Reviewed.  See Encounter Summary.     ALLERGIES  Allergies   Allergen Reactions    Entresto [Sacubitril-Valsartan]      Swollen tongue. Angioedema.    Crestor [Rosuvastatin]        PHYSICAL EXAM  VITAL SIGNS: /74   Pulse 69   Temp 36.1 °C (96.9 °F) (Temporal)   Resp 18   SpO2 95%   Constitutional: Awake, alert in no apparent distress.  HENT: Normocephalic, Bilateral external ears normal. Nose normal.   Eyes: Conjunctiva normal, non-icteric, EOMI.    Thorax & Lungs: Easy unlabored respirations, Clear to ascultation bilaterally.  Cardiovascular: Regular rate, Regular rhythm, No murmurs, rubs or gallops. Bilateral pulses symmetrical.   Abdomen:  Soft, nontender, nondistended, normal active bowel sounds.   : Able to void spontaneously with some decreased flow rate, there is pink-tinged urine in the urinal.  Skin: Visualized skin is  Dry, No erythema, No rash.   Musculoskeletal:   No cyanosis, clubbing or edema. No leg asymmetry.   Neurologic: Alert, Grossly non-focal.   Psychiatric: Normal affect, Normal mood  Lymphatic:            COURSE & MEDICAL DECISION MAKING  Pertinent Labs & Imaging studies reviewed. (See chart for details)    COURSE & MEDICAL DECISION MAKING  Pertinent Labs & Imaging studies reviewed. (See chart for details)    Differential diagnoses include but are not limited to: Medication side effect, postprocedural bleeding    7:13 PM - Nursing notes reviewed, patient seen and examined at bedside.    Escalation of care considered, and ultimately not performed: blood analysis and diagnostic imaging.    Decision tools and prescription drugs considered including, but not limited to: [id number] 2 score moderate    Decision Making:  This is a pleasant 83 y.o. year old male who presents with gross hematuria 48 hours  status post TURP.  The patient is on Eliquis, he restarted it yesterday at their direction.  It is not surprising that he has had significant hematuria.  Urinalysis today without any evidence of infection.  He is on a for stroke prevention from atrial fibrillation.  I think it is reasonable to stop it for the next 6 days, his risk of stroke is going to be slightly higher but this will prevent him from having to have a three-way Duran catheter placed.  He does not want to have any catheter placed if at all possible as he has had 1 into the past 3 months secondary to urinary retention.    I recommend he drink plenty of fluids, keep the urine pink to clear, restart the Eliquis next week.     The patient was discharged home (see d/c instructions) was told to return immediately for any signs or symptoms listed, or any worsening at all.  The patient verbally agreed to the discharge precautions and follow-up plan which is documented in EPIC.    Discharge Medications:  New Prescriptions    No medications on file       FINAL IMPRESSION  1. Gross hematuria

## 2023-12-02 NOTE — ED TRIAGE NOTES
"Chief Complaint   Patient presents with    Blood in Urine     \"Gross hematuria\"   S/p TURP 11/29  Currently taking Eliquis     Pt ELIAS AMOS from assisted living for c/o hematuria.  Further report from DANDRE, pt s/p TURP at [place] on 11/29 and is currently taking Eliquis.    "

## 2023-12-02 NOTE — ED NOTES
PT cleared for discharge home pt has no further questions or concerns  pt to f/u with pcp 1-2days pt verbalized understanding. pt advised to return to closest ED for any worsenign symptotms  PT ambulated out of ED w/steady gait

## 2023-12-11 ENCOUNTER — NON-PROVIDER VISIT (OUTPATIENT)
Dept: CARDIOLOGY | Facility: MEDICAL CENTER | Age: 83
End: 2023-12-11
Payer: MEDICARE

## 2023-12-11 PROCEDURE — 93295 DEV INTERROG REMOTE 1/2/MLT: CPT | Performed by: INTERNAL MEDICINE

## 2023-12-11 NOTE — CARDIAC REMOTE MONITOR - SCAN
Device transmission reviewed. Device demonstrated appropriate function.       Electronically Signed by: Tolu Barrios M.D.    12/15/2023  8:14 AM

## 2024-01-11 ENCOUNTER — TELEPHONE (OUTPATIENT)
Dept: CARDIOLOGY | Facility: MEDICAL CENTER | Age: 84
End: 2024-01-11
Payer: MEDICARE

## 2024-01-11 ENCOUNTER — APPOINTMENT (OUTPATIENT)
Dept: RADIOLOGY | Facility: MEDICAL CENTER | Age: 84
End: 2024-01-11
Attending: EMERGENCY MEDICINE
Payer: MEDICARE

## 2024-01-11 ENCOUNTER — HOSPITAL ENCOUNTER (EMERGENCY)
Facility: MEDICAL CENTER | Age: 84
End: 2024-01-11
Attending: EMERGENCY MEDICINE
Payer: MEDICARE

## 2024-01-11 VITALS
RESPIRATION RATE: 16 BRPM | HEIGHT: 74 IN | BODY MASS INDEX: 22.66 KG/M2 | OXYGEN SATURATION: 97 % | WEIGHT: 176.59 LBS | TEMPERATURE: 96.9 F | SYSTOLIC BLOOD PRESSURE: 108 MMHG | HEART RATE: 62 BPM | DIASTOLIC BLOOD PRESSURE: 67 MMHG

## 2024-01-11 DIAGNOSIS — I47.20 VENTRICULAR TACHYARRHYTHMIA (HCC): ICD-10-CM

## 2024-01-11 DIAGNOSIS — Z95.0 PACEMAKER: ICD-10-CM

## 2024-01-11 LAB
ALBUMIN SERPL BCP-MCNC: 3.9 G/DL (ref 3.2–4.9)
ALBUMIN/GLOB SERPL: 1.4 G/DL
ALP SERPL-CCNC: 82 U/L (ref 30–99)
ALT SERPL-CCNC: 37 U/L (ref 2–50)
ANION GAP SERPL CALC-SCNC: 13 MMOL/L (ref 7–16)
APTT PPP: 32.8 SEC (ref 24.7–36)
AST SERPL-CCNC: 37 U/L (ref 12–45)
BASOPHILS # BLD AUTO: 0.2 % (ref 0–1.8)
BASOPHILS # BLD: 0.01 K/UL (ref 0–0.12)
BILIRUB SERPL-MCNC: 0.9 MG/DL (ref 0.1–1.5)
BUN SERPL-MCNC: 38 MG/DL (ref 8–22)
CALCIUM ALBUM COR SERPL-MCNC: 9.1 MG/DL (ref 8.5–10.5)
CALCIUM SERPL-MCNC: 9 MG/DL (ref 8.4–10.2)
CHLORIDE SERPL-SCNC: 103 MMOL/L (ref 96–112)
CO2 SERPL-SCNC: 23 MMOL/L (ref 20–33)
CREAT SERPL-MCNC: 2.18 MG/DL (ref 0.5–1.4)
EKG IMPRESSION: NORMAL
EOSINOPHIL # BLD AUTO: 0.19 K/UL (ref 0–0.51)
EOSINOPHIL NFR BLD: 4.1 % (ref 0–6.9)
ERYTHROCYTE [DISTWIDTH] IN BLOOD BY AUTOMATED COUNT: 54.5 FL (ref 35.9–50)
GFR SERPLBLD CREATININE-BSD FMLA CKD-EPI: 30 ML/MIN/1.73 M 2
GLOBULIN SER CALC-MCNC: 2.8 G/DL (ref 1.9–3.5)
GLUCOSE SERPL-MCNC: 103 MG/DL (ref 65–99)
HCT VFR BLD AUTO: 47.1 % (ref 42–52)
HGB BLD-MCNC: 15.5 G/DL (ref 14–18)
IMM GRANULOCYTES # BLD AUTO: 0 K/UL (ref 0–0.11)
IMM GRANULOCYTES NFR BLD AUTO: 0 % (ref 0–0.9)
INR PPP: 1.24 (ref 0.87–1.13)
LYMPHOCYTES # BLD AUTO: 1.55 K/UL (ref 1–4.8)
LYMPHOCYTES NFR BLD: 33.2 % (ref 22–41)
MAGNESIUM SERPL-MCNC: 2.3 MG/DL (ref 1.5–2.5)
MCH RBC QN AUTO: 34.4 PG (ref 27–33)
MCHC RBC AUTO-ENTMCNC: 32.9 G/DL (ref 32.3–36.5)
MCV RBC AUTO: 104.7 FL (ref 81.4–97.8)
MONOCYTES # BLD AUTO: 0.53 K/UL (ref 0–0.85)
MONOCYTES NFR BLD AUTO: 11.3 % (ref 0–13.4)
NEUTROPHILS # BLD AUTO: 2.39 K/UL (ref 1.82–7.42)
NEUTROPHILS NFR BLD: 51.2 % (ref 44–72)
NRBC # BLD AUTO: 0 K/UL
NRBC BLD-RTO: 0 /100 WBC (ref 0–0.2)
PLATELET # BLD AUTO: 178 K/UL (ref 164–446)
PMV BLD AUTO: 11.2 FL (ref 9–12.9)
POTASSIUM SERPL-SCNC: 5.2 MMOL/L (ref 3.6–5.5)
PROT SERPL-MCNC: 6.7 G/DL (ref 6–8.2)
PROTHROMBIN TIME: 16.1 SEC (ref 12–14.6)
RBC # BLD AUTO: 4.5 M/UL (ref 4.7–6.1)
SODIUM SERPL-SCNC: 139 MMOL/L (ref 135–145)
WBC # BLD AUTO: 4.7 K/UL (ref 4.8–10.8)

## 2024-01-11 PROCEDURE — 85730 THROMBOPLASTIN TIME PARTIAL: CPT

## 2024-01-11 PROCEDURE — 71045 X-RAY EXAM CHEST 1 VIEW: CPT

## 2024-01-11 PROCEDURE — 85025 COMPLETE CBC W/AUTO DIFF WBC: CPT

## 2024-01-11 PROCEDURE — 83735 ASSAY OF MAGNESIUM: CPT

## 2024-01-11 PROCEDURE — 93005 ELECTROCARDIOGRAM TRACING: CPT | Performed by: EMERGENCY MEDICINE

## 2024-01-11 PROCEDURE — 700105 HCHG RX REV CODE 258: Performed by: EMERGENCY MEDICINE

## 2024-01-11 PROCEDURE — 36415 COLL VENOUS BLD VENIPUNCTURE: CPT

## 2024-01-11 PROCEDURE — 80053 COMPREHEN METABOLIC PANEL: CPT

## 2024-01-11 PROCEDURE — 85610 PROTHROMBIN TIME: CPT

## 2024-01-11 PROCEDURE — 99284 EMERGENCY DEPT VISIT MOD MDM: CPT

## 2024-01-11 RX ORDER — CARVEDILOL 12.5 MG/1
6.25-12.5 TABLET ORAL 2 TIMES DAILY WITH MEALS
COMMUNITY

## 2024-01-11 RX ORDER — SODIUM CHLORIDE, SODIUM LACTATE, POTASSIUM CHLORIDE, CALCIUM CHLORIDE 600; 310; 30; 20 MG/100ML; MG/100ML; MG/100ML; MG/100ML
1000 INJECTION, SOLUTION INTRAVENOUS ONCE
Status: COMPLETED | OUTPATIENT
Start: 2024-01-11 | End: 2024-01-11

## 2024-01-11 RX ORDER — ATORVASTATIN CALCIUM 40 MG/1
40 TABLET, FILM COATED ORAL DAILY
COMMUNITY

## 2024-01-11 RX ORDER — ALBUTEROL SULFATE 90 UG/1
2 AEROSOL, METERED RESPIRATORY (INHALATION) EVERY 4 HOURS PRN
COMMUNITY

## 2024-01-11 RX ORDER — EMPAGLIFLOZIN 25 MG/1
12.5 TABLET, FILM COATED ORAL DAILY
Status: SHIPPED | COMMUNITY
End: 2024-01-15 | Stop reason: SDUPTHER

## 2024-01-11 RX ORDER — ALLOPURINOL 100 MG/1
100 TABLET ORAL DAILY
COMMUNITY

## 2024-01-11 RX ADMIN — SODIUM CHLORIDE, POTASSIUM CHLORIDE, SODIUM LACTATE AND CALCIUM CHLORIDE 1000 ML: 600; 310; 30; 20 INJECTION, SOLUTION INTRAVENOUS at 16:32

## 2024-01-11 ASSESSMENT — FIBROSIS 4 INDEX: FIB4 SCORE: 4.63

## 2024-01-11 NOTE — ED TRIAGE NOTES
"Bib self for above complaints.     Chief Complaint   Patient presents with    Irregular Heart Beat     Pt has remote monitor at home for HR and was told to come to ER for his pacermaker firing a couple times. Pt denies CP, SOB or dizziness, pt states he did not feel pacer fire.      /85   Pulse 77   Temp 36.1 °C (97 °F) (Temporal)   Resp 18   Ht 1.88 m (6' 2\")   Wt 80.1 kg (176 lb 9.4 oz)   SpO2 97%   BMI 22.67 kg/m²     Pt has [employer] device.   "

## 2024-01-11 NOTE — ED PROVIDER NOTES
ED Provider Note    CHIEF COMPLAINT  Chief Complaint   Patient presents with    Irregular Heart Beat     Pt has remote monitor at home for HR and was told to come to ER for his pacermaker firing a couple times. Pt denies CP, SOB or dizziness, pt states he did not feel pacer fire.      EXTERNAL RECORDS REVIEWED  Prior ER encounter on 12/1/2023 when the patient was seen for gross hematuria following a TURP procedure on 11/29 while he is on Eliquis.    Telephone encounter today from remote transmission where the patient had 2 ventricular tachycardia episodes treated with ATP and 6 NSVT episodes on 1/3 and 1/8.  They are recorded as the following:  VT Episode  -Episode Onset: 1/8/2024 @ 4:54 AM  -Duration: 9 seconds  -Average V Rate: 158 bpm  -Therapy Delivered: ATP  -# of Therapies: 1  -Successful?: Yes     VT Episode  -Episode Onset: 1/3/2024 @ 10:59 AM  -Duration: 19 seconds  -Average V Rate: 167 bpm  -Therapy Delivered: ATP  -# of Therapies: 2  -Successful?: Yes    HPI/ROS  LIMITATION TO HISTORY   Select: : None  OUTSIDE HISTORIAN(S):  none    Lencho Parker is a 83 y.o. male who presents the emergency room for concerns regarding possible cardiac events as recorded by his pacemaker.  It is recommended that he come to the emergency department as he had received a call from the remote monitoring service for his heart rate and was told that he had had some episodes of ventricular tachycardia.  It appears that this did not happen today he had episodes possibly on the third and eighth.  During these times it was for ventricular tachycardia that was quelled with ATP/overdrive pacing.  At that time the patient was noted to be without any symptoms and he reports in the last 10 days he has had no episodes of dizziness, lightheadedness, cough, congestion, illness, fevers, chills, chest pain, shortness of breath.  Has been taking his blood pressure medications and blood thinners without any acute changes and is  otherwise in no acute distress.    PAST MEDICAL HISTORY   has a past medical history of Breath shortness, Cardiomyopathy (HCC) (05/2021), Hebrew measles, Heart attack (HCC), History of abdominal aortic aneurysm (AAA) (2009), Hyperlipidemia, Hypertension, Mumps, Pacemaker, Paroxysmal atrial fibrillation (HCC), Renal disorder, Sleep apnea, Snoring, Stroke (HCC) (2012), Tonsillitis, and Ventricular tachycardia (HCC).    SURGICAL HISTORY   has a past surgical history that includes aicd battery change (Left, 01/03/2017); aaa with stent graft (2009); other (2000); other orthopedic surgery (2005); other cardiac surgery (1991); other cardiac surgery (2014); and aicd battery change (Left, 09/10/2020).    FAMILY HISTORY  Family History   Problem Relation Age of Onset    Cancer Mother     Cancer Sister        SOCIAL HISTORY  Social History     Tobacco Use    Smoking status: Former     Current packs/day: 0.50     Types: Cigarettes    Smokeless tobacco: Former   Vaping Use    Vaping Use: Never used   Substance and Sexual Activity    Alcohol use: Yes     Alcohol/week: 8.4 oz     Types: 14 Glasses of wine per week     Comment: couple glasses of wine daily    Drug use: Not Currently     Comment: denies    Sexual activity: Not on file       CURRENT MEDICATIONS  Home Medications       Reviewed by Alfonso Perez (Pharmacy Tech) on 01/11/24 at 1554  Med List Status: Complete     Medication Last Dose Status   albuterol 108 (90 Base) MCG/ACT Aero Soln inhalation aerosol PRN Active   allopurinol (ZYLOPRIM) 100 MG Tab 1/11/2024 Active   amiodarone (CORDARONE) 200 MG Tab 1/11/2024 Active   apixaban (ELIQUIS) 5mg Tab 1/11/2024 Active   atorvastatin (LIPITOR) 40 MG Tab 1/11/2024 Active   carvedilol (COREG) 12.5 MG Tab 1/11/2024 Active   cinacalcet (SENSIPAR) 30 MG Tab 1/11/2024 Active   Empagliflozin (JARDIANCE) 25 MG Tab 1/11/2024 Active   furosemide (LASIX) 20 MG Tab 1/11/2024 Active   losartan (COZAAR) 100 MG Tab 1/11/2024 Active  "  Multiple Vitamins-Minerals (MULTIVITAMIN ADULT EXTRA C PO) 1/11/2024 Active   Omega-3 Fatty Acids (FISH OIL) 1000 MG Cap capsule 1/11/2024 Active   spironolactone (ALDACTONE) 25 MG Tab 1/11/2024 Active   tamsulosin (FLOMAX) 0.4 MG capsule 1/11/2024 Active   vitamin D3 (CHOLECALCIFEROL) 1000 Unit (25 mcg) Tab 1/11/2024 Active                    ALLERGIES  Allergies   Allergen Reactions    Entresto [Sacubitril-Valsartan]      Swollen tongue. Angioedema.    Crestor [Rosuvastatin]        PHYSICAL EXAM  VITAL SIGNS: /85   Pulse 77   Temp 36.1 °C (97 °F) (Temporal)   Resp 18   Ht 1.88 m (6' 2\")   Wt 80.1 kg (176 lb 9.4 oz)   SpO2 97%   BMI 22.67 kg/m²    Genl: M sitting in chair comfortably, speaking clearly, appears in no acute distress   Head: NC/AT   ENT: Mucous membranes moist, posterior pharynx clear, uvula midline, nares patent bilaterally   Eyes: Normal sclera, pupils equal round reactive to light  Neck: Supple, FROM, no LAD appreciated, no bruits  Pulmonary: Lungs are clear to auscultation bilaterally  Chest: No TTP  CV:  RRR, no murmur appreciated, pulses 2+ in both upper and lower extremities,  Abdomen: soft, NT/ND; no rebound/guarding, no masses palpated, no HSM   Musculoskeletal: Pain free ROM of the neck. Moving upper and lower extremities in spontaneous and coordinated fashion  Neuro: A&Ox4 (person, place, time, situation), speech fluent, gait steady, no focal deficits appreciated  Skin: No rash or lesions.  No pallor or jaundice.  No cyanosis.  Warm and dry.     DIAGNOSTIC STUDIES / PROCEDURES  EKG  I have independently interpreted this EKG  Results for orders placed or performed during the hospital encounter of 01/11/24   EKG   Result Value Ref Range    Report       [facility] Emergency Dept.    Test Date:  2024-01-11  Pt Name:    KYUNG LEONARD               Department: [facility]  MRN:        [id number]                      [facility]  Gender:     Male            "              Technician: [id number]  :        [id number]                   Requested By:DEJON CHAMBERLAIN  Order #:    [id number]                    Reading MD: Dex Ruiz MD    Measurements  Intervals                                Axis  Rate:       78                           P:          -65  MT:         207                          QRS:        -73  QRSD:       197                          T:          57  QT:         441  QTc:        503    Interpretive Statements  Atrial-ventricular dual-paced rhythm, rate of 78, no interval changes from  prior dated 10/31/203.  No PVCs, no further changes noted.  Limited  interpretation due to paced rhythm  Electronically Signed On 2024 15:44:30 PST by Dex Ruiz MD       LABS  Labs Reviewed   CBC WITH DIFFERENTIAL - Abnormal; Notable for the following components:       Result Value    WBC 4.7 (*)     RBC 4.50 (*)     .7 (*)     MCH 34.4 (*)     RDW 54.5 (*)     All other components within normal limits   COMP METABOLIC PANEL - Abnormal; Notable for the following components:    Glucose 103 (*)     Bun 38 (*)     Creatinine 2.18 (*)     All other components within normal limits   ESTIMATED GFR - Abnormal; Notable for the following components:    GFR (CKD-EPI) 30 (*)     All other components within normal limits   MAGNESIUM   PROTHROMBIN TIME    Narrative:     Indicate which anticoagulants the patient is on:->ARGATROBAN   APTT    Narrative:     Indicate which anticoagulants the patient is on:->ARGATROBAN     RADIOLOGY  I have independently interpreted the diagnostic imaging associated with this visit and am waiting the final reading from the radiologist.   My preliminary interpretation is as follows: Stable cardiomegaly  Radiologist interpretation:   DX-CHEST-PORTABLE (1 VIEW)   Final Result      Cardiomegaly.        COURSE & MEDICAL DECISION MAKING    ED Observation Status? No; Patient does not meet criteria for ED Observation.     INITIAL ASSESSMENT, COURSE AND  PLAN  Care Narrative: Patient presents emergency room as he was advised to possibly come in as he had had several episodes over the last several days where his V. tach had been dealt with by ATP interventions from his pacemaker defibrillator.  On arrival the patient states that he has not had any episodes of dizziness, lightheadedness, no syncope or chest pressure sensations.  Patient has stable vital signs, has been here and to his medication regiment and otherwise has no acute constitutional complaints.  With the possibility of some of this being exacerbated by metabolic derangement or worsening cardiomyopathy I have obtained some basic lab work obtained a chest x-ray.  There does not appear any wire fractures, there is not appear to be any gross electrolyte derangement.  Patient has a slight bump in creatinine from his baseline range of 1.5-1.99 at 2.18.  Creatinine is 30 which is very similar to his baseline ranges.  He does not have significant anemia, no leukocytosis, no other gross electrolyte abnormalities at this time that would necessitate further intervention.    I have spoken with Dr. Chavez of cardiology and as the patient is asymptomatic and had several days removed from his last episode there is no acute emergent interventions needed at this time.  Recommendations:  The patient can be discharged from the ER  The patient's been given instructions that in the event of the development of any cardiac symptoms to return to ER or contact EMS  Should be instructed to maintain adequate daily hydration.  Can hold furosemide and spironolactone for 2 days then restart  Will increase amiodarone to 400 mg daily  Will arrange early EP follow-upfollowing up on his current amiodarone dosing and nonemergent follow-up with EP physicians for further discussion regarding his medications    I have explained to the patient the current findings, overall he has no other evolving complaints and will follow-up with his  outpatient cardiologist as directed.  If he has evolving dizziness, lightheadedness, chest pressure sensations or shortness of breath I would recommend reevaluation here in the emergency department.  Questions are discussed and patient will be discharged home in stable condition.    DISPOSITION AND DISCUSSIONS  I have discussed management of the patient with the following physicians and JARED's:  Cardiology, [place]    Discussion of management with other [facility] or appropriate source(s): None     Escalation of care considered, and ultimately not performed:acute inpatient care management, however at this time, the patient is most appropriate for outpatient management    FINAL DIAGNOSIS  1. Ventricular tachyarrhythmia (HCC)    2. Pacemaker      Electronically signed by: Joseph Kowalski M.D., 1/11/2024 3:20 PM

## 2024-01-11 NOTE — ED NOTES
Med rec completed per pt's home pharmacy ([facility])   Allergies reviewed   No PO antibiotics in the last 30 days     Pt takes Eliquis 2.5 mg twice a day   Last dose 1/11/2024 AM

## 2024-01-12 NOTE — TELEPHONE ENCOUNTER
----- Message from Ayden Chavez M.D. sent at 1/11/2024  4:47 PM PST -----  Regarding: EP follow-up  This patient was sent into the ER and is being discharged.  Please have this patient seen by EP ASAP for recommendations  They could probably be notified to review the chart for any interim recommendations prior to clinic visit  Any questions let me know

## 2024-01-12 NOTE — DISCHARGE INSTRUCTIONS
Cardiology Recommendations  The patient can be discharged from the ER  The patient's been given instructions that in the event of the development of any cardiac symptoms to return to ER or contact EMS  Should be instructed to maintain adequate daily hydration.  Can hold furosemide and spironolactone for 2 days then restart  Will increase amiodarone to 400 mg daily  Will arrange early EP follow-upfollowing up on his current amiodarone dosing and nonemergent follow-up with EP physicians for further discussion regarding his medications

## 2024-01-12 NOTE — PROGRESS NOTES
Brief Cardiology Note:     I was called to discuss this patient's care with Dr. Joseph Kowalski MD.     The patient is a 83-year-old male followed by [facility] Cardiology Heart Failure and EP clinic for NICM, HFrEF, VT post ablation 2012, PAF with Watchman device 10/2015, hypertension, dyslipidemia, CKD, AAA repair and prior TIA and OAC on apixaban who on remote remote transmission had 2 ATP episodes effectively treated on 1/3/2024 1/8/2024.  He was contacted by the cardiology clinic and was told to come in for evaluation.  In the ER vital signs are stable.  Laboratory remarkable for some increase in BUN and creatinine.  Potassium and magnesium normal.  I am told the patient is having no symptoms.    Recommendations:  The patient can be discharged from the ER  The patient's been given instructions that in the event of the development of any cardiac symptoms to return to ER or contact EMS  Should be instructed to maintain adequate daily hydration.  Can hold furosemide and spironolactone for 2 days then restart  Will increase amiodarone to 400 mg daily  Will arrange early EP follow-up    At this time it was deemed no formal in person cardiology consultation was necessary, however if this changes due to changes in patient condition or abnormal test results, please re-consult cardiology.      Please note that this dictation was created using voice recognition software. There are errors of grammar and possibly content I did not discover before finalizing the note.     Electronically signed by: Ayden Chavez MD [facility]    1/11/2024  4:38 PM

## 2024-01-15 ENCOUNTER — TELEPHONE (OUTPATIENT)
Dept: CARDIOLOGY | Facility: MEDICAL CENTER | Age: 84
End: 2024-01-15
Payer: MEDICARE

## 2024-01-15 ENCOUNTER — OFFICE VISIT (OUTPATIENT)
Dept: CARDIOLOGY | Facility: MEDICAL CENTER | Age: 84
End: 2024-01-15
Attending: NURSE PRACTITIONER
Payer: MEDICARE

## 2024-01-15 VITALS
DIASTOLIC BLOOD PRESSURE: 72 MMHG | HEART RATE: 86 BPM | RESPIRATION RATE: 12 BRPM | HEIGHT: 72 IN | BODY MASS INDEX: 23.84 KG/M2 | OXYGEN SATURATION: 96 % | SYSTOLIC BLOOD PRESSURE: 100 MMHG | WEIGHT: 176 LBS

## 2024-01-15 DIAGNOSIS — I49.9 IRREGULAR HEART BEAT: ICD-10-CM

## 2024-01-15 DIAGNOSIS — Z95.810 PRESENCE OF BIVENTRICULAR AUTOMATIC CARDIOVERTER/DEFIBRILLATOR (AICD): ICD-10-CM

## 2024-01-15 DIAGNOSIS — I50.20 ACC/AHA STAGE C SYSTOLIC HEART FAILURE (HCC): Primary | ICD-10-CM

## 2024-01-15 DIAGNOSIS — I47.20 VENTRICULAR TACHYCARDIA (HCC): ICD-10-CM

## 2024-01-15 PROCEDURE — 3074F SYST BP LT 130 MM HG: CPT | Performed by: NURSE PRACTITIONER

## 2024-01-15 PROCEDURE — 99214 OFFICE O/P EST MOD 30 MIN: CPT | Mod: 25 | Performed by: NURSE PRACTITIONER

## 2024-01-15 PROCEDURE — 93284 PRGRMG EVAL IMPLANTABLE DFB: CPT | Performed by: NURSE PRACTITIONER

## 2024-01-15 PROCEDURE — 3078F DIAST BP <80 MM HG: CPT | Performed by: NURSE PRACTITIONER

## 2024-01-15 PROCEDURE — 99213 OFFICE O/P EST LOW 20 MIN: CPT | Performed by: NURSE PRACTITIONER

## 2024-01-15 PROCEDURE — 93005 ELECTROCARDIOGRAM TRACING: CPT | Performed by: NURSE PRACTITIONER

## 2024-01-15 PROCEDURE — 93284 PRGRMG EVAL IMPLANTABLE DFB: CPT | Mod: 26 | Performed by: NURSE PRACTITIONER

## 2024-01-15 RX ORDER — EMPAGLIFLOZIN 25 MG/1
12.5 TABLET, FILM COATED ORAL DAILY
Qty: 45 TABLET | Refills: 3 | Status: SHIPPED | OUTPATIENT
Start: 2024-01-15

## 2024-01-15 ASSESSMENT — FIBROSIS 4 INDEX: FIB4 SCORE: 2.77

## 2024-01-15 NOTE — PATIENT INSTRUCTIONS
Take one tablet twice daily of amiodarone for the next two weeks     Then move back down to 300mg daily for 3 weeks then down to 200mg daily

## 2024-01-15 NOTE — TELEPHONE ENCOUNTER
Phone Number Called: [phone]    Call outcome: Spoke to patient regarding message below.    Message: Called to follow up with patient regarding questions about fluid retained over the holidays. Advised to continue medications, monitor salt intake, and drink 64 ounces of plain water a day. Patient verbalizes understanding. Also advised patient that it is not unusual for holidays to increase heart patients fluid retention. Patient will call back with additional questions.

## 2024-01-16 ENCOUNTER — OCCUPATIONAL THERAPY (OUTPATIENT)
Dept: OCCUPATIONAL THERAPY | Facility: REHABILITATION | Age: 84
End: 2024-01-16
Attending: NURSE PRACTITIONER
Payer: COMMERCIAL

## 2024-01-16 DIAGNOSIS — I69.393 ATAXIA DUE TO RECENT CEREBROVASCULAR ACCIDENT (CVA): ICD-10-CM

## 2024-01-16 PROCEDURE — 97166 OT EVAL MOD COMPLEX 45 MIN: CPT

## 2024-01-16 ASSESSMENT — ENCOUNTER SYMPTOMS
BRUISES/BLEEDS EASILY: 0
CLAUDICATION: 0
ORTHOPNEA: 0
SENSORY CHANGE: 0
PALPITATIONS: 0
NAUSEA: 0
NERVOUS/ANXIOUS: 0
DIZZINESS: 0
DEPRESSION: 0
RESPIRATORY NEGATIVE: 1
HALLUCINATIONS: 0
GASTROINTESTINAL NEGATIVE: 1
PND: 0
NEUROLOGICAL NEGATIVE: 1
SHORTNESS OF BREATH: 0
EYES NEGATIVE: 1
WHEEZING: 0
CONSTITUTIONAL NEGATIVE: 1
MUSCULOSKELETAL NEGATIVE: 1

## 2024-01-16 ASSESSMENT — ACTIVITIES OF DAILY LIVING (ADL)
BATHING_CURRENT_FUNCTION: INDEPENDENT
POOR_BALANCE: 1
DRESSING_CURRENT_FUNCTION: INDEPENDENT
BRUSHING_TEETH_DENTURES_CURRENT_FUNCTION: INDEPENDENT
TOILETING: INDEPENDENT
FEEDING: INDEPENDENT
AMBULATION_WITH_ASSISTIVE_DEVICE: INDEPENDENT
EQUIPMENT_USED_WHILE_BATHING: SHOWER CHAIR

## 2024-01-16 ASSESSMENT — BALANCE ASSESSMENTS
BALANCE - SITTING DYNAMIC: NORMAL
BALANCE - STANDING STATIC: GOOD
BALANCE - SITTING STATIC: NORMAL
BALANCE - STANDING DYNAMIC: FAIR +

## 2024-01-16 NOTE — PROGRESS NOTES
Electrophysiology Follow up  Note    DOS: 1/16/2024  [id number]  Lencho Parker    Chief complaint/Reason for consult: ED visit device treatment for VT    HPI: Pt is a 83 y.o. male who presents to the clinic today in follow up for ED visit for device therapy. Patient has a past medical history significant for but not limited to: CKD3b, cardiomyopathy, hypertension, mixed hyperlipidemia, CRT-D in situ, paroxysmal atrial fibrillation, VT, high risk medication use amiodarone, h/o TIA, chronic systolic HFrEF. Patient was called to report to the ED after two VT events treated with ATP per his device. One event on January 3rd and one on January 8th. Patient was not awake for either event. Patient does not feel significantly different now vs prior to events. Device working well. Per OptiVol and thoracic impedance monitoring per his device he did have a spike in edema approximately 10-14 days before his events. This could have caused some strain on his heart. QRD duration 190ms on measurement on EKG. During interrogation attempt to find new pacing vector points to narrow the QRS was attempted, however patient could not tolerate the voltage higher than 1 without symptoms and this would not leave an adequate safety margin. Programmed vector was changed to 1.2 pulse width which did bring the QRS down to 160 when measured by hand with calipers. 1.0 years left on battery and during next battery change a more in depth vector trial can be attempted to try and narrow the QRS further. Patient otherwise doing ok. Amiodarone will be increased for a short duration to try and tamp down any more arrhythmia. Patient also scheduled with his HF specialist Dr. Velázquez in early March.       Past Medical History:   Diagnosis Date    Breath shortness     on exertion    Cardiomyopathy (HCC) 05/2021    Echocardiogram with moderately dilated LV, mild concentric LVH, LVEF 20%. Global hypokinesis. Normal RA and RV. Severely dilated LA. Mild MR, mild  TR. RVSP 34mmHg.    Sloven[place] measles     Heart attack (HCC)     hx 2007    History of abdominal aortic aneurysm (AAA) 2009    Status post stent    Hyperlipidemia     Hypertension     Mumps     Pacemaker     AICD    Paroxysmal atrial fibrillation (HCC)     Status post Watchman procedure in [place].    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     Sleep apnea     Snoring     Stroke (HCC) 2012    TIA    Tonsillitis     Ventricular tachycardia (HCC)        Past Surgical History:   Procedure Laterality Date    AICD BATTERY CHANGE Left 09/10/2020     Upgrade to [employer] MRI Quad CRT-D [id number] implanted by Dr. Barrios.    AICD BATTERY CHANGE Left 01/03/2017    Generator replacement with Medtronic Evera MRI XT  [id number] implanted in [place].    OTHER CARDIAC SURGERY  2014    watchman device    AAA WITH STENT GRAFT  2009    OTHER ORTHOPEDIC SURGERY  2005    hip surgery    OTHER  2000    kidney removed    OTHER CARDIAC SURGERY  1991    AICD implanted       Social History     Socioeconomic History    Marital status: Single     Spouse name: Not on file    Number of children: Not on file    Years of education: Not on file    Highest education level: Not on file   Occupational History    Not on file   Tobacco Use    Smoking status: Former     Current packs/day: 0.50     Types: Cigarettes    Smokeless tobacco: Former   Vaping Use    Vaping Use: Never used   Substance and Sexual Activity    Alcohol use: Yes     Alcohol/week: 8.4 oz     Types: 14 Glasses of wine per week     Comment: couple glasses of wine daily    Drug use: Not Currently     Comment: denies    Sexual activity: Not on file   Other Topics Concern    Not on file   Social History Narrative    Not on file     Social Determinants of Health     Financial Resource Strain: Not on file   Food Insecurity: Not on file   Transportation Needs: Not on file   Physical Activity: Not on file   Stress: Not on file   Social Connections: Not on file   Intimate Partner Violence: Not on  file   Housing Stability: Not on file       Family History   Problem Relation Age of Onset    Cancer Mother     Cancer Sister        Allergies   Allergen Reactions    Entresto [Sacubitril-Valsartan]      Swollen tongue. Angioedema.    Crestor [Rosuvastatin]        Current Outpatient Medications   Medication Sig Dispense Refill    Empagliflozin (JARDIANCE) 25 MG Tab Take 12.5 mg by mouth every day. 45 Tablet 3    albuterol 108 (90 Base) MCG/ACT Aero Soln inhalation aerosol Inhale 2 Puffs every four hours as needed for Shortness of Breath.      carvedilol (COREG) 12.5 MG Tab Take 6.25-12.5 mg by mouth 2 times a day with meals. 12.5 mg in the AM  6.25 mg in the PM      apixaban (ELIQUIS) 5mg Tab Take 2.5 mg by mouth 2 times a day.      atorvastatin (LIPITOR) 40 MG Tab Take 40 mg by mouth every day.      allopurinol (ZYLOPRIM) 100 MG Tab Take 100 mg by mouth every day.      spironolactone (ALDACTONE) 25 MG Tab Take 0.5 Tablets by mouth every day. 45 Tablet 3    losartan (COZAAR) 100 MG Tab Take 1 Tablet by mouth every day. 90 Each 4    amiodarone (CORDARONE) 200 MG Tab Take 1 Tablet by mouth every day. (Patient taking differently: Take 300 mg by mouth every day.) 90 Tablet 4    vitamin D3 (CHOLECALCIFEROL) 1000 Unit (25 mcg) Tab Take 1,000 Units by mouth every day.      furosemide (LASIX) 20 MG Tab Take 20 mg by mouth every day.      Multiple Vitamins-Minerals (MULTIVITAMIN ADULT EXTRA C PO) Take 1 Tablet by mouth every day.      tamsulosin (FLOMAX) 0.4 MG capsule Take 0.8 mg by mouth every day.      cinacalcet (SENSIPAR) 30 MG Tab Take 30 mg by mouth every day.      Omega-3 Fatty Acids (FISH OIL) 1000 MG Cap capsule Take 1,000 mg by mouth every day.       No current facility-administered medications for this visit.       Vitals:    01/15/24 0912   BP: 100/72   Pulse: 86   Resp: 12   SpO2: 96%         Review of Systems   Constitutional: Negative.  Negative for malaise/fatigue.   HENT: Negative.     Eyes: Negative.     Respiratory: Negative.  Negative for shortness of breath and wheezing.    Cardiovascular:  Negative for chest pain, palpitations, orthopnea, claudication, leg swelling and PND.   Gastrointestinal: Negative.  Negative for nausea.   Genitourinary: Negative.    Musculoskeletal: Negative.    Skin: Negative.    Neurological: Negative.  Negative for dizziness and sensory change.   Endo/Heme/Allergies: Negative.  Does not bruise/bleed easily.   Psychiatric/Behavioral:  Negative for depression and hallucinations. The patient is not nervous/anxious.         EKG interpreted by me: AV paced; QRS 190ms      Device Type: Medtronic CRT-D  AP%:  48  %: 99  Battery Longevity: 1.9yrs  Lead Impedance: stable and within normal limits  Indication: chronic HFrEF and VT  Events: 2 VT events treated with aTP: AF burden at 21.6% but no episodes since early Dec.: patient had diaphragmatic stimulation at vectors LV1-LV3, LV1-RV coil,LV1-LV4    Device interrogated and programmed by Guanakito Stanley       Physical Exam  Constitutional:       Appearance: Normal appearance.   HENT:      Head: Normocephalic.   Eyes:      Pupils: Pupils are equal, round, and reactive to light.   Neck:      Vascular: No JVD.   Cardiovascular:      Rate and Rhythm: Normal rate and regular rhythm.      Pulses: Normal pulses.      Heart sounds: Normal heart sounds.   Pulmonary:      Effort: Pulmonary effort is normal.      Breath sounds: Normal breath sounds.   Abdominal:      General: Abdomen is flat.      Palpations: Abdomen is soft.   Musculoskeletal:      Cervical back: Normal range of motion.      Right lower leg: No edema.      Left lower leg: No edema.   Skin:     General: Skin is warm and dry.   Neurological:      Mental Status: He is alert and oriented to person, place, and time.   Psychiatric:         Mood and Affect: Mood normal.         Behavior: Behavior normal.          Data:  Lipids:   Lab Results   Component Value Date/Time    CHOLSTRLTOT 188  "12/08/2022 07:39 AM    TRIGLYCERIDE 161 (H) 12/08/2022 07:39 AM    HDL 64 12/08/2022 07:39 AM    LDL 92 12/08/2022 07:39 AM        BMP:  Lab Results   Component Value Date/Time    SODIUM 139 01/11/2024 1531    POTASSIUM 5.2 01/11/2024 1531    CHLORIDE 103 01/11/2024 1531    CO2 23 01/11/2024 1531    GLUCOSE 103 (H) 01/11/2024 1531    BUN 38 (H) 01/11/2024 1531    CREATININE 2.18 (H) 01/11/2024 1531    CALCIUM 9.0 01/11/2024 1531    ANION 13.0 01/11/2024 1531       GFR:  Lab Results   Component Value Date/Time    IFAFRICA 46 (A) 12/09/2021 0744    IFNOTAFR 38 (A) 12/09/2021 0744        TSH:   Lab Results   Component Value Date/Time    TSHULTRASEN 5.900 (H) 05/17/2021 0709       MAGNESIUM:  Lab Results   Component Value Date/Time    MAGNESIUM 2.3 01/11/2024 1531    MAGNESIUM 2.2 07/20/2023 0108    MAGNESIUM 1.7 07/19/2023 0530        THYROXINE (T4):   No results found for: \"FREEDIR\"     CBC:   Lab Results   Component Value Date/Time    WBC 4.7 (L) 01/11/2024 03:31 PM    RBC 4.50 (L) 01/11/2024 03:31 PM    HEMOGLOBIN 15.5 01/11/2024 03:31 PM    HEMATOCRIT 47.1 01/11/2024 03:31 PM    .7 (H) 01/11/2024 03:31 PM    MCH 34.4 (H) 01/11/2024 03:31 PM    MCHC 32.9 01/11/2024 03:31 PM    RDW 54.5 (H) 01/11/2024 03:31 PM    PLATELETCT 178 01/11/2024 03:31 PM    MPV 11.2 01/11/2024 03:31 PM    NEUTSPOLYS 51.20 01/11/2024 03:31 PM    LYMPHOCYTES 33.20 01/11/2024 03:31 PM    MONOCYTES 11.30 01/11/2024 03:31 PM    EOSINOPHILS 4.10 01/11/2024 03:31 PM    BASOPHILS 0.20 01/11/2024 03:31 PM    IMMGRAN 0.00 01/11/2024 03:31 PM    NRBC 0.00 01/11/2024 03:31 PM    NEUTS 2.39 01/11/2024 03:31 PM    LYMPHS 1.55 01/11/2024 03:31 PM    MONOS 0.53 01/11/2024 03:31 PM    EOS 0.19 01/11/2024 03:31 PM    BASO 0.01 01/11/2024 03:31 PM    IMMGRANAB 0.00 01/11/2024 03:31 PM    NRBCAB 0.00 01/11/2024 03:31 PM        CBC w/o DIFF  Lab Results   Component Value Date/Time    WBC 4.7 (L) 01/11/2024 03:31 PM    RBC 4.50 (L) 01/11/2024 03:31 PM    " HEMOGLOBIN 15.5 01/11/2024 03:31 PM    .7 (H) 01/11/2024 03:31 PM    MCH 34.4 (H) 01/11/2024 03:31 PM    MCHC 32.9 01/11/2024 03:31 PM    RDW 54.5 (H) 01/11/2024 03:31 PM    MPV 11.2 01/11/2024 03:31 PM       LIVER:  Lab Results   Component Value Date/Time    ALKPHOSPHAT 82 01/11/2024 03:31 PM    ASTSGOT 37 01/11/2024 03:31 PM    ALTSGPT 37 01/11/2024 03:31 PM    TBILIRUBIN 0.9 01/11/2024 03:31 PM       BNP:  Lab Results   Component Value Date/Time    BNPBTYPENAT 695 (H) 05/13/2019 11:46 AM       PT/INR:  Lab Results   Component Value Date/Time    PROTHROMBTM 16.1 (H) 01/11/2024 03:31 PM    PROTHROMBTM 17.6 (H) 11/20/2023 09:00 AM    PROTHROMBTM 14.1 01/06/2023 01:54 PM    INR 1.24 (H) 01/11/2024 03:31 PM    INR 1.43 (H) 11/20/2023 09:00 AM    INR 1.11 01/06/2023 01:54 PM             Impression/Plan:  Ventricular tachycardia (HCC)   - 2 events: Jan. 3rd and 8th treated with burst ATP   - patient unaware   - device working appropriately   - increase amiodarone to 200mg twice daily for 2 weeks; then 300 mg for 3 weeks then back to 200mg daily   -     ACC/AHA stage C systolic heart failure (HCC)   - patient assisted with vericiguat prescription   - no new complaints   - no edema   - scheduled with Dr. Velázquez in March   - continue current goal directed medical therapies per HF team     Presence of biventricular automatic cardioverter/defibrillator (AICD)   - device interrogated and programmed   - leads intact and working WNL   - different LV vectors attempted to try and narrow QRS, however, patient had diaphragmatic stimulation in all   - continue remote monitoring and annual in office device checks            A total of 33 minutes of time was spent on day of encounter reviewing medical record, performing history and examination, counseling, ordering medication/test/consults, collaborating with referring service, and documentation.    Guanakito Stanley AGACNP-EP  Cardiac Electrophysiology

## 2024-01-16 NOTE — ASSESSMENT & PLAN NOTE
- 2 events: Jan. 3rd and 8th treated with burst ATP   - patient unaware   - device working appropriately   - increase amiodarone to 200mg twice daily for 2 weeks; then 300 mg for 3 weeks then back to 200mg daily   -

## 2024-01-16 NOTE — ASSESSMENT & PLAN NOTE
- patient assisted with vericiguat prescription   - no new complaints   - no edema   - scheduled with Dr. Velázquez in March   - continue current goal directed medical therapies per HF team

## 2024-01-16 NOTE — OP THERAPY EVALUATION
"  Outpatient Occupational Therapy  INITIAL NEUROLOGICAL EVALUATION    [facility] Occupational Therapy [address]  [address]  [place] [address]  Phone:  [phone]  Fax:  [phone]    Date of Evaluation: 01/16/2024    Patient: Lencho Parker  YOB: 1940  MRN: [id number]     Referring Provider: EUGENE Boyd  [address] [place] [address]   Referring Diagnosis Ataxia, unspecified [R27.0]     Time Calculation    Start time: 0230  Stop time: 0315 Time Calculation (min): 45 minutes             Chief Complaint: Extremity Weakness and Self Care Duties    Visit Diagnoses     ICD-10-CM   1. 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    Note stated that patient has decreased balance   As per patient, he stated he had a TIA in December of last year and developed \"shaking in his hands\"; however he has not been affected functionally     He is more concerned regarding the numbness in both feet and recurring falls.    He believed he was here for a Physical therapy appointment   Social Support:     Lives in:  Community-based residential facility (Senior living facility)    Lives with:  Alone  Treatments:     None    Activities of Daily Living:     Patient reported ADL status: Mod I with all personal care and light domestic tasks.  Facility provides meals and transportation as needed.   Patient Goals:     Patient goals for therapy:  Improved balance      Past Medical History:   Diagnosis Date    Breath shortness     on exertion    Cardiomyopathy (HCC) 05/2021    Echocardiogram with moderately dilated LV, mild concentric LVH, LVEF 20%. Global hypokinesis. Normal RA and RV. Severely dilated LA. Mild MR, mild TR. RVSP 34mmHg.    Azeri measles     Heart attack (HCC)     hx 2007    History of abdominal aortic aneurysm (AAA) " 2009    Status post stent    Hyperlipidemia     Hypertension     Mumps     Pacemaker     AICD    Paroxysmal atrial fibrillation (HCC)     Status post Watchman procedure in [place].    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     Sleep apnea     Snoring     Stroke (HCC) 2012    TIA    Tonsillitis     Ventricular tachycardia (HCC)      Past Surgical History:   Procedure Laterality Date    AICD BATTERY CHANGE Left 09/10/2020     Upgrade to Medtronic Claria MRI Quad CRT-D [id number] implanted by Dr. Barrios.    AICD BATTERY CHANGE Left 01/03/2017    Generator replacement with Medtronic Evera MRI XT  [id number] implanted in [place].    OTHER CARDIAC SURGERY  2014    watchman device    AAA WITH STENT GRAFT  2009    OTHER ORTHOPEDIC SURGERY  2005    hip surgery    OTHER  2000    kidney removed    OTHER CARDIAC SURGERY  1991    AICD implanted     Social History     Tobacco Use    Smoking status: Former     Current packs/day: 0.50     Types: Cigarettes    Smokeless tobacco: Former   Substance Use Topics    Alcohol use: Yes     Alcohol/week: 8.4 oz     Types: 14 Glasses of wine per week     Comment: couple glasses of wine daily     Family and Occupational History     Socioeconomic History    Marital status: Single     Spouse name: Not on file    Number of children: Not on file    Years of education: Not on file    Highest education level: Not on file   Occupational History    Not on file       Objective:   Active Range of Motion:   Upper extremity (left):     All left upper extremity active range of motion: All within functional limits  Upper extremity (right):     All right upper extremity active range of motion: All within functional limits      Strength:     Left upper extremity strength within functional limits.      Right upper extremity strength within functional limits.      Tone, Sensation and Coordination:   Tone:     Left upper extremity muscle tone: Resting tremors    Right upper extremity muscle tone: Resting  tremors    Sensation     Sensation comments:   Intact for UE  Numbness in LE    Cognition:     Orientation: normal to time, normal to place, normal to person and normal to situation    Direction following: three step    Short term memory: intact    Long term memory: intact    Attention span: intact    Sequencing: intact    Organization: intact    Problem solving: intact    Judgement and safety awareness: intact    Hearing: intact    Vision/Perception:     Visual tracking: intact    Convergence: intact    Visual attentions: intact    Visual acuity: intact    Visual scanning: intact    R/L discrimination: intact    R/L hemianopsia: not present    R/L neglect: not present    Spatial relations: intact    Vision assistive device(s): reading glasses    Postural Control (Balance)     Sitting (static): Normal    Sitting (dynamic): Normal    Standing (static): Good    Standing (dynamic): Fair +    Ambulation: Level Surfaces   Ambulation with assistive device: independent    Additional Level Surfaces Ambulation Details  Single point cane    Activities of Daily Living:   Transfers/Mobility:     Bed/chair transfers: independent    Sit to stand: independent    Toilet transfers: independent    Tub/shower transfers: independent    Bed mobility: independent    Toileting:     Toileting: independent    Bathing:     Bathing: independent    Bathing assistive device(s): shower chair    Dressing:     Dressing: independent    Grooming:     Brushing teeth or denture care: independent    Feeding:     Feeding: independent    Household Management:     Household Management Comments:  Mod I with light housekeeping, money management, writing and use of telephone.  Facility provides meals and laundry service.    Physical Assessment:   Strength:     Upper extremity (left): within functional limits    Upper extremity (right): within functional limits    Sensation:       Sensation Comments: Intact for UE  Numbness in LE  Balance:     Sitting  (static): Normal    Sitting (dynamic): Normal    Standing (static): Good    Standing (dynamic): Fair +      Vision/Perception:     Visual tracking: intact  Convergence: intact  Visual attentions: intact  Visual acuity: intact  Visual scanning: intact  R/L Discrimination: intact  R/L Hemianopsia: not present  R/L Neglect: not present  Spatial relations: intact  Vision assist device(s): reading glasses           Assessment, Response and Plan:   Impairments: impaired balance    Assessment details:  Patient is an 82 y/o male referred to OT for ataxia of UE due to recent TIA a couple of months age.  Patient is Mod I with all personal self care and mobilizes with a SPC.  UE strength is WFL and patient is more concerned regarding his bilateral foot numbness and increased falls in the past year.  Patient stated he has had at least 7 falls in the past year.  Patient does not warrant OT at this time, but would benefit from a Physical Therapy consultation to ascertain increased falling and numbness of feet.    Barriers to therapy:  None  Goals:   Short Term Goals:   No OT goals set today   OT short term goals timespan: 1 day.    Long Term Goals:   Not applicable.  Seen for OT evaluation only   OT long term goal timespan: 1 day.    Plan:   Therapy options:  No further treatment needed and referred back to MD  Other planned therapy interventions:  None.  Frequency: 1 x.  Duration in weeks:  1  Duration in visits:  1  Discussed with:  Patient  Plan details:  OTR has reached out to our [place] Team member, Aleah Roger, to contact referring provider to obtain a referral for PT and to arrange appointments at our [facility] location as it is closer to patient's home.           Referring provider co-signature:  I have reviewed this plan of care and my co-signature certifies the need for services.    Certification Period: 01/16/2024 to  Other 1/16/2024    Physician Signature: ________________________________ Date: ______________

## 2024-01-16 NOTE — ASSESSMENT & PLAN NOTE
- device interrogated and programmed   - leads intact and working WNL   - different LV vectors attempted to try and narrow QRS, however, patient had diaphragmatic stimulation in all   - continue remote monitoring and annual in office device checks

## 2024-01-17 ENCOUNTER — HOSPITAL ENCOUNTER (EMERGENCY)
Facility: MEDICAL CENTER | Age: 84
End: 2024-01-18
Attending: EMERGENCY MEDICINE
Payer: MEDICARE

## 2024-01-17 ENCOUNTER — APPOINTMENT (OUTPATIENT)
Dept: RADIOLOGY | Facility: MEDICAL CENTER | Age: 84
End: 2024-01-17
Attending: STUDENT IN AN ORGANIZED HEALTH CARE EDUCATION/TRAINING PROGRAM
Payer: MEDICARE

## 2024-01-17 ENCOUNTER — HOSPITAL ENCOUNTER (EMERGENCY)
Facility: MEDICAL CENTER | Age: 84
End: 2024-01-26
Payer: MEDICARE

## 2024-01-17 DIAGNOSIS — S09.90XA CLOSED HEAD INJURY, INITIAL ENCOUNTER: ICD-10-CM

## 2024-01-17 DIAGNOSIS — E86.0 DEHYDRATION: ICD-10-CM

## 2024-01-17 LAB
ALBUMIN SERPL BCP-MCNC: 3.7 G/DL (ref 3.2–4.9)
ALBUMIN/GLOB SERPL: 1.4 G/DL
ALP SERPL-CCNC: 75 U/L (ref 30–99)
ALT SERPL-CCNC: 40 U/L (ref 2–50)
ANION GAP SERPL CALC-SCNC: 15 MMOL/L (ref 7–16)
AST SERPL-CCNC: 49 U/L (ref 12–45)
BASOPHILS # BLD AUTO: 0.2 % (ref 0–1.8)
BASOPHILS # BLD: 0.01 K/UL (ref 0–0.12)
BILIRUB SERPL-MCNC: 0.5 MG/DL (ref 0.1–1.5)
BUN SERPL-MCNC: 37 MG/DL (ref 8–22)
CALCIUM ALBUM COR SERPL-MCNC: 8.6 MG/DL (ref 8.5–10.5)
CALCIUM SERPL-MCNC: 8.4 MG/DL (ref 8.5–10.5)
CHLORIDE SERPL-SCNC: 103 MMOL/L (ref 96–112)
CO2 SERPL-SCNC: 19 MMOL/L (ref 20–33)
CREAT SERPL-MCNC: 2.04 MG/DL (ref 0.5–1.4)
EKG IMPRESSION: NORMAL
EOSINOPHIL # BLD AUTO: 0.2 K/UL (ref 0–0.51)
EOSINOPHIL NFR BLD: 4.1 % (ref 0–6.9)
ERYTHROCYTE [DISTWIDTH] IN BLOOD BY AUTOMATED COUNT: 53.2 FL (ref 35.9–50)
GFR SERPLBLD CREATININE-BSD FMLA CKD-EPI: 32 ML/MIN/1.73 M 2
GLOBULIN SER CALC-MCNC: 2.7 G/DL (ref 1.9–3.5)
GLUCOSE SERPL-MCNC: 103 MG/DL (ref 65–99)
HCT VFR BLD AUTO: 47 % (ref 42–52)
HGB BLD-MCNC: 15.5 G/DL (ref 14–18)
IMM GRANULOCYTES # BLD AUTO: 0.01 K/UL (ref 0–0.11)
IMM GRANULOCYTES NFR BLD AUTO: 0.2 % (ref 0–0.9)
LYMPHOCYTES # BLD AUTO: 1.91 K/UL (ref 1–4.8)
LYMPHOCYTES NFR BLD: 38.7 % (ref 22–41)
MCH RBC QN AUTO: 34 PG (ref 27–33)
MCHC RBC AUTO-ENTMCNC: 33 G/DL (ref 32.3–36.5)
MCV RBC AUTO: 103.1 FL (ref 81.4–97.8)
MONOCYTES # BLD AUTO: 0.5 K/UL (ref 0–0.85)
MONOCYTES NFR BLD AUTO: 10.1 % (ref 0–13.4)
NEUTROPHILS # BLD AUTO: 2.3 K/UL (ref 1.82–7.42)
NEUTROPHILS NFR BLD: 46.7 % (ref 44–72)
NRBC # BLD AUTO: 0 K/UL
NRBC BLD-RTO: 0 /100 WBC (ref 0–0.2)
PLATELET # BLD AUTO: 167 K/UL (ref 164–446)
PMV BLD AUTO: 11.4 FL (ref 9–12.9)
POTASSIUM SERPL-SCNC: 5 MMOL/L (ref 3.6–5.5)
PROT SERPL-MCNC: 6.4 G/DL (ref 6–8.2)
RBC # BLD AUTO: 4.56 M/UL (ref 4.7–6.1)
SODIUM SERPL-SCNC: 137 MMOL/L (ref 135–145)
TROPONIN T SERPL-MCNC: 35 NG/L (ref 6–19)
TROPONIN T SERPL-MCNC: 37 NG/L (ref 6–19)
WBC # BLD AUTO: 4.9 K/UL (ref 4.8–10.8)

## 2024-01-17 PROCEDURE — 80053 COMPREHEN METABOLIC PANEL: CPT

## 2024-01-17 PROCEDURE — 99284 EMERGENCY DEPT VISIT MOD MDM: CPT

## 2024-01-17 PROCEDURE — 36415 COLL VENOUS BLD VENIPUNCTURE: CPT

## 2024-01-17 PROCEDURE — 93005 ELECTROCARDIOGRAM TRACING: CPT | Performed by: STUDENT IN AN ORGANIZED HEALTH CARE EDUCATION/TRAINING PROGRAM

## 2024-01-17 PROCEDURE — 700105 HCHG RX REV CODE 258: Performed by: STUDENT IN AN ORGANIZED HEALTH CARE EDUCATION/TRAINING PROGRAM

## 2024-01-17 PROCEDURE — 70450 CT HEAD/BRAIN W/O DYE: CPT

## 2024-01-17 PROCEDURE — 85025 COMPLETE CBC W/AUTO DIFF WBC: CPT

## 2024-01-17 PROCEDURE — 84484 ASSAY OF TROPONIN QUANT: CPT | Mod: 91

## 2024-01-17 RX ORDER — SODIUM CHLORIDE, SODIUM LACTATE, POTASSIUM CHLORIDE, AND CALCIUM CHLORIDE .6; .31; .03; .02 G/100ML; G/100ML; G/100ML; G/100ML
500 INJECTION, SOLUTION INTRAVENOUS ONCE
Status: COMPLETED | OUTPATIENT
Start: 2024-01-17 | End: 2024-01-17

## 2024-01-17 RX ADMIN — SODIUM CHLORIDE, POTASSIUM CHLORIDE, SODIUM LACTATE AND CALCIUM CHLORIDE 500 ML: 600; 310; 30; 20 INJECTION, SOLUTION INTRAVENOUS at 22:11

## 2024-01-18 VITALS
WEIGHT: 180 LBS | HEIGHT: 74 IN | TEMPERATURE: 97.6 F | SYSTOLIC BLOOD PRESSURE: 91 MMHG | OXYGEN SATURATION: 94 % | RESPIRATION RATE: 16 BRPM | BODY MASS INDEX: 23.1 KG/M2 | HEART RATE: 61 BPM | DIASTOLIC BLOOD PRESSURE: 55 MMHG

## 2024-01-18 NOTE — ED TRIAGE NOTES
"Chief Complaint   Patient presents with    T-5000 Head Injury     BIBA from [facility], after a MGLF, patient tripped landing on face causing epistaxis, patient denies loc +thinners, +etoh, patient also reports \"feeling like he is going to die\"  but unsure of why he feels this way. FSBG 165     /77   Pulse 78   Temp 36.3 °C (97.4 °F) (Temporal)   Resp 16   Ht 1.88 m (6' 2\")   Wt 81.6 kg (180 lb)   SpO2 97%     TBI alert transferred to CT and then blue 14 report given to Betty MERCHANT   "

## 2024-01-18 NOTE — ED PROVIDER NOTES
"ED Provider Note    CHIEF COMPLAINT  Chief Complaint   Patient presents with    T-5000 Head Injury     BIBA from [facility], after a MGLF, patient tripped landing on face causing epistaxis, patient denies loc +thinners, +etoh, patient also reports \"feeling like he is going to die\"  but unsure of why he feels this way. FSBG 165       EXTERNAL RECORDS REVIEWED  Outpatient labs & studies patient has alternative MRN per chart review here it does show chronic stable troponin anemia and CKD with baseline creatinine around 2    HPI/ROS  LIMITATION TO HISTORY   Select: : None  OUTSIDE HISTORIAN(S):  EMS report obtained at bedside    Lencho Parker is a 83 y.o. male who presents with head injury after trip and fall at assisted living facility.  Patient endorses alcohol use tonight and was ambulating back to his bedroom when he tripped falling forward and striking his head.  He denies loss of consciousness, episodes of vomiting.  Does take blood thinners for A-fib.    PAST MEDICAL HISTORY       SURGICAL HISTORY  patient denies any surgical history    FAMILY HISTORY  No family history on file.    SOCIAL HISTORY  Social History     Tobacco Use    Smoking status: Not on file    Smokeless tobacco: Not on file   Substance and Sexual Activity    Alcohol use: Not on file    Drug use: Not on file    Sexual activity: Not on file       CURRENT MEDICATIONS  Home Medications    **Home medications have not yet been reviewed for this encounter**         ALLERGIES  Allergies   Allergen Reactions    Crestor [Rosuvastatin]        PHYSICAL EXAM  VITAL SIGNS: /59   Pulse 70   Temp 36.3 °C (97.4 °F) (Temporal)   Resp 16   Ht 1.88 m (6' 2\")   Wt 81.6 kg (180 lb)   SpO2 93%   BMI 23.11 kg/m²    Physical Exam  Vitals and nursing note reviewed.   Constitutional:       Appearance: He is well-developed.   HENT:      Head: Normocephalic.      Nose:      Comments: Dried blood around the right naris  Cardiovascular:      Rate and Rhythm: " Normal rate and regular rhythm.      Heart sounds: No murmur heard.  Pulmonary:      Effort: Pulmonary effort is normal.      Breath sounds: Normal breath sounds.   Abdominal:      Palpations: Abdomen is soft.      Tenderness: There is no abdominal tenderness.   Musculoskeletal:         General: Normal range of motion.      Right lower leg: No edema.      Left lower leg: No edema.   Skin:     General: Skin is warm.   Neurological:      General: No focal deficit present.      Mental Status: He is alert and oriented to person, place, and time.         DIAGNOSTIC STUDIES / PROCEDURES  EKG  I have independently interpreted this EKG      LABS  Labs Reviewed   CBC WITH DIFFERENTIAL - Abnormal; Notable for the following components:       Result Value    RBC 4.56 (*)     .1 (*)     MCH 34.0 (*)     RDW 53.2 (*)     All other components within normal limits    Narrative:     Biotin intake of greater than 5 mg per day may interfere with  troponin levels, causing false low values.   COMP METABOLIC PANEL - Abnormal; Notable for the following components:    Co2 19 (*)     Glucose 103 (*)     Bun 37 (*)     Creatinine 2.04 (*)     Calcium 8.4 (*)     AST(SGOT) 49 (*)     All other components within normal limits    Narrative:     Biotin intake of greater than 5 mg per day may interfere with  troponin levels, causing false low values.   TROPONIN - Abnormal; Notable for the following components:    Troponin T 37 (*)     All other components within normal limits    Narrative:     Biotin intake of greater than 5 mg per day may interfere with  troponin levels, causing false low values.   ESTIMATED GFR - Abnormal; Notable for the following components:    GFR (CKD-EPI) 32 (*)     All other components within normal limits    Narrative:     Biotin intake of greater than 5 mg per day may interfere with  troponin levels, causing false low values.   TROPONIN - Abnormal; Notable for the following components:    Troponin T 35 (*)      All other components within normal limits    Narrative:     Biotin intake of greater than 5 mg per day may interfere with  troponin levels, causing false low values.         RADIOLOGY  I have independently interpreted the diagnostic imaging associated with this visit and am waiting the final reading from the radiologist.   My preliminary interpretation is as follows: Chest x-ray no acute process, CT head no acute process  Radiologist interpretation:   CT-HEAD W/O   Final Result         NO ACUTE ABNORMALITIES ARE NOTED ON CT SCAN OF THE HEAD.      Findings are consistent with atrophy.  Decreased attenuation in the periventricular white matter likely indicates microvascular ischemic disease.             COURSE & MEDICAL DECISION MAKING    ED Observation Status? No; Patient does not meet criteria for ED Observation.     INITIAL ASSESSMENT, COURSE AND PLAN  Care Narrative: 83-year-old male who presents with ground-level fall on blood thinners.  On exam patient with some dried blood around the nose otherwise remainder of secondary trauma survey is negative patient is not clinically intoxicated though does endorse some alcohol use earlier this evening.  CT head obtained to rule out intracranial hemorrhage and this was negative.  Patient did have sense of impending doom so workup was broadened slightly to include basic laboratory workup and EKG and troponin given patient's past medical history.  This was negative for ischemic changes and troponin was mildly elevated however stable and patient has a history of stable troponin anemia so did not feel there was an ischemic process at hand as the patient was also denying any chest pain, shortness of breath.  Patient's metabolic panel did indicate some signs of dehydration with elevation in BUN and creatinine however the patient does have CKD with what appears to be a baseline creatinine around 1.8-2.  Patient was given some IV rehydration his I did suspect some mild dehydration  and he was improved after this.  Discussed return precautions for change in mental status or intractable pain.  Patient ambulated throughout the department with out difficulty did not feel he was a particularly high fall risk at home.  HYDRATION: Based on the patient's presentation of Dehydration the patient was given IV fluids. IV Hydration was used because oral hydration was not adequate alone. Upon recheck following hydration, the patient was improved.      ADDITIONAL PROBLEM LIST  No past medical history on file.    DISPOSITION AND DISCUSSIONS      Escalation of care considered, and ultimately not performed:    Barriers to care at this time, including but not limited to: Patient lacks transportation .     Decision tools and prescription drugs considered including, but not limited to: Pain Medications Tylenol .    FINAL DIAGNOSIS  1. Dehydration Acute   2. Closed head injury, initial encounter Acute          Electronically signed by: [place] Catherine M.D., 1/18/2024 12:32 AM

## 2024-01-18 NOTE — ED NOTES
Pt given d/c instructions and follow up with verbal understanding.  VSS at discharge.  PIV d/c'd with tip intact.  Pt ambulatory from the ED w/ steady gait.  All belongings in possession on discharge.  Pt escorted to the lobby by RN. Cab called for patient.

## 2024-01-22 ENCOUNTER — APPOINTMENT (OUTPATIENT)
Dept: OCCUPATIONAL THERAPY | Facility: REHABILITATION | Age: 84
End: 2024-01-22
Attending: NURSE PRACTITIONER
Payer: COMMERCIAL

## 2024-02-05 ENCOUNTER — APPOINTMENT (OUTPATIENT)
Dept: OCCUPATIONAL THERAPY | Facility: REHABILITATION | Age: 84
End: 2024-02-05
Attending: NURSE PRACTITIONER
Payer: COMMERCIAL

## 2024-02-08 ENCOUNTER — APPOINTMENT (OUTPATIENT)
Dept: OCCUPATIONAL THERAPY | Facility: REHABILITATION | Age: 84
End: 2024-02-08
Attending: NURSE PRACTITIONER
Payer: COMMERCIAL

## 2024-02-10 LAB — EKG IMPRESSION: NORMAL

## 2024-02-12 ENCOUNTER — APPOINTMENT (OUTPATIENT)
Dept: OCCUPATIONAL THERAPY | Facility: REHABILITATION | Age: 84
End: 2024-02-12
Attending: NURSE PRACTITIONER
Payer: COMMERCIAL

## 2024-02-15 ENCOUNTER — APPOINTMENT (OUTPATIENT)
Dept: OCCUPATIONAL THERAPY | Facility: REHABILITATION | Age: 84
End: 2024-02-15
Attending: NURSE PRACTITIONER
Payer: COMMERCIAL

## 2024-02-19 ENCOUNTER — APPOINTMENT (OUTPATIENT)
Dept: OCCUPATIONAL THERAPY | Facility: REHABILITATION | Age: 84
End: 2024-02-19
Attending: NURSE PRACTITIONER
Payer: COMMERCIAL

## 2024-02-22 ENCOUNTER — APPOINTMENT (OUTPATIENT)
Dept: OCCUPATIONAL THERAPY | Facility: REHABILITATION | Age: 84
End: 2024-02-22
Attending: NURSE PRACTITIONER
Payer: COMMERCIAL

## 2024-02-26 ENCOUNTER — APPOINTMENT (OUTPATIENT)
Dept: OCCUPATIONAL THERAPY | Facility: REHABILITATION | Age: 84
End: 2024-02-26
Attending: NURSE PRACTITIONER
Payer: COMMERCIAL

## 2024-02-29 ENCOUNTER — APPOINTMENT (OUTPATIENT)
Dept: OCCUPATIONAL THERAPY | Facility: REHABILITATION | Age: 84
End: 2024-02-29
Attending: NURSE PRACTITIONER
Payer: COMMERCIAL

## 2024-03-04 ENCOUNTER — APPOINTMENT (OUTPATIENT)
Dept: OCCUPATIONAL THERAPY | Facility: REHABILITATION | Age: 84
End: 2024-03-04
Attending: NURSE PRACTITIONER
Payer: COMMERCIAL

## 2024-03-13 ENCOUNTER — TELEPHONE (OUTPATIENT)
Dept: CARDIOLOGY | Facility: MEDICAL CENTER | Age: 84
End: 2024-03-13
Payer: MEDICARE

## 2024-03-13 ENCOUNTER — NON-PROVIDER VISIT (OUTPATIENT)
Dept: CARDIOLOGY | Facility: MEDICAL CENTER | Age: 84
End: 2024-03-13
Payer: MEDICARE

## 2024-03-13 PROCEDURE — 93295 DEV INTERROG REMOTE 1/2/MLT: CPT | Performed by: INTERNAL MEDICINE

## 2024-03-13 NOTE — CARDIAC REMOTE MONITOR - SCAN
Device transmission reviewed. Device demonstrated appropriate function.       Electronically Signed by: Tolu Barrios M.D.    3/21/2024  10:12 AM

## 2024-03-15 ENCOUNTER — APPOINTMENT (OUTPATIENT)
Dept: CARDIOLOGY | Facility: MEDICAL CENTER | Age: 84
End: 2024-03-15
Attending: INTERNAL MEDICINE
Payer: MEDICARE

## 2024-03-20 ENCOUNTER — TELEPHONE (OUTPATIENT)
Dept: CARDIOLOGY | Facility: MEDICAL CENTER | Age: 84
End: 2024-03-20
Payer: MEDICARE

## 2024-03-20 NOTE — TELEPHONE ENCOUNTER
Remote transmission received via home monitor, device showing AT/AF [place]: 72% but possibly higher as device has some undersensing and some atrial beats falling into refractory, full report scanned into Media.     Patient presenting in persistent AF/AFL  -Onset: 3/11/2024 @ 11:21 AM  -Patient on OAC? Yes- Eliquis

## 2024-03-28 ENCOUNTER — PHYSICAL THERAPY (OUTPATIENT)
Dept: PHYSICAL THERAPY | Facility: MEDICAL CENTER | Age: 84
End: 2024-03-28
Attending: NURSE PRACTITIONER
Payer: COMMERCIAL

## 2024-03-28 DIAGNOSIS — R27.0 ATAXIA, UNSPECIFIED: ICD-10-CM

## 2024-03-28 PROCEDURE — 97110 THERAPEUTIC EXERCISES: CPT

## 2024-03-28 PROCEDURE — 97162 PT EVAL MOD COMPLEX 30 MIN: CPT

## 2024-03-28 ASSESSMENT — ENCOUNTER SYMPTOMS: ADDITIONAL INFORMATION: AGGRAVATING:    RELIEVING:

## 2024-03-28 ASSESSMENT — ACTIVITIES OF DAILY LIVING (ADL): POOR_BALANCE: 1

## 2024-03-28 NOTE — OP THERAPY EVALUATION
"  Outpatient Physical Therapy  INITIAL EVALUATION    [facility]  [address]  [place] [address]  Phone:  [phone]  Fax:  [phone]    Date of Evaluation: 03/28/2024    Patient: Lencho Parker  YOB: 1940  MRN: [id number]     Referring Provider: EUGENE Boyd  [address] [place] [address]   Referring Diagnosis Ataxia, unspecified [R27.0]     Time Calculation  Start time: 0905  Stop time: 0945 Time Calculation (min): 40 minutes         Chief Complaint: Loss Of Balance    Visit Diagnoses     ICD-10-CM   1. Ataxia, unspecified  R27.0       Date of onset of impairment: No data found    Subjective:   History of Present Illness:     Mechanism of injury:  Patient is an 83 year old male with a PMH including: Stage 3 CKD, cardiomyopathy, hx of AAA, stage C heart failure, DM, paroxysmal a fib, shortness of breath on exertion. Pt has one kidney per report.    Pt presents to therapy with complaints of imbalance and numbness in feet. [facility] Started noticing imbalance started a year ago. Started noticing some numbness in the R foot and now is experiencing some on the L. Stating the toes are all impacted and feels like they are \"dead.\" Does not notice any relieving factors. Notices light-headedness with standing up quickly. Denies numbness/tingling. Pt reporting has fallen in the past. Stating he tripped over something while speaking to someone, once while having had a few drinks, and then fell on the dining floor. Denies dizziness, visual changes, or fainting spells as contributing to falls.     Pt presenting to therapy today with a SPC; uses this for home and community. He owns a walker and uses this seldomly. Does have resting tremors RUE.             Pain:     Pain Comments::  Aggravating:    Relieving:  Social Support:     Lives in:  Community-based residential facility (senior living " faciility)    Lives with:  Significant other  Activities of Daily Living:     Patient reported ADL status: Patient's current daily routine includes:  Mod I with light housekeeping, money management, writing and use of telephone.  Facility provides meals and laundry service.  Pt has a car and is driving.        Past Medical History:   Diagnosis Date    Breath shortness     on exertion    Cardiomyopathy (HCC) 05/2021    Echocardiogram with moderately dilated LV, mild concentric LVH, LVEF 20%. Global hypokinesis. Normal RA and RV. Severely dilated LA. Mild MR, mild TR. RVSP 34mmHg.    [place] measles     Heart attack (HCC)     hx 2007    History of abdominal aortic aneurysm (AAA) 2009    Status post stent    Hyperlipidemia     Hypertension     Mumps     Pacemaker     AICD    Paroxysmal atrial fibrillation (HCC)     Status post Watchman procedure in [place].    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     Sleep apnea     Snoring     Stroke (HCC) 2012    TIA    Tonsillitis     Ventricular tachycardia (HCC)      Past Surgical History:   Procedure Laterality Date    AICD BATTERY CHANGE Left 09/10/2020     Upgrade to [employer]ia MRI Quad CRT-D [id number] implanted by Dr. Barrios.    AICD BATTERY CHANGE Left 01/03/2017    Generator replacement with Medtronic Evera MRI XT  [id number] implanted in [place].    OTHER CARDIAC SURGERY  2014    watchman device    AAA WITH STENT GRAFT  2009    OTHER ORTHOPEDIC SURGERY  2005    hip surgery    OTHER  2000    kidney removed    OTHER CARDIAC SURGERY  1991    AICD implanted     Social History     Tobacco Use    Smoking status: Former     Current packs/day: 0.50     Types: Cigarettes    Smokeless tobacco: Former   Substance Use Topics    Alcohol use: Yes     Alcohol/week: 8.4 oz     Types: 14 Glasses of wine per week     Comment: couple glasses of wine daily     Family and Occupational History     Socioeconomic History    Marital status: Single     Spouse name: Not on file    Number of  "children: Not on file    Years of education: Not on file    Highest education level: Not on file   Occupational History    Not on file       Objective     Postural Observations  Seated posture: fair    Additional Postural Observation Details  Forward head and rounded shoulders    Neurological Testing     Myotome testing   Lumbar (left)   L1 (hip flexors): 5  L2 (hip flexors): 5  L3 (knee extensors): 5  L4 (ankle dorsiflexors): 4-  L5 (great toe extension): 3+  S1 (ankle plantar flexors): 3+    Lumbar (right)   L1 (hip flexors): 5  L2 (hip flexors): 5  L3 (knee extensors): 5  L4 (ankle dorsiflexors): 5  L5 (great toe extension): 4-  S1 (ankle plantar flexors): 4-    Additional Neurological Details  Decreased sensation in stocking distribution    Active Range of Motion     Lumbar   Extension: decreased  Left lateral flexion: decreased  Right lateral flexion: decreased  Left rotation: decreased  Right rotation: decreased    Strength:      Additional Strength Details  Bridge: full ROM    Tests     Left Hip   SLR: Negative.     Right Hip   SLR: Negative.         Therapeutic Exercises (CPT 60837):     1. pt education, re: follow up with PCP for differential and dx for neuropathy and possible causes such as DM. Pt reporting is not on DM medications and has always been \"borderline.\" He has no major medical interventions for DM at this time per report.      Time-based treatments/modalities:    Physical Therapy Timed Treatment Charges  Therapeutic exercise minutes (CPT 88130): 10 minutes      Assessment, Response and Plan:   Impairments: abnormal or restricted ROM, impaired balance, impaired physical strength and lacks appropriate home exercise program    Assessment details:  Patient is a pleasant and cooperative 83 year old male who presents to therapy with c/o imbalance and numbness at B feet. He started noticing this a year ago. Stating he is unsure if he has DM as his labs have always been \"borderline.\" He was never told " anything re: numbness in feet.   Exam findings suggestive of neuropathy. Pt education today re: follow up with PCP for differential and dx for neuropathy and possible causes such as DM. He has no major medical interventions for DM at this time per report. If pt does have neuropathy from DM dx, he may benefit from further intervention for DM such as dietary changes with dietician consult or other medications, etc. Pt may benefit from skilled physical therapy in order to address above impairments such as addressing balance systems, reactive skills, core/prox pelvic girdle strength.    Other barriers to therapy:  Numbness with unknown etiology; suspect neuropathy  Prognosis: fair    Goals:   Short Term Goals:   1. Pt will be independent with written HEP.  2. Pt will contact PCP to discuss possible neuropathy condition.  3. Therapist to assess for BPPV vs orthostatic hypotension for dizziness complaints with position changes.  4. Pt will participate in formal balance test.   Short term goal time span:  4-6 weeks      Long Term Goals:    1. Pt will be independent with written HEP.  2. Pt will have a sig improvement in ABC score (eval: 98.2)  3. Pt will demo sig improvement in balance assessment to decrease risk for falls.  4. Pt will have no falls.      Long term goal time span:  6-8 weeks    Plan:   Therapy options:  Physical therapy treatment to continue and referred back to MD  Planned therapy interventions:  Neuromuscular Re-education (CPT 40128), Gait Training (CPT 42178), Therapeutic Exercise (CPT 85372), E Stim Unattended (CPT 95350), Mechanical Traction (CPT 76279) and Manual Therapy (CPT 30633)  Frequency: 1-2x/wk.  Duration in weeks:  8  Discussed with:  Patient  Plan details:  UPOC: 5/24/24          Functional Assessment Used  PT Functional Assessment Tool Used: ABC  PT Functional Assessment Score: 98.2     Referring provider co-signature:  I have reviewed this plan of care and my co-signature certifies the  need for services.    Certification Period: 03/28/2024 to  5/24/24    Physician Signature: ________________________________ Date: ______________

## 2024-03-29 ENCOUNTER — OFFICE VISIT (OUTPATIENT)
Dept: CARDIOLOGY | Facility: MEDICAL CENTER | Age: 84
End: 2024-03-29
Attending: NURSE PRACTITIONER
Payer: MEDICARE

## 2024-03-29 VITALS
SYSTOLIC BLOOD PRESSURE: 116 MMHG | RESPIRATION RATE: 16 BRPM | OXYGEN SATURATION: 96 % | WEIGHT: 169 LBS | DIASTOLIC BLOOD PRESSURE: 64 MMHG | HEART RATE: 76 BPM | HEIGHT: 74 IN | BODY MASS INDEX: 21.69 KG/M2

## 2024-03-29 DIAGNOSIS — Z51.81 ENCOUNTER FOR MONITORING AMIODARONE THERAPY: ICD-10-CM

## 2024-03-29 DIAGNOSIS — E11.59 TYPE 2 DIABETES MELLITUS WITH OTHER CIRCULATORY COMPLICATION, WITHOUT LONG-TERM CURRENT USE OF INSULIN (HCC): Primary | ICD-10-CM

## 2024-03-29 DIAGNOSIS — Z95.810 PRESENCE OF BIVENTRICULAR AUTOMATIC CARDIOVERTER/DEFIBRILLATOR (AICD): ICD-10-CM

## 2024-03-29 DIAGNOSIS — I48.0 PAROXYSMAL ATRIAL FIBRILLATION (HCC): ICD-10-CM

## 2024-03-29 DIAGNOSIS — Z79.899 ENCOUNTER FOR MONITORING AMIODARONE THERAPY: ICD-10-CM

## 2024-03-29 LAB — EKG IMPRESSION: NORMAL

## 2024-03-29 PROCEDURE — 93284 PRGRMG EVAL IMPLANTABLE DFB: CPT | Performed by: NURSE PRACTITIONER

## 2024-03-29 PROCEDURE — 99213 OFFICE O/P EST LOW 20 MIN: CPT | Performed by: NURSE PRACTITIONER

## 2024-03-29 PROCEDURE — 93005 ELECTROCARDIOGRAM TRACING: CPT | Performed by: NURSE PRACTITIONER

## 2024-03-29 ASSESSMENT — ENCOUNTER SYMPTOMS
DIZZINESS: 0
NEUROLOGICAL NEGATIVE: 1
PALPITATIONS: 0
PND: 0
HALLUCINATIONS: 0
GASTROINTESTINAL NEGATIVE: 1
ORTHOPNEA: 0
CLAUDICATION: 0
MUSCULOSKELETAL NEGATIVE: 1
NAUSEA: 0
SHORTNESS OF BREATH: 0
SENSORY CHANGE: 0
WHEEZING: 0
DEPRESSION: 0
EYES NEGATIVE: 1
RESPIRATORY NEGATIVE: 1
NERVOUS/ANXIOUS: 0
BRUISES/BLEEDS EASILY: 0

## 2024-03-29 ASSESSMENT — FIBROSIS 4 INDEX: FIB4 SCORE: 3.76

## 2024-03-29 NOTE — PROGRESS NOTES
Electrophysiology Follow up  Note    DOS: 3/29/2024   [id number]  Lencho Duarte Keith    Chief complaint/Reason for consult: regular follow up     HPI: Pt is a 83 y.o. male who presents to the clinic today in follow up for increasing AF burden. Patient has a past medical history significant for but not limited to: CKD3b, cardiomyopathy, hypertension, mixed hyperlipidemia, CRT-D in situ, paroxysmal atrial fibrillation, VT, high risk medication use amiodarone, h/o TIA, chronic systolic HFrEF. Patient AF burden has been slowly increasing and his device effective pacing decreasing. Patient is a little fatigued but no other corresponding symptoms. Patient device working well and no other pacing vectors give a better response on LV lead without causing diaphragmatic stimulation. Patient on amiodarone 300mg daily and not maintaining sinus. Discussed cardioversion vs ablation therapies for help to maintain better BiV pacing percentages and efficiencies. Patient will see if his symptoms worsen and let us know. Lab work for continued amiodarone use will be ordered. Patient trying to get disability via [facility] system and happy to help in any way.     Past Medical History:   Diagnosis Date    Breath shortness     on exertion    Cardiomyopathy (HCC) 05/2021    Echocardiogram with moderately dilated LV, mild concentric LVH, LVEF 20%. Global hypokinesis. Normal RA and RV. Severely dilated LA. Mild MR, mild TR. RVSP 34mmHg.    [place] measles     Heart attack (HCC)     hx 2007    History of abdominal aortic aneurysm (AAA) 2009    Status post stent    Hyperlipidemia     Hypertension     Mumps     Pacemaker     AICD    Paroxysmal atrial fibrillation (HCC)     Status post Watchman procedure in [place].    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     Sleep apnea     Snoring     Stroke (HCC) 2012    TIA    Tonsillitis     Ventricular tachycardia (HCC)        Past Surgical History:   Procedure Laterality Date    AICD BATTERY CHANGE Left  09/10/2020     Upgrade to Medtronic VM Discovery MRI Quad CRT-D [id number] implanted by Dr. Barrios.    AICD BATTERY CHANGE Left 01/03/2017    Generator replacement with Medtronic Evera MRI XT  [id number] implanted in [place].    OTHER CARDIAC SURGERY  2014    watchman device    AAA WITH STENT GRAFT  2009    OTHER ORTHOPEDIC SURGERY  2005    hip surgery    OTHER  2000    kidney removed    OTHER CARDIAC SURGERY  1991    AICD implanted       Social History     Socioeconomic History    Marital status: Single     Spouse name: Not on file    Number of children: Not on file    Years of education: Not on file    Highest education level: Not on file   Occupational History    Not on file   Tobacco Use    Smoking status: Former     Current packs/day: 0.50     Types: Cigarettes    Smokeless tobacco: Former   Vaping Use    Vaping Use: Never used   Substance and Sexual Activity    Alcohol use: Yes     Alcohol/week: 8.4 oz     Types: 14 Glasses of wine per week     Comment: couple glasses of wine daily    Drug use: Not Currently     Comment: denies    Sexual activity: Not on file   Other Topics Concern    Not on file   Social History Narrative    Not on file     Social Determinants of Health     Financial Resource Strain: Not on file   Food Insecurity: Not on file   Transportation Needs: Not on file   Physical Activity: Not on file   Stress: Not on file   Social Connections: Not on file   Intimate Partner Violence: Not on file   Housing Stability: Not on file       Family History   Problem Relation Age of Onset    Cancer Mother     Cancer Sister        Allergies   Allergen Reactions    Entresto [Sacubitril-Valsartan]      Swollen tongue. Angioedema.    Crestor [Rosuvastatin]        Current Outpatient Medications   Medication Sig Dispense Refill    Empagliflozin (JARDIANCE) 25 MG Tab Take 12.5 mg by mouth every day. 45 Tablet 3    albuterol 108 (90 Base) MCG/ACT Aero Soln inhalation aerosol Inhale 2 Puffs every four hours as needed for Shortness of  Breath.      carvedilol (COREG) 12.5 MG Tab Take 6.25-12.5 mg by mouth 2 times a day with meals. 12.5 mg in the AM  6.25 mg in the PM      apixaban (ELIQUIS) 5mg Tab Take 2.5 mg by mouth 2 times a day.      atorvastatin (LIPITOR) 40 MG Tab Take 40 mg by mouth every day.      allopurinol (ZYLOPRIM) 100 MG Tab Take 100 mg by mouth every day.      spironolactone (ALDACTONE) 25 MG Tab Take 0.5 Tablets by mouth every day. 45 Tablet 3    losartan (COZAAR) 100 MG Tab Take 1 Tablet by mouth every day. 90 Each 4    amiodarone (CORDARONE) 200 MG Tab Take 1 Tablet by mouth every day. (Patient taking differently: Take 300 mg by mouth every day.) 90 Tablet 4    vitamin D3 (CHOLECALCIFEROL) 1000 Unit (25 mcg) Tab Take 1,000 Units by mouth every day.      furosemide (LASIX) 20 MG Tab Take 20 mg by mouth every day.      Multiple Vitamins-Minerals (MULTIVITAMIN ADULT EXTRA C PO) Take 1 Tablet by mouth every day.      tamsulosin (FLOMAX) 0.4 MG capsule Take 0.8 mg by mouth every day.      cinacalcet (SENSIPAR) 30 MG Tab Take 30 mg by mouth every day.      Omega-3 Fatty Acids (FISH OIL) 1000 MG Cap capsule Take 1,000 mg by mouth every day.       No current facility-administered medications for this visit.       Vitals:    03/29/24 0858   BP: 116/64   Pulse: 76   Resp: 16   SpO2: 96%         Review of Systems   Constitutional:  Positive for malaise/fatigue.   HENT: Negative.     Eyes: Negative.    Respiratory: Negative.  Negative for shortness of breath and wheezing.    Cardiovascular:  Negative for chest pain, palpitations, orthopnea, claudication, leg swelling and PND.   Gastrointestinal: Negative.  Negative for nausea.   Genitourinary: Negative.    Musculoskeletal: Negative.    Skin: Negative.    Neurological: Negative.  Negative for dizziness and sensory change.   Endo/Heme/Allergies: Negative.  Does not bruise/bleed easily.   Psychiatric/Behavioral:  Negative for depression and hallucinations. The patient is not nervous/anxious.          EKG interpreted by me: AF with V pacing      Device Type: Medtronic CRT-D  AP%:  55.1  %: 992.9 (effective 86.6)  Battery Longevity: 1.9yrs  Lead Impedance: stable and within normal limits  Indication: chronic HFrEF and VT  Events: AF burden 22.7% increasing over time, in AF during exam: patient had diaphragmatic stimulation at vectors LV1-LV3, LV1-RV coil,LV1-LV4    Device interrogated and programmed by Guanakito Stanley       Physical Exam  Constitutional:       Appearance: Normal appearance.   HENT:      Head: Normocephalic.   Eyes:      Pupils: Pupils are equal, round, and reactive to light.   Neck:      Vascular: No JVD.   Cardiovascular:      Rate and Rhythm: Normal rate. Rhythm irregular.      Pulses: Normal pulses.      Heart sounds: Normal heart sounds.   Pulmonary:      Effort: Pulmonary effort is normal.      Breath sounds: Normal breath sounds.   Abdominal:      General: Abdomen is flat.      Palpations: Abdomen is soft.   Musculoskeletal:      Cervical back: Normal range of motion.      Right lower leg: No edema.      Left lower leg: No edema.   Skin:     General: Skin is warm and dry.   Neurological:      Mental Status: He is alert and oriented to person, place, and time.   Psychiatric:         Mood and Affect: Mood normal.         Behavior: Behavior normal.          Data:  Lipids:   Lab Results   Component Value Date/Time    CHOLSTRLTOT 188 12/08/2022 07:39 AM    TRIGLYCERIDE 161 (H) 12/08/2022 07:39 AM    HDL 64 12/08/2022 07:39 AM    LDL 92 12/08/2022 07:39 AM        BMP:  Lab Results   Component Value Date/Time    SODIUM 139 01/11/2024 1531    POTASSIUM 5.2 01/11/2024 1531    CHLORIDE 103 01/11/2024 1531    CO2 23 01/11/2024 1531    GLUCOSE 103 (H) 01/11/2024 1531    BUN 38 (H) 01/11/2024 1531    CREATININE 2.18 (H) 01/11/2024 1531    CALCIUM 9.0 01/11/2024 1531    ANION 13.0 01/11/2024 1531       GFR:  Lab Results   Component Value Date/Time    IFAFRICA 46 (A) 12/09/2021 0744    IFNOTAFR  "38 (A) 12/09/2021 0744        TSH:   Lab Results   Component Value Date/Time    TSHULTRASEN 5.900 (H) 05/17/2021 0709       MAGNESIUM:  Lab Results   Component Value Date/Time    MAGNESIUM 2.3 01/11/2024 1531    MAGNESIUM 2.2 07/20/2023 0108    MAGNESIUM 1.7 07/19/2023 0530        THYROXINE (T4):   No results found for: \"FREEDIR\"     CBC:   Lab Results   Component Value Date/Time    WBC 4.9 01/17/2024 08:59 PM    RBC 4.56 (L) 01/17/2024 08:59 PM    HEMOGLOBIN 15.5 01/17/2024 08:59 PM    HEMATOCRIT 47.0 01/17/2024 08:59 PM    .1 (H) 01/17/2024 08:59 PM    MCH 34.0 (H) 01/17/2024 08:59 PM    MCHC 33.0 01/17/2024 08:59 PM    RDW 53.2 (H) 01/17/2024 08:59 PM    PLATELETCT 167 01/17/2024 08:59 PM    MPV 11.4 01/17/2024 08:59 PM    NEUTSPOLYS 46.70 01/17/2024 08:59 PM    LYMPHOCYTES 38.70 01/17/2024 08:59 PM    MONOCYTES 10.10 01/17/2024 08:59 PM    EOSINOPHILS 4.10 01/17/2024 08:59 PM    BASOPHILS 0.20 01/17/2024 08:59 PM    IMMGRAN 0.20 01/17/2024 08:59 PM    NRBC 0.00 01/17/2024 08:59 PM    NEUTS 2.30 01/17/2024 08:59 PM    LYMPHS 1.91 01/17/2024 08:59 PM    MONOS 0.50 01/17/2024 08:59 PM    EOS 0.20 01/17/2024 08:59 PM    BASO 0.01 01/17/2024 08:59 PM    IMMGRANAB 0.01 01/17/2024 08:59 PM    NRBCAB 0.00 01/17/2024 08:59 PM        CBC w/o DIFF  Lab Results   Component Value Date/Time    WBC 4.9 01/17/2024 08:59 PM    RBC 4.56 (L) 01/17/2024 08:59 PM    HEMOGLOBIN 15.5 01/17/2024 08:59 PM    .1 (H) 01/17/2024 08:59 PM    MCH 34.0 (H) 01/17/2024 08:59 PM    MCHC 33.0 01/17/2024 08:59 PM    RDW 53.2 (H) 01/17/2024 08:59 PM    MPV 11.4 01/17/2024 08:59 PM       LIVER:  Lab Results   Component Value Date/Time    ALKPHOSPHAT 75 01/17/2024 08:59 PM    ASTSGOT 49 (H) 01/17/2024 08:59 PM    ALTSGPT 40 01/17/2024 08:59 PM    TBILIRUBIN 0.5 01/17/2024 08:59 PM       BNP:  Lab Results   Component Value Date/Time    BNPBTYPENAT 695 (H) 05/13/2019 11:46 AM       PT/INR:  Lab Results   Component Value Date/Time    " PROTHROMBTM 16.1 (H) 01/11/2024 03:31 PM    PROTHROMBTM 17.6 (H) 11/20/2023 09:00 AM    PROTHROMBTM 14.1 01/06/2023 01:54 PM    INR 1.24 (H) 01/11/2024 03:31 PM    INR 1.43 (H) 11/20/2023 09:00 AM    INR 1.11 01/06/2023 01:54 PM             Impression/Plan:  Presence of biventricular automatic cardioverter/defibrillator (AICD)   - device working well   - less than 2 years on battery   - unable to find better pacing vectors on LV lead without diaphragm stim   - pacing efficacy has decreased as AF burden increased   - patient not overly symptomatic     Paroxysmal atrial fibrillation (HCC)   - increasing burden   - if symptoms worsen due to interference in CRT-D ability to pace effectively for HF may need to consider ablation therapy (PVI vs AVJ)   - continue amiodarone at this time   - Watchman in place     Encounter for monitoring amiodarone therapy   - lab work: TSH and CMP   - annual; chest xray and EKG every 6 months                A total of 33 minutes of time was spent on day of encounter reviewing medical record, performing history and examination, counseling, ordering medication/test/consults, collaborating with referring service, and documentation.    Guanakito Stanley [facility]-EP  Cardiac Electrophysiology

## 2024-03-29 NOTE — ASSESSMENT & PLAN NOTE
- device working well   - less than 2 years on battery   - unable to find better pacing vectors on LV lead without diaphragm stim   - pacing efficacy has decreased as AF burden increased   - patient not overly symptomatic

## 2024-03-29 NOTE — ASSESSMENT & PLAN NOTE
- increasing burden   - if symptoms worsen due to interference in CRT-D ability to pace effectively for HF may need to consider ablation therapy (PVI vs AVJ)   - continue amiodarone at this time   - Watchman in place

## 2024-04-01 ENCOUNTER — TELEPHONE (OUTPATIENT)
Dept: CARDIOLOGY | Facility: MEDICAL CENTER | Age: 84
End: 2024-04-01
Payer: MEDICARE

## 2024-04-01 LAB — EKG IMPRESSION: NORMAL

## 2024-04-01 NOTE — TELEPHONE ENCOUNTER
MT  Caller: Lencho Parker    Topic/issue: Patient is returning APRJOSUE ELAM's call from 10:40AM on 04/01/2024. Please call patient back when available.    Callback Number: [phone]    Thank you,  Amy STILES

## 2024-04-02 ENCOUNTER — PHYSICAL THERAPY (OUTPATIENT)
Dept: PHYSICAL THERAPY | Facility: MEDICAL CENTER | Age: 84
End: 2024-04-02
Attending: NURSE PRACTITIONER
Payer: COMMERCIAL

## 2024-04-02 DIAGNOSIS — R27.0 ATAXIA, UNSPECIFIED: ICD-10-CM

## 2024-04-02 PROCEDURE — 97112 NEUROMUSCULAR REEDUCATION: CPT

## 2024-04-02 NOTE — OP THERAPY DAILY TREATMENT
Outpatient Physical Therapy  DAILY TREATMENT     [facility] Outpatient Physical Therapy  [address]  [place] [address]  Phone:  [phone]  Fax:  [phone]    Date: 2024    Patient: Lencho Parker  YOB: 1940  MRN: [id number]     Time Calculation                   Chief Complaint: No chief complaint on file.    Visit #: 2    SUBJECTIVE:  Pt reporting no new questions from initial appt. Takes his bp every day and is normally pretty low. His cardiologist likes to keep the bp low to avoid overworking the heart. Notices some imbalance with walking occasionally, umu without his SPC or with turning.    OBJECTIVE:  Current objective measures:   Today:  Vitals: (On room air; assessed in sitting)   Bp: 89/76   HR: 88 bpm    SpO2: 97%  *No signs of distress    Mini-BESTest:  Anticipatory: 2/6  -Sit to stand: 2  -Rise to toes: 0  -Stand on one le (<3 sec B)    Reactive postural control: 4/6  -Compensatory stepping correction -fwd: 2  -Compensatory stepping correction -bwd: 0  -Compensatory stepping correction -lateral: 2    Sensory orientation: 4/6  -Stance feet together, EO, firm: 2  -Stance feet together, EC, foam: 1  -Incline, EC: 1    Dynamic gait: 10/10  -Change in gait speed: 2  -Walk with head turns horizontal: 2  -Walk with pivot turns: 2  -Step over obstacles: 2    -Timed up and go with dual task (3 meter walk) (>10 sec signifies increased fall risk):   TU sec  Dual task TU sec (Mod score >10% difference compared to initial TUG score)    Total: 20/28 (Less than 21 indicates increased risk of falling)        From eval:   Active Range of Motion      Lumbar   Extension: decreased  Left lateral flexion: decreased  Right lateral flexion: decreased  Left rotation: decreased  Right rotation: decreased     Strength:    Additional Strength Details  Bridge: full ROM      Therapeutic Exercises (CPT 79787):       Therapeutic Exercise Summary:  Access Code: [email or web address]  URL: [email or web address]  Date: 04/02/2024  Prepared by: James Moran    Exercises  - Sit to Stand Without Arm Support  - 1 x daily - 7 x weekly - 1 sets - 10 reps  - Standing Heel Raises  - 3-4 x weekly - 1 sets - 20-25 reps  - Single Leg Heel Raise  - 3-4 x weekly - 1 sets - 10 reps    Therapeutic Treatments and Modalities:     1. Neuromuscular Re-education (CPT 08262)    Therapeutic Treatment and Modalities Summary: BPPV assessment -[place] Hallpike negative  B     MiniBESTest completion    Sit to stands x10; VC's for set up, momentum and hip hinge with controlled eccentrics  D heel raises at bar x20 //requires bar for UE hold; fatigue  SL heel raises x10 ea at bar // fatigue    Time-based treatments/modalities:       ASSESSMENT:   Response to treatment:   Pt demonstrating neg [place]-Hallpike testing. Suspect light-headedness possibly due to low blood pressure and position changes. In session, pt demonstrating impairments on MiniBESTest including in anticipatory, reactive, and sensory categories. Dynamic gait mostly unimpacted, however, pt reporting some subjective imbalance with walking and turns. Will address balance impairments in upcoming sessions as listed above.      PLAN/RECOMMENDATIONS:   Plan for treatment: Continue with current treatment.  Planned interventions for next visit: continue with current treatment.

## 2024-04-04 ENCOUNTER — APPOINTMENT (OUTPATIENT)
Dept: PHYSICAL THERAPY | Facility: MEDICAL CENTER | Age: 84
End: 2024-04-04
Attending: NURSE PRACTITIONER
Payer: COMMERCIAL

## 2024-04-09 ENCOUNTER — APPOINTMENT (OUTPATIENT)
Dept: PHYSICAL THERAPY | Facility: MEDICAL CENTER | Age: 84
End: 2024-04-09
Attending: NURSE PRACTITIONER
Payer: COMMERCIAL

## 2024-04-11 ENCOUNTER — APPOINTMENT (OUTPATIENT)
Dept: PHYSICAL THERAPY | Facility: MEDICAL CENTER | Age: 84
End: 2024-04-11
Attending: NURSE PRACTITIONER
Payer: COMMERCIAL

## 2024-04-16 ENCOUNTER — APPOINTMENT (OUTPATIENT)
Dept: PHYSICAL THERAPY | Facility: MEDICAL CENTER | Age: 84
End: 2024-04-16
Attending: NURSE PRACTITIONER
Payer: COMMERCIAL

## 2024-04-18 ENCOUNTER — APPOINTMENT (OUTPATIENT)
Dept: PHYSICAL THERAPY | Facility: MEDICAL CENTER | Age: 84
End: 2024-04-18
Attending: NURSE PRACTITIONER
Payer: COMMERCIAL

## 2024-04-18 ENCOUNTER — APPOINTMENT (OUTPATIENT)
Dept: SLEEP MEDICINE | Facility: MEDICAL CENTER | Age: 84
End: 2024-04-18
Payer: MEDICARE

## 2024-04-18 NOTE — OP THERAPY DAILY TREATMENT
Outpatient Physical Therapy  DAILY TREATMENT     [facility] Outpatient Physical Therapy  [address]  [place] [address]  Phone:  [phone]  Fax:  [phone]    Date: 2024    Patient: Lencho Parker  YOB: 1940  MRN: [id number]     Time Calculation                   Chief Complaint: No chief complaint on file.    Visit #: 3    SUBJECTIVE:  Pt reporting no new questions from initial appt. Takes his bp every day and is normally pretty low. His cardiologist likes to keep the bp low to avoid overworking the heart. Notices some imbalance with walking occasionally, umu without his SPC or with turning.        OBJECTIVE:  Current objective measures:   Today:  Vitals: (On room air; assessed in sitting)   Bp: 89/76   HR: 88 bpm    SpO2: 97%  *No signs of distress    Mini-BESTest:  Anticipatory: 2/6  -Sit to stand: 2  -Rise to toes: 0  -Stand on one le (<3 sec B)    Reactive postural control: 4/6  -Compensatory stepping correction -fwd: 2  -Compensatory stepping correction -bwd: 0  -Compensatory stepping correction -lateral: 2    Sensory orientation: 4/6  -Stance feet together, EO, firm: 2  -Stance feet together, EC, foam: 1  -Incline, EC: 1    Dynamic gait: 10/10  -Change in gait speed: 2  -Walk with head turns horizontal: 2  -Walk with pivot turns: 2  -Step over obstacles: 2    -Timed up and go with dual task (3 meter walk) (>10 sec signifies increased fall risk):   TU sec  Dual task TU sec (Mod score >10% difference compared to initial TUG score)    Total: 20/28 (Less than 21 indicates increased risk of falling)        From eval:   Active Range of Motion      Lumbar   Extension: decreased  Left lateral flexion: decreased  Right lateral flexion: decreased  Left rotation: decreased  Right rotation: decreased     Strength:    Additional Strength Details  Bridge: full ROM      Therapeutic Exercises (CPT 85798):     1. nustepper, cardiovascular  warm up    2. ball bridges    3. resisted bridges    4. clamshells    5. incline calf stretch      Therapeutic Exercise Summary: Access Code: [email or web address]  URL: [email or web address]  Date: 04/02/2024  Prepared by: Jamse Moran    Exercises  - Sit to Stand Without Arm Support  - 1 x daily - 7 x weekly - 1 sets - 10 reps  - Standing Heel Raises  - 3-4 x weekly - 1 sets - 20-25 reps  - Single Leg Heel Raise  - 3-4 x weekly - 1 sets - 10 reps    Therapeutic Treatments and Modalities:     1. Neuromuscular Re-education (CPT 60738)    Therapeutic Treatment and Modalities Summary: BPPV assessment -[place] Hallpike negative  B     MiniBESTest completion    Sit to stands x10; VC's for set up, momentum and hip hinge with controlled eccentrics  D heel raises at bar x20 //requires bar for UE hold; fatigue  SL heel raises x10 ea at bar // fatigue  Modified SLS 3x10 sec  Tandem stance  Rise to toes  WS sagittal     Time-based treatments/modalities:       ASSESSMENT:   Response to treatment:       Pt demonstrating neg Alan-Hallpike testing. Suspect light-headedness possibly due to low blood pressure and position changes. In session, pt demonstrating impairments on MiniBESTest including in anticipatory, reactive, and sensory categories. Dynamic gait mostly unimpacted, however, pt reporting some subjective imbalance with walking and turns. Will address balance impairments in upcoming sessions as listed above.      PLAN/RECOMMENDATIONS:   Plan for treatment: Continue with current treatment.  Planned interventions for next visit: continue with current treatment.

## 2024-04-23 ENCOUNTER — PHYSICAL THERAPY (OUTPATIENT)
Dept: PHYSICAL THERAPY | Facility: MEDICAL CENTER | Age: 84
End: 2024-04-23
Attending: NURSE PRACTITIONER
Payer: COMMERCIAL

## 2024-04-23 DIAGNOSIS — R27.0 ATAXIA, UNSPECIFIED: ICD-10-CM

## 2024-04-23 PROCEDURE — 97112 NEUROMUSCULAR REEDUCATION: CPT

## 2024-04-23 PROCEDURE — 97110 THERAPEUTIC EXERCISES: CPT

## 2024-04-23 NOTE — OP THERAPY DAILY TREATMENT
Outpatient Physical Therapy  DAILY TREATMENT     [facility] Outpatient Physical Therapy  [address]  [place] [address]  Phone:  [phone]  Fax:  [phone]    Date: 2024    Patient: Lencho Parker  YOB: 1940  MRN: [id number]     Time Calculation    Start time: 0815  Stop time: 0855 Time Calculation (min): 40 minutes         Chief Complaint: Loss Of Balance    Visit #: 3    SUBJECTIVE:  Pt reporting no new questions from initial appt. Takes his bp every day and is normally pretty low. His cardiologist likes to keep the bp low to avoid overworking the heart. Notices some imbalance with walking occasionally, umu without his SPC or with turning.    OBJECTIVE:  Current objective measures:   Today:  Vitals: (On room air; assessed in sitting) - beginning of session    HR: 96 bpm    SpO2: 99%  *No signs of distress    Mini-BESTest:  Anticipatory: 2/6  -Sit to stand: 2  -Rise to toes: 0  -Stand on one le (<3 sec B)    Reactive postural control: 4/6  -Compensatory stepping correction -fwd: 2  -Compensatory stepping correction -bwd: 0  -Compensatory stepping correction -lateral: 2    Sensory orientation: 4/6  -Stance feet together, EO, firm: 2  -Stance feet together, EC, foam: 1  -Incline, EC: 1    Dynamic gait: 10/10  -Change in gait speed: 2  -Walk with head turns horizontal: 2  -Walk with pivot turns: 2  -Step over obstacles: 2    -Timed up and go with dual task (3 meter walk) (>10 sec signifies increased fall risk):   TU sec  Dual task TU sec (Mod score >10% difference compared to initial TUG score)    Total: 20/28 (Less than 21 indicates increased risk of falling)        From eval:   Active Range of Motion      Lumbar   Extension: decreased  Left lateral flexion: decreased  Right lateral flexion: decreased  Left rotation: decreased  Right rotation: decreased     Strength:    Additional Strength Details  Bridge: full ROM      Therapeutic  Exercises (CPT 58203):     1. recumbent bike, x7 min L1, cardiovascular warm up    3. resisted bridges, pink TB, x15 2 sec hold, hep    4. clamshells, pink TB; x10 2 sec ea, hep; incr difficulty L>R    20. PN due: 4/28/24      Therapeutic Exercise Summary: Access Code: [email or web address]  URL: [email or web address]  Date: 04/02/2024  Prepared by: James Moran    Exercises  - Sit to Stand Without Arm Support  - 1 x daily - 7 x weekly - 1 sets - 10 reps  - Standing Heel Raises  - 3-4 x weekly - 1 sets - 20-25 reps  - Single Leg Heel Raise  - 3-4 x weekly - 1 sets - 10 reps    Therapeutic Treatments and Modalities:     1. Neuromuscular Re-education (CPT 31922)    Therapeutic Treatment and Modalities Summary: BPPV assessment -[place] Hallpike negative  B     MiniBESTest completion    Sit to stands x10; VC's for set up, momentum and hip hinge with controlled eccentrics  D heel raises at bar x20 //requires bar for UE hold; fatigue-verbal review  SL heel raises x10 ea at bar // fatigue-verbal review   Modified SLS 3x10 sec  WS sagittal x10 ea //VC's to manage hip strategy with improvement; poor posterior excursion     Time-based treatments/modalities:    Physical Therapy Timed Treatment Charges  Neuromusc re-ed, balance, coor, post minutes (CPT 40858): 33 minutes  Therapeutic exercise minutes (CPT 57154): 7 minutes    ASSESSMENT:   Response to treatment:   Pt demonstrating poor posterior excursion and hip strategy compensations during weight shifting in session. Additionally, frontal plane weakness L>RLE. Frequent cuing during session for corrections with pt demonstrating improvements. Will continue to address both balance and proximal pelvic girdle strengthening in follow ups.       PLAN/RECOMMENDATIONS:   Plan for treatment: Continue with current treatment.  Planned interventions for next visit: continue with current treatment.

## 2024-04-24 NOTE — TELEPHONE ENCOUNTER
Stanton Tran M.D.  You1 hour ago (1:05 PM)       Recommend ER for episodes of VT. Please arrange a closer f/u with TT or JARED. Thank you.     
Call and spoke with patient, patient does not recall any symptoms from these episodes.  Patient states he has not experienced any symptoms at all the last few weeks besides slight dizziness occasionally.   Patient is taking all medications as prescribed.    Discussed ER precautions with patient, patient verbalized understanding.    TT MARY JANE- JANE please advise  
Called and spoke with patient.    Advised patient go to the ER per [facility] recommendations, patient verbalized understanding.    Patient to call our office to schedule F/U appt.  
Remote transmission received via home monitor, patient had 2 VT episodes treated with ATP and 6 NSVT episodes on 1/3 and 1/8, therapy episode details below, full report scanned into Media.    VT Episode  -Episode Onset: 1/8/2024 @ 4:54 AM  -Duration: 9 seconds  -Average V Rate: 158 bpm  -Therapy Delivered: ATP  -# of Therapies: 1  -Successful?: Yes    VT Episode  -Episode Onset: 1/3/2024 @ 10:59 AM  -Duration: 19 seconds  -Average V Rate: 167 bpm  -Therapy Delivered: ATP  -# of Therapies: 2  -Successful?: Yes  
fair minus

## 2024-04-29 ENCOUNTER — PHYSICAL THERAPY (OUTPATIENT)
Dept: PHYSICAL THERAPY | Facility: MEDICAL CENTER | Age: 84
End: 2024-04-29
Attending: NURSE PRACTITIONER
Payer: COMMERCIAL

## 2024-04-29 DIAGNOSIS — R27.0 ATAXIA, UNSPECIFIED: ICD-10-CM

## 2024-04-29 NOTE — OP THERAPY DAILY TREATMENT
Outpatient Physical Therapy  DAILY TREATMENT     [facility] Outpatient Physical Therapy  [address]  [place] [address]  Phone:  [phone]  Fax:  [phone]    Date: 2024    Patient: Lencho Parker  YOB: 1940  MRN: [id number]     Time Calculation    Start time: 1415              Chief Complaint: Difficulty Walking and Loss Of Balance    Visit #: 4    SUBJECTIVE:  Pt reporting no new questions from initial appt. Takes his bp every day and is normally pretty low. His cardiologist likes to keep the bp low to avoid overworking the heart. Notices some imbalance with walking occasionally, umu without his SPC or with turning.    OBJECTIVE:  Current objective measures:   Today:  Vitals: (On room air; assessed in sitting) - beginning of session    HR: 96 bpm    SpO2: 99%  *No signs of distress    Mini-BESTest:  Anticipatory: 2/6  -Sit to stand: 2  -Rise to toes: 0  -Stand on one le (<3 sec B)    Reactive postural control: /6  -Compensatory stepping correction -fwd: 2  -Compensatory stepping correction -bwd: 0  -Compensatory stepping correction -lateral: 2    Sensory orientation: /6  -Stance feet together, EO, firm: 2  -Stance feet together, EC, foam: 1  -Incline, EC: 1    Dynamic gait: 10/10  -Change in gait speed: 2  -Walk with head turns horizontal: 2  -Walk with pivot turns: 2  -Step over obstacles: 2    -Timed up and go with dual task (3 meter walk) (>10 sec signifies increased fall risk):   TU sec  Dual task TU sec (Mod score >10% difference compared to initial TUG score)    Total: 20/28 (Less than 21 indicates increased risk of falling)        From eval:   Active Range of Motion      Lumbar   Extension: decreased  Left lateral flexion: decreased  Right lateral flexion: decreased  Left rotation: decreased  Right rotation: decreased     Strength:    Additional Strength Details  Bridge: full ROM      Therapeutic Exercises (CPT 03835):      1. recumbent bike, x7 min L1, cardiovascular warm up    3. resisted bridges, pink TB, x15 2 sec hold, hep    4. clamshells, pink TB; x10 2 sec ea, hep; incr difficulty L>R    5. toy soldier    20. PN due: 4/28/24      Therapeutic Exercise Summary: Access Code: [email or web address]  URL: [email or web address]  Date: 04/02/2024  Prepared by: James Moran    Exercises  - Sit to Stand Without Arm Support  - 1 x daily - 7 x weekly - 1 sets - 10 reps  - Standing Heel Raises  - 3-4 x weekly - 1 sets - 20-25 reps  - Single Leg Heel Raise  - 3-4 x weekly - 1 sets - 10 reps    Therapeutic Treatments and Modalities:     1. Neuromuscular Re-education (CPT 81092)    Therapeutic Treatment and Modalities Summary: BPPV assessment -Alan Hallpike negative  B     MiniBESTest completion    Sit to stands x10; VC's for set up, momentum and hip hinge with controlled eccentrics  D heel raises at bar x20 //requires bar for UE hold; fatigue-verbal review  SL heel raises x10 ea at bar // fatigue-verbal review   Modified SLS 3x10 sec  WS sagittal x10 ea //VC's to manage hip strategy with improvement; poor posterior excursion     Time-based treatments/modalities:         ASSESSMENT:   Response to treatment:       Pt demonstrating poor posterior excursion and hip strategy compensations during weight shifting in session. Additionally, frontal plane weakness L>RLE. Frequent cuing during session for corrections with pt demonstrating improvements. Will continue to address both balance and proximal pelvic girdle strengthening in follow ups.       PLAN/RECOMMENDATIONS:   Plan for treatment: Continue with current treatment.  Planned interventions for next visit: continue with current treatment.

## 2024-05-01 NOTE — TELEPHONE ENCOUNTER
Called patient to request a manual transmission, patient was on his way to an appointment but when he gets home he will send a transmission. Instructions sent via [employer] just in case he has any questions.

## 2024-05-14 ENCOUNTER — PHYSICAL THERAPY (OUTPATIENT)
Dept: PHYSICAL THERAPY | Facility: MEDICAL CENTER | Age: 84
End: 2024-05-14
Attending: NURSE PRACTITIONER
Payer: COMMERCIAL

## 2024-05-14 DIAGNOSIS — R27.0 ATAXIA, UNSPECIFIED: ICD-10-CM

## 2024-05-14 NOTE — OP THERAPY DAILY TREATMENT
Outpatient Physical Therapy  DAILY TREATMENT     [facility] Outpatient Physical Therapy  [address]  [place] [address]  Phone:  [phone]  Fax:  [phone]    Date: 05/14/2024    Patient: Lencho Parker  YOB: 1940  MRN: [id number]     Time Calculation    Start time: 1501  Stop time: 1542 Time Calculation (min): 41 minutes         Chief Complaint: Loss Of Balance    Visit #: 4    SUBJECTIVE:  Pt reporting he has questions re: losing his feet due to neuropathy.   He has noticed some of the sensation returning in feet but still has difficulty identifying ground while walking. Sensation loss is moving up the calves.      Objective:    PT Functional Assessment Tool Used: ABC  PT Functional Assessment Score: 97%       Therapeutic Exercises (CPT 95360):     1. recumbent bike, x7 min L1, cardiovascular warm up    2. review of hep    3. objective measures    4. review of goals    20. PN due: 4/28/24      Therapeutic Exercise Summary: Access Code: [email or web address]  URL: [email or web address]  Date: 04/02/2024  Prepared by: James Moran    Exercises  - Sit to Stand Without Arm Support  - 1 x daily - 7 x weekly - 1 sets - 10 reps  - Standing Heel Raises  - 3-4 x weekly - 1 sets - 20-25 reps  - Single Leg Heel Raise  - 3-4 x weekly - 1 sets - 10 reps    Therapeutic Treatments and Modalities:     1. Neuromuscular Re-education (CPT 19278), MiniBESTest completion    Therapeutic Treatment and Modalities Summary:         Time-based treatments/modalities:    Physical Therapy Timed Treatment Charges  Neuromusc re-ed, balance, coor, post minutes (CPT 08957): 30 minutes  Therapeutic exercise minutes (CPT 63333): 11 minutes    ASSESSMENT:   Response to treatment:   See PN       PLAN/RECOMMENDATIONS:   Plan for treatment: Continue with current treatment.  Planned interventions for next visit: continue with current treatment.

## 2024-05-15 NOTE — OP THERAPY PROGRESS SUMMARY
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 Ataxia, unspecified  R27.0       Rehab Potential: fair    Progress Report Period: 3/28/24-5/14/24    Functional Assessment Used  PT Functional Assessment Tool Used: ABC  PT Functional Assessment Score: 97%       Objective Findings and Assessment:   Patient progression towards goals: Pt has been seen for 4 visits; Intermittent sessions due to accidentally arriving at wrong time or day and one day leaving due to feeling ill. reports some improvement in sensation of feet. While improved, he will still walk and feel like \"nothing is there.\" May trial l/s traction to assess its effects in future sessions. Strength and balance overall improving. Feels balance may be the same but uncertain if he is more stable. States he is more comfortable without the walker; has transitioned to the cane for ambulation. Overall, strength and core endurance has improved. Pt has demonstrated some improvements in reactive skills but lowered dual tasking ability resulting in same score on MiniBESTest for balance. Pt continues to be a fall risk and would benefit from additional more consistent PT visits to decrease fall risk status.    Objective findings and assessment details: Today:  Vitals: (On room air; assessed in sitting) - beginning of session    HR: 87 bpm    SpO2: 97%  *No signs of distress    Active Range of Motion      Lumbar   Extension: decreased  Left lateral flexion: decreased  Right lateral flexion: decreased  Left rotation: decreased  Right " rotation: decreased       Strength:  Ball bridge for core/posterior strength and endurance-calves on ball: 2 min   Sit to stands x 30 sec: 11 (at 6th sit to stand started utilizing hands for push up)     Mini-BESTest:  Anticipatory: 2/6  -Sit to stand: 2  -Rise to toes: 0  -Stand on one le (<3 sec B)    Reactive postural control: 5/6  -Compensatory stepping correction -fwd: 2  -Compensatory stepping correction -bwd: 1  -Compensatory stepping correction -lateral: 2    Sensory orientation: 4/6  -Stance feet together, EO, firm: 2  -Stance feet together, EC, foam: 1  -Incline, EC: 1    Dynamic gait: 9/10  -Change in gait speed: 2  -Walk with head turns horizontal: 2  -Walk with pivot turns: 2  -Step over obstacles: 2    -Timed up and go with dual task (3 meter walk) (>10 sec signifies increased fall risk): /  TU sec  Dual task TUG: 10 sec (Mod score >10% difference compared to initial TUG score)    Total: 20/28 (Less than 21 indicates increased risk of falling)      Goals:   Short Term Goals:   1. Pt will be independent with written HEP. (Met)  2. Pt will contact PCP to discuss possible neuropathy condition. (Ongoing)  3. Therapist to assess for BPPV vs orthostatic hypotension for dizziness complaints with position changes. (Met)  4. Pt will participate in formal balance test (Met)     Short term goal time span:  2-4 weeks      Long Term Goals:    1. Pt will be independent with written HEP. (Ongoing)  2. Pt will have a sig improvement in ABC score (eval: 98.2)  3. Pt will demo sig improvement in balance assessment to decrease risk for falls. (Ongoing)  4. Pt will have no falls. (Met)     Long term goal time span:  6-8 weeks    Plan:   Planned therapy interventions:  Neuromuscular Re-education (CPT 57555), Gait Training (CPT 61215), Mechanical Traction (CPT 33536), Therapeutic Exercise (CPT 28706) and Therapeutic Activities (CPT 34833)  Frequency:  1x week  Duration in weeks:  8  Duration in visits:  5  Plan  details:  UPOC: 7/12/24      Referring provider co-signature:  I have reviewed this plan of care and my co-signature certifies the need for services.     Certification Period: 05/14/2024 to 7/12/24    Physician Signature: ________________________________ Date: ______________

## 2024-05-21 ENCOUNTER — PHYSICAL THERAPY (OUTPATIENT)
Dept: PHYSICAL THERAPY | Facility: MEDICAL CENTER | Age: 84
End: 2024-05-21
Attending: NURSE PRACTITIONER
Payer: COMMERCIAL

## 2024-05-21 DIAGNOSIS — R27.0 ATAXIA, UNSPECIFIED: ICD-10-CM

## 2024-05-21 NOTE — OP THERAPY DAILY TREATMENT
Outpatient Physical Therapy  DAILY TREATMENT     [facility] Outpatient Physical Therapy  [address]  [place] [address]  Phone:  [phone]  Fax:  [phone]    Date: 2024    Patient: Lencho Parker  YOB: 1940  MRN: [id number]     Time Calculation    Start time: 1547  Stop time: 1629 Time Calculation (min): 42 minutes         Chief Complaint: Loss Of Balance    Visit #: 5    SUBJECTIVE:  Pt reporting he will fly to [facility] for agent orange interview; unsure as to the details of the trip or possible outcomes.     Objective:  After lateral step ups:  HR: 86 bpm  spO2: 96%    From PN:     Mini-BESTest:  Anticipatory: 2/6  -Sit to stand: 2  -Rise to toes: 0  -Stand on one le (<3 sec B)     Reactive postural control: /6  -Compensatory stepping correction -fwd: 2  -Compensatory stepping correction -bwd: 1  -Compensatory stepping correction -lateral: 2     Sensory orientation: /6  -Stance feet together, EO, firm: 2  -Stance feet together, EC, foam: 1  -Incline, EC: 1     Dynamic gait: 9/10  -Change in gait speed: 2  -Walk with head turns horizontal: 2  -Walk with pivot turns: 2  -Step over obstacles: 2     -Timed up and go with dual task (3 meter walk) (>10 sec signifies increased fall risk): 1/2  TU sec  Dual task TUG: 10 sec (Mod score >10% difference compared to initial TUG score)     Total: / (Less than 21 indicates increased risk of falling)             Therapeutic Exercises (CPT 11428):     1. recumbent bike, x7 min L1, cardiovascular warm up    20. PN due: 24      Therapeutic Exercise Summary: Access Code: [email or web address]  URL: [email or web address]  Date: 2024  Prepared by: James Moran    Exercises  - Sit to Stand Without Arm Support  - 1 x daily - 7 x weekly - 1 sets - 10 reps  - Standing Heel Raises  - 3-4 x weekly - 1 sets - 20-25 reps  - Single Leg Heel Raise  - 3-4 x weekly - 1 sets - 10 reps    Therapeutic  Treatments and Modalities:     1. Neuromuscular Re-education (CPT 00421), see below    Therapeutic Treatment and Modalities Summary: Incline wedge stretch x60 sec at bar with BUE support    SL cone tapping; 2x15 L, 3x15 R //incr difficulty on R with pelvic drop L; this improved with cuing for weight shift and incr practice onto RLE    Step onto/off foam pad; utilizing SPC x 10 next to bar ->foam pad+6 in step //incr difficulty with incr height; CGA at gait belt; noted incr difficulty on RLE     Sit to stand with michelle disc from plinth-table raised due to tall stature and difficulty with low, standard height; x10 ea; sig difficulty on R>L with pole in RUE and further incr in plinth height to accommodate //fatigued     Time-based treatments/modalities:    Physical Therapy Timed Treatment Charges  Neuromusc re-ed, balance, coor, post minutes (CPT 62621): 37 minutes  Therapeutic exercise minutes (CPT 12517): 5 minutes    ASSESSMENT:   Response to treatment:   Emphasis today on anticipatory challenges with balance. Noted incr difficulty on R>LLE during single leg strength; this improved with cuing for weight shifting and glute engagement. Despite improvements with incr reps, pt may benefit from further practice in upcoming session in addition to reactive postural control program in future sessions.       PLAN/RECOMMENDATIONS:   Plan for treatment: Continue with current treatment.  Planned interventions for next visit: continue with current treatment.

## 2024-05-21 NOTE — OP THERAPY PROGRESS SUMMARY
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 Ataxia, unspecified  R27.0       Rehab Potential: fair    Progress Report Period: 3/28/24-5/21/24    Functional Assessment Used          Objective Findings and Assessment:   Patient progression towards goals: Pt has been seen for 5 of his 15 approved visits; Intermittent sessions due to accidentally arriving at wrong time or day and one day leaving due to feeling ill. Reports some improvement in sensation of feet and \"able to move them more.\" While improved, he will still walk and feel like \"nothing is there.\" May trial l/s traction to assess its effects in future sessions. Strength and balance overall improving. Feels balance may be the same but uncertain if he is more stable. States he is more comfortable without the walker; has transitioned to the cane for ambulation. Strength and core endurance has improved as well, however, noted incr difficulty on R>LLE during single leg strength; this improved with cuing for weight shifting and glute engagement. Despite improvements with incr reps, pt may benefit from further practice in upcoming session in addition to reactive postural control program in future sessions.     Pt has demonstrated some improvements in reactive skills but lowered dual tasking ability resulting in same score on MiniBESTest for balance. Pt continues to be a fall risk and would benefit from additional more consistent PT visits to decrease fall risk status. Due to imbalance and strength deficits, pt may benefit from " auth extension to accommodate inability to complete all visits in a timely manner (see reasoning above). Recommending additional time to complete his already approved and authorized sessions to address remaining deficits.     Objective findings and assessment details:   Active Range of Motion   Lumbar   Extension: decreased  Left lateral flexion: decreased  Right lateral flexion: decreased  Left rotation: decreased  Right rotation: decreased       Strength:  Ball bridge for core/posterior strength and endurance-calves on ball: 2 min   Sit to stands x 30 sec: 11 (at 6th sit to stand started utilizing hands for push up)     Mini-BESTest:  Anticipatory: 2/6  -Sit to stand: 2  -Rise to toes: 0  -Stand on one le (<3 sec B)    Reactive postural control: /6  -Compensatory stepping correction -fwd: 2  -Compensatory stepping correction -bwd: 1  -Compensatory stepping correction -lateral: 2    Sensory orientation: 4/6  -Stance feet together, EO, firm: 2  -Stance feet together, EC, foam: 1  -Incline, EC: 1    Dynamic gait: /10  -Change in gait speed: 2  -Walk with head turns horizontal: 2  -Walk with pivot turns: 2  -Step over obstacles: 2    -Timed up and go with dual task (3 meter walk) (>10 sec signifies increased fall risk): 1/2  TU sec  Dual task TUG: 10 sec (Mod score >10% difference compared to initial TUG score)    Total: 20/28 (Less than 21 indicates increased risk of falling)      Goals:   Short Term Goals:   1. Pt will be independent with written HEP. (Met)  2. Pt will contact PCP to discuss possible neuropathy condition. (Ongoing)  3. Therapist to assess for BPPV vs orthostatic hypotension for dizziness complaints with position changes. (Met)  4. Pt will participate in formal balance test (Met)     Short term goal time span:  2-4 weeks      Long Term Goals:    1. Pt will be independent with written HEP. (Ongoing)  2. Pt will have a sig improvement in ABC score (eval: 98.2)  3. Pt will demo sig improvement  in balance assessment to decrease risk for falls. (Ongoing)  4. Pt will have no falls. (Met)     Long term goal time span:  6-8 weeks    Plan:   Planned therapy interventions:  Neuromuscular Re-education (CPT 14800), Gait Training (CPT 34991), Mechanical Traction (CPT 70716), Therapeutic Exercise (CPT 72805) and Therapeutic Activities (CPT 06483)  Frequency: 1-2x/wk.  Duration in visits:  10  Plan details:  UPOC: 7/26/24      Referring provider co-signature:  I have reviewed this plan of care and my co-signature certifies the need for services.     Certification Period: 05/21/2024 to 7/26/24    Physician Signature: ________________________________ Date: ______________

## 2024-05-23 ENCOUNTER — APPOINTMENT (OUTPATIENT)
Dept: PHYSICAL THERAPY | Facility: MEDICAL CENTER | Age: 84
End: 2024-05-23
Payer: COMMERCIAL

## 2024-05-30 ENCOUNTER — PHYSICAL THERAPY (OUTPATIENT)
Dept: PHYSICAL THERAPY | Facility: MEDICAL CENTER | Age: 84
End: 2024-05-30
Attending: NURSE PRACTITIONER
Payer: COMMERCIAL

## 2024-05-30 DIAGNOSIS — R27.0 ATAXIA, UNSPECIFIED: ICD-10-CM

## 2024-05-30 NOTE — OP THERAPY DAILY TREATMENT
Outpatient Physical Therapy  DAILY TREATMENT     [facility] Outpatient Physical Therapy  [address]  [facility] [address]  Phone:  [phone]  Fax:  [phone]    Date: 2024    Patient: Lencho Parker  YOB: 1940  MRN: [id number]     Time Calculation    Start time: 1547  Stop time: 1626 Time Calculation (min): 39 minutes         Chief Complaint: Loss Of Balance    Visit #: 6    SUBJECTIVE:  Pt reporting his appt in [facility] was cancelled and will have agent orange interview here in [place]. N/t in BLE's hasn't changed; is increasing his walking at home and feels a little more fatigue from this.    Objective:  After  step ups onto foam  spO2: 84% (pt reporting mild dizziness)  spO2 after 2 min rest: 98%     From PN:   Mini-BESTest:  Anticipatory: 2/6  -Sit to stand: 2  -Rise to toes: 0  -Stand on one le (<3 sec B)     Reactive postural control: 5/6  -Compensatory stepping correction -fwd: 2  -Compensatory stepping correction -bwd: 1  -Compensatory stepping correction -lateral: 2     Sensory orientation: 4/6  -Stance feet together, EO, firm: 2  -Stance feet together, EC, foam: 1  -Incline, EC: 1     Dynamic gait: 9/10  -Change in gait speed: 2  -Walk with head turns horizontal: 2  -Walk with pivot turns: 2  -Step over obstacles: 2     -Timed up and go with dual task (3 meter walk) (>10 sec signifies increased fall risk): 1/2  TU sec  Dual task TUG: 10 sec (Mod score >10% difference compared to initial TUG score)     Total: / (Less than 21 indicates increased risk of falling)             Therapeutic Exercises (CPT 38641):     1. recumbent bike, x6 min L1, cardiovascular warm up    20. PN due: 24      Therapeutic Exercise Summary: Access Code: [email or web address]  URL: [email or web address]  Date: 2024  Prepared by: James Moran    Exercises  - Sit to Stand Without Arm Support  - 1 x daily - 7 x weekly - 1 sets - 10 reps  - Standing  Heel Raises  - 3-4 x weekly - 1 sets - 20-25 reps  - Single Leg Heel Raise  - 3-4 x weekly - 1 sets - 10 reps    Therapeutic Treatments and Modalities:     1. Neuromuscular Re-education (CPT 03932), see below    Therapeutic Treatment and Modalities Summary: Incline wedge stretch x60 sec at bar with BUE support -NT    Staggered stance with foot on 6 in step and head turns x10 ea direction //incr difficulty on LLE with 1-2 instances of UE holds on bar    SL cone tapping; 2x15 R //incr difficulty on L; intermittent UE holds on bars    Step onto/off foam pad 2x10 //intermit holds     Sit to stand with michelle disc from plinth-table raised due to tall stature and difficulty with low, standard height; x10 ea // reviewed; incr difficulty with LLE     Time-based treatments/modalities:    Physical Therapy Timed Treatment Charges  Therapeutic exercise minutes (CPT 04793): 39 minutes    ASSESSMENT:   Response to treatment:   Noted incr difficulty on R>LLE during single leg strength; this improved with cuing for weight shifting and glute engagement.  Pt may benefit from further practice with reactive postural control program in future sessions.       PLAN/RECOMMENDATIONS:   Plan for treatment: Continue with current treatment.  Planned interventions for next visit: continue with current treatment.  Introduce reactive postural control exercises

## 2024-06-03 ENCOUNTER — APPOINTMENT (OUTPATIENT)
Dept: PHYSICAL THERAPY | Facility: MEDICAL CENTER | Age: 84
End: 2024-06-03
Attending: NURSE PRACTITIONER
Payer: MEDICARE

## 2024-06-04 ENCOUNTER — PHYSICAL THERAPY (OUTPATIENT)
Dept: PHYSICAL THERAPY | Facility: MEDICAL CENTER | Age: 84
End: 2024-06-04
Attending: NURSE PRACTITIONER
Payer: COMMERCIAL

## 2024-06-04 DIAGNOSIS — R27.0 ATAXIA, UNSPECIFIED: ICD-10-CM

## 2024-06-04 PROCEDURE — 97112 NEUROMUSCULAR REEDUCATION: CPT

## 2024-06-04 NOTE — OP THERAPY DAILY TREATMENT
Outpatient Physical Therapy  DAILY TREATMENT     [facility] Outpatient Physical Therapy  [address]  [facility] [address]  Phone:  [phone]  Fax:  [phone]    Date: 2024    Patient: Lencho Parker  YOB: 1940  MRN: [id number]     Time Calculation    Start time: 0815  Stop time: 0851 Time Calculation (min): 36 minutes         Chief Complaint: Loss Of Balance    Visit #: 7    SUBJECTIVE:  Pt reporting his appt in [facility] was cancelled and will have agent orange interview here in [place]. N/t in BLE's hasn't changed; is increasing his walking at home and feels a little more fatigue from this.    Objective:  After  step ups onto foam  spO2: 84% (pt reporting mild dizziness)  spO2 after 2 min rest: 98%     From PN:   Mini-BESTest:  Anticipatory: 2/6  -Sit to stand: 2  -Rise to toes: 0  -Stand on one le (<3 sec B)     Reactive postural control: 5/6  -Compensatory stepping correction -fwd: 2  -Compensatory stepping correction -bwd: 1  -Compensatory stepping correction -lateral: 2     Sensory orientation: 4/6  -Stance feet together, EO, firm: 2  -Stance feet together, EC, foam: 1  -Incline, EC: 1     Dynamic gait: 9/10  -Change in gait speed: 2  -Walk with head turns horizontal: 2  -Walk with pivot turns: 2  -Step over obstacles: 2     -Timed up and go with dual task (3 meter walk) (>10 sec signifies increased fall risk): 1/2  TU sec  Dual task TUG: 10 sec (Mod score >10% difference compared to initial TUG score)     Total: / (Less than 21 indicates increased risk of falling)             Therapeutic Exercises (CPT 56679):     1. recumbent bike, x6 min L1, cardiovascular warm up    20. PN due: 24      Therapeutic Exercise Summary: Access Code: [email or web address]  URL: [email or web address]  Date: 2024  Prepared by: James Moran    Exercises  - Sit to Stand Without Arm Support  - 1 x daily - 7 x weekly - 1 sets - 10 reps  - Standing  Heel Raises  - 3-4 x weekly - 1 sets - 20-25 reps  - Single Leg Heel Raise  - 3-4 x weekly - 1 sets - 10 reps    Therapeutic Treatments and Modalities:     1. Neuromuscular Re-education (CPT 31503), see below    Therapeutic Treatment and Modalities Summary: Incline wedge stretch x60 sec at bar with BUE support -NT    Staggered stance with foot on 6 in step and head turns x10 ea direction-NT    SL cone tapping; 2x15 R //incr difficulty on L; intermittent UE holds on bars-NT    Step onto/off foam pad 2x10 //intermit holds -NT    Sit to stand with michelle disc from plinth-table raised due to tall stature and difficulty with low, standard height; x10 ea // reviewed; incr difficulty with LLE -NT    Weight shifting -sagittal and frontal planes x 20 ea     Wobble board WS sagittal and frontal planes x20 ea; intermittent UE holds onto // bars     Compensatory stepping corrections fwd, lateral, and posterior, x3 ea direction with tech providing additional SBA; pt improving with incr reps; most difficulty in L and posterior direction     Ball toss with one LE on michelle disc>standing on foam with forward ball toss then diagonals then lateral tosses, x10-15 reps ea with CGA; two instances of LOB with good hip strategy    Time-based treatments/modalities:    Physical Therapy Timed Treatment Charges  Neuromusc re-ed, balance, coor, post minutes (CPT 25775): 31 minutes  Therapeutic exercise minutes (CPT 71002): 5 minutes    ASSESSMENT:   Response to treatment:   Pt demonstrating improving reactive balance with improvement following increase practice and reps. Pt observed demonstrating improved hip strategies while on foam pad without requiring manual assist. Pt would likely benefit from continuance of reactive postural control and anticipatory balance challenges at higher levels to reduce fall risk status.    Of note, PN submitted for auth extension on 5/21; currently awaiting approval.    PLAN/RECOMMENDATIONS:   Plan for treatment:  Continue with current treatment.  Planned interventions for next visit: continue with current treatment.  Introduce reactive postural control exercises

## 2024-06-11 ENCOUNTER — PHYSICAL THERAPY (OUTPATIENT)
Dept: PHYSICAL THERAPY | Facility: MEDICAL CENTER | Age: 84
End: 2024-06-11
Attending: NURSE PRACTITIONER
Payer: COMMERCIAL

## 2024-06-11 DIAGNOSIS — R27.0 ATAXIA, UNSPECIFIED: ICD-10-CM

## 2024-06-11 PROCEDURE — 97112 NEUROMUSCULAR REEDUCATION: CPT

## 2024-06-11 NOTE — OP THERAPY DAILY TREATMENT
Outpatient Physical Therapy  DAILY TREATMENT     [facility] Outpatient Physical Therapy  [address]  [place] [address]  Phone:  [phone]  Fax:  [phone]    Date: 2024    Patient: Lencho Parker  YOB: 1940  MRN: [id number]     Time Calculation    Start time: 0945  Stop time: 1027 Time Calculation (min): 42 minutes         Chief Complaint: Loss Of Balance and Difficulty Walking    Visit #: 8    SUBJECTIVE:  Pt reporting he didn't bring his cane today; has been trying to walk without the AD.     Objective:  After  step ups onto foam  spO2: 84% (pt reporting mild dizziness)  spO2 after 2 min rest: 98%     From PN:   Mini-BESTest:  Anticipatory: 2/6  -Sit to stand: 2  -Rise to toes: 0  -Stand on one le (<3 sec B)     Reactive postural control: /6  -Compensatory stepping correction -fwd: 2  -Compensatory stepping correction -bwd: 1  -Compensatory stepping correction -lateral: 2     Sensory orientation: 4/6  -Stance feet together, EO, firm: 2  -Stance feet together, EC, foam: 1  -Incline, EC: 1     Dynamic gait: 9/10  -Change in gait speed: 2  -Walk with head turns horizontal: 2  -Walk with pivot turns: 2  -Step over obstacles: 2     -Timed up and go with dual task (3 meter walk) (>10 sec signifies increased fall risk): 1/2  TU sec  Dual task TUG: 10 sec (Mod score >10% difference compared to initial TUG score)     Total: 20/28 (Less than 21 indicates increased risk of falling)             Therapeutic Exercises (CPT 15412):     1. recumbent bike, x6 min L1, cardiovascular warm up    2. pt education, re: using SPC for haptic cues due to lack of sensation in feet    20. PN due: 24      Therapeutic Exercise Summary: Access Code: [email or web address]  URL: [email or web address]  Date: 2024  Prepared by: James Moran    Exercises  - Sit to Stand Without Arm Support  - 1 x daily - 7 x weekly - 1 sets - 10 reps  - Standing Heel  Raises  - 3-4 x weekly - 1 sets - 20-25 reps  - Single Leg Heel Raise  - 3-4 x weekly - 1 sets - 10 reps    Therapeutic Treatments and Modalities:     1. Neuromuscular Re-education (CPT 36494), see below    Therapeutic Treatment and Modalities Summary: Incline wedge stretch x60 sec at bar with BUE support -NT    Staggered stance with foot on 6 in step and head turns x10 ea direction-NT    SL cone tapping; 2x15 R //incr difficulty on R today; VC's for incr WS onto stance limb and making exercise intentional/slowing down movement with improvements noted     SL ball roll with WS onto stance limb; VC's to engage glutes with tactile cuing at glute med; 3x20 sec ea //incr difficulty with LLE>R; freq bar holds L>R    Step onto/off foam pad 2x10 //intermit holds -NT    Sit to stand with michelle disc from plinth-table raised due to tall stature and difficulty with low, standard height; x10 ea // reviewed; incr difficulty with LLE     Weight shifting -sagittal and frontal planes x 20 ea; reviewed    Wobble board WS sagittal and frontal planes x20 ea; intermittent UE holds onto // bars -NT    Compensatory stepping corrections fwd, lateral, and posterior, x3 ea direction with tech providing additional SBA; pt improving with incr reps; most difficulty in L and posterior direction, several steps taken into posterior direction     Ball toss with one LE on michelle disc>standing on foam with forward ball toss then diagonals then lateral tosses, x10-15 reps ea with CGA; two instances of LOB with good hip strategy-NT    Time-based treatments/modalities:    Physical Therapy Timed Treatment Charges  Neuromusc re-ed, balance, coor, post minutes (CPT 04064): 37 minutes  Therapeutic exercise minutes (CPT 41571): 5 minutes    ASSESSMENT:   Response to treatment:   Pt demonstrating improving WS onto stance limb but does require cont'd cuing to create intentional shift for SLS. Due to improving reactive and anticipatory skills, will add additional  training next session to simulate situational balance loss.    PLAN/RECOMMENDATIONS:   Plan for treatment: Continue with current treatment.  Planned interventions for next visit: continue with current treatment.  Introduce slip reactive skills with lite gait and sheet; bungee walking, step onto FR's

## 2024-06-14 ENCOUNTER — NON-PROVIDER VISIT (OUTPATIENT)
Dept: CARDIOLOGY | Facility: MEDICAL CENTER | Age: 84
End: 2024-06-14
Payer: MEDICARE

## 2024-06-14 PROCEDURE — 93295 DEV INTERROG REMOTE 1/2/MLT: CPT | Performed by: INTERNAL MEDICINE

## 2024-06-20 ENCOUNTER — PHYSICAL THERAPY (OUTPATIENT)
Dept: PHYSICAL THERAPY | Facility: MEDICAL CENTER | Age: 84
End: 2024-06-20
Attending: NURSE PRACTITIONER
Payer: COMMERCIAL

## 2024-06-20 DIAGNOSIS — R27.0 ATAXIA, UNSPECIFIED: ICD-10-CM

## 2024-06-20 PROCEDURE — 999999 HB NO CHARGE

## 2024-06-20 NOTE — OP THERAPY DAILY TREATMENT
Outpatient Physical Therapy  DAILY TREATMENT     [facility] Outpatient Physical Therapy  [address]  [place] [address]  Phone:  [phone]  Fax:  [phone]    Date: 2024    Patient: Lencho Parker  YOB: 1940  MRN: [id number]     Time Calculation                   Chief Complaint: No chief complaint on file.    Visit #: 9    SUBJECTIVE:  Pt reporting he didn't bring his cane today; has been trying to walk without the AD.     Objective:  After  step ups onto foam  spO2: 84% (pt reporting mild dizziness)  spO2 after 2 min rest: 98%     From PN:   Mini-BESTest:  Anticipatory: 2/6  -Sit to stand: 2  -Rise to toes: 0  -Stand on one le (<3 sec B)     Reactive postural control: 5/6  -Compensatory stepping correction -fwd: 2  -Compensatory stepping correction -bwd: 1  -Compensatory stepping correction -lateral: 2     Sensory orientation: 4/6  -Stance feet together, EO, firm: 2  -Stance feet together, EC, foam: 1  -Incline, EC: 1     Dynamic gait: 9/10  -Change in gait speed: 2  -Walk with head turns horizontal: 2  -Walk with pivot turns: 2  -Step over obstacles: 2     -Timed up and go with dual task (3 meter walk) (>10 sec signifies increased fall risk): 1/2  TU sec  Dual task TUG: 10 sec (Mod score >10% difference compared to initial TUG score)     Total: / (Less than 21 indicates increased risk of falling)             Therapeutic Exercises (CPT 13092):     1. recumbent bike, x6 min L1, cardiovascular warm up    2. pt education, re: using SPC for haptic cues due to lack of sensation in feet    20. PN due: 24      Therapeutic Exercise Summary: Access Code: [email or web address]  URL: [email or web address]  Date: 2024  Prepared by: James Moran    Exercises  - Sit to Stand Without Arm Support  - 1 x daily - 7 x weekly - 1 sets - 10 reps  - Standing Heel Raises  - 3-4 x weekly - 1 sets - 20-25 reps  - Single Leg Heel Raise  - 3-4  x weekly - 1 sets - 10 reps    Therapeutic Treatments and Modalities:     1. Neuromuscular Re-education (CPT 20292), see below    Therapeutic Treatment and Modalities Summary: Incline wedge stretch x60 sec at bar with BUE support -NT    Staggered stance with foot on 6 in step and head turns x10 ea direction-NT    SL cone tapping; 2x15 R //incr difficulty on R today; VC's for incr WS onto stance limb and making exercise intentional/slowing down movement with improvements noted     SL ball roll with WS onto stance limb; VC's to engage glutes with tactile cuing at glute med; 3x20 sec ea //incr difficulty with LLE>R; freq bar holds L>R    Step onto/off foam pad 2x10 //intermit holds -NT    Sit to stand with michelle disc from plinth-table raised due to tall stature and difficulty with low, standard height; x10 ea // reviewed; incr difficulty with LLE     Weight shifting -sagittal and frontal planes x 20 ea; reviewed    Wobble board WS sagittal and frontal planes x20 ea; intermittent UE holds onto // bars -NT    Compensatory stepping corrections fwd, lateral, and posterior, x3 ea direction with tech providing additional SBA; pt improving with incr reps; most difficulty in L and posterior direction, several steps taken into posterior direction     Ball toss with one LE on michelle disc>standing on foam with forward ball toss then diagonals then lateral tosses, x10-15 reps ea with CGA; two instances of LOB with good hip strategy-NT    Step ups onto 1/2 FR    Resisted walking with sport cord        Time-based treatments/modalities:         ASSESSMENT:   Response to treatment:       Pt demonstrating improving WS onto stance limb but does require cont'd cuing to create intentional shift for SLS. Due to improving reactive and anticipatory skills, will add additional training next session to simulate situational balance loss.    PLAN/RECOMMENDATIONS:   Plan for treatment: Continue with current treatment.  Planned interventions for next  visit: continue with current treatment.  Introduce slip reactive skills with lite gait and sheet; bungee walking, step onto FR's

## 2024-06-24 ENCOUNTER — PHYSICAL THERAPY (OUTPATIENT)
Dept: PHYSICAL THERAPY | Facility: MEDICAL CENTER | Age: 84
End: 2024-06-24
Attending: NURSE PRACTITIONER
Payer: COMMERCIAL

## 2024-06-24 DIAGNOSIS — R27.0 ATAXIA, UNSPECIFIED: ICD-10-CM

## 2024-06-24 PROCEDURE — 97112 NEUROMUSCULAR REEDUCATION: CPT

## 2024-06-24 PROCEDURE — 97110 THERAPEUTIC EXERCISES: CPT

## 2024-06-24 NOTE — OP THERAPY DAILY TREATMENT
Outpatient Physical Therapy  DAILY TREATMENT     [facility] Outpatient Physical Therapy  [address]  [place] [address]  Phone:  [phone]  Fax:  [phone]    Date: 2024    Patient: Lencho Parker  YOB: 1940  MRN: [id number]     Time Calculation    Start time: 1032  Stop time: 1110 Time Calculation (min): 38 minutes         Chief Complaint: Loss Of Balance    Visit #: 9    SUBJECTIVE:  Pt reporting his son is still in the ICU but is stable.   Pt asking re: best AD to use.     Objective:  After  step onto foam roller /2 on floor   spO2: 94%   HR: 88 bpm     From PN:   Mini-BESTest:  Anticipatory: 2/6  -Sit to stand: 2  -Rise to toes: 0  -Stand on one le (<3 sec B)     Reactive postural control: /6  -Compensatory stepping correction -fwd: 2  -Compensatory stepping correction -bwd: 1  -Compensatory stepping correction -lateral: 2     Sensory orientation: 4/6  -Stance feet together, EO, firm: 2  -Stance feet together, EC, foam: 1  -Incline, EC: 1     Dynamic gait: 9/10  -Change in gait speed: 2  -Walk with head turns horizontal: 2  -Walk with pivot turns: 2  -Step over obstacles: 2     -Timed up and go with dual task (3 meter walk) (>10 sec signifies increased fall risk): /2  TU sec  Dual task TUG: 10 sec (Mod score >10% difference compared to initial TUG score)     Total: / (Less than 21 indicates increased risk of falling)             Therapeutic Exercises (CPT 88759):     1. recumbent bike, x6 min L1, cardiovascular warm up    2. pt education, re: using SPC for haptic cues due to lack of sensation in feet, reviewed; discussed walker for longer walks or when feeling fatigued    20. PN due: 24      Therapeutic Exercise Summary: Access Code: [email or web address]  URL: [email or web address]  Date: 2024  Prepared by: James Moran    Exercises  - Sit to Stand Without Arm Support  - 1 x daily - 7 x weekly - 1 sets - 10  reps  - Standing Heel Raises  - 3-4 x weekly - 1 sets - 20-25 reps  - Single Leg Heel Raise  - 3-4 x weekly - 1 sets - 10 reps    Therapeutic Treatments and Modalities:     1. Neuromuscular Re-education (CPT 33438), see below    Therapeutic Treatment and Modalities Summary: Incline wedge stretch x60 sec at bar with BUE support -NT    SL cone tapping; 2x15 R //improving WS compared to piror visits-reviewed     SL ball roll with WS onto stance limb; VC's to engage glutes with tactile cuing at glute med; 3x20 sec ea //incr difficulty with LLE>R; freq bar holds L>R-NT    Step onto/off foam pad 2x10 //intermit holds -NT    Sit to stand with michelle disc from plinth-table raised due to tall stature and difficulty with low, standard height; x10 ea // reviewed; incr difficulty with LLE     Weight shifting -sagittal and frontal planes x 20 ea; reviewed    Wobble board WS sagittal and frontal planes x20 ea; intermittent UE holds onto // bars -NT    Compensatory stepping corrections fwd, lateral, and posterior, x3 ea direction with tech providing additional SBA; pt improving with incr reps; most difficulty in L and posterior direction, several steps taken into posterior direction -NT    Ball toss with one LE on michelle disc>standing on foam with forward ball toss then diagonals then lateral tosses, x10-15 reps ea with CGA; two instances of LOB with good hip strategy-NT    Staggered stance with foot on 8 in step and head turns x10 ea direction-reviewed; progressed height of step    Step onto FR at bar with reaction stepping 2x10 (round top up); good stepping reactions; occasionally able to maintain balance on FR    Tandem walking on foam at bar, 6x10 ft //freq LOB with UE support at bar to maintain balance    Lateral walking on foam at bar, 6x10 ft //good stability, only 1-2 instances of LOB with good stepping reaction off foam          Time-based treatments/modalities:    Physical Therapy Timed Treatment Charges  Neuromusc re-ed,  balance, coor, post minutes (CPT 30536): 32 minutes  Therapeutic exercise minutes (CPT 17309): 6 minutes    ASSESSMENT:   Response to treatment:   Pt demonstrating improving WS onto stance limb as well as stepping reaction skills. Pt will continue to benefit from these challenges to decrease overall fall risk. Will retest balance next session and discuss cont'd balance deficits with pt. Progress report due next session.      PLAN/RECOMMENDATIONS:   Plan for treatment: Continue with current treatment.  Planned interventions for next visit: continue with current treatment.  Introduce slip reactive skills with lite gait and sheet; bungee walking  PN due next visit

## 2024-07-02 ENCOUNTER — HOSPITAL ENCOUNTER (OUTPATIENT)
Dept: LAB | Facility: MEDICAL CENTER | Age: 84
End: 2024-07-02
Attending: STUDENT IN AN ORGANIZED HEALTH CARE EDUCATION/TRAINING PROGRAM
Payer: MEDICARE

## 2024-07-02 ENCOUNTER — APPOINTMENT (OUTPATIENT)
Dept: PHYSICAL THERAPY | Facility: MEDICAL CENTER | Age: 84
End: 2024-07-02
Attending: NURSE PRACTITIONER
Payer: COMMERCIAL

## 2024-07-02 DIAGNOSIS — R27.0 ATAXIA, UNSPECIFIED: ICD-10-CM

## 2024-07-02 PROCEDURE — 84153 ASSAY OF PSA TOTAL: CPT

## 2024-07-02 PROCEDURE — 36415 COLL VENOUS BLD VENIPUNCTURE: CPT

## 2024-07-02 PROCEDURE — 84154 ASSAY OF PSA FREE: CPT

## 2024-07-02 PROCEDURE — 97112 NEUROMUSCULAR REEDUCATION: CPT

## 2024-07-03 LAB
PSA FREE MFR SERPL: 14 %
PSA FREE SERPL-MCNC: 1.2 NG/ML
PSA SERPL-MCNC: 8.6 NG/ML (ref 0–4)

## 2024-07-08 ENCOUNTER — APPOINTMENT (OUTPATIENT)
Dept: PHYSICAL THERAPY | Facility: MEDICAL CENTER | Age: 84
End: 2024-07-08
Attending: NURSE PRACTITIONER
Payer: COMMERCIAL

## 2024-07-08 ENCOUNTER — HOSPITAL ENCOUNTER (OUTPATIENT)
Facility: MEDICAL CENTER | Age: 84
End: 2024-07-08
Attending: UROLOGY
Payer: MEDICARE

## 2024-07-08 PROCEDURE — 87086 URINE CULTURE/COLONY COUNT: CPT

## 2024-07-08 PROCEDURE — 87077 CULTURE AEROBIC IDENTIFY: CPT

## 2024-07-08 PROCEDURE — 87186 SC STD MICRODIL/AGAR DIL: CPT

## 2024-07-15 ENCOUNTER — APPOINTMENT (OUTPATIENT)
Dept: PHYSICAL THERAPY | Facility: MEDICAL CENTER | Age: 84
End: 2024-07-15
Attending: NURSE PRACTITIONER
Payer: COMMERCIAL

## 2024-09-15 ENCOUNTER — NON-PROVIDER VISIT (OUTPATIENT)
Dept: CARDIOLOGY | Facility: MEDICAL CENTER | Age: 84
End: 2024-09-15
Payer: MEDICARE

## 2024-09-16 ENCOUNTER — TELEPHONE (OUTPATIENT)
Dept: CARDIOLOGY | Facility: MEDICAL CENTER | Age: 84
End: 2024-09-16
Payer: MEDICARE

## 2024-09-16 NOTE — TELEPHONE ENCOUNTER
Called pt in regards to lab work that was ordered at previous OV. Patient states If I can please fax over labs to [facility] to get them done  Pt has follow up appointment scheduled with Jaden  on 9.30.2024.

## 2024-09-16 NOTE — CARDIAC REMOTE MONITOR - SCAN
Device transmission reviewed. Device demonstrated appropriate function.       Electronically Signed by: Rupert Jay M.D.    9/23/2024  4:36 PM

## 2024-09-30 ENCOUNTER — APPOINTMENT (OUTPATIENT)
Dept: CARDIOLOGY | Facility: MEDICAL CENTER | Age: 84
End: 2024-09-30
Attending: NURSE PRACTITIONER
Payer: MEDICARE

## 2024-10-02 ENCOUNTER — HOSPITAL ENCOUNTER (OUTPATIENT)
Dept: LAB | Facility: MEDICAL CENTER | Age: 84
End: 2024-10-02
Attending: UROLOGY
Payer: MEDICARE

## 2024-10-02 LAB — PSA SERPL-MCNC: 6.03 NG/ML (ref 0–4)

## 2024-10-02 PROCEDURE — 84153 ASSAY OF PSA TOTAL: CPT

## 2024-10-02 PROCEDURE — 36415 COLL VENOUS BLD VENIPUNCTURE: CPT

## 2024-10-02 PROCEDURE — 87186 SC STD MICRODIL/AGAR DIL: CPT

## 2024-10-02 PROCEDURE — 87086 URINE CULTURE/COLONY COUNT: CPT

## 2024-10-02 PROCEDURE — 87077 CULTURE AEROBIC IDENTIFY: CPT

## 2025-01-15 ENCOUNTER — HOSPITAL ENCOUNTER (OUTPATIENT)
Dept: LAB | Facility: MEDICAL CENTER | Age: 85
End: 2025-01-15
Attending: UROLOGY
Payer: MEDICARE

## 2025-01-15 ENCOUNTER — NON-PROVIDER VISIT (OUTPATIENT)
Dept: CARDIOLOGY | Facility: MEDICAL CENTER | Age: 85
End: 2025-01-15
Attending: NURSE PRACTITIONER
Payer: MEDICARE

## 2025-01-15 ENCOUNTER — NON-PROVIDER VISIT (OUTPATIENT)
Dept: CARDIOLOGY | Facility: MEDICAL CENTER | Age: 85
End: 2025-01-15

## 2025-01-15 DIAGNOSIS — Z95.810 PRESENCE OF BIVENTRICULAR AUTOMATIC CARDIOVERTER/DEFIBRILLATOR (AICD): ICD-10-CM

## 2025-01-15 DIAGNOSIS — I42.0 DILATED CARDIOMYOPATHY (HCC): ICD-10-CM

## 2025-01-15 DIAGNOSIS — I50.20 ACC/AHA STAGE C SYSTOLIC HEART FAILURE (HCC): ICD-10-CM

## 2025-01-15 LAB — PSA SERPL-MCNC: 8.95 NG/ML (ref 0–4)

## 2025-01-15 PROCEDURE — 93284 PRGRMG EVAL IMPLANTABLE DFB: CPT | Performed by: INTERNAL MEDICINE

## 2025-01-15 PROCEDURE — 93284 PRGRMG EVAL IMPLANTABLE DFB: CPT | Mod: 26 | Performed by: INTERNAL MEDICINE

## 2025-01-15 PROCEDURE — 84153 ASSAY OF PSA TOTAL: CPT | Mod: GA

## 2025-01-15 PROCEDURE — 36415 COLL VENOUS BLD VENIPUNCTURE: CPT | Mod: GA

## 2025-01-15 NOTE — CARDIAC REMOTE MONITOR - SCAN
Device transmission reviewed. Device demonstrated appropriate function.       Electronically Signed by: Rupert Jay M.D.    1/24/2025  12:08 PM

## 2025-01-16 ENCOUNTER — NON-PROVIDER VISIT (OUTPATIENT)
Dept: CARDIOLOGY | Facility: MEDICAL CENTER | Age: 85
End: 2025-01-16
Payer: MEDICARE

## 2025-01-16 PROCEDURE — 93295 DEV INTERROG REMOTE 1/2/MLT: CPT | Performed by: INTERNAL MEDICINE

## 2025-01-20 ENCOUNTER — HOSPITAL ENCOUNTER (OUTPATIENT)
Facility: MEDICAL CENTER | Age: 85
End: 2025-01-20
Attending: UROLOGY
Payer: MEDICARE

## 2025-01-20 PROCEDURE — 87186 SC STD MICRODIL/AGAR DIL: CPT

## 2025-01-20 PROCEDURE — 87077 CULTURE AEROBIC IDENTIFY: CPT

## 2025-01-20 PROCEDURE — 87086 URINE CULTURE/COLONY COUNT: CPT

## 2025-02-19 ENCOUNTER — HOSPITAL ENCOUNTER (OUTPATIENT)
Dept: LAB | Facility: MEDICAL CENTER | Age: 85
End: 2025-02-19
Attending: UROLOGY
Payer: MEDICARE

## 2025-02-19 LAB — PSA SERPL DL<=0.01 NG/ML-MCNC: 5.42 NG/ML (ref 0–4)

## 2025-02-19 PROCEDURE — 84153 ASSAY OF PSA TOTAL: CPT

## 2025-02-19 PROCEDURE — 36415 COLL VENOUS BLD VENIPUNCTURE: CPT

## 2025-02-24 ENCOUNTER — OFFICE VISIT (OUTPATIENT)
Dept: CARDIOLOGY | Facility: MEDICAL CENTER | Age: 85
End: 2025-02-24
Attending: NURSE PRACTITIONER
Payer: MEDICARE

## 2025-02-24 VITALS
OXYGEN SATURATION: 97 % | BODY MASS INDEX: 21.82 KG/M2 | SYSTOLIC BLOOD PRESSURE: 98 MMHG | RESPIRATION RATE: 15 BRPM | DIASTOLIC BLOOD PRESSURE: 60 MMHG | WEIGHT: 170 LBS | HEART RATE: 84 BPM | HEIGHT: 74 IN

## 2025-02-24 DIAGNOSIS — I42.0 DILATED CARDIOMYOPATHY (HCC): ICD-10-CM

## 2025-02-24 DIAGNOSIS — I48.0 PAROXYSMAL ATRIAL FIBRILLATION (HCC): ICD-10-CM

## 2025-02-24 DIAGNOSIS — I50.20 ACC/AHA STAGE C SYSTOLIC HEART FAILURE (HCC): ICD-10-CM

## 2025-02-24 DIAGNOSIS — I50.9 HEART FAILURE, NYHA CLASS 2 (HCC): ICD-10-CM

## 2025-02-24 DIAGNOSIS — Z79.899 LONG TERM CURRENT USE OF AMIODARONE: ICD-10-CM

## 2025-02-24 DIAGNOSIS — Z79.899 HIGH RISK MEDICATION USE: ICD-10-CM

## 2025-02-24 DIAGNOSIS — Z86.73 HISTORY OF STROKE: ICD-10-CM

## 2025-02-24 DIAGNOSIS — I10 HTN (HYPERTENSION), MALIGNANT: ICD-10-CM

## 2025-02-24 DIAGNOSIS — E78.2 MIXED HYPERLIPIDEMIA: ICD-10-CM

## 2025-02-24 DIAGNOSIS — D68.69 SECONDARY HYPERCOAGULABLE STATE (HCC): ICD-10-CM

## 2025-02-24 DIAGNOSIS — G47.39 COMPLEX SLEEP APNEA SYNDROME: ICD-10-CM

## 2025-02-24 DIAGNOSIS — I47.20 VENTRICULAR TACHYCARDIA (HCC): ICD-10-CM

## 2025-02-24 DIAGNOSIS — Z95.810 PRESENCE OF BIVENTRICULAR AUTOMATIC CARDIOVERTER/DEFIBRILLATOR (AICD): ICD-10-CM

## 2025-02-24 PROCEDURE — 99213 OFFICE O/P EST LOW 20 MIN: CPT | Performed by: NURSE PRACTITIONER

## 2025-02-24 PROCEDURE — 3078F DIAST BP <80 MM HG: CPT | Performed by: NURSE PRACTITIONER

## 2025-02-24 PROCEDURE — 3074F SYST BP LT 130 MM HG: CPT | Performed by: NURSE PRACTITIONER

## 2025-02-24 PROCEDURE — 99214 OFFICE O/P EST MOD 30 MIN: CPT | Performed by: NURSE PRACTITIONER

## 2025-02-24 RX ORDER — VERICIGUAT 2.5 MG/1
1 TABLET, FILM COATED ORAL DAILY
Qty: 90 TABLET | Refills: 3 | Status: SHIPPED | OUTPATIENT
Start: 2025-02-24

## 2025-02-24 RX ORDER — VERICIGUAT 2.5 MG/1
1 TABLET, FILM COATED ORAL DAILY
COMMUNITY
End: 2025-02-24 | Stop reason: SDUPTHER

## 2025-02-24 ASSESSMENT — ENCOUNTER SYMPTOMS
DIZZINESS: 1
CLAUDICATION: 0
COUGH: 0
ORTHOPNEA: 0
PALPITATIONS: 0
FEVER: 0
SHORTNESS OF BREATH: 1
PND: 0
ABDOMINAL PAIN: 0
MYALGIAS: 0
ROS SKIN COMMENTS: BRUISING

## 2025-02-24 ASSESSMENT — FIBROSIS 4 INDEX: FIB4 SCORE: 3.8

## 2025-02-24 NOTE — PROGRESS NOTES
Chief Complaint   Patient presents with    Congestive Heart Failure     F/V DX: ACC/AHA stage C systolic heart failure (HCC)           Subjective:   Lencho Parker is a 84 y.o. male who presents today for follow-up on his cardiomyopathy.    Patient of Dr. Velázquez and Prakash Stanley with EP.  He was last seen in clinic on 3/29/2024 with NORMA Mccormick.  No changes were made during that visit, he was sent for follow-up blood testing.    Patient feels well, denies chest pain, shortness of breath, palpitations, dizziness/lightheadedness, orthopnea, PND or Edema.  He does mention having some bruising.    Patient reports he has run out of his Vericiguat for the past 10 days because he was not able to get refills.    He reports his home weights are stable around 170 pounds.    He denies any shocks from his defibrillator    He was diagnosed with cardiomyopathy in the 90s.    Patient was a mild social smoker for about 5 years    Additonally, patient has the following medical problems:    -Non-ischemic cardiomyopathy last EF 10-15%    -Paroxysmal A. fib, has a watchman device placed approximately 10/2015    -Hx sustained slow VT, had RFA (9/2012) has an ICD    -Nonobstructive CAD per angiogram 4/09 and 05/2016 (per medical records)    -History of a AAA with repair about 10 years ago    -Has 1 solitary left kidney.  Gave 1 to son in the 90s, followed by nephrology    -History of TIA/CVA, now on Eliquis 2.5 mg BID     -History Colon Cancer    Past Medical History:   Diagnosis Date    Breath shortness     on exertion    Cardiomyopathy (HCC) 05/2021    Echocardiogram with moderately dilated LV, mild concentric LVH, LVEF 20%. Global hypokinesis. Normal RA and RV. Severely dilated LA. Mild MR, mild TR. RVSP 34mmHg.    [place] measles     Heart attack (HCC)     hx 2007    History of abdominal aortic aneurysm (AAA) 2009    Status post stent    Hyperlipidemia     Hypertension     Mumps     Pacemaker     AICD    Paroxysmal  atrial fibrillation (HCC)     Status post Watchman procedure in [place].    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     Sleep apnea     Snoring     Stroke (HCC) 2012    TIA    Tonsillitis     Ventricular tachycardia (HCC)      Past Surgical History:   Procedure Laterality Date    AICD BATTERY CHANGE Left 09/10/2020     Upgrade to Medtronic Claria MRI Quad CRT-D [id number] implanted by Dr. Barrios.    AICD BATTERY CHANGE Left 01/03/2017    Generator replacement with Medtronic Evera MRI XT  [id number] implanted in [place].    OTHER CARDIAC SURGERY  2014    watchman device    AAA WITH STENT GRAFT  2009    OTHER ORTHOPEDIC SURGERY  2005    hip surgery    OTHER  2000    kidney removed    OTHER CARDIAC SURGERY  1991    AICD implanted     Family History   Problem Relation Age of Onset    Cancer Mother     Cancer Sister      Social History     Socioeconomic History    Marital status: Single     Spouse name: Not on file    Number of children: Not on file    Years of education: Not on file    Highest education level: Not on file   Occupational History    Not on file   Tobacco Use    Smoking status: Former     Current packs/day: 0.50     Types: Cigarettes    Smokeless tobacco: Former   Vaping Use    Vaping status: Never Used   Substance and Sexual Activity    Alcohol use: Yes     Alcohol/week: 8.4 oz     Types: 14 Glasses of wine per week     Comment: couple glasses of wine daily    Drug use: Not Currently     Comment: denies    Sexual activity: Not on file   Other Topics Concern    Not on file   Social History Narrative    Not on file     Social Drivers of Health     Financial Resource Strain: Medium Risk (11/28/2023)    [facility]    Overall Financial Resource Strain (CARDIA)     Difficulty of Paying Living Expenses: Somewhat hard   Food Insecurity: No Food Insecurity (11/28/2023)    Received from [facility]    Hunger Vital Sign     Worried About Running Out of Food in the  Last Year: Never true     Ran Out of Food in the Last Year: Never true   Transportation Needs: No Transportation Needs (11/28/2023)    Received from [facility]    PRAPARE - Transportation     Lack of Transportation (Medical): No     Lack of Transportation (Non-Medical): No   Physical Activity: Inactive (11/28/2023)    Received from [facility]    Exercise Vital Sign     Days of Exercise per Week: 0 days     Minutes of Exercise per Session: 0 min   Stress: Stress Concern Present (11/28/2023)    Received from [facility]    [employer] - Occupational Stress Questionnaire     Feeling of Stress : To some extent   Social Connections: Unknown (11/28/2023)    Received from [facility]    Social Connection and Isolation Panel [NHANES]     Frequency of Communication with Friends and Family: Once a week     Frequency of Social Gatherings with Friends and Family: Once a week     Attends Protestant Services: Never     Active Member of Clubs or Organizations: No     Attends Club or Organization Meetings: Never     Marital Status: Patient declined   Intimate Partner Violence: Not At Risk (11/28/2023)    Received from [facility]    Humiliation, Afraid, Rape, and Kick questionnaire     Fear of Current or Ex-Partner: No     Emotionally Abused: No     Physically Abused: No     Sexually Abused: No   Housing Stability: Low Risk  (11/28/2023)    Received from [facility]    Housing Stability Vital Sign     Unable to Pay for Housing in the Last Year: No     Number of Places Lived in the Last Year: 1     Unstable Housing in the Last Year: No     Allergies   Allergen Reactions    Entresto [Sacubitril-Valsartan]      Swollen tongue. Angioedema.    Crestor [Rosuvastatin]      Outpatient Encounter Medications as of 2/24/2025   Medication Sig Dispense Refill    VERQUVO 2.5 MG  Tab Take 1 Tablet by mouth every day. 90 Tablet 3    Empagliflozin (JARDIANCE) 25 MG Tab Take 12.5 mg by mouth every day. 45 Tablet 3    albuterol 108 (90 Base) MCG/ACT Aero Soln inhalation aerosol Inhale 2 Puffs every four hours as needed for Shortness of Breath.      carvedilol (COREG) 12.5 MG Tab Take 6.25-12.5 mg by mouth 2 times a day with meals. 12.5 mg in the AM  6.25 mg in the PM      apixaban (ELIQUIS) 5mg Tab Take 2.5 mg by mouth 2 times a day.      atorvastatin (LIPITOR) 40 MG Tab Take 40 mg by mouth every day.      allopurinol (ZYLOPRIM) 100 MG Tab Take 100 mg by mouth every day.      spironolactone (ALDACTONE) 25 MG Tab Take 0.5 Tablets by mouth every day. 45 Tablet 3    losartan (COZAAR) 100 MG Tab Take 1 Tablet by mouth every day. 90 Each 4    amiodarone (CORDARONE) 200 MG Tab Take 1 Tablet by mouth every day. (Patient taking differently: Take 300 mg by mouth every day.) 90 Tablet 4    vitamin D3 (CHOLECALCIFEROL) 1000 Unit (25 mcg) Tab Take 1,000 Units by mouth every day.      furosemide (LASIX) 20 MG Tab Take 20 mg by mouth every day.      Multiple Vitamins-Minerals (MULTIVITAMIN ADULT EXTRA C PO) Take 1 Tablet by mouth every day.      tamsulosin (FLOMAX) 0.4 MG capsule Take 0.8 mg by mouth every day.      cinacalcet (SENSIPAR) 30 MG Tab Take 30 mg by mouth every day.      Omega-3 Fatty Acids (FISH OIL) 1000 MG Cap capsule Take 1,000 mg by mouth every day.      [DISCONTINUED] VERQUVO 2.5 MG Tab Take 1 Tablet by mouth every day.       No facility-administered encounter medications on file as of 2/24/2025.     Review of Systems   Constitutional:  Negative for fever and malaise/fatigue.   Respiratory:  Positive for shortness of breath. Negative for cough.    Cardiovascular:  Negative for chest pain, palpitations, orthopnea, claudication, leg swelling and PND.   Gastrointestinal:  Negative for abdominal pain.   Musculoskeletal:  Negative for myalgias.   Skin:         Bruising   Neurological:  Positive  "for dizziness.   All other systems reviewed and are negative.       Objective:   BP 98/60 (BP Location: Left arm, Patient Position: Sitting, BP Cuff Size: Adult)   Pulse 84   Resp 15   Ht 1.88 m (6' 2\")   Wt 77.1 kg (170 lb)   SpO2 97%   BMI 21.83 kg/m²     Physical Exam  Vitals reviewed.   Constitutional:       Appearance: He is well-developed.   HENT:      Head: Normocephalic and atraumatic.   Eyes:      Pupils: Pupils are equal, round, and reactive to light.   Neck:      Vascular: No JVD.   Cardiovascular:      Rate and Rhythm: Normal rate and regular rhythm.      Heart sounds: Normal heart sounds.   Pulmonary:      Effort: Pulmonary effort is normal. No respiratory distress.      Breath sounds: Normal breath sounds. No wheezing or rales.   Abdominal:      General: Bowel sounds are normal.      Palpations: Abdomen is soft.   Musculoskeletal:      Cervical back: Normal range of motion and neck supple.   Skin:     General: Skin is warm and dry.      Comments: Right groin site, soft, no hematoma, erosion or drainage.  Patient does have tenderness with palpation.   Neurological:      Mental Status: He is alert and oriented to person, place, and time.   Psychiatric:         Behavior: Behavior normal.       Lab Results   Component Value Date/Time    CHOLSTRLTOT 188 12/08/2022 07:39 AM    LDL 92 12/08/2022 07:39 AM    HDL 64 12/08/2022 07:39 AM    TRIGLYCERIDE 161 (H) 12/08/2022 07:39 AM       Lab Results   Component Value Date/Time    SODIUM 137 01/17/2024 08:59 PM    POTASSIUM 5.0 01/17/2024 08:59 PM    CHLORIDE 103 01/17/2024 08:59 PM    CO2 19 (L) 01/17/2024 08:59 PM    GLUCOSE 103 (H) 01/17/2024 08:59 PM    BUN 37 (H) 01/17/2024 08:59 PM    CREATININE 2.04 (H) 01/17/2024 08:59 PM    BUNCREATRAT 15.7 11/29/2023 04:05 AM     Lab Results   Component Value Date/Time    ALKPHOSPHAT 75 01/17/2024 08:59 PM    ASTSGOT 49 (H) 01/17/2024 08:59 PM    ALTSGPT 40 01/17/2024 08:59 PM    TBILIRUBIN 0.5 01/17/2024 08:59 PM "      Transthoracic Echo Report 8/29/2019  No prior study is available for comparison.   Severely reduced left ventricular systolic function. Left ventricular ejection fraction is visually estimated to be 15%.  Indeterminate diastolic function.  Estimated right ventricular systolic pressure  is 26 mmHg + JVP.  Ascending aorta diameter is 4 cm.    CardioMEMS implantation 2/4/2020  Hemodynamics:  1) Pulmonary arterial pressure: Systolic of 35 mm Hg, diastolic of 17 mm Hg, mean of 24 mm Hg.  2) Pulmonary arterial wedge pressure with mean of 15 mm Hg.  3) Cardiac output of 2.9 and Cardiac index of 1.3 through thermodilution method.  4) Right ventricular pressure: Systolic of 30 mm Hg, end diastolic pressure of 5 mm Hg.  5) Right atrial pressure: A wave of 3 mm Hg, V wave of 5 mm Hg, mean of 3 mm Hg.     Conclusions:  1) Successful implantation of Cardiomems device for remote monitor of intracardiac pressures.  2) Patient will be monitored as part of our protocol in our heart failure program to further reduce repeated heart failure hospitalization and also to improve overall quality of life.       Transthoracic Echo Report 5/13/2021  Prior echo 08/29/19.  Severely reduced left ventricular systolic function. Left ventricular ejection fraction is visually estimated to be 20%.  Severely dilated left atrium.  Mild mitral regurgitation.  Compared to the images of the prior study done -  there has been no significant change.     Transthoracic Echo Report 9/1/2022  Compared to the prior study on 05/13/2021, there has been slight reduction in left ventricular systolic function.   Severely reduced left ventricular systolic function. The left   ventricular ejection fraction is visually estimated to be 10 %.  Global hypokinesis. Grade III diastolic dysfunction (restrictive pattern).  Mild mitral regurgitation.  Right ventricular systolic pressure is estimated to be 45 mmHg.  Severely dilated left atrium.  Pacer/ICD wire seen in the  right ventricle    Transthoracic Echo Report 8/15/2023  Compared to the prior study on 9/1/2022, no changes.  Mild concentric left ventricular hypertrophy. Severely reduced left ventricular systolic function. The left ventricular ejection fraction is visually estimated to be 10% to 15%.   Mild mitral regurgitation.  Estimated right ventricular systolic pressure is 31 mmHg.  Normal aortic root for body surface area. The ascending aorta diameter is 3.6 cm.    Assessment:     1. ACC/AHA stage C systolic heart failure (HCC)  VERQUVO 2.5 MG Tab    CBC Without Differential    Comp Metabolic Panel (CMP)    proBrain Natriuretic Peptide, NT    Magnesium    Lipid Profile (Lipid Panel)    TSH    DX-CHEST-2 VIEWS    EC-ECHOCARDIOGRAM COMPLETE W/O CONT      2. Heart failure, NYHA class 2 (HCC)        3. 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 Paroxysmal atrial fibrillation (HCC)  CBC Without Differential    Comp Metabolic Panel (CMP)    proBrain Natriuretic Peptide, NT    Magnesium    Lipid Profile (Lipid Panel)    TSH    DX-CHEST-2 VIEWS    EC-ECHOCARDIOGRAM COMPLETE W/O CONT      5. Ventricular tachycardia (HCC)  CBC Without Differential    Comp Metabolic Panel (CMP)    proBrain Natriuretic Peptide, NT    Magnesium    Lipid Profile (Lipid Panel)    TSH    DX-CHEST-2 VIEWS    EC-ECHOCARDIOGRAM COMPLETE W/O CONT      6. Mixed hyperlipidemia  CBC Without Differential    Comp Metabolic Panel (CMP)    proBrain Natriuretic Peptide, NT    Magnesium    Lipid Profile (Lipid Panel)    TSH    DX-CHEST-2 VIEWS    EC-ECHOCARDIOGRAM COMPLETE W/O CONT      7. HTN (hypertension), malignant  CBC Without Differential    Comp Metabolic Panel (CMP)    proBrain Natriuretic Peptide, NT    Magnesium    Lipid Profile (Lipid Panel)    TSH    DX-CHEST-2 VIEWS    EC-ECHOCARDIOGRAM COMPLETE W/O CONT      8. High risk medication use  CBC Without Differential    Comp Metabolic Panel (CMP)    proBrain Natriuretic  Peptide, NT    Magnesium    Lipid Profile (Lipid Panel)    TSH    DX-CHEST-2 VIEWS    EC-ECHOCARDIOGRAM COMPLETE W/O CONT      9. Long term current use of amiodarone  Lipid Profile (Lipid Panel)    TSH    DX-CHEST-2 VIEWS      10. Secondary hypercoagulable state (HCC)        11. Dilated cardiomyopathy (HCC)        12. 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 No JVD, lungs are clear to auscultation, no pitting edema in bilateral lower extremities, no ascites.  -Discussed results of his right heart cath, cardiac index, cardiac output  -Heart failure due to nonischemic cardiomyopathy  -Has CardioMEMS: pt is no longer sending in readings and has been inactive. Pt is not interested at this time to resume.  -ACE/ARB/ARNI: Continue losartan 100 mg daily (reintroduced patient is switching back to Entresto, patient is not interested at this time, and states does not like the medications and will not try it again)  -Aldosterone antagonist: Continue spironolactone 12.5 mg daily (may need to discontinue if kidney function not better with next lab test)  -Beta-blocker: Continue carvedilol 12.5 mg in a.m. and 6.25 mg in p.m.  -SGLT2 inhibitor: Continue Jardiance 12.5 mg daily ([facility]  -Other: Continue Vericiguat 2.5 mg daily   -Diuretic: Continue furosemide 20 mg on Mondays, Wednesdays, Fridays  -Patient has a solitary kidney  -Labs: Patient to obtain a CBC, CMP, lipid panel, NT proBNP, magnesium level, TSH level. Will continue to closely monitor for side effects of patient's high risk medication(s) including renal function, liver function NTproBNP/cardiac markers, and electrolytes as needed  -Repeat echo at this time  -Has ICD, denies any current shocks, next device check 4/18/2025  -Reinforced s/sx of  worsening heart failure with patient and weight monitoring. Pt verbalizes understanding. Pt to call office or RTC if present.    -Discussed prognosis of his heart failure, currently euvolemic, may need to consider advanced heart failure therapies in the future such as LVAD.      Paroxysmal A. Fib, has watchman:   -No episodes on last device check  -Continue aspirin 81 mg daily  -Continue amiodarone 300 mg daily   -he is back on OAC d/t stroke   -Patient to obtain chest x-ray, TSH, CMP within the next week to follow for long-term amiodarone use    Ventricular tachycardia, s/p ablation in 2012:  -Has ICD, denies shocks, next device check 4/18/2025  -Continue amiodarone 300 mg daily  -Patient will continue to be followed by EP    Hyperlipidemia/nonobstructive CAD: Last LDL 92 on 12/8/2022  -off of aspirin d/t Being on OAC  -Continue atorvastatin 40 mg daily  -Lipid panel to be completed with next labs    Hypertension: Stable   -Continue recommendations per above    History of stroke:  -Continue Eliquis 2.5 mg BID     Complex sleep apnea:  -Followed by sleep medicine  -Using CPAP    FU in clinic in 1 year with Dr. Velázquez. Sooner if needed.    Patient verbalizes understanding and agrees with the plan of care.     PLEASE NOTE: This Note was created using voice recognition Software. I have made every reasonable attempt to correct obvious errors, but I expect that there are errors of grammar and possibly content that I did not discover before finalizing the note

## 2025-02-28 ENCOUNTER — HOSPITAL ENCOUNTER (OUTPATIENT)
Dept: RADIOLOGY | Facility: MEDICAL CENTER | Age: 85
End: 2025-02-28
Attending: NURSE PRACTITIONER
Payer: MEDICARE

## 2025-02-28 ENCOUNTER — HOSPITAL ENCOUNTER (OUTPATIENT)
Facility: MEDICAL CENTER | Age: 85
End: 2025-02-28
Attending: NURSE PRACTITIONER
Payer: MEDICARE

## 2025-02-28 DIAGNOSIS — Z79.899 HIGH RISK MEDICATION USE: ICD-10-CM

## 2025-02-28 DIAGNOSIS — I50.20 ACC/AHA STAGE C SYSTOLIC HEART FAILURE (HCC): ICD-10-CM

## 2025-02-28 DIAGNOSIS — I48.0 PAROXYSMAL ATRIAL FIBRILLATION (HCC): ICD-10-CM

## 2025-02-28 DIAGNOSIS — Z79.899 LONG TERM CURRENT USE OF AMIODARONE: ICD-10-CM

## 2025-02-28 DIAGNOSIS — I10 HTN (HYPERTENSION), MALIGNANT: ICD-10-CM

## 2025-02-28 DIAGNOSIS — I47.20 VENTRICULAR TACHYCARDIA (HCC): ICD-10-CM

## 2025-02-28 DIAGNOSIS — E78.2 MIXED HYPERLIPIDEMIA: ICD-10-CM

## 2025-02-28 DIAGNOSIS — D68.69 SECONDARY HYPERCOAGULABLE STATE (HCC): ICD-10-CM

## 2025-02-28 LAB
ALBUMIN SERPL BCP-MCNC: 3.9 G/DL (ref 3.2–4.9)
ALBUMIN/GLOB SERPL: 1.5 G/DL
ALP SERPL-CCNC: 80 U/L (ref 30–99)
ALT SERPL-CCNC: 40 U/L (ref 2–50)
ANION GAP SERPL CALC-SCNC: 11 MMOL/L (ref 7–16)
AST SERPL-CCNC: 42 U/L (ref 12–45)
BILIRUB SERPL-MCNC: 1 MG/DL (ref 0.1–1.5)
BUN SERPL-MCNC: 44 MG/DL (ref 8–22)
CALCIUM ALBUM COR SERPL-MCNC: 9.5 MG/DL (ref 8.5–10.5)
CALCIUM SERPL-MCNC: 9.4 MG/DL (ref 8.4–10.2)
CHLORIDE SERPL-SCNC: 105 MMOL/L (ref 96–112)
CHOLEST SERPL-MCNC: 145 MG/DL (ref 100–199)
CO2 SERPL-SCNC: 21 MMOL/L (ref 20–33)
CREAT SERPL-MCNC: 1.91 MG/DL (ref 0.5–1.4)
ERYTHROCYTE [DISTWIDTH] IN BLOOD BY AUTOMATED COUNT: 58 FL (ref 35.9–50)
FASTING STATUS PATIENT QL REPORTED: NORMAL
GFR SERPLBLD CREATININE-BSD FMLA CKD-EPI: 34 ML/MIN/1.73 M 2
GLOBULIN SER CALC-MCNC: 2.6 G/DL (ref 1.9–3.5)
GLUCOSE SERPL-MCNC: 123 MG/DL (ref 65–99)
HCT VFR BLD AUTO: 47.5 % (ref 42–52)
HDLC SERPL-MCNC: 58 MG/DL
HGB BLD-MCNC: 15.3 G/DL (ref 14–18)
LDLC SERPL CALC-MCNC: 63 MG/DL
MAGNESIUM SERPL-MCNC: 2.4 MG/DL (ref 1.5–2.5)
MCH RBC QN AUTO: 34.7 PG (ref 27–33)
MCHC RBC AUTO-ENTMCNC: 32.2 G/DL (ref 32.3–36.5)
MCV RBC AUTO: 107.7 FL (ref 81.4–97.8)
NT-PROBNP SERPL IA-MCNC: 1992 PG/ML (ref 0–125)
PLATELET # BLD AUTO: 133 K/UL (ref 164–446)
PMV BLD AUTO: 11.9 FL (ref 9–12.9)
POTASSIUM SERPL-SCNC: 5 MMOL/L (ref 3.6–5.5)
PROT SERPL-MCNC: 6.5 G/DL (ref 6–8.2)
RBC # BLD AUTO: 4.41 M/UL (ref 4.7–6.1)
SODIUM SERPL-SCNC: 137 MMOL/L (ref 135–145)
TRIGL SERPL-MCNC: 122 MG/DL (ref 0–149)
TSH SERPL DL<=0.005 MIU/L-ACNC: 2.49 UIU/ML (ref 0.38–5.33)
WBC # BLD AUTO: 4.3 K/UL (ref 4.8–10.8)

## 2025-02-28 PROCEDURE — 36415 COLL VENOUS BLD VENIPUNCTURE: CPT

## 2025-02-28 PROCEDURE — 83880 ASSAY OF NATRIURETIC PEPTIDE: CPT | Mod: GA

## 2025-02-28 PROCEDURE — 71046 X-RAY EXAM CHEST 2 VIEWS: CPT

## 2025-02-28 PROCEDURE — 80061 LIPID PANEL: CPT

## 2025-02-28 PROCEDURE — 84443 ASSAY THYROID STIM HORMONE: CPT

## 2025-02-28 PROCEDURE — 80053 COMPREHEN METABOLIC PANEL: CPT

## 2025-02-28 PROCEDURE — 85027 COMPLETE CBC AUTOMATED: CPT

## 2025-02-28 PROCEDURE — 83735 ASSAY OF MAGNESIUM: CPT

## 2025-03-03 ENCOUNTER — RESULTS FOLLOW-UP (OUTPATIENT)
Dept: CARDIOLOGY | Facility: MEDICAL CENTER | Age: 85
End: 2025-03-03
Payer: MEDICARE

## 2025-03-03 DIAGNOSIS — D69.6 THROMBOCYTOPENIA (HCC): ICD-10-CM

## 2025-03-03 DIAGNOSIS — I50.9 HEART FAILURE, NYHA CLASS 2 (HCC): ICD-10-CM

## 2025-03-06 NOTE — RESULT ENCOUNTER NOTE
Please let patient know that his blood tests look okay, NT proBNP is improving.  His blood count is slightly abnormal, he has mild thrombocytopenia.  Please have him monitor for bleeding problems.  All other tests are good.    If possible, have him follow-up with PCP soon and have him repeat a CBC in about a month to follow.

## 2025-03-06 NOTE — TELEPHONE ENCOUNTER
----- Message from Nurse Eulalia URIARTE R.N. sent at 3/6/2025 12:40 PM PST -----    ----- Message -----  From: EUGENE Munoz  Sent: 3/5/2025   4:33 PM PST  To: Josie Barrera R.N.    Please let patient know that his blood tests look okay, NT proBNP is improving.  His blood count is slightly abnormal, he has mild thrombocytopenia.  Please have him monitor for bleeding problems.  All other tests are good.    If possible, have him follow-up with PCP soon and have him repeat a CBC in about a month to follow.

## 2025-03-07 NOTE — TELEPHONE ENCOUNTER
Phone Number Called: [phone]    Call outcome: Did not leave a detailed message. Requested patient to call back.    Message: Called to discuss.

## 2025-03-07 NOTE — TELEPHONE ENCOUNTER
----- Message from Nurse Eulalia URIARTE R.N. sent at 3/6/2025 12:40 PM PST -----    ----- Message -----  From: EUGENE Munoz  Sent: 3/5/2025   4:24 PM PST  To: Josie Barrera R.N.    Chest x-ray stable

## 2025-03-10 NOTE — TELEPHONE ENCOUNTER
Phone Number Called: [phone]    Call outcome: Spoke to patient regarding message below.    Message: Called to update pt on labs showing low platelets and advise bleeding precautions and follow up with PCP. CBC in one month, order placed. Updated that CXR looked normal as well.    Answered all questions and concerns, appreciative of call.

## 2025-04-18 ENCOUNTER — NON-PROVIDER VISIT (OUTPATIENT)
Dept: CARDIOLOGY | Facility: MEDICAL CENTER | Age: 85
End: 2025-04-18
Attending: NURSE PRACTITIONER
Payer: MEDICARE

## 2025-04-18 ENCOUNTER — NON-PROVIDER VISIT (OUTPATIENT)
Dept: CARDIOLOGY | Facility: MEDICAL CENTER | Age: 85
End: 2025-04-18

## 2025-04-18 DIAGNOSIS — Z95.810 PRESENCE OF BIVENTRICULAR AUTOMATIC CARDIOVERTER/DEFIBRILLATOR (AICD): ICD-10-CM

## 2025-04-18 DIAGNOSIS — I42.0 DILATED CARDIOMYOPATHY (HCC): ICD-10-CM

## 2025-04-18 PROCEDURE — 93284 PRGRMG EVAL IMPLANTABLE DFB: CPT | Performed by: STUDENT IN AN ORGANIZED HEALTH CARE EDUCATION/TRAINING PROGRAM

## 2025-04-18 NOTE — CARDIAC REMOTE MONITOR - SCAN
Device transmission reviewed. Device demonstrated appropriate function.       Electronically Signed by: Thuy Aggarwal MD, PhD    4/21/2025  3:25 PM

## 2025-04-19 ENCOUNTER — NON-PROVIDER VISIT (OUTPATIENT)
Dept: CARDIOLOGY | Facility: MEDICAL CENTER | Age: 85
End: 2025-04-19
Payer: MEDICARE

## 2025-04-21 PROCEDURE — 93295 DEV INTERROG REMOTE 1/2/MLT: CPT | Performed by: STUDENT IN AN ORGANIZED HEALTH CARE EDUCATION/TRAINING PROGRAM

## 2025-06-05 ENCOUNTER — APPOINTMENT (OUTPATIENT)
Dept: SLEEP MEDICINE | Facility: MEDICAL CENTER | Age: 85
End: 2025-06-05
Attending: NURSE PRACTITIONER
Payer: MEDICARE

## 2025-06-10 ENCOUNTER — OFFICE VISIT (OUTPATIENT)
Dept: SLEEP MEDICINE | Facility: MEDICAL CENTER | Age: 85
End: 2025-06-10
Attending: NURSE PRACTITIONER
Payer: MEDICARE

## 2025-06-10 VITALS
WEIGHT: 174 LBS | DIASTOLIC BLOOD PRESSURE: 64 MMHG | BODY MASS INDEX: 22.33 KG/M2 | HEART RATE: 87 BPM | HEIGHT: 74 IN | RESPIRATION RATE: 16 BRPM | OXYGEN SATURATION: 99 % | SYSTOLIC BLOOD PRESSURE: 86 MMHG

## 2025-06-10 DIAGNOSIS — G47.33 OSA (OBSTRUCTIVE SLEEP APNEA): Primary | ICD-10-CM

## 2025-06-10 PROCEDURE — 3078F DIAST BP <80 MM HG: CPT | Performed by: NURSE PRACTITIONER

## 2025-06-10 PROCEDURE — 99214 OFFICE O/P EST MOD 30 MIN: CPT | Performed by: NURSE PRACTITIONER

## 2025-06-10 PROCEDURE — 3074F SYST BP LT 130 MM HG: CPT | Performed by: NURSE PRACTITIONER

## 2025-06-10 ASSESSMENT — PATIENT HEALTH QUESTIONNAIRE - PHQ9: CLINICAL INTERPRETATION OF PHQ2 SCORE: 0

## 2025-06-10 ASSESSMENT — FIBROSIS 4 INDEX: FIB4 SCORE: 4.09

## 2025-06-14 ENCOUNTER — HOSPITAL ENCOUNTER (EMERGENCY)
Facility: MEDICAL CENTER | Age: 85
End: 2025-06-15
Attending: EMERGENCY MEDICINE
Payer: MEDICARE

## 2025-06-14 DIAGNOSIS — N18.9 CHRONIC KIDNEY DISEASE, UNSPECIFIED CKD STAGE: ICD-10-CM

## 2025-06-14 DIAGNOSIS — I10 HYPERTENSION, UNSPECIFIED TYPE: ICD-10-CM

## 2025-06-14 DIAGNOSIS — R25.1 TREMOR: Primary | ICD-10-CM

## 2025-06-14 LAB — EKG IMPRESSION: NORMAL

## 2025-06-14 PROCEDURE — 93005 ELECTROCARDIOGRAM TRACING: CPT | Mod: TC | Performed by: EMERGENCY MEDICINE

## 2025-06-14 PROCEDURE — 99285 EMERGENCY DEPT VISIT HI MDM: CPT

## 2025-06-14 ASSESSMENT — FIBROSIS 4 INDEX: FIB4 SCORE: 4.09

## 2025-06-15 ENCOUNTER — APPOINTMENT (OUTPATIENT)
Dept: RADIOLOGY | Facility: MEDICAL CENTER | Age: 85
End: 2025-06-15
Attending: EMERGENCY MEDICINE
Payer: MEDICARE

## 2025-06-15 VITALS
SYSTOLIC BLOOD PRESSURE: 103 MMHG | TEMPERATURE: 99.8 F | HEART RATE: 89 BPM | RESPIRATION RATE: 13 BRPM | DIASTOLIC BLOOD PRESSURE: 65 MMHG | WEIGHT: 180 LBS | BODY MASS INDEX: 23.1 KG/M2 | HEIGHT: 74 IN | OXYGEN SATURATION: 90 %

## 2025-06-15 LAB
ALBUMIN SERPL BCP-MCNC: 3.7 G/DL (ref 3.2–4.9)
ALBUMIN/GLOB SERPL: 1.3 G/DL
ALP SERPL-CCNC: 93 U/L (ref 30–99)
ALT SERPL-CCNC: 25 U/L (ref 2–50)
ANION GAP SERPL CALC-SCNC: 14 MMOL/L (ref 7–16)
APTT PPP: 27.1 SEC (ref 24.7–36)
AST SERPL-CCNC: 34 U/L (ref 12–45)
BASOPHILS # BLD AUTO: 0.3 % (ref 0–1.8)
BASOPHILS # BLD: 0.01 K/UL (ref 0–0.12)
BILIRUB SERPL-MCNC: 1.2 MG/DL (ref 0.1–1.5)
BUN SERPL-MCNC: 28 MG/DL (ref 8–22)
CALCIUM ALBUM COR SERPL-MCNC: 9.4 MG/DL (ref 8.5–10.5)
CALCIUM SERPL-MCNC: 9.2 MG/DL (ref 8.5–10.5)
CHLORIDE SERPL-SCNC: 106 MMOL/L (ref 96–112)
CO2 SERPL-SCNC: 18 MMOL/L (ref 20–33)
CREAT SERPL-MCNC: 1.96 MG/DL (ref 0.5–1.4)
EOSINOPHIL # BLD AUTO: 0.03 K/UL (ref 0–0.51)
EOSINOPHIL NFR BLD: 0.9 % (ref 0–6.9)
ERYTHROCYTE [DISTWIDTH] IN BLOOD BY AUTOMATED COUNT: 53.8 FL (ref 35.9–50)
GFR SERPLBLD CREATININE-BSD FMLA CKD-EPI: 33 ML/MIN/1.73 M 2
GLOBULIN SER CALC-MCNC: 2.9 G/DL (ref 1.9–3.5)
GLUCOSE SERPL-MCNC: 120 MG/DL (ref 65–99)
HCT VFR BLD AUTO: 47.3 % (ref 42–52)
HGB BLD-MCNC: 15.7 G/DL (ref 14–18)
IMM GRANULOCYTES # BLD AUTO: 0.01 K/UL (ref 0–0.11)
IMM GRANULOCYTES NFR BLD AUTO: 0.3 % (ref 0–0.9)
INR PPP: 1.28 (ref 0.87–1.13)
LIPASE SERPL-CCNC: 32 U/L (ref 11–82)
LYMPHOCYTES # BLD AUTO: 0.18 K/UL (ref 1–4.8)
LYMPHOCYTES NFR BLD: 5.4 % (ref 22–41)
MAGNESIUM SERPL-MCNC: 2 MG/DL (ref 1.5–2.5)
MCH RBC QN AUTO: 33.8 PG (ref 27–33)
MCHC RBC AUTO-ENTMCNC: 33.2 G/DL (ref 32.3–36.5)
MCV RBC AUTO: 101.7 FL (ref 81.4–97.8)
MONOCYTES # BLD AUTO: 0.01 K/UL (ref 0–0.85)
MONOCYTES NFR BLD AUTO: 0.3 % (ref 0–13.4)
NEUTROPHILS # BLD AUTO: 3.1 K/UL (ref 1.82–7.42)
NEUTROPHILS NFR BLD: 92.8 % (ref 44–72)
NRBC # BLD AUTO: 0 K/UL
NRBC BLD-RTO: 0 /100 WBC (ref 0–0.2)
PLATELET # BLD AUTO: 125 K/UL (ref 164–446)
PMV BLD AUTO: 11.4 FL (ref 9–12.9)
POTASSIUM SERPL-SCNC: 4.8 MMOL/L (ref 3.6–5.5)
PROT SERPL-MCNC: 6.6 G/DL (ref 6–8.2)
PROTHROMBIN TIME: 16 SEC (ref 12–14.6)
RBC # BLD AUTO: 4.65 M/UL (ref 4.7–6.1)
SODIUM SERPL-SCNC: 138 MMOL/L (ref 135–145)
WBC # BLD AUTO: 3.3 K/UL (ref 4.8–10.8)

## 2025-06-15 PROCEDURE — 83690 ASSAY OF LIPASE: CPT

## 2025-06-15 PROCEDURE — 700111 HCHG RX REV CODE 636 W/ 250 OVERRIDE (IP): Performed by: EMERGENCY MEDICINE

## 2025-06-15 PROCEDURE — 96374 THER/PROPH/DIAG INJ IV PUSH: CPT

## 2025-06-15 PROCEDURE — 85730 THROMBOPLASTIN TIME PARTIAL: CPT

## 2025-06-15 PROCEDURE — 80053 COMPREHEN METABOLIC PANEL: CPT

## 2025-06-15 PROCEDURE — 85610 PROTHROMBIN TIME: CPT

## 2025-06-15 PROCEDURE — 70450 CT HEAD/BRAIN W/O DYE: CPT

## 2025-06-15 PROCEDURE — 700105 HCHG RX REV CODE 258: Performed by: EMERGENCY MEDICINE

## 2025-06-15 PROCEDURE — 85025 COMPLETE CBC W/AUTO DIFF WBC: CPT

## 2025-06-15 PROCEDURE — 83735 ASSAY OF MAGNESIUM: CPT

## 2025-06-15 PROCEDURE — 36415 COLL VENOUS BLD VENIPUNCTURE: CPT

## 2025-06-15 RX ORDER — SODIUM CHLORIDE 9 MG/ML
500 INJECTION, SOLUTION INTRAVENOUS ONCE
Status: COMPLETED | OUTPATIENT
Start: 2025-06-15 | End: 2025-06-15

## 2025-06-15 RX ORDER — DIAZEPAM 10 MG/2ML
5 INJECTION, SOLUTION INTRAMUSCULAR; INTRAVENOUS ONCE
Status: COMPLETED | OUTPATIENT
Start: 2025-06-15 | End: 2025-06-15

## 2025-06-15 RX ADMIN — DIAZEPAM 5 MG: 10 INJECTION, SOLUTION INTRAMUSCULAR; INTRAVENOUS at 00:49

## 2025-06-15 RX ADMIN — SODIUM CHLORIDE 500 ML: 9 INJECTION, SOLUTION INTRAVENOUS at 00:27

## 2025-06-15 NOTE — ED NOTES
Pt discharged, all appropriate hospital equipment removed (IV, monitor, pulse ox, etc.). Pt left unit via wheelchair with self to home for self-care. Personal belongings with pt when leaving unit. Pt given discharge instructions prior to leaving ER, including where to  prescriptions and when to follow-up if applicable; verbalizes understanding. Pt informed to return to ED if symptoms worsen/return or altered status develop. Copy of discharge instructions signed and turned into DC basket and copy sent with pt.

## 2025-06-15 NOTE — ED PROVIDER NOTES
"ER Provider Note    Scribed for  Wes Vidal D.O. by Ada Snider. 6/14/2025   11:58 PM    Primary Care Provider: Pcp Pt States None    CHIEF COMPLAINT  Chief Complaint   Patient presents with    Shaking     Pt presents with persistent shaking staring about 20min PTA. Pt denies additional complaints. Pt alert and oriented.       EXTERNAL RECORDS REVIEWED  Outpatient Notes Patient seen at Cardiology 2/24/2025 for follow up. Dr. Velázquez and Prakash Stanley with EP, are his providers. History of paroxysmal A. Fib, ICD, and takes daily Eliquis.    2021 Occupational Therapy note reports patient stated he had a TIA in December of 2020 and developed \"shaking in his hands\" Physical exam describes right and left upper extremity tremors.  January 2024.  Similar presentation for tremors.    HPI/ROS  LIMITATION TO HISTORY   Select: : None  OUTSIDE HISTORIAN(S):  None    Lencho Parker is a 84 y.o. male who presents to the ED via EMS for evaluation of shaking onset prior to arrival. The patient reports a onset of shaking, that is persistent and has not stopped for about 30 minuets. The patient denies any other complaints at this time. He adds having a history of the same a few months ago, but is resolved after 15 minuets. He describes the shaking is only to his upper body, he denies lower extremity shaking.  He states that he does drink beer daily, but denies alcoholism. He denies possibility of withdrawal. The patient has a history of a pacemaker defibrillator and takes Eliquis. There are no known alleviating or exacerbating factors.      PAST MEDICAL HISTORY  Past Medical History[1]    SURGICAL HISTORY  Past Surgical History[2]    FAMILY HISTORY  Family History   Problem Relation Age of Onset    Cancer Mother     Cancer Sister        SOCIAL HISTORY   reports that he has quit smoking. His smoking use included cigarettes. He has quit using smokeless tobacco. He reports current alcohol use of about 8.4 oz of alcohol per " "week. He reports that he does not currently use drugs.    CURRENT MEDICATIONS  Previous Medications    ALBUTEROL 108 (90 BASE) MCG/ACT AERO SOLN INHALATION AEROSOL    Inhale 2 Puffs every four hours as needed for Shortness of Breath.    ALLOPURINOL (ZYLOPRIM) 100 MG TAB    Take 100 mg by mouth every day.    AMIODARONE (CORDARONE) 200 MG TAB    Take 1 Tablet by mouth every day.    APIXABAN (ELIQUIS) 5MG TAB    Take 2.5 mg by mouth 2 times a day.    ATORVASTATIN (LIPITOR) 40 MG TAB    Take 40 mg by mouth every day.    CARVEDILOL (COREG) 12.5 MG TAB    Take 6.25-12.5 mg by mouth 2 times a day with meals. 12.5 mg in the AM  6.25 mg in the PM    CINACALCET (SENSIPAR) 30 MG TAB    Take 30 mg by mouth every day.    EMPAGLIFLOZIN (JARDIANCE) 25 MG TAB    Take 12.5 mg by mouth every day.    FUROSEMIDE (LASIX) 20 MG TAB    Take 20 mg by mouth every day.    LOSARTAN (COZAAR) 100 MG TAB    Take 1 Tablet by mouth every day.    MULTIPLE VITAMINS-MINERALS (MULTIVITAMIN ADULT EXTRA C PO)    Take 1 Tablet by mouth every day.    OMEGA-3 FATTY ACIDS (FISH OIL) 1000 MG CAP CAPSULE    Take 1,000 mg by mouth every day.    SPIRONOLACTONE (ALDACTONE) 25 MG TAB    Take 0.5 Tablets by mouth every day.    TAMSULOSIN (FLOMAX) 0.4 MG CAPSULE    Take 0.8 mg by mouth every day.    VERQUVO 2.5 MG TAB    Take 1 Tablet by mouth every day.    VITAMIN D3 (CHOLECALCIFEROL) 1000 UNIT (25 MCG) TAB    Take 1,000 Units by mouth every day.       ALLERGIES  Allergies[3]     PHYSICAL EXAM  BP (!) 169/76   Pulse 84   Temp 37.7 °C (99.8 °F) (Temporal)   Resp (!) 22   Ht 1.88 m (6' 2\")   Wt 81.6 kg (180 lb)   SpO2 94%   BMI 23.11 kg/m²    General: No acute distress  Neuro: Neuro: GCS 15, A and O x 4, CN 2-12 GI, MS 5/5 x 4 ex, Sensation Coordination Gait wnl, patient has bilateral upper extremity tremulousness, primarily intention tremor, tremor better at rest..  Neck: Supple  Cardiac: Regular rate and rhythm  Pulmonary: Clear to auscultation bilaterally " no distress  Abdomen: Soft nontender nondistended  Back: Nontender  Psych: Normal  Skin: Pink warm dry  Extremities: Full range of motion, muscle strength sensation intact 2+ pulses    DIAGNOSTIC STUDIES/PROCEDURES  Labs:   Labs Reviewed   CBC WITH DIFFERENTIAL - Abnormal; Notable for the following components:       Result Value    WBC 3.3 (*)     RBC 4.65 (*)     .7 (*)     MCH 33.8 (*)     RDW 53.8 (*)     Platelet Count 125 (*)     Neutrophils-Polys 92.80 (*)     Lymphocytes 5.40 (*)     Lymphs (Absolute) 0.18 (*)     All other components within normal limits   COMP METABOLIC PANEL - Abnormal; Notable for the following components:    Co2 18 (*)     Glucose 120 (*)     Bun 28 (*)     Creatinine 1.96 (*)     All other components within normal limits   PROTHROMBIN TIME - Abnormal; Notable for the following components:    PT 16.0 (*)     INR 1.28 (*)     All other components within normal limits   ESTIMATED GFR - Abnormal; Notable for the following components:    GFR (CKD-EPI) 33 (*)     All other components within normal limits   LIPASE   APTT   MAGNESIUM     I have independently interpreted the above labs    EKG:   Results for orders placed or performed during the hospital encounter of 25   EKG   Result Value Ref Range    Report       [facility] Emergency Dept.    Test Date:  2025  Pt Name:    KYUNG LEONARD               Department: ER  MRN:        [id number]                      Room:        14  Gender:     Male                         Technician: [id number]  :        [id number]                   Requested By:ER TRIAGE PROTOCOL  Order #:    [id number]                    Reading MD:    Measurements  Intervals                                Axis  Rate:       84                           P:          -74  IN:         46                           QRS:        -78  QRSD:       181                          T:          101  QT:         453  QTc:        536    Interpretive  Statements  Atrial-ventricular dual-paced complexes  No further analysis attempted due to paced rhythm  Compared to ECG 03/29/2024 09:29:29  Atrial flutter no longer present       I have independently interpreted this EKG    Radiology:   This attending emergency physician has independently interpreted the diagnostic imaging associated with this visit and is awaiting the final reading from the radiologist.   Preliminary interpretation is a follows: No abnormalities noted  Radiologist interpretation:   CT-HEAD W/O   Final Result         1.  No acute intracranial process.              COURSE & MEDICAL DECISION MAKING     INITIAL ASSESSMENT, COURSE AND PLAN  Differential diagnoses include but not limited to: Medication side effect, Hyponatremia, Hyperglycemia, TIA, Alcohol withdrawal, Parkinson's      Care Narrative: 84-year-old male with tremor over the last few years, having tremor again this evening.    11:58 PM - Patient was first seen and evaluated at bedside. Patient presents to the ED for upper extremity tremor. Ordered for EKG, CBC w/ diff, CMP, Lipase, Magnesium, PTT, PT/INR, CT-CTA head, CT-CTA neck  to evaluate. The patient will be medicated with NS bolus for his symptoms. Patient verbalizes understanding and support with my plan of care.     12:48 AM - Patient medicated with Valium 5 mg.     HYDRATION: Based on the patient's presentation the patient was given IV fluids. IV Hydration was used because oral hydration was not adequate alone. Upon recheck following hydration, the patient was improved.     Discharged with PCP and neurology follow-up.  Referral for neurology placed.     ED COURSE AND ADDITIONAL PROBLEMS  (R25.1) Tremor, Acute  (primary encounter diagnosis): Patient has had this tremor intermittently over the last few years, I am concerned he might have Parkinson's.  We put in a referral for neurology patient agrees also to follow-up with primary care in the next 48 hours.  He agrees with neurology  follow-up.  Of note you know we gave the patient Valium as it seems as though this might be alcohol withdrawal and his tremor very much improved.  It is possible.  We discussed with him the challenges of drinking alcohol and he is rosa m discuss with primary care.  Otherwise his labs and imaging were reassuring.    (I10) Hypertension, unspecified type, Acute: Patient agrees to discuss with primary care.  It was transitory and is resolved.    (N18.9) Chronic kidney disease, unspecified CKD stage, Acute: Patient has similar renal function test in the past.  He says he will discuss with primary care.    Medications   NS (Bolus) 0.9 % infusion 500 mL (0 mL Intravenous Stopped 6/15/25 0152)   diazePAM (Valium) injection 5 mg (5 mg Intravenous Given 6/15/25 0049)         DISPOSITION AND DISCUSSIONS    I have discussed management of the patient with the following physicians and JARED's:  None    Discussion of management with other Q or appropriate source(s): CT technician    Escalation of care considered, and ultimately not performed: acute inpatient care management, however at this time, the patient is most appropriate for outpatient management.    Barriers to care at this time, including but not limited to: Patient does not have established PCP.     Decision tools and prescription drugs considered including, but not limited to: Patient agrees to continue his home medications..    The patient will return for new or worsening symptoms and is stable at the time of discharge.    The patient is referred to a primary physician for blood pressure management, diabetic screening, and for all other preventative health concerns.    DISPOSITION:  Patient will be discharged home in stable condition.    FOLLOW UP:  Gerson Ramirez D.O.  [address] [id number] [phone]    Schedule an appointment as soon as possible for a visit in 2 days      [facility], Emergency Dept  [address]  [address]  [phone]    As needed, If symptoms worsen      OUTPATIENT MEDICATIONS:  New Prescriptions    No medications on file         FINAL DIAGNOSIS  1. Tremor Acute   2. Hypertension, unspecified type Acute   3. Chronic kidney disease, unspecified CKD stage Acute        Ada URIARTE (Scribdenise), am scribing for, and in the presence of, Wes Vidal D.O..    Electronically signed by: Ada Snider (Minalibe), 6/14/2025    Wes URIARTE D.O. personally performed the services described in this documentation, as scribed by Ada Snider in my presence, and it is both accurate and complete.      The note accurately reflects work and decisions made by me.  Wes Vidal D.O.  6/15/2025  1:55 AM         [1]   Past Medical History:  Diagnosis Date    Breath shortness     on exertion    Cardiomyopathy (HCC) 05/2021    Echocardiogram with moderately dilated LV, mild concentric LVH, LVEF 20%. Global hypokinesis. Normal RA and RV. Severely dilated LA. Mild MR, mild TR. RVSP 34mmHg.    English measles     Heart attack (HCC)     hx 2007    History of abdominal aortic aneurysm (AAA) 2009    Status post stent    Hyperlipidemia     Hypertension     Mumps     Pacemaker     AICD    Paroxysmal atrial fibrillation (HCC)     Status post Watchman procedure in [place].    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     Sleep apnea     Snoring     Stroke (HCC) 2012    TIA    Tonsillitis     Ventricular tachycardia (HCC)    [2]   Past Surgical History:  Procedure Laterality Date    AICD BATTERY CHANGE Left 09/10/2020     Upgrade to Medtronic eTobbia MRI Quad CRT-D [id number] implanted by Dr. Barrios.    AICD BATTERY CHANGE Left 01/03/2017    Generator replacement with Medtronic Evera MRI XT  [id number] implanted in [place].    OTHER CARDIAC SURGERY  2014    watchman device    AAA WITH STENT GRAFT  2009    OTHER ORTHOPEDIC SURGERY  2005    hip surgery    OTHER  2000    kidney removed    OTHER CARDIAC SURGERY  1991    AICD implanted   [3]    Allergies  Allergen Reactions    Entresto [Sacubitril-Valsartan]      Swollen tongue. Angioedema.    Crestor [Rosuvastatin]

## 2025-06-15 NOTE — DISCHARGE INSTRUCTIONS
We recommend you follow-up with your primary care in 48 hours to discuss your recent emergency department visit.  Also we put a referral in for neurology.  We provided you with the neurology follow-up.  We recommend you seek care with them to discuss your tremor.  We are thankful that your tremor has resolved here in the emergency department.

## 2025-06-15 NOTE — ED TRIAGE NOTES
"Chief Complaint   Patient presents with    Shaking     Pt presents with persistent shaking staring about 20min PTA. Pt denies additional complaints. Pt alert and oriented.          BIB [facility] for above complaint.     Past Medical History[1]        BP (!) 169/76   Pulse 84   Temp 37.7 °C (99.8 °F) (Temporal)   Resp (!) 22   Ht 1.88 m (6' 2\")   Wt 81.6 kg (180 lb)   SpO2 94%   BMI 23.11 kg/m²           [1]   Past Medical History:  Diagnosis Date    Breath shortness     on exertion    Cardiomyopathy (HCC) 05/2021    Echocardiogram with moderately dilated LV, mild concentric LVH, LVEF 20%. Global hypokinesis. Normal RA and RV. Severely dilated LA. Mild MR, mild TR. RVSP 34mmHg.    [place] measles     Heart attack (HCC)     hx 2007    History of abdominal aortic aneurysm (AAA) 2009    Status post stent    Hyperlipidemia     Hypertension     Mumps     Pacemaker     AICD    Paroxysmal atrial fibrillation (HCC)     Status post Watchman procedure in [place].    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     Sleep apnea     Snoring     Stroke (HCC) 2012    TIA    Tonsillitis     Ventricular tachycardia (HCC)      "

## 2025-06-25 ENCOUNTER — TELEPHONE (OUTPATIENT)
Dept: NEUROLOGY | Facility: MEDICAL CENTER | Age: 85
End: 2025-06-25
Payer: OTHER GOVERNMENT

## 2025-06-26 ENCOUNTER — OFFICE VISIT (OUTPATIENT)
Dept: NEUROLOGY | Facility: MEDICAL CENTER | Age: 85
End: 2025-06-26
Attending: INTERNAL MEDICINE
Payer: MEDICARE

## 2025-06-26 VITALS
SYSTOLIC BLOOD PRESSURE: 92 MMHG | TEMPERATURE: 96.8 F | HEART RATE: 70 BPM | HEIGHT: 74 IN | WEIGHT: 175.93 LBS | OXYGEN SATURATION: 96 % | RESPIRATION RATE: 16 BRPM | BODY MASS INDEX: 22.58 KG/M2 | DIASTOLIC BLOOD PRESSURE: 52 MMHG

## 2025-06-26 DIAGNOSIS — G20.C PARKINSONISM, UNSPECIFIED PARKINSONISM TYPE (HCC): Primary | ICD-10-CM

## 2025-06-26 DIAGNOSIS — G25.0 ESSENTIAL TREMOR: ICD-10-CM

## 2025-06-26 PROCEDURE — 99214 OFFICE O/P EST MOD 30 MIN: CPT

## 2025-06-26 ASSESSMENT — PATIENT HEALTH QUESTIONNAIRE - PHQ9
SUM OF ALL RESPONSES TO PHQ QUESTIONS 1-9: 6
CLINICAL INTERPRETATION OF PHQ2 SCORE: 1
5. POOR APPETITE OR OVEREATING: 1 - SEVERAL DAYS

## 2025-06-26 ASSESSMENT — FIBROSIS 4 INDEX: FIB4 SCORE: 4.46

## 2025-06-26 NOTE — PROGRESS NOTES
[facility]  [address] [place] [address], [address]. [place] [address]  Phone: [phone], Fax: [phone]    Giovany Singleton DO  Neurology, Movement Disorders    ASSESSMENT / PLAN   Lencho Parker is a 84 y.o. RHD male presenting for tremors    Essential tremor  Possible parkinsonism  Progressive worsening of bilateral hand tremors since 2022.  Exam notable for minimal postural tremors, 1 to 3 cm intention tremors, intermittent 1 to 3 cm right rest tremor, <1cm left rest tremor.  Sinusoidal Archimedes spiral noted as well.  Minimal rigidity or bradykinesia.  Likely diagnosis essential tremor.  Question whether parkinsonism is involved given his history of agent orange exposure.  It is possible that the rest tremor is just related to the essential tremor as he has minimal rigidity or bradykinesia.  However, has postural instability too  - Defer starting medications at this time  - DaTscan ordered        Orders Placed This Encounter    NM-BRAIN IMAGING SPECT SINGLE DAY     Return in about 4 months (around 10/26/2025).    BILLING DOCUMENTATION:   Excluding time spent on procedures during visit, I spent 75 minutes reviewing the medical record, interviewing and examining the patient, discussing diagnosis and treatment, and coordinating care.    No procedure          HISTORY OF PRESENT ILLNESS   Lencho Parker is a 84 y.o. RHD male presenting for tremors  PMHx: Sleep apnea on BiPAP, heart failure with AICD, paroxysmal A-fib with Watchman device,    Initial HPI 06/26/25  Onset 2-3 years ago. Trouble shaving. Hard hold a book to read. Worse on the right. No tremors aside from arms. Handwriting messier and smaller. Mild tremor when eating with utensils.  Not spilling drinks. Wine doesn't help with tremors.    No FH of tremors.   Neuroleptics/pesticide exposure: agent orange exposure      Interval history  06/26/25: First visit with me.  DaTscan ordered      Current  Regimen  none      ROS  Gait:  Impaired balance due to neuropathy  Uses taller walker when longer distances. Cane otherwise. Fell 1.5 year ago, tripped on rocks at a new living facility    Orthostasis:   Only if standing quickly    GI:   No constipation    :   No significant issues    Speech/Swallow:   Voice change, but atributed to thyroid srugery  No dysphagia    Cognition:   Cognitive worsening. Driving OK, will need to check directions at times.  Finances OK.    Mood:   No anxiety  06/26/25 PHQ-9 = 6, C-SSRS = neg    Hallucinations:   No issues    Sleep/RBD:   No insomnia  Uses CPAP  No known dream enactment      Past Medical History[1]  Past Surgical History[2]  Family History   Problem Relation Age of Onset    Cancer Mother     Cancer Sister      Social History     Socioeconomic History    Marital status: Single     Spouse name: Not on file    Number of children: Not on file    Years of education: Not on file    Highest education level: Not on file   Occupational History    Not on file   Tobacco Use    Smoking status: Former     Current packs/day: 0.50     Types: Cigarettes    Smokeless tobacco: Former   Vaping Use    Vaping status: Never Used   Substance and Sexual Activity    Alcohol use: Yes     Alcohol/week: 8.4 oz     Types: 14 Glasses of wine per week     Comment: couple glasses of wine daily    Drug use: Not Currently     Comment: denies    Sexual activity: Not on file   Other Topics Concern    Not on file   Social History Narrative    Not on file     Social Drivers of Health     Financial Resource Strain: Medium Risk (11/28/2023)    Received from [facility]    Overall Financial Resource Strain (CARDIA)     Difficulty of Paying Living Expenses: Somewhat hard   Food Insecurity: No Food Insecurity (11/28/2023)    Received from [facility]    Hunger Vital Sign     Worried About Running Out of Food in the Last Year: Never true     Ran Out of Food in the Last Year: Never true   Transportation  Needs: No Transportation Needs (11/28/2023)    Received from [facility]    PRAPARE - Transportation     Lack of Transportation (Medical): No     Lack of Transportation (Non-Medical): No   Physical Activity: Inactive (11/28/2023)    Received from [facility]    Exercise Vital Sign     Days of Exercise per Week: 0 days     Minutes of Exercise per Session: 0 min   Stress: Stress Concern Present (11/28/2023)    Received from [facility]    [place] [employer] - Occupational Stress Questionnaire     Feeling of Stress : To some extent   Social Connections: Unknown (11/28/2023)    Received from [facility]    Social Connection and Isolation Panel [NHANES]     Frequency of Communication with Friends and Family: Once a week     Frequency of Social Gatherings with Friends and Family: Once a week     Attends Church Services: Never     Active Member of Clubs or Organizations: No     Attends Club or Organization Meetings: Never     Marital Status: Patient declined   Intimate Partner Violence: Not At Risk (11/28/2023)    Received from [facility]    Humiliation, Afraid, Rape, and Kick questionnaire     Fear of Current or Ex-Partner: No     Emotionally Abused: No     Physically Abused: No     Sexually Abused: No   Housing Stability: Low Risk  (11/28/2023)    Received from [facility]    Housing Stability Vital Sign     Unable to Pay for Housing in the Last Year: No     Number of Places Lived in the Last Year: 1     Unstable Housing in the Last Year: No     Current Outpatient Medications   Medication    VERQUVO 2.5 MG Tab    Empagliflozin (JARDIANCE) 25 MG Tab    albuterol 108 (90 Base) MCG/ACT Aero Soln inhalation aerosol    carvedilol (COREG) 12.5 MG Tab    apixaban (ELIQUIS) 5mg Tab    atorvastatin (LIPITOR) 40 MG Tab    allopurinol (ZYLOPRIM) 100 MG Tab    spironolactone (ALDACTONE) 25 MG Tab    losartan (COZAAR) 100 MG Tab    vitamin D3 (CHOLECALCIFEROL) 1000 Unit (25 mcg)  Tab    furosemide (LASIX) 20 MG Tab    Multiple Vitamins-Minerals (MULTIVITAMIN ADULT EXTRA C PO)    tamsulosin (FLOMAX) 0.4 MG capsule    cinacalcet (SENSIPAR) 30 MG Tab    amiodarone (CORDARONE) 200 MG Tab    Omega-3 Fatty Acids (FISH OIL) 1000 MG Cap capsule     No current facility-administered medications for this visit.     Allergies[3]          DATA / RESULTS     CT Head 6/2025  FINDINGS:     No acute hemorrhage, mass-effect, or midline shift.   There is diffuse atrophy.   Periventricular white matter changes are consistent with chronic small vessel disease. Encephalomalacia in the left temporal lobe, right occipital lobe, left frontal lobe, and left cerebellum  Ventricles and cisterns are patent. No ischemia or intracranial mass is identified. The paranasal sinuses and mastoid air cells are clear.   IMPRESSION:  1.  No acute intracranial process.      25-Hydroxy   Vitamin D 25   Date Value Ref Range Status   12/08/2022 65 30 - 100 ng/mL Final     Comment:     Adult Ranges:   <20 ng/mL - Deficiency  20-29 ng/mL - Insufficiency   ng/mL - Sufficiency  Electrochemiluminescence binding assay performed using Roche walker e  immunoassay analyzer.  The [employer]ys Vitamin D total II assay is intended for  the quantitative determination of total 25 hydroxyvitamin D in human serum  and plasma. This assay is to be used as an aid in the assessment of vitamin  D sufficiency in adults.       Vitamin B12 -True Cobalamin   Date Value Ref Range Status   07/20/2023 1410 (H) 211 - 911 pg/mL Final     TSH   Date Value Ref Range Status   02/28/2025 2.490 0.380 - 5.330 uIU/mL Final     Comment:     The 2011 American Thyroid Association (DEE DEE) guidelines  recommended that the interpretation of thyroid function in  pregnancy be based on trimester specific reference ranges.    1st Trimester  0.100-2.500 mIU/L  2nd Trimester  0.200-3.000 mIU/L  3rd Trimester  0.300-3.500 mIU/L    These established reference ranges have not been  "validated  at [employer].       Glycohemoglobin   Date Value Ref Range Status   12/08/2022 6.4 (H) 4.0 - 5.6 % Final     Comment:     Increased risk for diabetes:  5.7 -6.4%  Diabetes:  >6.4%  Glycemic control for adults with diabetes:  <7.0%    The above interpretations are per ADA guidelines.  Diagnosis  of diabetes mellitus on the basis of elevated Hemoglobin A1c  should be confirmed by repeating the Hb A1c test.       LDL   Date Value Ref Range Status   02/28/2025 63 <100 mg/dL Final                OBJECTIVE      Vitals:    06/26/25 1111   BP: 92/52   BP Location: Left arm   Patient Position: Sitting   BP Cuff Size: Adult   Pulse: 70   Resp: 16   Temp: 36 °C (96.8 °F)   TempSrc: Temporal   SpO2: 96%   Weight: 79.8 kg (175 lb 14.8 oz)   Height: 1.88 m (6' 2\")     Physical Exam  General: NAD, appears stated age.      Mental status: Speech clear and fluent without tremor. Fund of knowledge is good.      Cranial Nerves:  CN2: PERRL. Visual fields are full to finger confrontation.   CN3/4/6: EOMI. There is no nystagmus.   CN5: V1-V3 intact to light touch    CN7: Symmetric face.   CN8: Hearing grossly intact.   CN9/10/12: Soft palate and uvula rise symmetrically. Tongue midline.   CN11: Shoulder shrug intact bilaterally.     Strength  Right Left   Shoulder Abduction  5 5   Elbow Flexion 5 5   Elbow Extension  5 5   Wrist Extension  5 5   Finger Extension  5 5   Hip Flexion  5 5   Knee Extension 5 5   Ankle Dorsiflexion  5 5     Deep Tendon Reflexes: 1+ biceps, brachioradialis, patella and 0 ankles    Abnormal movements:    UPDRS Right Left   Finger tapping 0 0   Hand Movement 0 0   Toe Tapping 0 0   Leg Agility 0 0   Rigidity 0 0   Rest Tremor 2 1   Postural Tremor 0 1   Kinetic Tremor 2 2      No dystonia, dyskinesias, tics, stereotypies, athetosis, akathisia, or chorea noted.          Sensory: decreased to sharp on right    Quantitative tuning fork Right Left   Hands 8 8   Feet 6 6       Cerebellar: " No dysmetria with FTN    Gait:   Posture - slightly stooped  Base - narrow   Stride length - normal   Arm swing - holding cane, but no reemergent tremor   Speed - slow, slightly antalgic  Shuffling/freezing - none  U-Turn - normal           PROCEDURE     N/A         [1]   Past Medical History:  Diagnosis Date    Breath shortness     on exertion    Cardiomyopathy (HCC) 05/2021    Echocardiogram with moderately dilated LV, mild concentric LVH, LVEF 20%. Global hypokinesis. Normal RA and RV. Severely dilated LA. Mild MR, mild TR. RVSP 34mmHg.    [place] measles     Heart attack (HCC)     hx 2007    History of abdominal aortic aneurysm (AAA) 2009    Status post stent    Hyperlipidemia     Hypertension     Mumps     Pacemaker     AICD    Paroxysmal atrial fibrillation (HCC)     Status post Watchman procedure in [place].    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     Sleep apnea     Snoring     Stroke (HCC) 2012    TIA    Tonsillitis     Ventricular tachycardia (HCC)    [2]   Past Surgical History:  Procedure Laterality Date    AICD BATTERY CHANGE Left 09/10/2020     Upgrade to [employer] MRI Quad CRT-D [id number] implanted by Dr. Barrios.    AICD BATTERY CHANGE Left 01/03/2017    Generator replacement with [employer] MRI XT  [id number] implanted in [place].    OTHER CARDIAC SURGERY  2014    watchman device    AAA WITH STENT GRAFT  2009    OTHER ORTHOPEDIC SURGERY  2005    hip surgery    OTHER  2000    kidney removed    OTHER CARDIAC SURGERY  1991    AICD implanted   [3]   Allergies  Allergen Reactions    Entresto [Sacubitril-Valsartan]      Swollen tongue. Angioedema.    Crestor [Rosuvastatin]       4

## 2025-07-09 NOTE — Clinical Note
REFERRAL APPROVAL NOTICE         Sent on July 9, 2025                   Lencho Parker  [address]  [place] [address]                   Dear Mr. Parker,    After a careful review of the medical information and benefit coverage, [facility] has processed your referral. See below for additional details.    If applicable, you must be actively enrolled with your insurance for coverage of the authorized service. If you have any questions regarding your coverage, please contact your insurance directly.    REFERRAL INFORMATION   Referral #:  [id number]  Referred-To Department    Referred-By Provider:  Cardiology    Patient Self-Referred   [facility]      BLANK  SUSAN MAYBERRY  Referring provider phone N/A [address], [address]  [place] [id number] [phone]    Referral Start Date:  07/09/2025  Referral End Date:   07/09/2026           SCHEDULING  If you do not already have an appointment, please call [phone] to make an appointment.   MORE INFORMATION  As a reminder, [facility] ownership has changed, meaning this location is now owned and operated by [facility]. As such, we want to clarify that our patients should expect to receive two separate bills for the services received at [facility] - one representing the [facility] facility fees as the owner of the establishment, and the other to represent the physician's services and subsequent fees. You can speak with your insurance carrier for a pricing estimate by calling the customer service number on the back of your card and ask about charges for a hospital outpatient visit.  If you do not already have a [employer] account, sign up at: [email or web address]  You can access your medical information, make appointments, see lab results, billing information, and more.  If you have questions regarding this referral, please contact  the [facility] department  at:             [phone]. Monday - Friday 7:30AM - 5:00PM.      Sincerely,  [facility]

## 2025-07-15 ENCOUNTER — HOSPITAL ENCOUNTER (OUTPATIENT)
Dept: RADIOLOGY | Facility: MEDICAL CENTER | Age: 85
End: 2025-07-15
Attending: INTERNAL MEDICINE
Payer: MEDICARE

## 2025-07-15 DIAGNOSIS — G20.C PARKINSONISM, UNSPECIFIED PARKINSONISM TYPE (HCC): ICD-10-CM

## 2025-07-15 PROCEDURE — A9584 IODINE I-123 IOFLUPANE: HCPCS

## 2025-07-17 ENCOUNTER — ANCILLARY PROCEDURE (OUTPATIENT)
Dept: CARDIOLOGY | Facility: MEDICAL CENTER | Age: 85
End: 2025-07-17
Attending: NURSE PRACTITIONER
Payer: MEDICARE

## 2025-07-17 DIAGNOSIS — E78.2 MIXED HYPERLIPIDEMIA: ICD-10-CM

## 2025-07-17 DIAGNOSIS — I47.20 VENTRICULAR TACHYCARDIA (HCC): ICD-10-CM

## 2025-07-17 DIAGNOSIS — Z79.899 HIGH RISK MEDICATION USE: ICD-10-CM

## 2025-07-17 DIAGNOSIS — I10 HTN (HYPERTENSION), MALIGNANT: ICD-10-CM

## 2025-07-17 DIAGNOSIS — I48.0 PAROXYSMAL ATRIAL FIBRILLATION (HCC): ICD-10-CM

## 2025-07-17 DIAGNOSIS — I50.20 ACC/AHA STAGE C SYSTOLIC HEART FAILURE (HCC): ICD-10-CM

## 2025-07-17 LAB
LV EJECT FRACT  99904: 20
LV EJECT FRACT MOD 2C 99903: 16.98
LV EJECT FRACT MOD 4C 99902: 18.22
LV EJECT FRACT MOD BP 99901: 17.73

## 2025-07-17 PROCEDURE — 93306 TTE W/DOPPLER COMPLETE: CPT

## 2025-07-17 PROCEDURE — 93306 TTE W/DOPPLER COMPLETE: CPT | Mod: 26 | Performed by: INTERNAL MEDICINE

## 2025-08-11 ENCOUNTER — TELEPHONE (OUTPATIENT)
Dept: CARDIOLOGY | Facility: MEDICAL CENTER | Age: 85
End: 2025-08-11
Payer: MEDICARE

## 2025-08-15 ENCOUNTER — TELEPHONE (OUTPATIENT)
Dept: CARDIOLOGY | Facility: MEDICAL CENTER | Age: 85
End: 2025-08-15

## 2025-08-15 ENCOUNTER — NON-PROVIDER VISIT (OUTPATIENT)
Dept: CARDIOLOGY | Facility: MEDICAL CENTER | Age: 85
End: 2025-08-15

## 2025-08-15 ENCOUNTER — NON-PROVIDER VISIT (OUTPATIENT)
Dept: CARDIOLOGY | Facility: MEDICAL CENTER | Age: 85
End: 2025-08-15
Attending: NURSE PRACTITIONER
Payer: MEDICARE

## 2025-08-15 DIAGNOSIS — I42.0 DILATED CARDIOMYOPATHY (HCC): Primary | ICD-10-CM

## 2025-08-15 DIAGNOSIS — I50.20 ACC/AHA STAGE C SYSTOLIC HEART FAILURE (HCC): ICD-10-CM

## 2025-08-15 DIAGNOSIS — Z95.810 PRESENCE OF BIVENTRICULAR AUTOMATIC CARDIOVERTER/DEFIBRILLATOR (AICD): Primary | ICD-10-CM

## 2025-08-15 DIAGNOSIS — Z95.810 PRESENCE OF BIVENTRICULAR AUTOMATIC CARDIOVERTER/DEFIBRILLATOR (AICD): ICD-10-CM

## 2025-08-15 DIAGNOSIS — I42.0 DILATED CARDIOMYOPATHY (HCC): ICD-10-CM

## 2025-08-15 PROCEDURE — 93284 PRGRMG EVAL IMPLANTABLE DFB: CPT | Performed by: STUDENT IN AN ORGANIZED HEALTH CARE EDUCATION/TRAINING PROGRAM

## 2025-08-21 ENCOUNTER — NON-PROVIDER VISIT (OUTPATIENT)
Dept: CARDIOLOGY | Facility: MEDICAL CENTER | Age: 85
End: 2025-08-21
Payer: MEDICARE

## 2025-08-21 PROCEDURE — 93295 DEV INTERROG REMOTE 1/2/MLT: CPT | Performed by: INTERNAL MEDICINE

## 2025-08-22 ENCOUNTER — TELEPHONE (OUTPATIENT)
Dept: CARDIOLOGY | Facility: MEDICAL CENTER | Age: 85
End: 2025-08-22
Payer: MEDICARE

## 2025-08-28 ENCOUNTER — HOSPITAL ENCOUNTER (OUTPATIENT)
Facility: MEDICAL CENTER | Age: 85
End: 2025-08-28
Attending: UROLOGY
Payer: MEDICARE

## 2025-08-28 LAB — PSA SERPL-MCNC: 7.07 NG/ML (ref 0–4)

## 2025-08-28 PROCEDURE — 36415 COLL VENOUS BLD VENIPUNCTURE: CPT

## 2025-08-28 PROCEDURE — 84153 ASSAY OF PSA TOTAL: CPT

## 2025-10-09 ENCOUNTER — APPOINTMENT (OUTPATIENT)
Dept: NEUROLOGY | Facility: MEDICAL CENTER | Age: 85
End: 2025-10-09
Attending: INTERNAL MEDICINE
Payer: MEDICARE